# Patient Record
Sex: FEMALE | URBAN - METROPOLITAN AREA
[De-identification: names, ages, dates, MRNs, and addresses within clinical notes are randomized per-mention and may not be internally consistent; named-entity substitution may affect disease eponyms.]

---

## 2021-09-15 ENCOUNTER — TRANSCRIBE ORDER (OUTPATIENT)
Dept: SCHEDULING | Age: 61
End: 2021-09-15

## 2021-09-15 DIAGNOSIS — M81.0 POSTMENOPAUSAL OSTEOPOROSIS: Primary | ICD-10-CM

## 2021-09-15 DIAGNOSIS — M79.604 LEG PAIN, RIGHT: ICD-10-CM

## 2021-09-15 DIAGNOSIS — M79.662 PAIN OF LEFT LOWER LEG: ICD-10-CM

## 2023-01-31 ENCOUNTER — HOSPITAL ENCOUNTER (INPATIENT)
Age: 63
LOS: 3 days | Discharge: HOME HEALTH | End: 2023-02-03
Attending: EMERGENCY MEDICINE | Admitting: INTERNAL MEDICINE
Payer: MEDICARE

## 2023-01-31 ENCOUNTER — APPOINTMENT (OUTPATIENT)
Dept: MRI IMAGING | Age: 63
End: 2023-01-31
Attending: INTERNAL MEDICINE
Payer: MEDICARE

## 2023-01-31 ENCOUNTER — APPOINTMENT (OUTPATIENT)
Dept: CT IMAGING | Age: 63
End: 2023-01-31
Attending: EMERGENCY MEDICINE
Payer: MEDICARE

## 2023-01-31 DIAGNOSIS — I10 MALIGNANT HYPERTENSION: ICD-10-CM

## 2023-01-31 DIAGNOSIS — I63.9 CEREBROVASCULAR ACCIDENT (CVA), UNSPECIFIED MECHANISM (HCC): Primary | ICD-10-CM

## 2023-01-31 DIAGNOSIS — R53.1 LEFT-SIDED WEAKNESS: ICD-10-CM

## 2023-01-31 LAB
ALBUMIN SERPL-MCNC: 3.8 G/DL (ref 3.5–5)
ALBUMIN/GLOB SERPL: 1 (ref 1.1–2.2)
ALP SERPL-CCNC: 95 U/L (ref 45–117)
ALT SERPL-CCNC: 10 U/L (ref 12–78)
ANION GAP SERPL CALC-SCNC: 5 MMOL/L (ref 5–15)
APPEARANCE UR: ABNORMAL
AST SERPL W P-5'-P-CCNC: 10 U/L (ref 15–37)
ATRIAL RATE: 61 BPM
BACTERIA URNS QL MICRO: ABNORMAL /HPF
BASOPHILS # BLD: 0 K/UL (ref 0–0.1)
BASOPHILS NFR BLD: 0 % (ref 0–1)
BILIRUB SERPL-MCNC: 0.3 MG/DL (ref 0.2–1)
BILIRUB UR QL: NEGATIVE
BUN SERPL-MCNC: 29 MG/DL (ref 6–20)
BUN/CREAT SERPL: 14 (ref 12–20)
CA-I BLD-MCNC: 9.3 MG/DL (ref 8.5–10.1)
CALCULATED P AXIS, ECG09: 66 DEGREES
CALCULATED R AXIS, ECG10: 16 DEGREES
CALCULATED T AXIS, ECG11: 79 DEGREES
CHLORIDE SERPL-SCNC: 106 MMOL/L (ref 97–108)
CO2 SERPL-SCNC: 26 MMOL/L (ref 21–32)
COLOR UR: YELLOW
CREAT SERPL-MCNC: 2.11 MG/DL (ref 0.55–1.02)
DIAGNOSIS, 93000: NORMAL
DIFFERENTIAL METHOD BLD: ABNORMAL
EOSINOPHIL # BLD: 0.1 K/UL (ref 0–0.4)
EOSINOPHIL NFR BLD: 1 % (ref 0–7)
EPITH CASTS URNS QL MICRO: ABNORMAL /LPF
ERYTHROCYTE [DISTWIDTH] IN BLOOD BY AUTOMATED COUNT: 15.4 % (ref 11.5–14.5)
GLOBULIN SER CALC-MCNC: 3.8 G/DL (ref 2–4)
GLUCOSE BLD STRIP.AUTO-MCNC: 107 MG/DL (ref 65–100)
GLUCOSE BLD STRIP.AUTO-MCNC: 77 MG/DL (ref 65–100)
GLUCOSE SERPL-MCNC: 186 MG/DL (ref 65–100)
GLUCOSE UR STRIP.AUTO-MCNC: NEGATIVE MG/DL
HCT VFR BLD AUTO: 39.8 % (ref 35–47)
HGB BLD-MCNC: 12.8 G/DL (ref 11.5–16)
HGB UR QL STRIP: ABNORMAL
IMM GRANULOCYTES # BLD AUTO: 0 K/UL (ref 0–0.04)
IMM GRANULOCYTES NFR BLD AUTO: 0 % (ref 0–0.5)
INR PPP: 1 (ref 0.9–1.1)
KETONES UR QL STRIP.AUTO: NEGATIVE MG/DL
LEUKOCYTE ESTERASE UR QL STRIP.AUTO: ABNORMAL
LYMPHOCYTES # BLD: 3.2 K/UL (ref 0.8–3.5)
LYMPHOCYTES NFR BLD: 27 % (ref 12–49)
MCH RBC QN AUTO: 27.2 PG (ref 26–34)
MCHC RBC AUTO-ENTMCNC: 32.2 G/DL (ref 30–36.5)
MCV RBC AUTO: 84.5 FL (ref 80–99)
MONOCYTES # BLD: 0.4 K/UL (ref 0–1)
MONOCYTES NFR BLD: 3 % (ref 5–13)
MUCOUS THREADS URNS QL MICRO: ABNORMAL /LPF
NEUTS SEG # BLD: 8.2 K/UL (ref 1.8–8)
NEUTS SEG NFR BLD: 69 % (ref 32–75)
NITRITE UR QL STRIP.AUTO: NEGATIVE
NRBC # BLD: 0 K/UL (ref 0–0.01)
NRBC BLD-RTO: 0 PER 100 WBC
P-R INTERVAL, ECG05: 168 MS
PERFORMED BY, TECHID: ABNORMAL
PERFORMED BY, TECHID: NORMAL
PH UR STRIP: 6 (ref 5–8)
PLATELET # BLD AUTO: 300 K/UL (ref 150–400)
PMV BLD AUTO: 9.1 FL (ref 8.9–12.9)
POTASSIUM SERPL-SCNC: 4.5 MMOL/L (ref 3.5–5.1)
PROT SERPL-MCNC: 7.6 G/DL (ref 6.4–8.2)
PROT UR STRIP-MCNC: >300 MG/DL
PROTHROMBIN TIME: 13.6 SEC (ref 11.9–14.6)
Q-T INTERVAL, ECG07: 458 MS
QRS DURATION, ECG06: 76 MS
QTC CALCULATION (BEZET), ECG08: 461 MS
RBC # BLD AUTO: 4.71 M/UL (ref 3.8–5.2)
RBC #/AREA URNS HPF: ABNORMAL /HPF (ref 0–5)
SODIUM SERPL-SCNC: 137 MMOL/L (ref 136–145)
SP GR UR REFRACTOMETRY: 1.01 (ref 1–1.03)
SP GR UR REFRACTOMETRY: 1.01 (ref 1–1.03)
TROPONIN-HIGH SENSITIVITY: 11 NG/L (ref 0–51)
UROBILINOGEN UR QL STRIP.AUTO: 0.1 EU/DL (ref 0.1–1)
VENTRICULAR RATE, ECG03: 61 BPM
WBC # BLD AUTO: 11.9 K/UL (ref 3.6–11)
WBC URNS QL MICRO: >100 /HPF (ref 0–4)

## 2023-01-31 PROCEDURE — 74011250637 HC RX REV CODE- 250/637: Performed by: EMERGENCY MEDICINE

## 2023-01-31 PROCEDURE — 92610 EVALUATE SWALLOWING FUNCTION: CPT

## 2023-01-31 PROCEDURE — 82962 GLUCOSE BLOOD TEST: CPT

## 2023-01-31 PROCEDURE — 65270000029 HC RM PRIVATE

## 2023-01-31 PROCEDURE — 80053 COMPREHEN METABOLIC PANEL: CPT

## 2023-01-31 PROCEDURE — 84484 ASSAY OF TROPONIN QUANT: CPT

## 2023-01-31 PROCEDURE — 36415 COLL VENOUS BLD VENIPUNCTURE: CPT

## 2023-01-31 PROCEDURE — 81001 URINALYSIS AUTO W/SCOPE: CPT

## 2023-01-31 PROCEDURE — 74011250637 HC RX REV CODE- 250/637: Performed by: INTERNAL MEDICINE

## 2023-01-31 PROCEDURE — 70551 MRI BRAIN STEM W/O DYE: CPT

## 2023-01-31 PROCEDURE — 93005 ELECTROCARDIOGRAM TRACING: CPT

## 2023-01-31 PROCEDURE — 70450 CT HEAD/BRAIN W/O DYE: CPT

## 2023-01-31 PROCEDURE — 85025 COMPLETE CBC W/AUTO DIFF WBC: CPT

## 2023-01-31 PROCEDURE — 99285 EMERGENCY DEPT VISIT HI MDM: CPT

## 2023-01-31 PROCEDURE — 85610 PROTHROMBIN TIME: CPT

## 2023-01-31 PROCEDURE — 74011250636 HC RX REV CODE- 250/636: Performed by: EMERGENCY MEDICINE

## 2023-01-31 RX ORDER — GUAIFENESIN 100 MG/5ML
81 LIQUID (ML) ORAL DAILY
Status: DISCONTINUED | OUTPATIENT
Start: 2023-02-01 | End: 2023-02-03 | Stop reason: HOSPADM

## 2023-01-31 RX ORDER — ATORVASTATIN CALCIUM 40 MG/1
40 TABLET, FILM COATED ORAL
Status: DISCONTINUED | OUTPATIENT
Start: 2023-01-31 | End: 2023-02-03 | Stop reason: HOSPADM

## 2023-01-31 RX ORDER — HYDRALAZINE HYDROCHLORIDE 20 MG/ML
20 INJECTION INTRAMUSCULAR; INTRAVENOUS ONCE
Status: COMPLETED | OUTPATIENT
Start: 2023-01-31 | End: 2023-01-31

## 2023-01-31 RX ORDER — SIMVASTATIN 20 MG/1
TABLET, FILM COATED ORAL
COMMUNITY
End: 2023-02-03

## 2023-01-31 RX ORDER — ACARBOSE 50 MG/1
TABLET ORAL
COMMUNITY

## 2023-01-31 RX ORDER — ATENOLOL 50 MG/1
TABLET ORAL DAILY
COMMUNITY
End: 2023-02-03

## 2023-01-31 RX ORDER — HYDROCHLOROTHIAZIDE 12.5 MG/1
12.5 TABLET ORAL DAILY
COMMUNITY

## 2023-01-31 RX ORDER — LORAZEPAM 2 MG/ML
1 INJECTION INTRAMUSCULAR ONCE
Status: COMPLETED | OUTPATIENT
Start: 2023-01-31 | End: 2023-01-31

## 2023-01-31 RX ORDER — GLIPIZIDE 5 MG/1
TABLET ORAL DAILY
COMMUNITY

## 2023-01-31 RX ORDER — ACETAMINOPHEN 325 MG/1
650 TABLET ORAL
Status: COMPLETED | OUTPATIENT
Start: 2023-01-31 | End: 2023-01-31

## 2023-01-31 RX ORDER — ACETAMINOPHEN 325 MG/1
650 TABLET ORAL
Status: DISCONTINUED | OUTPATIENT
Start: 2023-01-31 | End: 2023-02-03 | Stop reason: HOSPADM

## 2023-01-31 RX ORDER — ACETAMINOPHEN 650 MG/1
650 SUPPOSITORY RECTAL
Status: DISCONTINUED | OUTPATIENT
Start: 2023-01-31 | End: 2023-02-03 | Stop reason: HOSPADM

## 2023-01-31 RX ADMIN — ATORVASTATIN CALCIUM 40 MG: 40 TABLET, FILM COATED ORAL at 21:20

## 2023-01-31 RX ADMIN — ACETAMINOPHEN 650 MG: 325 TABLET ORAL at 20:22

## 2023-01-31 RX ADMIN — HYDRALAZINE HYDROCHLORIDE 20 MG: 20 INJECTION, SOLUTION INTRAMUSCULAR; INTRAVENOUS at 15:24

## 2023-01-31 RX ADMIN — ACETAMINOPHEN 650 MG: 325 TABLET ORAL at 11:55

## 2023-01-31 RX ADMIN — LORAZEPAM 1 MG: 2 INJECTION INTRAMUSCULAR; INTRAVENOUS at 15:24

## 2023-01-31 NOTE — ROUTINE PROCESS
TRANSFER - OUT REPORT:    Verbal report given to robe on Janice Cartagena  being transferred to [facility] for routine progression of care       Report consisted of patients Situation, Background, Assessment and   Recommendations(SBAR). Information from the following report(s) SBAR was reviewed with the receiving nurse. Lines:   Peripheral IV 01/31/23 Right Arm (Active)   Site Assessment Clean, dry, & intact 01/31/23 1051   Phlebitis Assessment 0 01/31/23 1051   Infiltration Assessment 0 01/31/23 1051   Dressing Status Clean, dry, & intact 01/31/23 1051   Dressing Type Tape;Transparent 01/31/23 1051   Hub Color/Line Status Pink;Flushed 01/31/23 1051   Action Taken Blood drawn 01/31/23 1051   Alcohol Cap Used Yes 01/31/23 1051        Opportunity for questions and clarification was provided.       Patient transported with:   [employer] Odomzo Counseling- I discussed with the patient the risks of Odomzo including but not limited to nausea, vomiting, diarrhea, constipation, weight loss, changes in the sense of taste, decreased appetite, muscle spasms, and hair loss.  The patient verbalized understanding of the proper use and possible adverse effects of Odomzo.  All of the patient's questions and concerns were addressed.

## 2023-01-31 NOTE — PROGRESS NOTES
SPEECH LANGUAGE PATHOLOGY BEDSIDE SWALLOW EVALUATIONS  Patient: Douglas Cyr  (14 y.o. )  Date: 1/31/2023  Primary Diagnosis: CVA   Precautions:  Fall    ASSESSMENT :  Patient is A&Ox4 and follows basic commands. No facial droop or dysarthria. Patient reports dysarthria comes and goes. Informally, language is wfl. Patient presents w/ wfl oropharyngeal swallow fxn. Oral phase c/b slow but adequate mastication of hard solids. Pharyngeal phase c/b timely swallow and HLE Is wfl upon palpation. No overt s/s of pen/asp observed. PLAN :  Recommendations and Planned Interventions:  Rec easy to chew w/ thin liquids and general asp/GERD precautions. No follow up for dysphagia. Speech eval pending MRI. SUBJECTIVE:   Patient reports L sided weakness and R sided paraesthesia. She denies dysphagia does report she has softer diet (stays away from hard vegetables). OBJECTIVE:   Patient admitted w/ left sided weakness and facial droop x3 days. Per patient had fall at home. Past Medical History:   Diagnosis Date    Chronic kidney disease     Diabetes ([facility] [address]     Hypertension     Stroke ([facility] [address]          Diet prior to admission: Soft/thin  Current Diet:  DIET ADULT     Cognitive and Communication Status:  Neurologic State: Alert  Orientation Level: Oriented X4  Cognition: Follows commands  Perception: Appears intact  Perseveration: No perseveration noted  Safety/Judgement: Awareness of environment  Swallowing Evaluation:   Oral Assessment:  Oral Assessment  Labial: No impairment  Dentition: Edentulous  Oral Hygiene: wfl  Lingual: No impairment  Velum: No impairment  Mandible: No impairment  P.O. Trials:  Patient Position: upright in bed  Vocal quality prior to P.O.: No impairment  Consistency Presented: Thin liquid; Solid;Puree  How Presented: Self-fed/presented;Cup/sip; Successive swallows;Straw     Bolus Acceptance: No impairment  Bolus Formation/Control: No impairment     Propulsion: No impairment  Oral Residue: None  Initiation of Swallow: No impairment  Laryngeal Elevation: Functional  Aspiration Signs/Symptoms: None  Pharyngeal Phase Characteristics: No impairment, issues, or problems              Oral Phase Severity: Minimal  Pharyngeal Phase Severity : No impairment  Voice:  Vocal Quality: No impairment     Pain:  Pain Scale 1: Numeric (0 - 10)  Pain Intensity 1: 7         After treatment:   Patient left in no apparent distress in bed, Call bell within reach, and Nursing notified    COMMUNICATION/EDUCATION:   Patient was educated regarding diet recs, s/s aspiration, and aspiration precautions. She demonstrated Good understanding as evidenced by engagement and appropriate questions. The patient's plan of care including recommendations, planned interventions, and recommended diet changes were discussed with: Registered nurse.      Thank you for this referral.  Margie Conklin M.S., M.Ed., CCC-SLP  Time Calculation: 16 mins

## 2023-01-31 NOTE — ED TRIAGE NOTES
Pt states she's had severe L sided weakness since Saturday. Slurred speech, Left facial droop. Pt is A&O x4. Glucose 274.

## 2023-01-31 NOTE — ED PROVIDER NOTES
[facility] EMERGENCY DEPT  EMERGENCY DEPARTMENT HISTORY AND PHYSICAL EXAM      Date: 1/31/2023  Patient Name: Henny Giang  MRN: [id number]  [place] 1960  Date of evaluation: 1/31/2023  Provider: Demetrius Ludwig MD   Note Started: 10:45 AM 1/31/23    HISTORY OF PRESENT ILLNESS     Chief Complaint   Patient presents with    Extremity Weakness       History Provided By: Patient    HPI: Henny Giang, 60 y.o. female presents to the emergency department complaint of left-sided weakness that started 3 days ago. Patient reports that the weakness was initially noticed when she was leaving the bathroom and caused her to fall. Patient reports mild residual weakness previously from a old stroke, but states this was weaker than before. PAST MEDICAL HISTORY   Past Medical History:  Diabetes, hypertension, CVA    Past Surgical History:  No past surgical history on file. Family History:  No family history on file. Social History:  Local resident, retired    Allergies:  No Known Allergies    PCP: No primary care provider on file. Current Meds:   Previous Medications    No medications on file       REVIEW OF SYSTEMS   Review of Systems  Positives and Pertinent negatives as per HPI. PHYSICAL EXAM     ED Triage Vitals   ED Encounter Vitals Group      BP       Pulse       Resp       Temp       Temp src       SpO2       Weight       Height       Physical Exam  Physical Exam  Constitutional:       General: No acute distress. Appearance: Normal appearance. Not toxic-appearing. HENT:      Head: Normocephalic and atraumatic. Nose: Nose normal.      Mouth/Throat:      Mouth: Mucous membranes are moist.   Eyes:      Extraocular Movements: Extraocular movements intact. Pupils: Pupils are equal, round, and reactive to light. Cardiovascular:      Rate and Rhythm: Normal rate. Pulses: Normal pulses. Pulmonary:      Effort: Pulmonary effort is normal.      Breath sounds: No stridor. Abdominal:      General: Abdomen is flat. There is no distension. Musculoskeletal:         General: Normal range of motion. Cervical back: Normal range of motion and neck supple. Skin:     General: Skin is warm and dry. Capillary Refill: Capillary refill takes less than 2 seconds. Neurological:      General: Left upper and lower extremity weakness noted. Left facial droop. Mental Status: Alert and oriented to person, place, and time. Psychiatric:         Mood and Affect: Mood normal.         Behavior: Behavior normal.     SCREENINGS        1/31/2023     1047   NIH Stroke Scale     Interval Baseline   Level of Conciousness (1a) Alert   LOC Questions (1b) Answers both questions correctly   LOC Commands (1c) Performs both tasks correctly   Best Gaze (2) Normal   Visual (3) Partial hemianopiaVisual (3). Partial hemianopia. Data is abnormal. Taken on 1/31/23 1047   Facial Palsy (4) Partial paralysisFacial Palsy (4). Partial paralysis. Data is abnormal. Taken on 1/31/23 1047   Motor Arm, Left (5a) Drift, but does not hit bedMotor Arm, Left (5a). Drift, but does not hit bed. Data is abnormal. Taken on 1/31/23 1047   Motor Arm, Right (5b) No drift   Motor Leg, Left (6a) Some effort against gravityMotor Leg, Left (6a). Some effort against gravity. Data is abnormal. Taken on 1/31/23 1047   Motor Leg, Right (6b) No drift   Limb Ataxia (7) Absent   Sensory (8) Mild to ModerateSensory (8). Mild to Moderate.  Data is abnormal. Taken on 1/31/23 1047   Best Language (9) No aphasia   Dysarthria (10) Normal   Extinction and Inattention (11) No abnormality   Total 7            No data recorded        LAB, EKG AND DIAGNOSTIC RESULTS   Labs:  Recent Results (from the past 12 hour(s))   TROPONIN-HIGH SENSITIVITY    Collection Time: 01/31/23 10:47 AM   Result Value Ref Range    Troponin-High Sensitivity 11 0 - 51 ng/L   CBC WITH AUTOMATED DIFF    Collection Time: 01/31/23 10:48 AM   Result Value Ref Range    WBC 11.9 (H) 3.6 - 11.0 K/uL    RBC 4.71 3.80 - 5.20 M/uL    HGB 12.8 11.5 - 16.0 g/dL    HCT 39.8 35.0 - 47.0 %    MCV 84.5 80.0 - 99.0 FL    MCH 27.2 26.0 - 34.0 PG    MCHC 32.2 30.0 - 36.5 g/dL    RDW 15.4 (H) 11.5 - 14.5 %    PLATELET 006 551 - 788 K/uL    MPV 9.1 8.9 - 12.9 FL    NRBC 0.0 0.0  WBC    ABSOLUTE NRBC 0.00 0.00 - 0.01 K/uL    NEUTROPHILS 69 32 - 75 %    LYMPHOCYTES 27 12 - 49 %    MONOCYTES 3 (L) 5 - 13 %    EOSINOPHILS 1 0 - 7 %    BASOPHILS 0 0 - 1 %    IMMATURE GRANULOCYTES 0 0 - 0.5 %    ABS. NEUTROPHILS 8.2 (H) 1.8 - 8.0 K/UL    ABS. LYMPHOCYTES 3.2 0.8 - 3.5 K/UL    ABS. MONOCYTES 0.4 0.0 - 1.0 K/UL    ABS. EOSINOPHILS 0.1 0.0 - 0.4 K/UL    ABS. BASOPHILS 0.0 0.0 - 0.1 K/UL    ABS. IMM. GRANS. 0.0 0.00 - 0.04 K/UL    DF AUTOMATED     PROTHROMBIN TIME + INR    Collection Time: 01/31/23 10:48 AM   Result Value Ref Range    Prothrombin time 13.6 11.9 - 14.6 sec    INR 1.0 0.9 - 1.1     METABOLIC PANEL, COMPREHENSIVE    Collection Time: 01/31/23 10:48 AM   Result Value Ref Range    Sodium 137 136 - 145 mmol/L    Potassium 4.5 3.5 - 5.1 mmol/L    Chloride 106 97 - 108 mmol/L    CO2 26 21 - 32 mmol/L    Anion gap 5 5 - 15 mmol/L    Glucose 186 (H) 65 - 100 mg/dL    BUN 29 (H) 6 - 20 mg/dL    Creatinine 2.11 (H) 0.55 - 1.02 mg/dL    BUN/Creatinine ratio 14 12 - 20      eGFR 26 (L) >60 ml/min/1.73m2    Calcium 9.3 8.5 - 10.1 mg/dL    Bilirubin, total 0.3 0.2 - 1.0 mg/dL    AST (SGOT) 10 (L) 15 - 37 U/L    ALT (SGPT) 10 (L) 12 - 78 U/L    Alk. phosphatase 95 45 - 117 U/L    Protein, total 7.6 6.4 - 8.2 g/dL    Albumin 3.8 3.5 - 5.0 g/dL    Globulin 3.8 2.0 - 4.0 g/dL    A-G Ratio 1.0 (L) 1.1 - 2.2             Interpretation per the Radiologist below, if available at the time of this note:  CT HEAD WO CONT    Result Date: 1/31/2023  HEAD CT WITHOUT CONTRAST: 1/31/2023 11:43 AM INDICATION: Left-sided weakness, prior cerebrovascular accident. COMPARISON: 3/11/2011.  PROCEDURE: Axial images of the head were obtained without contrast. Coronal and sagittal reformats were performed. CT dose reduction was achieved through use of a standardized protocol tailored for this examination and automatic exposure control for dose modulation. FINDINGS: A small right posterior cerebellar infarction is new from 2011, but probably chronic. Periventricular white matter hypodensity is nonspecific, but consistent with chronic small vessel ischemic disease. The ventricles and sulci are appropriate in size and configuration for age. No ill-defined loss of gray-white differentiation to suggest late acute or early subacute infarction. No mass effect or intracranial hemorrhage. Small, age indeterminate, right cerebellar infarction; probably chronic. PROCEDURES   Unless otherwise noted below, none  Performed by: Milton Holt MD   Procedures      CRITICAL CARE TIME   CRITICAL CARE NOTE :  IMPENDING DETERIORATION -CNS  ASSOCIATED RISK FACTORS -hypertension  MANAGEMENT- Bedside Assessment and Supervision of Care  INTERPRETATION -  CT Scan and Blood Pressure  INTERVENTIONS - hemodynamic mngmt  CASE REVIEW - Hospitalist/Intensivist and Nursing  TREATMENT RESPONSE -Stable  PERFORMED BY - Self    NOTES   :  I have spent 35 minutes of critical care time involved in lab review, consultations with specialist, family decision- making, bedside attention and documentation. This time excludes time spent in any separate billed procedures. During this entire length of time I was immediately available to the patient .     Milton Holt MD     EMERGENCY DEPARTMENT COURSE and DIFFERENTIAL DIAGNOSIS/MDM   Vitals:    Vitals:    01/31/23 1051   BP: (!) 192/68   Pulse: 68   Resp: 16   Temp: 98.3 °F (36.8 °C)   SpO2: 100%   Weight: 80.7 kg (178 lb)   Height: 5' 7\" (1.702 m)        Patient was given the following medications:  Medications   LORazepam (ATIVAN) injection 1 mg (has no administration in time range)   hydrALAZINE (APRESOLINE) 20 mg/mL injection 20 mg (has no administration in time range)   acetaminophen (TYLENOL) tablet 650 mg (650 mg Oral Given 1/31/23 1155)       CONSULTS: (Who and What was discussed)  None     Chronic Conditions: Hypertension, diabetes, prior CVA  Social Determinants affecting Dx or Tx: Patient lacks support at home or lives alone. Counseling: HYPERTENSION COUNSELING: Education was provided to the patient today regarding their hypertension. Patient is made aware of their elevated blood pressure and is instructed to follow up this week with their Primary Care for a recheck. Patient is counseled regarding consequences of chronic, uncontrolled hypertension including kidney disease, heart disease, stroke or even death. Patient states their understanding and agrees to follow up this week. Additionally, during their visit, I discussed sodium restriction, maintaining ideal body weight and regular exercise program as physiologic means to achieve blood pressure control. The patient will strive towards this. Records Reviewed (source and summary of external notes): Nursing notes and EMS run sheet    CC/HPI Summary, DDx, ED Course, and Reassessment: Patient with worsening acute on chronic left-sided weakness. History of prior stroke, which is concern for me at this time. Symptoms have been going on for approximately 3 days, is outside of the window for intervention. Less likely infectious etiology given lack of fever. Disposition Considerations (Tests not done, Shared Decision Making, Pt Expectation of Test or Treatment.):  Given continued left-sided weakness and difficulty to care for self, will admit. Her continued poorly controlled hypertension can also be addressed during this admission, she is currently on antihypertensives as well as a statin and medicine for diabetes. ED FINAL IMPRESSION     1. Cerebrovascular accident (CVA), unspecified mechanism ([address]    2. Malignant hypertension    3. Left-sided weakness          DISPOSITION/PLAN   Admitted    Admit Note: Pt is being admitted by Dr Chiqui Melissa. The results of their tests and reason(s) for their admission have been discussed with pt and/or available family. They convey agreement and understanding for the need to be admitted and for the admission diagnosis. I am the Primary Clinician of Record. Cherelle Hernandez MD (electronically signed)    (Please note that parts of this dictation were completed with voice recognition software. Quite often unanticipated grammatical, syntax, homophones, and other interpretive errors are inadvertently transcribed by the computer software. Please disregards these errors.  Please excuse any errors that have escaped final proofreading.)

## 2023-01-31 NOTE — H&P
History & Physical    Primary Care Provider: None  Source of Information: Patient/family     History of Presenting Illness:   Shiv Miller is a 60 y.o. female with a history of HTN, DMII, HLD, and 20 pack year smoking who presents with a three day history of left-sided weakness in her leg and arm with facial droop. On 1/28/23, she had a fall that left her in the bathroom on the floor. She does not remember any loss of consciousness and was helped up by a neighbor who heard her yelling for help. Although she felt weak in her arms and legs, she thought the medicine she was taking would help with the symptoms. Due to this belief, she did not pursue medical treatment for three days even with worsening weakness on her left side. She admits to a frontal headache and has had some vision changes since Saturday. Denies any fever, chest pain, runny nose, shortness of breath, nausea, vomiting, diarrhea, and tingling in her legs. A CT head was done at her arrival in the ED and showed a likely chronic cerebellar lesion. Her creatinine was also elevated at 2. 11. Due to the possibility of a stroke and further need for workup and treatment, this patient is being admitted to the floor. Review of Systems:  A comprehensive review of systems was negative except for that written in the History of Present Illness. Past Medical History:   Diagnosis Date    Chronic kidney disease     Diabetes ([facility] [address]     Hypertension     Stroke [facility])         Past Surgical History:   Procedure Laterality Date    HX HYSTERECTOMY      age 21       Prior to Admission medications    Medication Sig Start Date End Date Taking? Authorizing Provider   atenoloL (TENORMIN) 50 mg tablet Take  by mouth daily. Yes Provider, Historical   acarbose (PRECOSE) 50 mg tablet Take  by mouth three (3) times daily (with meals). Yes Provider, Historical   glipiZIDE (GLUCOTROL) 5 mg tablet Take  by mouth daily.    Yes Provider, Historical hydroCHLOROthiazide (HYDRODIURIL) 12.5 mg tablet Take 12.5 mg by mouth daily. Yes Provider, Historical   simvastatin (ZOCOR) 20 mg tablet Take  by mouth nightly. Yes Provider, Historical       No Known Allergies     Family History   Problem Relation Age of Onset    Heart Disease Mother     Diabetes Mother     Cancer Sister     Diabetes Sister         Social History     Socioeconomic History    Marital status: SINGLE   Tobacco Use    Smoking status: Every Day     Packs/day: 0.50     Types: Cigarettes    Smokeless tobacco: Never   Substance and Sexual Activity    Alcohol use: Never    Drug use: Never            CODE STATUS:  DNR    Full x   Other      Objective:     Physical Exam:     Visit Vitals  BP (!) 192/68 (BP Patient Position: At rest)   Pulse 68   Temp 98.3 °F (36.8 °C)   Resp 16   Ht 5' 7\" (1.702 m)   Wt 80.7 kg (178 lb)   SpO2 100%   BMI 27.88 kg/m²      O2 Device: None (Room air)    General:  Alert, cooperative, no distress, appears stated age. Head:  Normocephalic, without obvious abnormality, atraumatic. Eyes:  Conjunctivae/corneas clear. PERRL, EOMs intact. Nose: Nares normal. Septum midline. Mucosa normal. No drainage or sinus tenderness. Throat: Lips, mucosa, and tongue normal. Teeth and gums normal.   Neck: Supple, symmetrical, trachea midline, no adenopathy, thyroid: no enlargement/tenderness/nodules, no carotid bruit and no JVD. Back:   Symmetric, no curvature. ROM normal. No CVA tenderness. Lungs:   Clear to auscultation bilaterally. Chest wall:  No tenderness or deformity. Heart:  Regular rate and rhythm, S1, S2 normal, no murmur, click, rub or gallop. Abdomen:   Soft, non-tender. Bowel sounds normal. No masses,  No organomegaly. Extremities: LUE 4/5, LLE 1/5, all other extremities 5/5. Pulses: 2+ and symmetric all extremities. Skin: Skin color, texture, turgor normal. No rashes or lesions   Neurologic: CNII-XII intact. Motor deficits as described above.        24 Hour Results:    Recent Results (from the past 24 hour(s))   TROPONIN-HIGH SENSITIVITY    Collection Time: 01/31/23 10:47 AM   Result Value Ref Range    Troponin-High Sensitivity 11 0 - 51 ng/L   CBC WITH AUTOMATED DIFF    Collection Time: 01/31/23 10:48 AM   Result Value Ref Range    WBC 11.9 (H) 3.6 - 11.0 K/uL    RBC 4.71 3.80 - 5.20 M/uL    HGB 12.8 11.5 - 16.0 g/dL    HCT 39.8 35.0 - 47.0 %    MCV 84.5 80.0 - 99.0 FL    MCH 27.2 26.0 - 34.0 PG    MCHC 32.2 30.0 - 36.5 g/dL    RDW 15.4 (H) 11.5 - 14.5 %    PLATELET 812 429 - 352 K/uL    MPV 9.1 8.9 - 12.9 FL    NRBC 0.0 0.0  WBC    ABSOLUTE NRBC 0.00 0.00 - 0.01 K/uL    NEUTROPHILS 69 32 - 75 %    LYMPHOCYTES 27 12 - 49 %    MONOCYTES 3 (L) 5 - 13 %    EOSINOPHILS 1 0 - 7 %    BASOPHILS 0 0 - 1 %    IMMATURE GRANULOCYTES 0 0 - 0.5 %    ABS. NEUTROPHILS 8.2 (H) 1.8 - 8.0 K/UL    ABS. LYMPHOCYTES 3.2 0.8 - 3.5 K/UL    ABS. MONOCYTES 0.4 0.0 - 1.0 K/UL    ABS. EOSINOPHILS 0.1 0.0 - 0.4 K/UL    ABS. BASOPHILS 0.0 0.0 - 0.1 K/UL    ABS. IMM. GRANS. 0.0 0.00 - 0.04 K/UL    DF AUTOMATED     PROTHROMBIN TIME + INR    Collection Time: 01/31/23 10:48 AM   Result Value Ref Range    Prothrombin time 13.6 11.9 - 14.6 sec    INR 1.0 0.9 - 1.1     METABOLIC PANEL, COMPREHENSIVE    Collection Time: 01/31/23 10:48 AM   Result Value Ref Range    Sodium 137 136 - 145 mmol/L    Potassium 4.5 3.5 - 5.1 mmol/L    Chloride 106 97 - 108 mmol/L    CO2 26 21 - 32 mmol/L    Anion gap 5 5 - 15 mmol/L    Glucose 186 (H) 65 - 100 mg/dL    BUN 29 (H) 6 - 20 mg/dL    Creatinine 2.11 (H) 0.55 - 1.02 mg/dL    BUN/Creatinine ratio 14 12 - 20      eGFR 26 (L) >60 ml/min/1.73m2    Calcium 9.3 8.5 - 10.1 mg/dL    Bilirubin, total 0.3 0.2 - 1.0 mg/dL    AST (SGOT) 10 (L) 15 - 37 U/L    ALT (SGPT) 10 (L) 12 - 78 U/L    Alk.  phosphatase 95 45 - 117 U/L    Protein, total 7.6 6.4 - 8.2 g/dL    Albumin 3.8 3.5 - 5.0 g/dL    Globulin 3.8 2.0 - 4.0 g/dL    A-G Ratio 1.0 (L) 1.1 - 2.2           Imaging: CT HEAD WO CONT   Final Result   Small, age indeterminate, right cerebellar infarction; probably   chronic. MRI BRAIN WO CONT    (Results Pending)   DUPLEX CAROTID BILATERAL    (Results Pending)           Assessment:   Suspected acute stroke--LLE and LUE weakness and L facial droop    CKD stage indeterminate, possible ERIN    Diabetes Mellitus Type II not requiring insulin    Hyperlipidemia    Hypertension, uncontrolled    Tobacco abuse, 20 pack years    History of recurrent UTIs    Obesity      Discussion/Medical Decision Making: Patient with numerous medical comorbidities, each with increased risk for mortality and morbidity if left untreated.   Patient requires medications with high risk of toxicity and need for intensive monitoring      Plan:   Admit to telemetruy  MRI brain w/o  Carotid Duplex  Limited echo with bubble study  Start high-intensity statin  Start aspirin 81mg  Permissive hypertension above 150/90 until further MRI study is back  Hold anti-coagulation for now, possibly start after MRI study is back  Follow creatinine  Pureed diet until SLP can evaluate  Order A1C  PT/OT to evaluate extent of neurological status  Frequent neuro checks    CODE STATUS: full    Social determinants of health: No family in area, patient has missed appointments d/t transportation problems, weakness affecting her ADLs    Home medications were reviewed    Signed By: Mary Patton MD     January 31, 2023

## 2023-01-31 NOTE — MED STUDENT NOTES
History & Physical    Primary Care Provider: None  Source of Information: Patient/family     History of Presenting Illness:   Stella Nova is a 60 y.o. female with a history of HTN, DMII, HLD, and 20 pack year smoking who presents with a three day history of left-sided weakness in her leg and arm with facial droop. On 1/28/23, she had a fall that left her in the bathroom on the floor. She does not remember any loss of consciousness and was helped up by a neighbor who heard her yelling for help. Although she felt weak in her arms and legs, she thought the medicine she was taking would help with the symptoms. Due to this belief, she did not pursue medical treatment for three days even with worsening weakness on her left side. She admits to a frontal headache and has had some vision changes since Saturday. Denies any fever, chest pain, runny nose, shortness of breath, nausea, vomiting, diarrhea, and tingling in her legs. A CT head was done at her arrival in the ED and showed a likely chronic cerebellar lesion. Her creatinine was also elevated at 2. 11. Due to the possibility of a stroke and further need for workup and treatment, this patient is being admitted to the floor. Review of Systems:  A comprehensive review of systems was negative except for that written in the History of Present Illness. Past Medical History:   Diagnosis Date    Chronic kidney disease     Diabetes ([facility] [address]     Hypertension     Stroke [facility])         Past Surgical History:   Procedure Laterality Date    HX HYSTERECTOMY      age 21       Prior to Admission medications    Medication Sig Start Date End Date Taking? Authorizing Provider   atenoloL (TENORMIN) 50 mg tablet Take  by mouth daily. Yes Provider, Historical   acarbose (PRECOSE) 50 mg tablet Take  by mouth three (3) times daily (with meals). Yes Provider, Historical   glipiZIDE (GLUCOTROL) 5 mg tablet Take  by mouth daily.    Yes Provider, Historical hydroCHLOROthiazide (HYDRODIURIL) 12.5 mg tablet Take 12.5 mg by mouth daily. Yes Provider, Historical   simvastatin (ZOCOR) 20 mg tablet Take  by mouth nightly. Yes Provider, Historical       No Known Allergies     Family History   Problem Relation Age of Onset    Heart Disease Mother     Diabetes Mother     Cancer Sister     Diabetes Sister         Social History     Socioeconomic History    Marital status: SINGLE   Tobacco Use    Smoking status: Every Day     Packs/day: 0.50     Types: Cigarettes    Smokeless tobacco: Never   Substance and Sexual Activity    Alcohol use: Never    Drug use: Never            CODE STATUS:  DNR    Full x   Other      Objective:     Physical Exam:     Visit Vitals  BP (!) 192/68 (BP Patient Position: At rest)   Pulse 68   Temp 98.3 °F (36.8 °C)   Resp 16   Ht 5' 7\" (1.702 m)   Wt 80.7 kg (178 lb)   SpO2 100%   BMI 27.88 kg/m²      O2 Device: None (Room air)    General:  Alert, cooperative, no distress, appears stated age. Head:  Normocephalic, without obvious abnormality, atraumatic. Eyes:  Conjunctivae/corneas clear. PERRL, EOMs intact. Nose: Nares normal. Septum midline. Mucosa normal. No drainage or sinus tenderness. Throat: Lips, mucosa, and tongue normal. Teeth and gums normal.   Neck: Supple, symmetrical, trachea midline, no adenopathy, thyroid: no enlargement/tenderness/nodules, no carotid bruit and no JVD. Back:   Symmetric, no curvature. ROM normal. No CVA tenderness. Lungs:   Clear to auscultation bilaterally. Chest wall:  No tenderness or deformity. Heart:  Regular rate and rhythm, S1, S2 normal, no murmur, click, rub or gallop. Abdomen:   Soft, non-tender. Bowel sounds normal. No masses,  No organomegaly. Extremities: LUE 4/5, LLE 1/5, all other extremities 5/5. Pulses: 2+ and symmetric all extremities. Skin: Skin color, texture, turgor normal. No rashes or lesions   Neurologic: CNII-XII intact. Motor deficits as described above.        24 Hour Results:    Recent Results (from the past 24 hour(s))   TROPONIN-HIGH SENSITIVITY    Collection Time: 01/31/23 10:47 AM   Result Value Ref Range    Troponin-High Sensitivity 11 0 - 51 ng/L   CBC WITH AUTOMATED DIFF    Collection Time: 01/31/23 10:48 AM   Result Value Ref Range    WBC 11.9 (H) 3.6 - 11.0 K/uL    RBC 4.71 3.80 - 5.20 M/uL    HGB 12.8 11.5 - 16.0 g/dL    HCT 39.8 35.0 - 47.0 %    MCV 84.5 80.0 - 99.0 FL    MCH 27.2 26.0 - 34.0 PG    MCHC 32.2 30.0 - 36.5 g/dL    RDW 15.4 (H) 11.5 - 14.5 %    PLATELET 391 042 - 513 K/uL    MPV 9.1 8.9 - 12.9 FL    NRBC 0.0 0.0  WBC    ABSOLUTE NRBC 0.00 0.00 - 0.01 K/uL    NEUTROPHILS 69 32 - 75 %    LYMPHOCYTES 27 12 - 49 %    MONOCYTES 3 (L) 5 - 13 %    EOSINOPHILS 1 0 - 7 %    BASOPHILS 0 0 - 1 %    IMMATURE GRANULOCYTES 0 0 - 0.5 %    ABS. NEUTROPHILS 8.2 (H) 1.8 - 8.0 K/UL    ABS. LYMPHOCYTES 3.2 0.8 - 3.5 K/UL    ABS. MONOCYTES 0.4 0.0 - 1.0 K/UL    ABS. EOSINOPHILS 0.1 0.0 - 0.4 K/UL    ABS. BASOPHILS 0.0 0.0 - 0.1 K/UL    ABS. IMM. GRANS. 0.0 0.00 - 0.04 K/UL    DF AUTOMATED     PROTHROMBIN TIME + INR    Collection Time: 01/31/23 10:48 AM   Result Value Ref Range    Prothrombin time 13.6 11.9 - 14.6 sec    INR 1.0 0.9 - 1.1     METABOLIC PANEL, COMPREHENSIVE    Collection Time: 01/31/23 10:48 AM   Result Value Ref Range    Sodium 137 136 - 145 mmol/L    Potassium 4.5 3.5 - 5.1 mmol/L    Chloride 106 97 - 108 mmol/L    CO2 26 21 - 32 mmol/L    Anion gap 5 5 - 15 mmol/L    Glucose 186 (H) 65 - 100 mg/dL    BUN 29 (H) 6 - 20 mg/dL    Creatinine 2.11 (H) 0.55 - 1.02 mg/dL    BUN/Creatinine ratio 14 12 - 20      eGFR 26 (L) >60 ml/min/1.73m2    Calcium 9.3 8.5 - 10.1 mg/dL    Bilirubin, total 0.3 0.2 - 1.0 mg/dL    AST (SGOT) 10 (L) 15 - 37 U/L    ALT (SGPT) 10 (L) 12 - 78 U/L    Alk.  phosphatase 95 45 - 117 U/L    Protein, total 7.6 6.4 - 8.2 g/dL    Albumin 3.8 3.5 - 5.0 g/dL    Globulin 3.8 2.0 - 4.0 g/dL    A-G Ratio 1.0 (L) 1.1 - 2.2           Imaging: CT HEAD WO CONT   Final Result   Small, age indeterminate, right cerebellar infarction; probably   chronic. MRI BRAIN WO CONT    (Results Pending)   DUPLEX CAROTID BILATERAL    (Results Pending)           Assessment:   Suspected acute stroke--LLE and LUE weakness and L facial droop    CKD stage indeterminate, possible ERIN    Diabetes Mellitus Type II not requiring insulin    Hyperlipidemia    Hypertension, uncontrolled    Tobacco abuse, 20 pack years    History of recurrent UTIs    Obesity      Discussion/Medical Decision Making: Patient with numerous medical comorbidities, each with increased risk for mortality and morbidity if left untreated.   Patient requires medications with high risk of toxicity and need for intensive monitoring      Plan:   MRI brain  Carotid Duplex  Limited echo with bubble study  Start high-intensity statin  Start aspirin 81mg  Permissive hypertension above 150/90 until further MRI study is back  Hold anti-coagulation for now, possibly start after MRI study is back  Follow creatinine  Pureed diet until SLP can evaluate  Order A1C  PT/OT to evaluate extent of neurological status  Frequent neuro checks    CODE STATUS: full    Social determinants of health: No family in area, patient has missed appointments d/t transportation problems, weakness affecting her ADLs    Home medications were reviewed    Signed By: Aniceto Londono     January 31, 2023

## 2023-02-01 ENCOUNTER — APPOINTMENT (OUTPATIENT)
Dept: NON INVASIVE DIAGNOSTICS | Age: 63
End: 2023-02-01
Attending: INTERNAL MEDICINE
Payer: MEDICARE

## 2023-02-01 LAB
ALBUMIN SERPL-MCNC: 3.6 G/DL (ref 3.5–5)
ANION GAP SERPL CALC-SCNC: 7 MMOL/L (ref 5–15)
BUN SERPL-MCNC: 35 MG/DL (ref 6–20)
BUN/CREAT SERPL: 15 (ref 12–20)
CA-I BLD-MCNC: 9.1 MG/DL (ref 8.5–10.1)
CHLORIDE SERPL-SCNC: 107 MMOL/L (ref 97–108)
CHOLEST SERPL-MCNC: 198 MG/DL
CO2 SERPL-SCNC: 25 MMOL/L (ref 21–32)
CREAT SERPL-MCNC: 2.34 MG/DL (ref 0.55–1.02)
ECHO AO ROOT DIAM: 3 CM
ECHO AO ROOT INDEX: 1.56 CM/M2
ECHO AV PEAK GRADIENT: 5 MMHG
ECHO AV PEAK VELOCITY: 1.1 M/S
ECHO AV VELOCITY RATIO: 0.82
ECHO EST RA PRESSURE: 3 MMHG
ECHO LA DIAMETER INDEX: 1.35 CM/M2
ECHO LA DIAMETER: 2.6 CM
ECHO LA TO AORTIC ROOT RATIO: 0.87
ECHO LV EJECTION FRACTION BIPLANE: 63 % (ref 55–100)
ECHO LV FRACTIONAL SHORTENING: 35 % (ref 28–44)
ECHO LV INTERNAL DIMENSION DIASTOLE INDEX: 2.24 CM/M2
ECHO LV INTERNAL DIMENSION DIASTOLIC: 4.3 CM (ref 3.9–5.3)
ECHO LV INTERNAL DIMENSION SYSTOLIC INDEX: 1.46 CM/M2
ECHO LV INTERNAL DIMENSION SYSTOLIC: 2.8 CM
ECHO LV IVSD: 1.5 CM (ref 0.6–0.9)
ECHO LV MASS 2D: 196.1 G (ref 67–162)
ECHO LV MASS INDEX 2D: 102.1 G/M2 (ref 43–95)
ECHO LV POSTERIOR WALL DIASTOLIC: 1 CM (ref 0.6–0.9)
ECHO LV RELATIVE WALL THICKNESS RATIO: 0.47
ECHO LVOT PEAK GRADIENT: 3 MMHG
ECHO LVOT PEAK VELOCITY: 0.9 M/S
ECHO PULMONARY ARTERY END DIASTOLIC PRESSURE: 6 MMHG
ECHO PV MAX VELOCITY: 0.8 M/S
ECHO PV PEAK GRADIENT: 3 MMHG
ECHO PV REGURGITANT MAX VELOCITY: 1.2 M/S
ECHO RIGHT VENTRICULAR SYSTOLIC PRESSURE (RVSP): 30 MMHG
ECHO RV INTERNAL DIMENSION: 3.2 CM
ECHO TV REGURGITANT MAX VELOCITY: 2.61 M/S
ECHO TV REGURGITANT PEAK GRADIENT: 27 MMHG
EST. AVERAGE GLUCOSE BLD GHB EST-MCNC: 166 MG/DL
GLUCOSE BLD STRIP.AUTO-MCNC: 165 MG/DL (ref 65–100)
GLUCOSE BLD STRIP.AUTO-MCNC: 184 MG/DL (ref 65–100)
GLUCOSE BLD STRIP.AUTO-MCNC: 186 MG/DL (ref 65–100)
GLUCOSE SERPL-MCNC: 99 MG/DL (ref 65–100)
HBA1C MFR BLD: 7.4 % (ref 4–5.6)
HDLC SERPL-MCNC: 34 MG/DL
HDLC SERPL: 5.8 (ref 0–5)
LDLC SERPL CALC-MCNC: 132 MG/DL (ref 0–100)
LIPID PROFILE,FLP: ABNORMAL
PERFORMED BY, TECHID: ABNORMAL
PHOSPHATE SERPL-MCNC: 4.7 MG/DL (ref 2.6–4.7)
POTASSIUM SERPL-SCNC: 4.2 MMOL/L (ref 3.5–5.1)
SODIUM SERPL-SCNC: 139 MMOL/L (ref 136–145)
TRIGL SERPL-MCNC: 160 MG/DL (ref ?–150)
VLDLC SERPL CALC-MCNC: 32 MG/DL

## 2023-02-01 PROCEDURE — 82962 GLUCOSE BLOOD TEST: CPT

## 2023-02-01 PROCEDURE — 97530 THERAPEUTIC ACTIVITIES: CPT

## 2023-02-01 PROCEDURE — 97161 PT EVAL LOW COMPLEX 20 MIN: CPT

## 2023-02-01 PROCEDURE — 65270000029 HC RM PRIVATE

## 2023-02-01 PROCEDURE — 74011250637 HC RX REV CODE- 250/637: Performed by: INTERNAL MEDICINE

## 2023-02-01 PROCEDURE — 80061 LIPID PANEL: CPT

## 2023-02-01 PROCEDURE — 36415 COLL VENOUS BLD VENIPUNCTURE: CPT

## 2023-02-01 PROCEDURE — 97165 OT EVAL LOW COMPLEX 30 MIN: CPT

## 2023-02-01 PROCEDURE — 93308 TTE F-UP OR LMTD: CPT

## 2023-02-01 PROCEDURE — 83036 HEMOGLOBIN GLYCOSYLATED A1C: CPT

## 2023-02-01 PROCEDURE — 93880 EXTRACRANIAL BILAT STUDY: CPT

## 2023-02-01 PROCEDURE — 80069 RENAL FUNCTION PANEL: CPT

## 2023-02-01 RX ORDER — ACARBOSE 50 MG/1
50 TABLET ORAL
Status: DISCONTINUED | OUTPATIENT
Start: 2023-02-01 | End: 2023-02-03 | Stop reason: HOSPADM

## 2023-02-01 RX ORDER — ATENOLOL 50 MG/1
50 TABLET ORAL DAILY
Status: DISCONTINUED | OUTPATIENT
Start: 2023-02-01 | End: 2023-02-02

## 2023-02-01 RX ORDER — HYDROCHLOROTHIAZIDE 25 MG/1
12.5 TABLET ORAL DAILY
Status: DISCONTINUED | OUTPATIENT
Start: 2023-02-01 | End: 2023-02-03 | Stop reason: HOSPADM

## 2023-02-01 RX ADMIN — ASPIRIN 81 MG CHEWABLE TABLET 81 MG: 81 TABLET CHEWABLE at 09:59

## 2023-02-01 RX ADMIN — ACETAMINOPHEN 650 MG: 325 TABLET ORAL at 03:45

## 2023-02-01 RX ADMIN — ACARBOSE 50 MG: 50 TABLET ORAL at 18:50

## 2023-02-01 RX ADMIN — ACARBOSE 50 MG: 50 TABLET ORAL at 09:59

## 2023-02-01 RX ADMIN — HYDROCHLOROTHIAZIDE 12.5 MG: 25 TABLET ORAL at 09:59

## 2023-02-01 RX ADMIN — ATORVASTATIN CALCIUM 40 MG: 40 TABLET, FILM COATED ORAL at 21:22

## 2023-02-01 RX ADMIN — ATENOLOL 50 MG: 50 TABLET ORAL at 09:59

## 2023-02-01 NOTE — PROGRESS NOTES
Physician Progress Note      Carolyn Huerta  [facility] #:                  [id number]  :                       1960  ADMIT DATE:       2023 10:29 AM  DISCH DATE:  RESPONDING  PROVIDER #:        Molly Patton MD        QUERY TEXT:    Stage of Chronic Kidney Disease: Please provide further specificity, if known. Clinical indicators include: creatinine, chronic kidney disease, bun, bun/creatinine  Options provided:  -- Chronic kidney disease stage 1  -- Chronic kidney disease stage 2  -- Chronic kidney disease stage 3  -- Chronic kidney disease stage 3a  -- Chronic kidney disease stage 3b  -- Chronic kidney disease stage 4  -- Chronic kidney disease stage 5  -- Chronic kidney disease stage 5, requiring dialysis  -- End stage renal disease  -- Other - I will add my own diagnosis  -- Disagree - Not applicable / Not valid  -- Disagree - Clinically Unable to determine / Unknown        PROVIDER RESPONSE TEXT:    Provider was unable to determine a response for this query.   Suspected CKD, baseline unknown      Electronically signed by:  Molly Patton MD 2023 12:37 PM

## 2023-02-01 NOTE — PROGRESS NOTES
PHYSICAL THERAPY EVALUATION  Patient: Mini Hay (45 y.o. female)  Date: 2/1/2023  Primary Diagnosis: CVA (cerebral vascular accident) [facility]) [I63.9]       Precautions: falls    In place during session: External Catheter purewick  ASSESSMENT  Pt is a 60 y.o. female admitted on 1/31/2023 for left sided weakness x 3 days with fall prior to admission; pt currently being treated for CVA workup, CKD, DM, HLD, HTN, tobacco use, h/o UTIs, obese. Brain MRI showed acute infarcts of anterior and posterior limbs of right internal capsule. Pt semi-supine in bed upon PT/OT arrival, agreeable to evaluation. Pt A&O x all but year. Pt sister in room throughout session with permission from pt given. Based on the objective data described, the patient presents with left sided weakness, impaired functional mobility, impaired amb, impaired balance, impaired coordination on left compared to right, intact sensation, impaired posture with left sided lean, protracted left scapula compared to right, and impaired left shoulder griddle strength, no obvious subluxation at this time (sling provided to decrease risk of subluxation). (See below for objective details and assist levels). Overall pt tolerated session fair today with no c/o pain throughout session. Pt reports intact light touch sensation bilaterally, impaired coordination on left UE and LE compared to right, which is intact. Pt demonstrates sig weakness of left LE compared to right (4-/5) distal >proximal; left hip and knee 1/5, ankle and toes 0/5. Pt requires min to mod A x2 for sit to stand transfers with gt belt and quad cane; only able to advance 1 step toward [facility] with mod to max Ax2 with inability to clear left foot during right weight shift. Pt education regarding use of right foot hook behind to left to decrease amount of assistance required for sit to supine transfer completed; pt demonstrates understanding.  Pt will benefit from continued skilled PT to address above deficits and return to PLOF. Current PT DC recommendation Inpatient Rehabilitation Facility  once medically appropriate due to acute infarcts, new onset of deficits, severity of left sided deficits, pt would benefit from more intensive therapy (at least 3 hours/day) to improve mobility, decrease fall risk, decrease risk of readmission as pt is a high risk of skin breakdown and contractures with new onset of deficit. Current Level of Function Impacting Discharge (mobility/balance): min to mod Ax2 with additional time due to left sided deficits    Other factors to consider for discharge: excellent desire and motivation, good family support, acute medical state, severity of deficits      PLAN :  Recommendations and Planned Interventions: bed mobility training, transfer training, gait training, therapeutic exercises, patient and family training/education, and therapeutic activities      Recommend for staff: Encourage HEP in prep for ADLs/mobility and Frequent repositioning to prevent skin breakdown    Frequency/Duration: Patient will be followed by physical therapy:  3-5x/week to address goals. Recommendation for discharge: (in order for the patient to meet his/her long term goals)  [facility]     This discharge recommendation:  Has been made in collaboration with the attending provider and/or case management    IF patient discharges home will need the following DME: to be determined (TBD)         SUBJECTIVE:   Patient stated I want to try.     OBJECTIVE DATA SUMMARY:   HISTORY:    Past Medical History:   Diagnosis Date    Chronic kidney disease     Diabetes ([facility] [address]     Hypertension     Stroke [facility])      Past Surgical History:   Procedure Laterality Date    HX HYSTERECTOMY      age 21       Home Situation  Home Environment: Private residence (boading house)  # Steps to Enter: 1  Rails to Enter: Yes  One/Two Story Residence: Two story, live on 1st floor  Living Alone: No  Support Systems: Other Family Member(s)  Patient Expects to be Discharged to[de-identified] Rehab hospital/unit acute  Current DME Used/Available at Home: Saratha Underwood, rollator, [employer], quad  Tub or Shower Type: Other (comment) (sponge bath)    EXAMINATION/PRESENTATION/DECISION MAKING:   Critical Behavior:  Neurologic State: Alert  Orientation Level: Oriented to person, Oriented to place, Oriented to situation, Disoriented to time (disoriented to year)  Cognition: Follows commands  Safety/Judgement: Awareness of environment  Hearing: Auditory  Auditory Impairment: None  Skin:  intact where visible  Edema: none noted   Range Of Motion:  AROM: Grossly decreased, non-functional (increased tone noted in L bicep impacting elbow extension; difficutly w/ finger flexion)                       Strength:    Strength: Grossly decreased, non-functional                    Tone & Sensation:                  Sensation: Intact               Coordination:      Finger Opposition Finger to Nose Dysdiadokinesis Proprioception   Right UE  [x] Intact    [] Impaired   [x] Intact    [] Impaired [x] Intact    [] Impaired [x] Intact    [] Impaired   Left UE [] Intact    [x] Impaired [] Intact    [x] Impaired [] Intact    [x] Impaired [x] Intact    [] Impaired    Heel to Edouadr Heel taps  Proprioception   Right LE  [x] Intact    [] Impaired [x] Intact    [] Impaired  [x] Intact    [] Impaired   Left LE [] Intact    [x] Impaired [] Intact    [x] Impaired  [x] Intact    [] Impaired       Vision:   Corrective Lenses: Reading glasses  Functional Mobility:  Bed Mobility:     Supine to Sit: Moderate assistance;Assist x1;Additional time  Sit to Supine: Minimum assistance;Assist x1;Additional time;Bed Modified     Transfers:  Sit to Stand: Moderate assistance;Assist x2;Minimum assistance;Assist x1 (mod A x2 progressed to min A x1)  Stand to Sit: Moderate assistance;Assist x2;Minimum assistance;Assist x1                       Balance:   Sitting: Impaired; Without support;High guard  Sitting - Static: Fair (occasional)  Sitting - Dynamic: Fair (occasional)  Standing: Impaired; With support  Standing - Static: Fair;Constant support  Standing - Dynamic : Fair;Constant support  Ambulation/Gait Training:  Distance (ft):  (1 step completed)  Assistive Device: Gait belt;Cane, quad  Ambulation - Level of Assistance: Moderate assistance;Assist x2; Additional time     Gait Description (WDL): Exceptions to WDL  Gait Abnormalities: Foot drop;Decreased step clearance (unable to clear left foot)        Base of Support: Widened;Shift to right     Speed/Tiny: Shuffled;Delayed      Therapeutic Exercises:   Pt educated on LE HEP to include quad sets, glut sets 10x; passive left ankle DF (sister instructed), instructed to complete throughout the day to improve ROM and strength with good understanding verbalized and demonstrated. Functional Measure:  [facility] AM-PAC 6 Clicks         Basic Mobility Inpatient Short Form  How much difficulty does the patient currently have. .. Unable A Lot A Little None   1. Turning over in bed (including adjusting bedclothes, sheets and blankets)? [] 1   [x] 2   [] 3   [] 4   2. Sitting down on and standing up from a chair with arms ( e.g., wheelchair, bedside commode, etc.)   [] 1   [x] 2   [] 3   [] 4   3. Moving from lying on back to sitting on the side of the bed? [] 1   [x] 2   [] 3   [] 4          How much help from another person does the patient currently need. .. Total A Lot A Little None   4. Moving to and from a bed to a chair (including a wheelchair)? [] 1   [x] 2   [] 3   [] 4   5. Need to walk in hospital room? [] 1   [x] 2   [] 3   [] 4   6. Climbing 3-5 steps with a railing? [x] 1   [] 2   [] 3   [] 4   © 2007, Trustees of [facility], under license to [employer].  All rights reserved     Score:  Initial: 9/24 Most Recent: X (Date: 2/1/2023 )   Interpretation of Tool:  Represents activities that are increasingly more difficult (i.e. Bed mobility, Transfers, Gait). Score 24 23 22-20 19-15 14-10 9-7 6   Modifier CH CI CJ CK CL CM CN         Physical Therapy Evaluation Charge Determination   History Examination Presentation Decision-Making   HIGH Complexity :3+ comorbidities / personal factors will impact the outcome/ POC  HIGH Complexity : 4+ Standardized tests and measures addressing body structure, function, activity limitation and / or participation in recreation  LOW Complexity : Stable, uncomplicated  Other outcome measures ampac 6  mod      Based on the above components, the patient evaluation is determined to be of the following complexity level: LOW     Pain Ratin/10 reported    Activity Tolerance:   Fair and requires rest breaks    After treatment patient left in no apparent distress:   Bed locked and in lowest position Supine in bed, Call bell within reach, Bed / chair alarm activated, Caregiver / family present, and Side rails x 3 and nsg updated. GOALS:    Problem: Mobility Impaired (Adult and Pediatric)  Goal: *Acute Goals and Plan of Care (Insert Text)  Description: FUNCTIONAL STATUS PRIOR TO ADMISSION: Patient was modified independent using a SPC prior to fall on Saturday, rollator following Saturday for functional mobility. Patient required minimal assistance for basic and instrumental ADLs. HOME SUPPORT PRIOR TO ADMISSION: Pt resides at a boarding house, required assistance for ADLs and IADLs    Physical Therapy Goals  Initiated 2023  Pt stated goal: to get better  Pt will be I with LE HEP in 7 days. Pt will perform bed mobility with mod I in 7 days. Pt will perform transfers with mod I in 7 days. Pt will amb  feet with LRAD safely with mod I in 7 days. Outcome: Not Met       COMMUNICATION/EDUCATION:   The patients plan of care was discussed with: Occupational therapist, Registered nurse, and Case management.      Fall prevention education was provided and the patient/caregiver indicated understanding., Patient/family have participated as able in goal setting and plan of care. , and Patient/family agree to work toward stated goals and plan of care. PT/OT sessions occurred together for increased safety of pt and clinician.        Thank you for this referral.  Gertrude Casillas, PT, DPT   Time Calculation: 38 mins

## 2023-02-01 NOTE — PROGRESS NOTES
OCCUPATIONAL THERAPY EVALUATION  Patient: Good Pacheco (86 y.o. female)  Date: 2/1/2023  Primary Diagnosis: CVA (cerebral vascular accident) [facility]) [I63.9]       Precautions: fall risk     In place during session: EKG/telemetry     ASSESSMENT  Pt is a 60 y.o. female presenting to [facility] s/p fall with c/o 3 day h/o L sided weakness in her L arm and leg w/ facial droop along w/ frontal headache, admitted 1/31 and currently being treated for CVA work up. MRI shows two acute infarcts in the R anterior and posterior limb of the internal capsule as well as chronic bilateral cerebellar infarctions. Pt w/ h/o CVA w/ R sided deficits. Pt received semi-supine in bed upon arrival w/ sister in room (remained w/ permission), AXO x3 (disoriented to year), and agreeable to OT/PT evaluations. She reports two falls within the last 3 months. Based on current observations, pt presents with deficits in generalized strength/AROM, bed mobility, static/dynamic sitting balance, static/dynamic standing balance (see PT note for gait details), functional activity tolerance, and confusion currently impacting overall performance of ADLs and functional transfers/mobility (see below for objective details and assist levels). Overall, pt tolerates session fair w/out dizziness, SOB, or pain. Noted to have R facial droop; family reports this is baseline from previous strove and denies sensation differences. Unable to assess coordination in LUE; intact strength, ROM, and coordination in RUE. She presents w/ anteriorly shifted L shoulder, however, no subluxation noted. Pt able to shrug L shoulder and complete elbow flexion from ~90 degrees; increased tone noted in L biceps potentially impacting pt's ability to complete elbow extension. Pt presented w/ flexed L hand and no trace movement noted in fingers; able to initiate extension using R hand to A but unable to achieve full ROM.  Pt req'd mod A for sup>sit transfer; req'd A for UB and to bring LLE toward EOB. Slight dizziness reported w/ change in position; able guide L hand using R to handrail to help support self up, however, still req'd high guard for safety. Attempted STS x2 w/ mod A x2 progress to min A x1. On first attempt, req'd physical A to maintain upright support and attempted to advance LLE toward [facility] but unable to pick it up (see PT note for further details). On second attempt, despite improved performance, pt unable to maintain standing balance safely and returned to EOB. She req'd min A for sit>sup transfer; cues for hand placement and to use RLE to A LLE onto bed. OT provided pt w/ sling and educated on use for LUE during OOB activity to help reduce the risk of developing subluxation; pt/ family verbalized understanding. OT also educated pt on scapular retraction exercise and AAROM of LUE using RUE exs including elbow flex/ext, finger flex/ext,  and wrist flex/ext to help improve ROM; pt verbalized understanding. Pt would benefit from continued skilled OT services to address current impairments and improve IND and safety with self cares and functional transfers/mobility. Due to pt's deficits and acute CVA, she would benefit from intensive rehab (at least 3  hrs/day) to maximize safety/IND w/ functional mobility/ADLs, prevent contractures, improve IND w/ tasks, and reduce falls risk. Without intensive rehab,   pt is at high risk for falls, decompensation, and re-admission. Pt is motivated to return to OF, therefore, current OT d/c recommendation Inpatient Rehabilitation Facility  once medically appropriate. Other factors to consider for discharge: family/social support, DME, time since onset, severity of deficits, functional baseline     Patient will benefit from skilled therapy intervention to address the above noted impairments.        PLAN :  Recommendations and Planned Interventions: self care training, functional mobility training, therapeutic exercise, balance training, therapeutic activities, endurance activities, and patient education    Recommend with staff: bed pan for safety    Recommend next session: LB dressing , LB bathing, and Seated grooming/balance; ADLs compensatory techniques    Frequency/Duration: Patient will be followed by occupational therapy:  3-5x/week to address goals. Recommendation for discharge: (in order for the patient to meet his/her long term goals)  [address]     This discharge recommendation:  Has been made in collaboration with the attending provider and/or case management    IF patient discharges home will need the following DME: TBD at next placement       SUBJECTIVE:   Patient stated I want to be able to do it myself.     OBJECTIVE DATA SUMMARY:   HISTORY:   Past Medical History:   Diagnosis Date    Chronic kidney disease     Diabetes ([facility] [address]     Hypertension     Stroke [facility])      Past Surgical History:   Procedure Laterality Date    HX HYSTERECTOMY      age 21       Per pt:   Home Situation  Home Environment: Private residence (boading house)  # Steps to Enter: 1  Rails to [employer]: Yes  One/Two Story Residence: Two story, live on 1st floor  Living Alone: No  Support Systems: Other Family Member(s)  Patient Expects to be Discharged to[de-identified] Rehab hospital/unit acute  Current DME Used/Available at Home: Walker, rollator, [place], quad  Tub or Shower Type: Other (comment) (sponge bath)    Hand dominance: Right    EXAMINATION OF PERFORMANCE DEFICITS:  Cognitive/Behavioral Status:  Neurologic State: Alert  Orientation Level: Oriented to person;Oriented to place;Oriented to situation;Disoriented to time (disoriented to year)  Cognition: Follows commands               Hearing:   Auditory  Auditory Impairment: None    Vision/Perceptual:                                Corrective Lenses: Reading glasses    Range of Motion:  AROM: Grossly decreased, non-functional (increased tone noted in L bicep impacting elbow extension; difficutly w/ finger flexion)                         Strength:  Strength: Grossly decreased, non-functional                Coordination:     Fine Motor Skills-Upper: Left Impaired;Right Intact    Gross Motor Skills-Upper: Left Impaired;Right Intact    Tone & Sensation:     Sensation: Intact                      Balance:  Sitting: Impaired; Without support;High guard  Sitting - Static: Fair (occasional)  Sitting - Dynamic: Fair (occasional)  Standing: Impaired; With support  Standing - Static: Fair;Constant support  Standing - Dynamic : Fair;Constant support    Functional Mobility and Transfers for ADLs:  Bed Mobility:  Supine to Sit: Moderate assistance;Assist x1;Additional time  Sit to Supine: Minimum assistance;Assist x1;Additional time;Bed Modified    Transfers:  Sit to Stand: Moderate assistance;Assist x2;Minimum assistance;Assist x1 (mod A x2 progressed to min A x1)  Stand to Sit: Moderate assistance;Assist x2;Minimum assistance;Assist x1  Assistive Device : Cane, quad      ADL Intervention and task modifications:                           Lower Body Dressing Assistance  Socks: Minimum assistance              Therapeutic Exercise:  Pt will benefit from BUE HEP to improve participation in ADLs and mobility. Plan will be initiated at next session. Functional Measure:    [employer] AM-PACTM \"6 Clicks\"                                                       Daily Activity Inpatient Short Form  How much help from another person does the patient currently need. .. Total; A Lot A Little None   1. Putting on and taking off regular lower body clothing? []  1 [x]  2 []  3 []  4   2. Bathing (including washing, rinsing, drying)? []  1 [x]  2 []  3 []  4   3. Toileting, which includes using toilet, bedpan or urinal? [] 1 [x]  2 []  3 []  4   4. Putting on and taking off regular upper body clothing? []  1 []  2 [x]  3 []  4   5. Taking care of personal grooming such as brushing teeth? []  1 []  2 [x]  3 []  4   6. Eating meals? []  1 []  2 [x]  3 []  4   © 2007, Trustees of [address] [place] [address] [address] [address], under license to [employer]. All rights reserved     Score: 15/24     Interpretation of Tool:  Represents clinically-significant functional categories (i.e. Activities of daily living). Percentage of Impairment CH    0%   CI    1-19% CJ    20-39% CK    40-59% CL    60-79% CM    80-99% CN     100%   AMPA  Score 6-24 24 23 20-22 15-19 10-14 7-9 6     Occupational Therapy Evaluation Charge Determination   History Examination Decision-Making   LOW Complexity : Brief history review  LOW Complexity : 1-3 performance deficits relating to physical, cognitive , or psychosocial skils that result in activity limitations and / or participation restrictions  MEDIUM Complexity : Patient may present with comorbidities that affect occupational performnce. Miniml to moderate modification of tasks or assistance (eg, physical or verbal ) with assesment(s) is necessary to enable patient to complete evaluation       Based on the above components, the patient evaluation is determined to be of the following complexity level: LOW   Pain Rating:  No reports of pain    Activity Tolerance:   Fair and requires rest breaks    After treatment patient left in no apparent distress:    Supine in bed, Call bell within reach, Bed / chair alarm activated, Caregiver / family present, and Side rails x 3, bed locked and in lowest position    COMMUNICATION/EDUCATION:   The patients plan of care was discussed with: Physical therapist and Registered nurse. Patient/family have participated as able in goal setting and plan of care. and Patient/family agree to work toward stated goals and plan of care. This patients plan of care is appropriate for delegation to [facility]. PT/OT sessions occurred together for increased safety of pt and clinician.      Thank you for this referral.  Malathi Chambers OT  Time Calculation: 44 mins   Problem: Self Care Deficits Care Plan (Adult)  Goal: *Acute Goals and Plan of Care (Insert Text)  Description: FUNCTIONAL STATUS PRIOR TO ADMISSION: Pt reports she was mod I using quad cane until Saturday when she started to experience her symptoms since then used rollator; she req'd min A for bathing and dressing. She reports 2 falls. HOME SUPPORT: Pt lives at [facility].      Occupational Therapy Goals  Initiated 2/1/2023    Pt stated goal I want to get better   Pt will be IND sup <> sit in prep for EOB ADLs  Pt will be Mod I grooming standing sink side LRAD  Pt will be IND UB dressing sitting EOB/long sit   Pt will be Mod I  LE dressing sitting EOB/long sit  Pt will be Mod I sit <>  prep for toileting LRAD  Pt will be Mod I toileting/toilet transfer/cloth mgmt LRAD  Pt will be IND following UE HEP in prep for self care tasks   Outcome: Not Met

## 2023-02-01 NOTE — PROGRESS NOTES
Hospitalist Progress Note               Daily Progress Note: 2/1/2023      Subjective:   Hospital course to date:  Omi Beauchamp is a 60 y.o. female with a history of HTN, DMII, HLD, and 20 pack year smoking who presents with a three day history of left-sided weakness in her leg and arm with facial droop. On 1/28/23, she had a fall that left her in the bathroom on the floor. She does not remember any loss of consciousness and was helped up by a neighbor who heard her yelling for help. Although she felt weak in her arms and legs, she thought the medicine she was taking would help with the symptoms. Due to this belief, she did not pursue medical treatment for three days even with worsening weakness on her left side. She admits to a frontal headache and has had some vision changes since Saturday. Denies any fever, chest pain, runny nose, shortness of breath, nausea, vomiting, diarrhea, and tingling in her legs. A CT head was done at her arrival in the ED and showed a likely chronic cerebellar lesion. Her creatinine was also elevated at 2. 11. Due to the possibility of a stroke and further need for workup and treatment, this patient is being admitted to the floor. ---------------------  MRI was read early this morning and showed two infarcts in the right poster limb of the internal capsule. She is scheduled for a an echo and carotid duplex now. This morning, her neuro exam is similar to the previous day with a slight improvement of her left leg but still remains 1/5. Left arm is 4/5 again. Patient to see PT/OT/SLP.         Problem List:  Problem List as of 2/1/2023 Never Reviewed            Codes Class Noted - Resolved    CVA (cerebral vascular accident) [facility]) ICD-10-CM: I63.9  ICD-9-CM: 434.91  1/31/2023 - Present           Medications reviewed  Current Facility-Administered Medications   Medication Dose Route Frequency    acarbose (PRECOSE) tablet 50 mg  50 mg Oral TID WITH MEALS    atenoloL (TENORMIN) tablet 50 mg  50 mg Oral DAILY    hydroCHLOROthiazide (HYDRODIURIL) tablet 12.5 mg  12.5 mg Oral DAILY    acetaminophen (TYLENOL) tablet 650 mg  650 mg Oral Q4H PRN    Or    acetaminophen (TYLENOL) solution 650 mg  650 mg Per NG tube Q4H PRN    Or    acetaminophen (TYLENOL) suppository 650 mg  650 mg Rectal Q4H PRN    aspirin chewable tablet 81 mg  81 mg Oral DAILY    atorvastatin (LIPITOR) tablet 40 mg  40 mg Oral QHS       Review of Systems:   A comprehensive review of systems was negative except for that written in the HPI. Objective:   Physical Exam:     Visit Vitals  BP (!) 143/62 (BP 1 Location: Left upper arm)   Pulse 60   Temp 98.3 °F (36.8 °C)   Resp 18   Ht 5' 7\" (1.702 m)   Wt 80.7 kg (178 lb)   SpO2 99%   BMI 27.88 kg/m²      O2 Device: None (Room air)    Temp (24hrs), Av.9 °F (36.6 °C), Min:97.4 °F (36.3 °C), Max:98.3 °F (36.8 °C)    No intake/output data recorded.  1901 -  0700  In: -   Out: 25 [Urine:25]    General:   Awake and alert   Lungs:   Clear to auscultation bilaterally. Chest wall:  No tenderness or deformity. Heart:  Regular rate and rhythm, S1, S2 normal, no murmur, click, rub or gallop. Abdomen:   Soft, non-tender. Bowel sounds normal. No masses,  No organomegaly. Extremities: Extremities normal, atraumatic, no cyanosis or edema. Pulses: 2+ and symmetric all extremities. Skin: Skin color, texture, turgor normal. No rashes or lesions   Neurologic: CNII-XII intact. No gross focal deficits         Data Review:       Recent Days:  Recent Labs     23  1048   WBC 11.9*   HGB 12.8   HCT 39.8        Recent Labs     23  1048      K 4.5      CO2 26   *   BUN 29*   CREA 2.11*   CA 9.3   ALB 3.8   TBILI 0.3   ALT 10*   INR 1.0     No results for input(s): PH, PCO2, PO2, HCO3, FIO2 in the last 72 hours.     24 Hour Results:  Recent Results (from the past 24 hour(s))   EKG, 12 LEAD, INITIAL    Collection Time: 23 10:44 AM   Result Value Ref Range    Ventricular Rate 61 BPM    Atrial Rate 61 BPM    P-R Interval 168 ms    QRS Duration 76 ms    Q-T Interval 458 ms    QTC Calculation (Bezet) 461 ms    Calculated P Axis 66 degrees    Calculated R Axis 16 degrees    Calculated T Axis 79 degrees    Diagnosis       Sinus rhythm with marked sinus arrhythmia  Otherwise normal ECG  When compared with ECG of 11-MAR-2011 12:41,  Vent. rate has decreased BY  37 BPM  Confirmed by Seema Alicea ([id number]) on 1/31/2023 9:42:31 PM     TROPONIN-HIGH SENSITIVITY    Collection Time: 01/31/23 10:47 AM   Result Value Ref Range    Troponin-High Sensitivity 11 0 - 51 ng/L   CBC WITH AUTOMATED DIFF    Collection Time: 01/31/23 10:48 AM   Result Value Ref Range    WBC 11.9 (H) 3.6 - 11.0 K/uL    RBC 4.71 3.80 - 5.20 M/uL    HGB 12.8 11.5 - 16.0 g/dL    HCT 39.8 35.0 - 47.0 %    MCV 84.5 80.0 - 99.0 FL    MCH 27.2 26.0 - 34.0 PG    MCHC 32.2 30.0 - 36.5 g/dL    RDW 15.4 (H) 11.5 - 14.5 %    PLATELET 702 296 - 466 K/uL    MPV 9.1 8.9 - 12.9 FL    NRBC 0.0 0.0  WBC    ABSOLUTE NRBC 0.00 0.00 - 0.01 K/uL    NEUTROPHILS 69 32 - 75 %    LYMPHOCYTES 27 12 - 49 %    MONOCYTES 3 (L) 5 - 13 %    EOSINOPHILS 1 0 - 7 %    BASOPHILS 0 0 - 1 %    IMMATURE GRANULOCYTES 0 0 - 0.5 %    ABS. NEUTROPHILS 8.2 (H) 1.8 - 8.0 K/UL    ABS. LYMPHOCYTES 3.2 0.8 - 3.5 K/UL    ABS. MONOCYTES 0.4 0.0 - 1.0 K/UL    ABS. EOSINOPHILS 0.1 0.0 - 0.4 K/UL    ABS. BASOPHILS 0.0 0.0 - 0.1 K/UL    ABS. IMM.  GRANS. 0.0 0.00 - 0.04 K/UL    DF AUTOMATED     PROTHROMBIN TIME + INR    Collection Time: 01/31/23 10:48 AM   Result Value Ref Range    Prothrombin time 13.6 11.9 - 14.6 sec    INR 1.0 0.9 - 1.1     METABOLIC PANEL, COMPREHENSIVE    Collection Time: 01/31/23 10:48 AM   Result Value Ref Range    Sodium 137 136 - 145 mmol/L    Potassium 4.5 3.5 - 5.1 mmol/L    Chloride 106 97 - 108 mmol/L    CO2 26 21 - 32 mmol/L    Anion gap 5 5 - 15 mmol/L    Glucose 186 (H) 65 - 100 mg/dL    BUN 29 (H) 6 - 20 mg/dL    Creatinine 2.11 (H) 0.55 - 1.02 mg/dL    BUN/Creatinine ratio 14 12 - 20      eGFR 26 (L) >60 ml/min/1.73m2    Calcium 9.3 8.5 - 10.1 mg/dL    Bilirubin, total 0.3 0.2 - 1.0 mg/dL    AST (SGOT) 10 (L) 15 - 37 U/L    ALT (SGPT) 10 (L) 12 - 78 U/L    Alk. phosphatase 95 45 - 117 U/L    Protein, total 7.6 6.4 - 8.2 g/dL    Albumin 3.8 3.5 - 5.0 g/dL    Globulin 3.8 2.0 - 4.0 g/dL    A-G Ratio 1.0 (L) 1.1 - 2.2     URINALYSIS W/ RFLX MICROSCOPIC    Collection Time: 01/31/23  3:27 PM   Result Value Ref Range    Color YELLOW     Appearance HAZY Clear    Specific gravity 1.015 1.003 - 1.030    Specific gravity 1.015 1.003 - 1.030      pH (UA) 6.0 5.0 - 8.0      Protein >300 (A) Negative mg/dL    Glucose Negative Negative mg/dL    Ketone Negative Negative mg/dL    Bilirubin Negative Negative      Blood SMALL Negative    Urobilinogen 0.1 0.1 - 1.0 EU/dL    Nitrites Negative Negative      Leukocyte Esterase LARGE Negative   URINE MICROSCOPIC    Collection Time: 01/31/23  3:27 PM   Result Value Ref Range    WBC >100 (H) 0 - 4 /hpf    RBC 10-20 0 - 5 /hpf    Epithelial cells Moderate (A) Few /lpf    Bacteria 3+ (A) Negative /hpf    Mucus Trace (A) Negative /lpf   GLUCOSE, POC    Collection Time: 01/31/23  4:47 PM   Result Value Ref Range    Glucose (POC) 107 (H) 65 - 100 mg/dL    Performed by Enzo Javed    GLUCOSE, POC    Collection Time: 01/31/23  7:11 PM   Result Value Ref Range    Glucose (POC) 77 65 - 100 mg/dL    Performed by Kris Rivera        MRI BRAIN WO CONT   Final Result   Chronic microvascular ischemic disease with 2 separate small areas of acute   infarction involving the anterior and posterior limbs of the right internal   capsule. Chronic bilateral cerebellar infarctions. CT HEAD WO CONT   Final Result   Small, age indeterminate, right cerebellar infarction; probably   chronic.       DUPLEX CAROTID BILATERAL    (Results Pending)        Assessment:  Acute CVA, left hemiplegia -- two infarcts in the right posterior limb of the internal capsule     CKD stage indeterminate, possible ERIN     Diabetes Mellitus Type II not requiring insulin     Hyperlipidemia     Hypertension, uncontrolled     Tobacco abuse, 20 pack years     History of recurrent UTIs     Obesity      Discussion/MDM: Patient with multiple medical comorbidities, each with high likelihood for morbidity and mortality if left untreated. Patient being treated with medication that requires intensive monitoring due to increased risk for toxicity. I have reviewed patient's presenting subjective and objective findings, as well as all laboratory studies, imaging studies, and vital signs to date as well as treatment rendered and patient's response to those treatments. In addition, prior medical, surgical and relevant social and family histories were reviewed. Acute CVA in the right posterior limb of the internal capsule, PT/OT will be important if there is any improvement in the clinical status to be had. Plan:  Follow echo and carotid duplex results  PT/OT  SLP  A1C and lipid panel pending  Continue frequent neuro checks        Care Plan discussed with: Patient/Family    Code status: full    Social determinants of health: lives at boarding home alone    Disposition: IRF or SNF pending insurance    Total time spent with patient: 30 minutes.     Luis Hernandez

## 2023-02-01 NOTE — PROGRESS NOTES
Pt. C/o headache, BP elevated, medicated with tylenol, paged on call hospitalist for elevated BP, awaiting response.   Provider read message, no response received, BP now 149/65

## 2023-02-01 NOTE — PROGRESS NOTES
Reason for Admission:  CVA                     RUR Score:     13%                Plan for utilizing home health:  Not at this time        PCP: First and Last name:  None     Name of Practice:    Are you a current patient: Yes/No:    Approximate date of last visit:    Can you participate in a virtual visit with your PCP:                     Current Advanced Directive/Advance Care Plan: Full Code      Healthcare Decision Maker:   Click here to complete [place] including selection of the Healthcare Decision Maker Relationship (ie \"Primary\")     Manny Munoz, sister, [phone]                        Transition of Care Plan:     Met f/f with Pt and her sister, Pt sister confirmed that the information on the face sheet is correct. Pt sister stated that Pt lives in a [address] [place], [place] [address] Pt sister stated no HH, Pt has a rollator,and sometime Pt needs assistance with bathing and dressing. Pt stated that she uses [facility] on [address]. Pt will need stretcher ride when D/C. Pt signed choice letter for [facility], will send referral, will place choice letter on Pt chart. KRISTYN dispo: [facility].          2:30  Met f/f with Pt, discussed with her that if her insurance denied her going to [facility] where would she want to go. Pt stated that [facility] or [facility]. CM did not send referrals to [facility] or [facility], waiting on [employer] to see if they will give auth for [facility].

## 2023-02-01 NOTE — PROGRESS NOTES
Hospitalist Progress Note               Daily Progress Note: 2/1/2023      Subjective:   Hospital course to date:  Sadie Graham is a 60 y.o. female with a history of HTN, DMII, HLD, and 20 pack year smoking who presents with a three day history of left-sided weakness in her leg and arm with facial droop. On 1/28/23, she had a fall that left her in the bathroom on the floor. She does not remember any loss of consciousness and was helped up by a neighbor who heard her yelling for help. Although she felt weak in her arms and legs, she thought the medicine she was taking would help with the symptoms. Due to this belief, she did not pursue medical treatment for three days even with worsening weakness on her left side. She admits to a frontal headache and has had some vision changes since Saturday. Denies any fever, chest pain, runny nose, shortness of breath, nausea, vomiting, diarrhea, and tingling in her legs. A CT head was done at her arrival in the ED and showed a likely chronic cerebellar lesion. Her creatinine was also elevated at 2. 11. Due to the possibility of a stroke and further need for workup and treatment, this patient is being admitted to the floor. ---------------------  MRI was read early this morning and showed two infarcts in the right poster limb of the internal capsule. She is scheduled for a an echo and carotid duplex now. This morning, her neuro exam is similar to the previous day with a slight improvement of her left leg but still remains 1/5. Left arm is 4/5 again. Patient to see PT/OT/SLP.         Problem List:  Problem List as of 2/1/2023 Never Reviewed            Codes Class Noted - Resolved    CVA (cerebral vascular accident) [facility]) ICD-10-CM: I63.9  ICD-9-CM: 434.91  1/31/2023 - Present         Medications reviewed  Current Facility-Administered Medications   Medication Dose Route Frequency    acarbose (PRECOSE) tablet 50 mg  50 mg Oral TID WITH MEALS    atenoloL (TENORMIN) tablet 50 mg  50 mg Oral DAILY    hydroCHLOROthiazide (HYDRODIURIL) tablet 12.5 mg  12.5 mg Oral DAILY    acetaminophen (TYLENOL) tablet 650 mg  650 mg Oral Q4H PRN    Or    acetaminophen (TYLENOL) solution 650 mg  650 mg Per NG tube Q4H PRN    Or    acetaminophen (TYLENOL) suppository 650 mg  650 mg Rectal Q4H PRN    aspirin chewable tablet 81 mg  81 mg Oral DAILY    atorvastatin (LIPITOR) tablet 40 mg  40 mg Oral QHS       Review of Systems:   A comprehensive review of systems was negative except for that written in the HPI. Objective:   Physical Exam:     Visit Vitals  BP (!) 157/61 (BP 1 Location: Right upper arm, BP Patient Position: At rest)   Pulse 63   Temp 98.8 °F (37.1 °C)   Resp 16   Ht 5' 7\" (1.702 m)   Wt 80.7 kg (178 lb)   SpO2 99%   BMI 27.88 kg/m²      O2 Device: None (Room air)    Temp (24hrs), Av °F (36.7 °C), Min:97.4 °F (36.3 °C), Max:98.8 °F (37.1 °C)    No intake/output data recorded.  1901 -  0700  In: -   Out: 25 [Urine:25]    General:   Awake and alert   Lungs:   Clear to auscultation bilaterally. Chest wall:  No tenderness or deformity. Heart:  Regular rate and rhythm, S1, S2 normal, no murmur, click, rub or gallop. Abdomen:   Soft, non-tender. Bowel sounds normal. No masses,  No organomegaly. Extremities: Extremities normal, atraumatic, no cyanosis or edema. Pulses: 2+ and symmetric all extremities. Skin: Skin color, texture, turgor normal. No rashes or lesions   Neurologic: CNII-XII intact. No gross focal deficits         Data Review:       Recent Days:  Recent Labs     23  1048   WBC 11.9*   HGB 12.8   HCT 39.8          Recent Labs     23  1048      K 4.5      CO2 26   *   BUN 29*   CREA 2.11*   CA 9.3   ALB 3.8   TBILI 0.3   ALT 10*   INR 1.0       No results for input(s): PH, PCO2, PO2, HCO3, FIO2 in the last 72 hours.     24 Hour Results:  Recent Results (from the past 24 hour(s))   URINALYSIS W/ RFLX MICROSCOPIC Collection Time: 01/31/23  3:27 PM   Result Value Ref Range    Color YELLOW     Appearance HAZY Clear    Specific gravity 1.015 1.003 - 1.030    Specific gravity 1.015 1.003 - 1.030      pH (UA) 6.0 5.0 - 8.0      Protein >300 (A) Negative mg/dL    Glucose Negative Negative mg/dL    Ketone Negative Negative mg/dL    Bilirubin Negative Negative      Blood SMALL Negative    Urobilinogen 0.1 0.1 - 1.0 EU/dL    Nitrites Negative Negative      Leukocyte Esterase LARGE Negative   URINE MICROSCOPIC    Collection Time: 01/31/23  3:27 PM   Result Value Ref Range    WBC >100 (H) 0 - 4 /hpf    RBC 10-20 0 - 5 /hpf    Epithelial cells Moderate (A) Few /lpf    Bacteria 3+ (A) Negative /hpf    Mucus Trace (A) Negative /lpf   GLUCOSE, POC    Collection Time: 01/31/23  4:47 PM   Result Value Ref Range    Glucose (POC) 107 (H) 65 - 100 mg/dL    Performed by Pancho Alert    GLUCOSE, POC    Collection Time: 01/31/23  7:11 PM   Result Value Ref Range    Glucose (POC) 77 65 - 100 mg/dL    Performed by [facility] Patient    DUPLEX CAROTID BILATERAL    Collection Time: 02/01/23 10:48 AM   Result Value Ref Range    Left CCA dist sys 129.0 cm/s    Left CCA dist sarah 16.2 cm/s    Left CCA prox sys 105.0 cm/s    Left CCA prox sarah 8.6 cm/s    Left ICA dist sys 88.0 cm/s    Left ICA dist sarah 15.4 cm/s    Left ICA prox sys 328.0 cm/s    Left ICA prox sarah 32.7 cm/s    Left ECA sys 333.0 cm/s    LEFT EXTERNAL CAROTID ARTERY D 0.00 cm/s    Left subclavian prox .0 cm/s    Left subclavian prox EDV 0.0 cm/s    Left vertebral sys 108.0 cm/s    LEFT VERTEBRAL ARTERY D 15.70 cm/s    Right cca dist sys 103.0 cm/s    Right CCA dist sarah 13.4 cm/s    Right CCA prox sys 113.0 cm/s    Right CCA prox sarah 0.0 cm/s    Right ICA dist sys 120.0 cm/s    Right ICA dist sarah 16.0 cm/s    Right ICA prox sys 106.0 cm/s    Right ICA prox sarah 13.7 cm/s    Right eca sys 227.0 cm/s    RIGHT EXTERNAL CAROTID ARTERY D 0.00 cm/s    Right subclavian prox .0 cm/s    Right subclavian prox EDV 0.0 cm/s    Right vertebral sys 62.1 cm/s    RIGHT VERTEBRAL ARTERY D 0.60 cm/s   ECHO ADULT FOLLOW-UP OR LIMITED    Collection Time: 02/01/23 11:33 AM   Result Value Ref Range    AV Peak Velocity 1.1 m/s    AV Peak Gradient 5 mmHg    Aortic Root 3.0 cm    IVSd 1.5 (A) 0.6 - 0.9 cm    LVIDd 4.3 3.9 - 5.3 cm    LVIDs 2.8 cm    LVOT Peak Velocity 0.9 m/s    LVOT Peak Gradient 3 mmHg    LVPWd 1.0 (A) 0.6 - 0.9 cm    LA Diameter 2.6 cm    MO Max Velocity 1.2 m/s    Pulmonary Artery EDP 6 mmHg    PV Max Velocity 0.8 m/s    PV Peak Gradient 3 mmHg    Est. RA Pressure 3 mmHg    RVIDd 3.2 cm    TR Max Velocity 2.61 m/s    TR Peak Gradient 27 mmHg   GLUCOSE, POC    Collection Time: 02/01/23 12:00 PM   Result Value Ref Range    Glucose (POC) 165 (H) 65 - 100 mg/dL    Performed by ANUEL ARCINIEGA        DUPLEX CAROTID BILATERAL         MRI BRAIN WO CONT   Final Result   Chronic microvascular ischemic disease with 2 separate small areas of acute   infarction involving the anterior and posterior limbs of the right internal   capsule. Chronic bilateral cerebellar infarctions. CT HEAD WO CONT   Final Result   Small, age indeterminate, right cerebellar infarction; probably   chronic. Assessment:  Acute CVA, left hemiplegia -- two infarcts in the right posterior limb of the internal capsule     CKD stage indeterminate, possible ERIN     Diabetes Mellitus Type II not requiring insulin     Hyperlipidemia     Hypertension, uncontrolled     Tobacco abuse, 20 pack years     History of recurrent UTIs     Obesity      Discussion/MDM: Patient with multiple medical comorbidities, each with high likelihood for morbidity and mortality if left untreated. Patient being treated with medication that requires intensive monitoring due to increased risk for toxicity.  I have reviewed patient's presenting subjective and objective findings, as well as all laboratory studies, imaging studies, and vital signs to date as well as treatment rendered and patient's response to those treatments. In addition, prior medical, surgical and relevant social and family histories were reviewed. Acute CVA in the right posterior limb of the internal capsule, intensive inpatient PT/OT will be important if there is any improvement in the clinical status to be had. Plan:  Follow echo and carotid duplex results  PT/OT  SLP  A1C and lipid panel pending  Continue frequent neuro checks  Recheck renal function      Care Plan discussed with: Patient/Family    Code status: full    Social determinants of health: lives at boarding home alone    Disposition: IRF or SNF pending insurance    Total time spent with patient: 30 minutes.     Carlos Escobar MD

## 2023-02-02 LAB
GLUCOSE BLD STRIP.AUTO-MCNC: 138 MG/DL (ref 65–100)
GLUCOSE BLD STRIP.AUTO-MCNC: 167 MG/DL (ref 65–100)
GLUCOSE BLD STRIP.AUTO-MCNC: 213 MG/DL (ref 65–100)
GLUCOSE BLD STRIP.AUTO-MCNC: 260 MG/DL (ref 65–100)
LEFT CCA DIST DIAS: 16.2 CM/S
LEFT CCA DIST SYS: 129 CM/S
LEFT CCA PROX DIAS: 8.6 CM/S
LEFT CCA PROX SYS: 105 CM/S
LEFT ECA DIAS: 0 CM/S
LEFT ECA SYS: 333 CM/S
LEFT ICA DIST DIAS: 15.4 CM/S
LEFT ICA DIST SYS: 88 CM/S
LEFT ICA PROX DIAS: 32.7 CM/S
LEFT ICA PROX SYS: 328 CM/S
LEFT ICA/CCA SYS: 2.54
LEFT VERTEBRAL DIAS: 15.7 CM/S
LEFT VERTEBRAL SYS: 108 CM/S
PERFORMED BY, TECHID: ABNORMAL
RIGHT CCA DIST DIAS: 13.4 CM/S
RIGHT CCA DIST SYS: 103 CM/S
RIGHT CCA PROX DIAS: 0 CM/S
RIGHT CCA PROX SYS: 113 CM/S
RIGHT ECA DIAS: 0 CM/S
RIGHT ECA SYS: 227 CM/S
RIGHT ICA DIST DIAS: 16 CM/S
RIGHT ICA DIST SYS: 120 CM/S
RIGHT ICA PROX DIAS: 13.7 CM/S
RIGHT ICA PROX SYS: 106 CM/S
RIGHT ICA/CCA SYS: 1.03
RIGHT VERTEBRAL DIAS: 0.6 CM/S
RIGHT VERTEBRAL SYS: 62.1 CM/S
VAS LEFT SUBCLAVIAN PROX EDV: 0 CM/S
VAS LEFT SUBCLAVIAN PROX PSV: 168 CM/S
VAS RIGHT SUBCLAVIAN PROX EDV: 0 CM/S
VAS RIGHT SUBCLAVIAN PROX PSV: 139 CM/S

## 2023-02-02 PROCEDURE — 93880 EXTRACRANIAL BILAT STUDY: CPT | Performed by: SURGERY

## 2023-02-02 PROCEDURE — 65270000029 HC RM PRIVATE

## 2023-02-02 PROCEDURE — 82962 GLUCOSE BLOOD TEST: CPT

## 2023-02-02 PROCEDURE — 97530 THERAPEUTIC ACTIVITIES: CPT

## 2023-02-02 PROCEDURE — 74011250636 HC RX REV CODE- 250/636: Performed by: INTERNAL MEDICINE

## 2023-02-02 PROCEDURE — 74011250637 HC RX REV CODE- 250/637: Performed by: INTERNAL MEDICINE

## 2023-02-02 RX ORDER — HYDRALAZINE HYDROCHLORIDE 20 MG/ML
10 INJECTION INTRAMUSCULAR; INTRAVENOUS ONCE
Status: COMPLETED | OUTPATIENT
Start: 2023-02-02 | End: 2023-02-02

## 2023-02-02 RX ORDER — ACETAMINOPHEN 325 MG/1
650 TABLET ORAL
Status: ACTIVE | OUTPATIENT
Start: 2023-02-02 | End: 2023-02-03

## 2023-02-02 RX ORDER — AMLODIPINE BESYLATE 5 MG/1
5 TABLET ORAL DAILY
Status: DISCONTINUED | OUTPATIENT
Start: 2023-02-02 | End: 2023-02-03 | Stop reason: HOSPADM

## 2023-02-02 RX ORDER — DIPHENHYDRAMINE HCL 25 MG
25 CAPSULE ORAL
Status: DISPENSED | OUTPATIENT
Start: 2023-02-02 | End: 2023-02-03

## 2023-02-02 RX ADMIN — ATORVASTATIN CALCIUM 40 MG: 40 TABLET, FILM COATED ORAL at 21:02

## 2023-02-02 RX ADMIN — ACARBOSE 50 MG: 50 TABLET ORAL at 17:43

## 2023-02-02 RX ADMIN — ACARBOSE 50 MG: 50 TABLET ORAL at 15:15

## 2023-02-02 RX ADMIN — ASPIRIN 81 MG CHEWABLE TABLET 81 MG: 81 TABLET CHEWABLE at 09:32

## 2023-02-02 RX ADMIN — ACARBOSE 50 MG: 50 TABLET ORAL at 09:33

## 2023-02-02 RX ADMIN — AMLODIPINE BESYLATE 5 MG: 5 TABLET ORAL at 17:51

## 2023-02-02 RX ADMIN — HYDROCHLOROTHIAZIDE 12.5 MG: 25 TABLET ORAL at 09:33

## 2023-02-02 RX ADMIN — HYDRALAZINE HYDROCHLORIDE 10 MG: 20 INJECTION, SOLUTION INTRAMUSCULAR; INTRAVENOUS at 21:02

## 2023-02-02 RX ADMIN — ACETAMINOPHEN 650 MG: 325 TABLET ORAL at 17:43

## 2023-02-02 NOTE — PROGRESS NOTES
Hospitalist Progress Note               Daily Progress Note: 2/2/2023      Subjective:   Hospital course to date:  Jihan Moore is a 60 y.o. female with a history of HTN, DMII, HLD, and 20 pack year smoking who presents with a three day history of left-sided weakness in her leg and arm with facial droop. On 1/28/23, she had a fall that left her in the bathroom on the floor. She does not remember any loss of consciousness and was helped up by a neighbor who heard her yelling for help. Although she felt weak in her arms and legs, she thought the medicine she was taking would help with the symptoms. Due to this belief, she did not pursue medical treatment for three days even with worsening weakness on her left side. She admits to a frontal headache and has had some vision changes since Saturday. Denies any fever, chest pain, runny nose, shortness of breath, nausea, vomiting, diarrhea, and tingling in her legs. A CT head was done at her arrival in the ED and showed a likely chronic cerebellar lesion. Her creatinine was also elevated at 2. 11. Due to the possibility of a stroke and further need for workup and treatment, this patient is being admitted to the floor. MRI was read early this morning and showed two infarcts in the right poster limb of the internal capsule. She is scheduled for a an echo and carotid duplex now. This morning, her neuro exam is similar to the previous day with a slight improvement of her left leg but still remains 1/5. Left arm is 4/5 again. Patient to see PT/OT/SLP.  ---------------------  Echo was done yesterday and showed no local thrombus/embolus with an EF of 60-65%. Carotid Duplex scan showed some L proximal ICA increased flow which correlates to 50-70% stenosis. Repeat CTA is suggested by radiologist. A1C 7.4% Lipid panel shows an elevated LDL.         Problem List:  Problem List as of 2/2/2023 Never Reviewed            Codes Class Noted - Resolved    CVA (cerebral vascular accident) [facility]) ICD-10-CM: I63.9  ICD-9-CM: 434.91  2023 - Present         Medications reviewed  Current Facility-Administered Medications   Medication Dose Route Frequency    acarbose (PRECOSE) tablet 50 mg  50 mg Oral TID WITH MEALS    atenoloL (TENORMIN) tablet 50 mg  50 mg Oral DAILY    hydroCHLOROthiazide (HYDRODIURIL) tablet 12.5 mg  12.5 mg Oral DAILY    acetaminophen (TYLENOL) tablet 650 mg  650 mg Oral Q4H PRN    Or    acetaminophen (TYLENOL) solution 650 mg  650 mg Per NG tube Q4H PRN    Or    acetaminophen (TYLENOL) suppository 650 mg  650 mg Rectal Q4H PRN    aspirin chewable tablet 81 mg  81 mg Oral DAILY    atorvastatin (LIPITOR) tablet 40 mg  40 mg Oral QHS       Review of Systems:   A comprehensive review of systems was negative except for that written in the HPI. Objective:   Physical Exam:     Visit Vitals  BP (!) 175/67 (BP 1 Location: Left upper arm, BP Patient Position: At rest;Supine)   Pulse (!) 58   Temp 98.1 °F (36.7 °C)   Resp 18   Ht 5' 7\" (1.702 m)   Wt 80.7 kg (178 lb)   SpO2 97%   BMI 27.88 kg/m²      O2 Device: None (Room air)    Temp (24hrs), Av.3 °F (36.8 °C), Min:97.7 °F (36.5 °C), Max:98.8 °F (37.1 °C)    No intake/output data recorded.  1901 -  0700  In: -   Out: 25 [Urine:25]    General:   Awake and alert   Lungs:   Clear to auscultation bilaterally. Chest wall:  No tenderness or deformity. Heart:  Regular rate and rhythm, S1, S2 normal, no murmur, click, rub or gallop. Abdomen:   Soft, non-tender. Bowel sounds normal. No masses,  No organomegaly. Extremities: Extremities normal, atraumatic, no cyanosis or edema. Pulses: 2+ and symmetric all extremities. Skin: Skin color, texture, turgor normal. No rashes or lesions   Neurologic: CNII-XII intact.   No gross focal deficits         Data Review:       Recent Days:  Recent Labs     23  1048   WBC 11.9*   HGB 12.8   HCT 39.8          Recent Labs     23  0850 23  1048  137   K 4.2 4.5    106   CO2 25 26   GLU 99 186*   BUN 35* 29*   CREA 2.34* 2.11*   CA 9.1 9.3   PHOS 4.7  --    ALB 3.6 3.8   TBILI  --  0.3   ALT  --  10*   INR  --  1.0       No results for input(s): PH, PCO2, PO2, HCO3, FIO2 in the last 72 hours.     24 Hour Results:  Recent Results (from the past 24 hour(s))   ECHO ADULT FOLLOW-UP OR LIMITED    Collection Time: 02/01/23 11:33 AM   Result Value Ref Range    AV Peak Velocity 1.1 m/s    AV Peak Gradient 5 mmHg    Aortic Root 3.0 cm    IVSd 1.5 (A) 0.6 - 0.9 cm    LVIDd 4.3 3.9 - 5.3 cm    LVIDs 2.8 cm    LVOT Peak Velocity 0.9 m/s    LVOT Peak Gradient 3 mmHg    LVPWd 1.0 (A) 0.6 - 0.9 cm    LA Diameter 2.6 cm    HI Max Velocity 1.2 m/s    Pulmonary Artery EDP 6 mmHg    PV Max Velocity 0.8 m/s    PV Peak Gradient 3 mmHg    Est. RA Pressure 3 mmHg    RVIDd 3.2 cm    TR Max Velocity 2.61 m/s    TR Peak Gradient 27 mmHg    Fractional Shortening 2D 35 28 - 44 %    LVIDd Index 2.24 cm/m2    LVIDs Index 1.46 cm/m2    LV RWT Ratio 0.47     LV Mass 2D 196.1 (A) 67 - 162 g    LV Mass 2D Index 102.1 (A) 43 - 95 g/m2    LA Size Index 1.35 cm/m2    LA/AO Root Ratio 0.87     Ao Root Index 1.56 cm/m2    AV Velocity Ratio 0.82     RVSP 30 mmHg    EF BP 63 55 - 100 %   GLUCOSE, POC    Collection Time: 02/01/23 12:00 PM   Result Value Ref Range    Glucose (POC) 165 (H) 65 - 100 mg/dL    Performed by ANUEL GUIDRY, POC    Collection Time: 02/01/23  4:04 PM   Result Value Ref Range    Glucose (POC) 186 (H) 65 - 100 mg/dL    Performed by Yevonne Olszewski, POC    Collection Time: 02/01/23  7:41 PM   Result Value Ref Range    Glucose (POC) 184 (H) 65 - 100 mg/dL    Performed by Corky Ordonez    GLUCOSE, POC    Collection Time: 02/02/23  8:58 AM   Result Value Ref Range    Glucose (POC) 167 (H) 65 - 100 mg/dL    Performed by CARTER NORTON        DUPLEX CAROTID BILATERAL   Final Result      MRI BRAIN WO CONT   Final Result   Chronic microvascular ischemic disease with 2 separate small areas of acute   infarction involving the anterior and posterior limbs of the right internal   capsule. Chronic bilateral cerebellar infarctions. CT HEAD WO CONT   Final Result   Small, age indeterminate, right cerebellar infarction; probably   chronic. Assessment:  Acute CVA, left hemiplegia -- two infarcts in the right posterior limb of the internal capsule    Carotid artery stenosis  - L ICA 50-70% per duplex 2/1/23     CKD stage indeterminate, possible ERIN     Diabetes Mellitus Type II not requiring insulin     Hyperlipidemia     Hypertension, uncontrolled     Tobacco abuse, 20 pack years     History of recurrent UTIs     Obesity      Discussion/MDM: Patient with multiple medical comorbidities, each with high likelihood for morbidity and mortality if left untreated. Patient being treated with medication that requires intensive monitoring due to increased risk for toxicity. I have reviewed patient's presenting subjective and objective findings, as well as all laboratory studies, imaging studies, and vital signs to date as well as treatment rendered and patient's response to those treatments. In addition, prior medical, surgical and relevant social and family histories were reviewed. Acute CVA in the right posterior limb of the internal capsule, PT/OT will be important if there is any improvement in the clinical status to be had. Plan:  PT/OT  Waiting placement and authorization        Care Plan discussed with: Patient/Family    Code status: full    Social determinants of health: lives at boarding home alone    Disposition: IRF or SNF pending insurance    Total time spent with patient: 30 minutes.     Juvenal Tate

## 2023-02-02 NOTE — PROGRESS NOTES
Chart reviewed. KRISTYN called Dorene Guerrero from [facility] IRF and was made aware that Synetta Rosy been started and she will give CM a room number shortly. Per chart, choice was given for [facility] and [facility] as back-up if insurance denies the IRF request. CM sent referrals to SNF's.

## 2023-02-02 NOTE — PROGRESS NOTES
Hospitalist Progress Note               Daily Progress Note: 2/2/2023      Subjective:   Hospital course to date:  Tre Rolon is a 60 y.o. female with a history of HTN, DMII, HLD, and 20 pack year smoking who presents with a three day history of left-sided weakness in her leg and arm with facial droop. On 1/28/23, she had a fall that left her in the bathroom on the floor. She does not remember any loss of consciousness and was helped up by a neighbor who heard her yelling for help. Although she felt weak in her arms and legs, she thought the medicine she was taking would help with the symptoms. Due to this belief, she did not pursue medical treatment for three days even with worsening weakness on her left side. She admits to a frontal headache and has had some vision changes since Saturday. Denies any fever, chest pain, runny nose, shortness of breath, nausea, vomiting, diarrhea, and tingling in her legs. A CT head was done at her arrival in the ED and showed a likely chronic cerebellar lesion. Her creatinine was also elevated at 2. 11. Due to the possibility of a stroke and further need for workup and treatment, this patient is being admitted to the floor. MRI was read early this morning and showed two infarcts in the right poster limb of the internal capsule. She is scheduled for a an echo and carotid duplex now. This morning, her neuro exam is similar to the previous day with a slight improvement of her left leg but still remains 1/5. Left arm is 4/5 again. Patient to see PT/OT/SLP.  ---------------------  Echo was done yesterday and showed no local thrombus/embolus with an EF of 60-65%. Carotid Duplex scan showed some L proximal ICA increased flow which correlates to 50-70% stenosis. Repeat CTA is suggested by radiologist. A1C 7.4% Lipid panel shows an elevated LDL.         Problem List:  Problem List as of 2/2/2023 Never Reviewed            Codes Class Noted - Resolved    CVA (cerebral vascular accident) [facility]) ICD-10-CM: I63.9  ICD-9-CM: 434.91  2023 - Present       Medications reviewed  Current Facility-Administered Medications   Medication Dose Route Frequency    acarbose (PRECOSE) tablet 50 mg  50 mg Oral TID WITH MEALS    atenoloL (TENORMIN) tablet 50 mg  50 mg Oral DAILY    hydroCHLOROthiazide (HYDRODIURIL) tablet 12.5 mg  12.5 mg Oral DAILY    acetaminophen (TYLENOL) tablet 650 mg  650 mg Oral Q4H PRN    Or    acetaminophen (TYLENOL) solution 650 mg  650 mg Per NG tube Q4H PRN    Or    acetaminophen (TYLENOL) suppository 650 mg  650 mg Rectal Q4H PRN    aspirin chewable tablet 81 mg  81 mg Oral DAILY    atorvastatin (LIPITOR) tablet 40 mg  40 mg Oral QHS       Review of Systems:   A comprehensive review of systems was negative except for that written in the HPI. Objective:   Physical Exam:     Visit Vitals  BP (!) 175/67 (BP 1 Location: Left upper arm, BP Patient Position: At rest;Supine)   Pulse (!) 58   Temp 98.1 °F (36.7 °C)   Resp 18   Ht 5' 7\" (1.702 m)   Wt 80.7 kg (178 lb)   SpO2 97%   BMI 27.88 kg/m²      O2 Device: None (Room air)    Temp (24hrs), Av.1 °F (36.7 °C), Min:97.7 °F (36.5 °C), Max:98.7 °F (37.1 °C)    No intake/output data recorded.  1901 -  0700  In: -   Out: 25 [Urine:25]    General:   Awake and alert   Lungs:   Clear to auscultation bilaterally. Chest wall:  No tenderness or deformity. Heart:  Regular rate and rhythm, S1, S2 normal, no murmur, click, rub or gallop. Abdomen:   Soft, non-tender. Bowel sounds normal. No masses,  No organomegaly. Extremities: Extremities normal, atraumatic, no cyanosis or edema. Pulses: 2+ and symmetric all extremities. Skin: Skin color, texture, turgor normal. No rashes or lesions   Neurologic: CNII-XII intact.   No gross focal deficits         Data Review:       Recent Days:  Recent Labs     23  1048   WBC 11.9*   HGB 12.8   HCT 39.8          Recent Labs     23  0850 23  1048  137   K 4.2 4.5    106   CO2 25 26   GLU 99 186*   BUN 35* 29*   CREA 2.34* 2.11*   CA 9.1 9.3   PHOS 4.7  --    ALB 3.6 3.8   TBILI  --  0.3   ALT  --  10*   INR  --  1.0       No results for input(s): PH, PCO2, PO2, HCO3, FIO2 in the last 72 hours. 24 Hour Results:  Recent Results (from the past 24 hour(s))   GLUCOSE, POC    Collection Time: 02/01/23  4:04 PM   Result Value Ref Range    Glucose (POC) 186 (H) 65 - 100 mg/dL    Performed by Deirdre Clinton, POC    Collection Time: 02/01/23  7:41 PM   Result Value Ref Range    Glucose (POC) 184 (H) 65 - 100 mg/dL    Performed by Tyler Mak, POC    Collection Time: 02/02/23  8:58 AM   Result Value Ref Range    Glucose (POC) 167 (H) 65 - 100 mg/dL    Performed by Helder Logan    GLUCOSE, POC    Collection Time: 02/02/23 11:28 AM   Result Value Ref Range    Glucose (POC) 260 (H) 65 - 100 mg/dL    Performed by Ardeljosiah Duffel        DUPLEX CAROTID BILATERAL   Final Result      MRI BRAIN WO CONT   Final Result   Chronic microvascular ischemic disease with 2 separate small areas of acute   infarction involving the anterior and posterior limbs of the right internal   capsule. Chronic bilateral cerebellar infarctions. CT HEAD WO CONT   Final Result   Small, age indeterminate, right cerebellar infarction; probably   chronic. Assessment:  Acute CVA, left hemiplegia -- two infarcts in the right posterior limb of the internal capsule    Carotid artery stenosis  - L ICA 50-70% per duplex 2/1/23     CKD stage indeterminate, possible ERIN     Diabetes Mellitus Type II not requiring insulin     Hyperlipidemia     Hypertension, uncontrolled     Tobacco abuse, 20 pack years     History of recurrent UTIs     Obesity      Discussion/MDM: Patient with multiple medical comorbidities, each with high likelihood for morbidity and mortality if left untreated.   Patient being treated with medication that requires intensive monitoring due to increased risk for toxicity. I have reviewed patient's presenting subjective and objective findings, as well as all laboratory studies, imaging studies, and vital signs to date as well as treatment rendered and patient's response to those treatments. In addition, prior medical, surgical and relevant social and family histories were reviewed. Acute CVA in the right posterior limb of the internal capsule, PT/OT will be important if there is any improvement in the clinical status to be had. Plan:  Waiting placement and authorization  IRF would be the ideal rehab setting      Care Plan discussed with: Patient/Family    Code status: full    Social determinants of health: lives at boarding home alone    Disposition: IRF or SNF pending insurance    Total time spent with patient: 30 minutes.     Rocío Petty MD

## 2023-02-02 NOTE — PROGRESS NOTES
Problem: Pressure Injury - Risk of  Goal: *Prevention of pressure injury  Description: Document Stewart Scale and appropriate interventions in the flowsheet.   Outcome: Progressing Towards Goal  Note: Pressure Injury Interventions:  Sensory Interventions: Assess changes in LOC    Moisture Interventions: Absorbent underpads    Activity Interventions: Assess need for specialty bed    Mobility Interventions: HOB 30 degrees or less    Nutrition Interventions: Document food/fluid/supplement intake    Friction and Shear Interventions: HOB 30 degrees or less                Problem: Patient Education: Go to Patient Education Activity  Goal: Patient/Family Education  Outcome: Progressing Towards Goal

## 2023-02-02 NOTE — PROGRESS NOTES
Problem: Falls - Risk of  Goal: *Absence of Falls  Description: Document Jacki Nap Fall Risk and appropriate interventions in the flowsheet.   Outcome: Progressing Towards Goal                               Problem: Patient Education: Go to Patient Education Activity  Goal: Patient/Family Education  Outcome: Progressing Towards Goal

## 2023-02-02 NOTE — PROGRESS NOTES
Patient states she will not take Atenolol because it causes her to have Headaches. Patient stated she has told the doctor. This RN stated she will follow up with MD.

## 2023-02-02 NOTE — CONSULTS
Rehab Consult    Patient: Douglas Cyr MRN: [id number]  SSN: [id number]    YOB: 1960  Age: 60 y.o. Sex: female      Consulting provider: Gala  Reason for consult: Evaluate for rehab needs     Admit date: 1/31/2023  LOS (days): 2    CC: Difficulty walking     Subjective: This is a 60 y.o. female with a past medical history including hypertension, diabetes mellitus type 2, hyperlipidemia, tobacco abuse. Patient actually presented is also [facility] with left-sided weakness and facial droop. Initial head CT was completed and patient mated for further stroke work-up. MRI brain showed infarcts of the right posterior limb of internal capsule. Echo completed showing no shunt, EF of 60 to 65%. Carotid duplex completed showing stenosis of the left ICA 50 to 70%. Patient also noted A1c of 7.4%. Patient working with therapy. Patient seen today while she is sitting up in her bedside chair was on the phone with her son. She currently denies any chest pain, nausea, vomiting, or diarrhea. Discussed therapy options posthospitalization, she hopes for inpatient rehab would likely good candidate. Functional History -   Baseline: Independent with mobility/transfers/ADLs. Utilizes rollator sometimes gets assistance with bathing.     Current: Patient currently functioning at a mod assist with transfers, ADLs, and mobility    Living situation: Patient resides at a [facility]    Past Medical History:   Diagnosis Date    Chronic kidney disease     Diabetes ([facility] [address]     Hypertension     Stroke [facility])      Past Surgical History:   Procedure Laterality Date    HX HYSTERECTOMY      age 21      Family History   Problem Relation Age of Onset    Heart Disease Mother     Diabetes Mother     Cancer Sister     Diabetes Sister      Social History     Tobacco Use    Smoking status: Every Day     Packs/day: 0.50     Types: Cigarettes    Smokeless tobacco: Never   Substance Use Topics    Alcohol use: Never      Current Facility-Administered Medications   Medication    acarbose (PRECOSE) tablet 50 mg    atenoloL (TENORMIN) tablet 50 mg    hydroCHLOROthiazide (HYDRODIURIL) tablet 12.5 mg    acetaminophen (TYLENOL) tablet 650 mg    Or    acetaminophen (TYLENOL) solution 650 mg    Or    acetaminophen (TYLENOL) suppository 650 mg    aspirin chewable tablet 81 mg    atorvastatin (LIPITOR) tablet 40 mg        No Known Allergies    Review of Systems:  Positive for fatigue. Otherwise a complete review of systems was negative except as noted above in HPI.      Objective:     Vitals:    02/02/23 0000 02/02/23 0334 02/02/23 0400 02/02/23 0854   BP:  (!) 148/65  (!) 175/67   Pulse: (!) 59 62 (!) 58 (!) 58   Resp:  18  18   Temp:  98 °F (36.7 °C)  98.1 °F (36.7 °C)   SpO2:  98%  97%   Weight:       Height:            Physical Exam:  General: NAD, pleasant and cooperative   HEENT: anicteric, moist oral mucosa   CV: RRR, no murmurs, 2+ pulses   Respiratory: clear and aerating well, no increased WOB  Abdomen: nondistended, nontender  Extremities: no peripheral edema   Musculoskeletal: VERÓNICA 3/5 prox, 2/5 distal. RUE/RLE 4/5  Neuro: alert, mild dysarthrai, facial droop, CN 2-12 intact, sensation intact to light touch     Labs:  Recent Results (from the past 24 hour(s))   GLUCOSE, POC    Collection Time: 02/01/23  4:04 PM   Result Value Ref Range    Glucose (POC) 186 (H) 65 - 100 mg/dL    Performed by Daniel Grant, POC    Collection Time: 02/01/23  7:41 PM   Result Value Ref Range    Glucose (POC) 184 (H) 65 - 100 mg/dL    Performed by Basia Zheng, POC    Collection Time: 02/02/23  8:58 AM   Result Value Ref Range    Glucose (POC) 167 (H) 65 - 100 mg/dL    Performed by Lu Horowitz    GLUCOSE, POC    Collection Time: 02/02/23 11:28 AM   Result Value Ref Range    Glucose (POC) 260 (H) 65 - 100 mg/dL    Performed by [facility]          Imaging:  MRI BRAIN WO CONT    Result Date: 2/1/2023  Chronic microvascular ischemic disease with 2 separate small areas of acute infarction involving the anterior and posterior limbs of the right internal capsule. Chronic bilateral cerebellar infarctions. CT HEAD WO CONT    Result Date: 1/31/2023  Small, age indeterminate, right cerebellar infarction; probably chronic. Impression/Plan:     Diagnoses:   Right CVA posterior limb internal capsule  Left hemiparesis  Carotid stenosis  ERIN on CKD  Diabetes mellitus type 2  Tobacco abuse  Hypertension      This patient would benefit from participation in an intensive inpatient rehabilitation program.     This patient can likely tolerate 3 hours of therapy per day. Discussed with patient/family. Barriers to rehab: insurance auth    [ x ] Will need submission for insurance authorization for inpatient rehab. Continue PT/OT in the acute setting. Will continue to follow.        Signed By: Avinash Bailey NP     February 2, 2023    Physical Medicine and Rehabilitation

## 2023-02-02 NOTE — PROGRESS NOTES
OCCUPATIONAL THERAPY TREATMENT  Patient: Tre Rolon (47 y.o. female)  Date: 2/2/2023  Diagnosis: CVA (cerebral vascular accident) [facility]) [I63.9] <principal problem not specified>      Chart, occupational therapy assessment, plan of care, and goals were reviewed. ASSESSMENT  Pt continues with skilled OT services and is progressing towards goals. Upon BETTENCOURT arrival, pt semi supine in bed and agreeable to tx session at this time. Overall, pt continues to present with deficits in generalized strength/AROM, coordination, bed mobility, static/dynamic sitting and standing balance and functional activity tolerance during performance of ADLs/mobility (see below for objective details and assist levels). Pt tolerated session fair this date and demonstrated improvement with bed mobility and transfers. Pt completed EOB UE therex with improvement in mobility of LUE. Pt completed steps using quad cane and Shannan x2 and noted with improvement advancing LLE. Pt completed transfer to chair for seated rest break. Pt then completed ambulation using RW and noted with improvement in steps and in L . Pt then returned for seated rest break then completed transfer onto [facility] and able to complete standing bowel hygiene with Shannan x2 for balance. Donning of clean gown completed with ModA due to decreased coordination. Seated grooming completed in chair with setup A overall. Pt left seated in chair with needs met. Recommend d/c to [address]  once medically appropriate. Other factors to consider for discharge: family/social support, DME, time since onset, severity of deficits, decline from functional baseline         PLAN :  Patient continues to benefit from skilled intervention to address the above impairments. Continue treatment per established plan of care to address goals.     Recommendation for discharge: (in order for the patient to meet his/her long term goals)  Inpatient Rehabilitation Facility     This discharge recommendation:  Has been made in collaboration with the attending provider and/or case management    IF patient discharges home will need the following DME: TBD       SUBJECTIVE:   Patient stated I am just feeling tired today.     OBJECTIVE DATA SUMMARY:   Cognitive/Behavioral Status:  Neurologic State: Alert  Orientation Level: Oriented X4  Cognition: Follows commands    Functional Mobility and Transfers for ADLs:  Bed Mobility:  Rolling: Minimum assistance  Supine to Sit: Moderate assistance  Scooting: Minimum assistance; Moderate assistance    Transfers:  Sit to Stand: Minimum assistance;Assist x2  Functional Transfers  Toilet Transfer : Minimum assistance;Assist x2 (BSC)  Bed to Chair: Minimum assistance;Assist x2    Balance:  Sitting: Impaired; With support  Sitting - Static: Good (unsupported)  Sitting - Dynamic: Fair (occasional)  Standing: Impaired; With support  Standing - Static: Constant support; Fair  Standing - Dynamic : Constant support; Fair    ADL Intervention:  Grooming  Position Performed: Seated in chair  Washing Face: Set-up    Upper Body [address]: Moderate assistance    Toileting  Bladder Hygiene: Minimum assistance; Moderate assistance (balance while standing)    Therapeutic Exercises:   Exercise Sets Reps AROM AAROM PROM Self PROM Comments   Elbow flex/ext 1 10 [] [x] [] []    Wrist flex/ext 1 10 [] [x] [] []      Pain:  0/10    Activity Tolerance:   Fair and requires rest breaks  Please refer to the flowsheet for vital signs taken during this treatment. After treatment patient left in no apparent distress:   Bed locked and in lowest position  Sitting in chair and Call bell within reach    COMMUNICATION/COLLABORATION:   The patients plan of care was discussed with: Physical therapy assistant, Registered nurse, and Certified nursing assistant/patient care technician.      SG Santos  Time Calculation: 41 mins    Problem: Self Care Deficits Care Plan (Adult)  Goal: *Acute Goals and Plan of Care (Insert Text)  Description: FUNCTIONAL STATUS PRIOR TO ADMISSION: Pt reports she was mod I using quad cane until Saturday when she started to experience her symptoms since then used rollator; she req'd min A for bathing and dressing. She reports 2 falls. HOME SUPPORT: Pt lives at [facility].      Occupational Therapy Goals  Initiated 2/1/2023    Pt stated goal I want to get better   Pt will be IND sup <> sit in prep for EOB ADLs  Pt will be Mod I grooming standing sink side LRAD  Pt will be IND UB dressing sitting EOB/long sit   Pt will be Mod I  LE dressing sitting EOB/long sit  Pt will be Mod I sit <>  prep for toileting LRAD  Pt will be Mod I toileting/toilet transfer/cloth mgmt LRAD  Pt will be IND following UE HEP in prep for self care tasks   Outcome: Progressing Towards Goal

## 2023-02-02 NOTE — PROGRESS NOTES
PHYSICAL THERAPY TREATMENT  Patient: Chidi Xie (9 y.o. female)  Date: 2/2/2023  Diagnosis: CVA (cerebral vascular accident) [facility]) [I63.9] <principal problem not specified>    Chart, physical therapy assessment, plan of care and goals were reviewed. ASSESSMENT  Patient continues with skilled PT services and is progressing towards goals. Pt in the chair upon PT arrival, agreeable to session. Patient ambulated with quad cane and RW today during session and pt appeared safer with RW and was able to  with Left hand today. Patient does demonstrate unsteady gait with L foot drag and difficulty advancing. Req physical assist to manual move LLE initially progressed well with practices. AAROM-PROM required to perform LLE therex. Pt improving but still remains unsafe with mobility and would benefit from IRF to address CVA deficits from LLE. (See below for objective details and assist levels). Overall, pt tolerated session well today with improved mobility. Will continue to benefit from skilled PT services, and will continue to progress as tolerated. Current Level of Function Impacting Discharge (mobility/balance): L side weakness    Other factors to consider for discharge: safety, weakness         PLAN :  Patient continues to benefit from skilled intervention to address the above impairments. Continue treatment per established plan of care to address goals.     Recommendation for discharge: (in order for the patient to meet his/her long term goals)  [address]     This discharge recommendation:  Has been made in collaboration with the attending provider and/or case management    IF patient discharges home will need the following DME: to be determined (TBD)       SUBJECTIVE:   Patient stated I want to go home    OBJECTIVE DATA SUMMARY:   Critical Behavior:  Neurologic State: Alert  Orientation Level: Oriented X4  Cognition: Follows commands  Safety/Judgement: Awareness of environment  Functional Mobility Training:    Transfers:  Sit to Stand: Minimum assistance;Assist x2  Stand to Sit: Minimum assistance;Assist x2    Balance:  Sitting: Impaired; With support  Sitting - Static: Good (unsupported)  Sitting - Dynamic: Fair (occasional)  Standing: Impaired; With support  Standing - Static: Constant support; Fair  Standing - Dynamic : Constant support; Fair    Ambulation/Gait Training:  Distance (ft): 10 Feet (ft)  Assistive Device: Gait belt;Walker, rolling;Cane, quad  Ambulation - Level of Assistance: Assist x2;Minimal assistance  Gait Abnormalities: Foot drop;Decreased step clearance  Base of Support: Widened  Speed/Tiny: Shuffled      Therapeutic Exercises:   10x ankle pumps and LAQ (mostly PROM on LLE)     Pain Ratin/10    Activity Tolerance:   Good and requires rest breaks    After treatment patient left in no apparent distress:   Sitting in chair and Call bell within reach      COMMUNICATION/COLLABORATION:   The patients plan of care was discussed with: Occupational therapy assistant, Registered nurse, and Certified nursing assistant/patient care technician. Cotx with BETTENCOURT for increased safety and assistance. Doris Bradford, PT   Time Calculation: 39 mins         Problem: Mobility Impaired (Adult and Pediatric)  Goal: *Acute Goals and Plan of Care (Insert Text)  Description: FUNCTIONAL STATUS PRIOR TO ADMISSION: Patient was modified independent using a SPC prior to fall on Saturday, rollator following Saturday for functional mobility. Patient required minimal assistance for basic and instrumental ADLs. HOME SUPPORT PRIOR TO ADMISSION: Pt resides at a boarding house, required assistance for ADLs and IADLs    Physical Therapy Goals  Initiated 2023  Pt stated goal: to get better  Pt will be I with LE HEP in 7 days. Pt will perform bed mobility with mod I in 7 days. Pt will perform transfers with mod I in 7 days.    Pt will amb  feet with LRAD safely with mod I in 7 days.        Outcome: Progressing Towards Goal

## 2023-02-03 VITALS
SYSTOLIC BLOOD PRESSURE: 195 MMHG | TEMPERATURE: 98.3 F | RESPIRATION RATE: 18 BRPM | BODY MASS INDEX: 27.94 KG/M2 | OXYGEN SATURATION: 98 % | DIASTOLIC BLOOD PRESSURE: 67 MMHG | WEIGHT: 178 LBS | HEART RATE: 66 BPM | HEIGHT: 67 IN

## 2023-02-03 LAB
GLUCOSE BLD STRIP.AUTO-MCNC: 169 MG/DL (ref 65–100)
GLUCOSE BLD STRIP.AUTO-MCNC: 239 MG/DL (ref 65–100)
PERFORMED BY, TECHID: ABNORMAL
PERFORMED BY, TECHID: ABNORMAL

## 2023-02-03 PROCEDURE — 97530 THERAPEUTIC ACTIVITIES: CPT

## 2023-02-03 PROCEDURE — 82962 GLUCOSE BLOOD TEST: CPT

## 2023-02-03 PROCEDURE — 74011250637 HC RX REV CODE- 250/637: Performed by: INTERNAL MEDICINE

## 2023-02-03 PROCEDURE — 97110 THERAPEUTIC EXERCISES: CPT

## 2023-02-03 PROCEDURE — 97116 GAIT TRAINING THERAPY: CPT

## 2023-02-03 RX ORDER — ATORVASTATIN CALCIUM 40 MG/1
40 TABLET, FILM COATED ORAL
Qty: 30 TABLET | Refills: 0 | Status: SHIPPED | OUTPATIENT
Start: 2023-02-03 | End: 2023-02-03 | Stop reason: SDUPTHER

## 2023-02-03 RX ORDER — AMLODIPINE BESYLATE 5 MG/1
10 TABLET ORAL DAILY
Qty: 30 TABLET | Refills: 0 | Status: SHIPPED | OUTPATIENT
Start: 2023-02-04 | End: 2023-02-03 | Stop reason: SDUPTHER

## 2023-02-03 RX ORDER — AMLODIPINE BESYLATE 10 MG/1
10 TABLET ORAL DAILY
Qty: 30 TABLET | Refills: 0 | Status: SHIPPED | OUTPATIENT
Start: 2023-02-04

## 2023-02-03 RX ORDER — GUAIFENESIN 100 MG/5ML
81 LIQUID (ML) ORAL DAILY
Qty: 30 TABLET | Refills: 0 | Status: SHIPPED | OUTPATIENT
Start: 2023-02-04

## 2023-02-03 RX ORDER — ATORVASTATIN CALCIUM 40 MG/1
40 TABLET, FILM COATED ORAL
Qty: 30 TABLET | Refills: 0 | Status: SHIPPED | OUTPATIENT
Start: 2023-02-03

## 2023-02-03 RX ADMIN — ACARBOSE 50 MG: 50 TABLET ORAL at 12:22

## 2023-02-03 RX ADMIN — AMLODIPINE BESYLATE 5 MG: 5 TABLET ORAL at 08:11

## 2023-02-03 RX ADMIN — ACARBOSE 50 MG: 50 TABLET ORAL at 08:04

## 2023-02-03 RX ADMIN — ACETAMINOPHEN 650 MG: 325 TABLET ORAL at 08:12

## 2023-02-03 RX ADMIN — ASPIRIN 81 MG CHEWABLE TABLET 81 MG: 81 TABLET CHEWABLE at 08:05

## 2023-02-03 RX ADMIN — HYDROCHLOROTHIAZIDE 12.5 MG: 25 TABLET ORAL at 09:00

## 2023-02-03 NOTE — PROGRESS NOTES
Patient complaining of headache. Obtained an order for Tylenol 650mg, Patient refused insisting to take her own Tylenol PM to help her sleep (unwrapped and without label in a pill box). Explained that she cannot take that medicine for her safety as it is not labeled and patient became combative trying to take the medicine into her mouth. Called code birth. Patients pill box brought to the pharmacy for safekeeping. Obtained another order for Benadryl, patient refused. Referred to Doctor on duty.

## 2023-02-03 NOTE — PROGRESS NOTES
OCCUPATIONAL THERAPY TREATMENT  Patient: Kulwant Esposito (68 y.o. female)  Date: 2/3/2023  Diagnosis: CVA (cerebral vascular accident) ([facility] [address] [I63.9] <principal problem not specified>      Precautions: In place during session: EKG/telemetry   Chart, occupational therapy assessment, plan of care, and goals were reviewed. ASSESSMENT  Pt continues with skilled OT services and is progressing towards goals. Upon BETTENCOURT arrival, pt sitting at EOB with PTA and agreeable to tx session at this time. Overall, pt continues to present with deficits in generalized strength/AROM, coordination, bed mobility, static/dynamic sitting and standing balance and functional activity tolerance during performance of ADLs/mobility (see below for objective details and assist levels). Pt noted with improvement in overall mobility and transfers. Pt completed ambulation and transfers with Shannan x1-2 using RW. Pt noted with improvement using RW and able to complete toilet transfer with Shannan x1-2. Pt able to complete toileting with Shannan for balance. Pt completed standing grooming at sink with Shannan for balance and noted with one LOB in bathroom requiring ModA for recovery. Pt noted with balance decreasing as becoming fatigued and pt reporting LE weakness. Pt returned to supine and completed semi supine UE therex. Pt left semi supine in bed with call bell within reach and needs met. Recommend d/c to [address] [employer]  once medically appropriate. Other factors to consider for discharge: family/social support, DME, time since onset, severity of deficits, decline from functional baseline         PLAN :  Patient continues to benefit from skilled intervention to address the above impairments. Continue treatment per established plan of care to address goals.     Recommendation for discharge: (in order for the patient to meet his/her long term goals)  [address] [employer]     This discharge recommendation:  Has been made in collaboration with the attending provider and/or case management    IF patient discharges home will need the following DME: TBD       SUBJECTIVE:   Patient stated I just want to go home to pay my rent before rehab.     OBJECTIVE DATA SUMMARY:   Cognitive/Behavioral Status:  Neurologic State: Alert     Cognition: Follows commands;Decreased attention/concentration; Impaired decision making    Functional Mobility and Transfers for ADLs:  Bed Mobility:  Rolling: Stand-by assistance  Supine to Sit: Stand-by assistance  Sit to Supine: Stand-by assistance  Scooting: Minimum assistance;Assist x2    Transfers:  Sit to Stand: Minimum assistance  Functional Transfers  Toilet Transfer : Minimum assistance;Assist x2    Balance:  Sitting: Impaired; With support  Sitting - Static: Good (unsupported)  Sitting - Dynamic: Fair (occasional)  Standing: Impaired; With support  Standing - Static: Constant support; Fair  Standing - Dynamic : Constant support; Fair    ADL Intervention:  Grooming  Position Performed: Standing  Washing Hands: Minimum assistance    Toileting  Bladder Hygiene: Minimum assistance    Therapeutic Exercises:   Exercise Sets Reps AROM AAROM PROM Self PROM Comments   Shoulder flex/ext 1 15 [x] [] [] []    Elbow flex/ext 1 15 [x] [] [] []    Wrist flex/ext 1 15 [] [x] [] []    Digit flex/ext 1 15 [] [x] [] []      Pain:  0/10    Activity Tolerance:   Fair and requires rest breaks  Please refer to the flowsheet for vital signs taken during this treatment. After treatment patient left in no apparent distress:   Bed locked and in lowest position  Supine in bed, Call bell within reach, Bed / chair alarm activated, and Side rails x 3    COMMUNICATION/COLLABORATION:   The patients plan of care was discussed with: Physical therapy assistant and Registered nurse. Cotreat with PT for increased safety for pt and clinician.     SG Santos  Time Calculation: 24 mins    Problem: Self Care Deficits Care Plan (Adult)  Goal: *Acute Goals and Plan of Care (Insert Text)  Description: FUNCTIONAL STATUS PRIOR TO ADMISSION: Pt reports she was mod I using quad cane until Saturday when she started to experience her symptoms since then used rollator; she req'd min A for bathing and dressing. She reports 2 falls. HOME SUPPORT: Pt lives at [facility].      Occupational Therapy Goals  Initiated 2/1/2023    Pt stated goal I want to get better   Pt will be IND sup <> sit in prep for EOB ADLs  Pt will be Mod I grooming standing sink side LRAD  Pt will be IND UB dressing sitting EOB/long sit   Pt will be Mod I  LE dressing sitting EOB/long sit  Pt will be Mod I sit <>  prep for toileting LRAD  Pt will be Mod I toileting/toilet transfer/cloth mgmt LRAD  Pt will be IND following UE HEP in prep for self care tasks   Outcome: Progressing Towards Goal

## 2023-02-03 NOTE — MED STUDENT NOTES
Physician Discharge Summary     Patient ID:    Chirag López  [id number]  60 y.o.  1960    Admit date: 1/31/2023    Discharge date : 2/3/2023      Final Diagnoses:   Acute CVA, left hemiplegia leg>>arm -- two infarcts in the right posterior limb of the internal capsule     Carotid artery stenosis  - L ICA 50-70%     CKD stage indeterminate, possible ERIN     Diabetes Mellitus Type II not requiring insulin     Hyperlipidemia     Hypertension, uncontrolled     Tobacco abuse, 20 pack years     History of recurrent UTIs     Obesity      Reason for Hospitalization:  Chirag López is a 60 y.o. female with a history of HTN, DMII, HLD, and 20 pack year smoking who presents with a three day history of left-sided weakness in her leg and arm with facial droop. On 1/28/23, she had a fall that left her in the bathroom on the floor. She does not remember any loss of consciousness and was helped up by a neighbor who heard her yelling for help. Although she felt weak in her arms and legs, she thought the medicine she was taking would help with the symptoms. Due to this belief, she did not pursue medical treatment for three days even with worsening weakness on her left side. She admits to a frontal headache and has had some vision changes since Saturday. Denies any fever, chest pain, runny nose, shortness of breath, nausea, vomiting, diarrhea, and tingling in her legs. A CT head was done at her arrival in the ED and showed a likely chronic cerebellar lesion. Her creatinine was also elevated at 2. 11. Due to the possibility of a stroke and further need for workup and treatment, this patient is being admitted to the floor. Hospital Course:   MRI was read early this morning and showed two infarcts in the right poster limb of the internal capsule. She is scheduled for a an echo and carotid duplex now.  This morning, her neuro exam is similar to the previous day with a slight improvement of her left leg but still remains 1/5. Left arm is 4/5 again. Patient to see PT/OT/SLP. Echo was done yesterday and showed no local thrombus/embolus with an EF of 60-65%. Carotid Duplex scan showed some L proximal ICA increased flow which correlates to 50-70% stenosis. Repeat CTA is suggested by radiologist. A1C 7.4% Lipid panel shows an elevated LDL. Patient is doing well today, has a high blood pressure with reading 195/67. Muscle strength is 4/ in LUE and 3/5 in LLE. She reports that she is ready to go home and doesn't want to go to any rehab place. Discharge Medications:   Current Discharge Medication List        START taking these medications    Details   amLODIPine (NORVASC) 5 mg tablet Take 2 Tablets by mouth daily. Qty: 30 Tablet, Refills: 0  Start date: 2/4/2023      aspirin 81 mg chewable tablet Take 1 Tablet by mouth daily. Qty: 30 Tablet, Refills: 0  Start date: 2/4/2023      atorvastatin (LIPITOR) 40 mg tablet Take 1 Tablet by mouth nightly. Qty: 30 Tablet, Refills: 0  Start date: 2/3/2023           CONTINUE these medications which have NOT CHANGED    Details   acarbose (PRECOSE) 50 mg tablet Take  by mouth three (3) times daily (with meals). glipiZIDE (GLUCOTROL) 5 mg tablet Take  by mouth daily. hydroCHLOROthiazide (HYDRODIURIL) 12.5 mg tablet Take 12.5 mg by mouth daily. STOP taking these medications       atenoloL (TENORMIN) 50 mg tablet Comments:   Reason for Stopping:         simvastatin (ZOCOR) 20 mg tablet Comments:   Reason for Stopping: Follow up Care:    1. None in 1-2 weeks. Please call to set up an appointment shortly after discharge. Diet:  Regular Diet    Disposition:  Home. Advanced Directive:   FULL x   DNR      Discharge Exam:  General:  Alert, cooperative, no distress, appears stated age. L facial droop. Lungs:   Clear to auscultation bilaterally. Chest wall:  No tenderness or deformity.    Heart:  Regular rate and rhythm, S1, S2 normal, no murmur, click, rub or gallop. Abdomen:   Soft, non-tender. Bowel sounds normal. No masses,  No organomegaly. Extremities: LUE 4/5, LLE 3/5, all other 5/5 muscle strength   Pulses: 2+ and symmetric all extremities. Skin: Skin color, texture, turgor normal. No rashes or lesions   Neurologic: CNII-XII intact. No gross sensory or motor deficits        CONSULTATIONS: None    Significant Diagnostic Studies:   1/31/2023: BUN 29 mg/dL (H; Ref range: 6 - 20 mg/dL); Calcium 9.3 mg/dL (Ref range: 8.5 - 10.1 mg/dL); CO2 26 mmol/L (Ref range: 21 - 32 mmol/L); Creatinine 2.11 mg/dL (H; Ref range: 0.55 - 1.02 mg/dL); Glucose 186 mg/dL (H; Ref range: 65 - 100 mg/dL); HCT 39.8 % (Ref range: 35.0 - 47.0 %); HGB 12.8 g/dL (Ref range: 11.5 - 16.0 g/dL); Potassium 4.5 mmol/L (Ref range: 3.5 - 5.1 mmol/L); Sodium 137 mmol/L (Ref range: 136 - 145 mmol/L)  2/1/2023: BUN 35 mg/dL (H; Ref range: 6 - 20 mg/dL); Calcium 9.1 mg/dL (Ref range: 8.5 - 10.1 mg/dL); CO2 25 mmol/L (Ref range: 21 - 32 mmol/L); Creatinine 2.34 mg/dL (H; Ref range: 0.55 - 1.02 mg/dL); Glucose 99 mg/dL (Ref range: 65 - 100 mg/dL); Potassium 4.2 mmol/L (Ref range: 3.5 - 5.1 mmol/L); Sodium 139 mmol/L (Ref range: 136 - 145 mmol/L)  Recent Labs     01/31/23  1048   WBC 11.9*   HGB 12.8   HCT 39.8        Recent Labs     02/01/23  0850 01/31/23  1048    137   K 4.2 4.5    106   CO2 25 26   BUN 35* 29*   CREA 2.34* 2.11*   GLU 99 186*   CA 9.1 9.3   PHOS 4.7  --      Recent Labs     02/01/23  0850 01/31/23  1048   ALT  --  10*   AP  --  95   TBILI  --  0.3   TP  --  7.6   ALB 3.6 3.8   GLOB  --  3.8     Recent Labs     01/31/23  1048   INR 1.0   PTP 13.6      No results for input(s): FE, TIBC, PSAT, FERR in the last 72 hours. No results for input(s): PH, PCO2, PO2 in the last 72 hours. No results for input(s): CPK, CKMB in the last 72 hours.     No lab exists for component: TROPONINI  Lab Results   Component Value Date/Time    Glucose (POC) 169 (H) 02/03/2023 07:27 AM    Glucose (POC) 213 (H) 02/02/2023 08:21 PM    Glucose (POC) 138 (H) 02/02/2023 04:43 PM    Glucose (POC) 260 (H) 02/02/2023 11:28 AM    Glucose (POC) 167 (H) 02/02/2023 08:58 AM       Discharge time spent 35 minutes    Signed:  Chana Mojica  2/3/2023  10:13 AM

## 2023-02-03 NOTE — PROGRESS NOTES
CM informed that patient wants to go home instead of going to IRF or SNF. CM met with patient at bedside, MD in room encouraging patient to go to rehab, but patient declined. Patient states that she wants to go home with [facility] today. No preference of agency. CM sent referral.    Patient states that she will call her transportation to pick her up at 1pm.    --------------    1155- Patient accepted with [facility] - SOC date 2/5. CM notified patient. Discharge plan of care/case management plan validated with provider discharge order.

## 2023-02-03 NOTE — PROGRESS NOTES
PHYSICAL THERAPY TREATMENT  Patient: Ajit Lino (54 y.o. female)  Date: 2/3/2023  Diagnosis: CVA (cerebral vascular accident) [facility]) [I63.9] <principal problem not specified>      Precautions:      Chart, physical therapy assessment, plan of care and goals were reviewed. ASSESSMENT  Patient continues with skilled PT services and is progressing towards goals. Pt in bed upon PT arrival, agreeable to session. Pt improved with bed mobility, SBA to the right side of bed today. Patient still req min A x1-2 for ambulation and transfers. Patient improved with RW today and step clearance initially while amb to the bathroom however demos 1 LOB amb back to bed with mod A to recover. Balance worsened with fatigue from ambulation and pt reporting more LE weakness. Patient demos more movement in the LLE and was able to assist more with exercises. Progressing well but still remains unsafe with mobility, rec d/c to IRF. (See below for objective details and assist levels). Overall pt tolerated session well today with improved mobility. Will continue to benefit from skilled PT services, and will continue to progress as tolerated. Current Level of Function Impacting Discharge (mobility/balance): safety, min-mod A ambulation    Other factors to consider for discharge: safety, lives alone          PLAN :  Patient continues to benefit from skilled intervention to address the above impairments. Continue treatment per established plan of care to address goals. Recommendation for discharge: (in order for the patient to meet his/her long term goals)  [address] [facility],[address]     This discharge recommendation:  Has been made in collaboration with the attending provider and/or case management    IF patient discharges home will need the following DME: to be determined (TBD)       SUBJECTIVE:   Patient stated I want to go home so bad.     OBJECTIVE DATA SUMMARY:   Critical Behavior:  Neurologic State: Agitated  Orientation Level: Oriented X4  Cognition: Impaired decision making, Impulsive, Poor safety awareness  Safety/Judgement: Awareness of environment  Functional Mobility Training:  Bed Mobility:  Rolling: Stand-by assistance  Supine to Sit: Stand-by assistance  Sit to Supine: Stand-by assistance  Scooting: Minimum assistance;Assist x2        Transfers:  Sit to Stand: Minimum assistance  Stand to Sit: Minimum assistance;Assist x2    Balance:  Sitting: Impaired; With support  Sitting - Static: Good (unsupported)  Sitting - Dynamic: Fair (occasional)  Standing: Impaired; With support  Standing - Static: Constant support; Fair  Standing - Dynamic : Constant support; Fair  Ambulation/Gait Training:  Distance (ft): 20 Feet (ft) (10ft x2 to bathroom/back to bed)  Assistive Device: Gait belt;Walker, rolling  Ambulation - Level of Assistance: Minimal assistance        Gait Abnormalities: Foot drop;Decreased step clearance        Base of Support: Narrowed     Speed/Tiny: Shuffled      Therapeutic Exercises:       EXERCISE   Sets   Reps   Active Active Assist   Passive Self ROM   Comments   Ankle Pumps  10 [x] [x] [] [] AAROM LLE   Hip abd/add  10 [x] [] [] []    Long Arc Quads  10 [x] [] [] []    Marching  10 [x] [x] [] [] AAROM LLE      [] [] [] []       Pain Ratin/10    Activity Tolerance:   Fair and requires rest breaks    After treatment patient left in no apparent distress:   Bed locked and returned to lowest position, Supine in bed and Call bell within reach, BETTENCOURT finishing up with patient tx. COMMUNICATION/COLLABORATION:   The patients plan of care was discussed with: Occupational therapy assistant and Registered nurse. Cotx for ambulation/OOB safety.        Nisreen Hatfield, PT   Time Calculation: 23 mins         Problem: Mobility Impaired (Adult and Pediatric)  Goal: *Acute Goals and Plan of Care (Insert Text)  Description: FUNCTIONAL STATUS PRIOR TO ADMISSION: Patient was modified independent using a SPC prior to fall on Saturday, rollator following Saturday for functional mobility. Patient required minimal assistance for basic and instrumental ADLs. HOME SUPPORT PRIOR TO ADMISSION: Pt resides at a boarding house, required assistance for ADLs and IADLs    Physical Therapy Goals  Initiated 2/1/2023  Pt stated goal: to get better  Pt will be I with LE HEP in 7 days. Pt will perform bed mobility with mod I in 7 days. Pt will perform transfers with mod I in 7 days. Pt will amb  feet with LRAD safely with mod I in 7 days.        Outcome: Progressing Towards Goal

## 2023-02-03 NOTE — PROGRESS NOTES
Problem: Pressure Injury - Risk of  Goal: *Prevention of pressure injury  Description: Document Stewart Scale and appropriate interventions in the flowsheet. Outcome: Progressing Towards Goal  Note: Pressure Injury Interventions:  Sensory Interventions: Assess changes in LOC, Keep linens dry and wrinkle-free    Moisture Interventions: Absorbent underpads, Internal/External urinary devices    Activity Interventions: Assess need for specialty bed    Mobility Interventions: HOB 30 degrees or less, Turn and reposition approx. every two hours(pillow and wedges)    Nutrition Interventions: Document food/fluid/supplement intake, Offer support with meals,snacks and hydration    Friction and Shear Interventions: HOB 30 degrees or less                Problem: Falls - Risk of  Goal: *Absence of Falls  Description: Document Justin Fall Risk and appropriate interventions in the flowsheet.   Outcome: Progressing Towards Goal  Note: Fall Risk Interventions:

## 2023-02-03 NOTE — PROGRESS NOTES
Problem: Pressure Injury - Risk of  Goal: *Prevention of pressure injury  Description: Document Stewart Scale and appropriate interventions in the flowsheet. Outcome: Progressing Towards Goal  Note: Pressure Injury Interventions:  Sensory Interventions: Assess changes in LOC    Moisture Interventions: Absorbent underpads    Activity Interventions: Assess need for specialty bed    Mobility Interventions: HOB 30 degrees or less    Nutrition Interventions: Document food/fluid/supplement intake    Friction and Shear Interventions: HOB 30 degrees or less                Problem: Patient Education: Go to Patient Education Activity  Goal: Patient/Family Education  Outcome: Progressing Towards Goal     Problem: Falls - Risk of  Goal: *Absence of Falls  Description: Document Justin Fall Risk and appropriate interventions in the flowsheet.   Outcome: Progressing Towards Goal  Note: Fall Risk Interventions:                                Problem: Patient Education: Go to Patient Education Activity  Goal: Patient/Family Education  Outcome: Progressing Towards Goal

## 2023-02-03 NOTE — DISCHARGE SUMMARY
Physician Discharge Summary     Patient ID:    Cassandra Jernigan  [id number]  60 y.o.  1960    Admit date: 1/31/2023    Discharge date : 2/3/2023      Final Diagnoses:   Acute CVA, left hemiplegia leg>>arm -- two infarcts in the right posterior limb of the internal capsule     Carotid artery stenosis  - L ICA 50-70%     CKD likely stage III     Diabetes Mellitus Type II not requiring insulin     Hyperlipidemia     Hypertension, uncontrolled     Tobacco abuse, 20 pack years     History of recurrent UTIs     Obesity      Reason for Hospitalization:  Cassandra Jernigan is a 60 y.o. female with a history of HTN, DMII, HLD, and 20 pack year smoking who presents with a three day history of left-sided weakness in her leg and arm with facial droop. On 1/28/23, she had a fall that left her in the bathroom on the floor. She does not remember any loss of consciousness and was helped up by a neighbor who heard her yelling for help. Although she felt weak in her arms and legs, she thought the medicine she was taking would help with the symptoms. Due to this belief, she did not pursue medical treatment for three days even with worsening weakness on her left side. She admits to a frontal headache and has had some vision changes since Saturday. Denies any fever, chest pain, runny nose, shortness of breath, nausea, vomiting, diarrhea, and tingling in her legs. A CT head was done at her arrival in the ED and showed a likely chronic cerebellar lesion. Her creatinine was also elevated at 2. 11. Due to the possibility of a stroke and further need for workup and treatment, this patient is being admitted to the floor. Hospital Course:   MRI was read early this morning and showed two infarcts in the right poster limb of the internal capsule. She is scheduled for a an echo and carotid duplex now.  This morning, her neuro exam is similar to the previous day with a slight improvement of her left leg but still remains 1/5. Left arm is 4/5 again. Patient to see PT/OT/SLP. Echo was done yesterday and showed no local thrombus/embolus with an EF of 60-65%. Carotid Duplex scan showed some L proximal ICA increased flow which correlates to 50-70% stenosis. Repeat CTA is suggested by radiologist. A1C 7.4% Lipid panel shows an elevated LDL. She was switched to a high intensity statin and aspirin was added    Patient is doing well today, has a high blood pressure with reading 195/67. Muscle strength is 4/ in LUE and 3/5 in LLE. She reports that she is ready to go home and doesn't want to go to any rehab place. Although her home medication reconciliation indicated she was on atenolol, patient states she has not been taking it due to headaches. We therefore started her on amlodipine 10 mg daily in its place    Smoking cessation strongly advised although patient has no intention of quitting        Discharge Medications:   Current Discharge Medication List        START taking these medications    Details   aspirin 81 mg chewable tablet Take 1 Tablet by mouth daily. Qty: 30 Tablet, Refills: 0  Start date: 2/4/2023      atorvastatin (LIPITOR) 40 mg tablet Take 1 Tablet by mouth nightly. Qty: 30 Tablet, Refills: 0  Start date: 2/3/2023      amLODIPine (NORVASC) 10 mg tablet Take 1 Tablet by mouth daily. Qty: 30 Tablet, Refills: 0  Start date: 2/4/2023           CONTINUE these medications which have NOT CHANGED    Details   acarbose (PRECOSE) 50 mg tablet Take  by mouth three (3) times daily (with meals). glipiZIDE (GLUCOTROL) 5 mg tablet Take  by mouth daily. hydroCHLOROthiazide (HYDRODIURIL) 12.5 mg tablet Take 12.5 mg by mouth daily. STOP taking these medications       atenoloL (TENORMIN) 50 mg tablet Comments:   Reason for Stopping:         simvastatin (ZOCOR) 20 mg tablet Comments:   Reason for Stopping: Follow up Care:    1. None in 1-2 weeks.   Please call to set up an appointment shortly after discharge. Diet:  Diabetic Diet    Disposition:  Home. Advanced Directive:   FULL x   DNR      Discharge Exam:  General:  Alert, cooperative, no distress, appears stated age. L facial droop. Lungs:   Clear to auscultation bilaterally. Chest wall:  No tenderness or deformity. Heart:  Regular rate and rhythm, S1, S2 normal, no murmur, click, rub or gallop. Abdomen:   Soft, non-tender. Bowel sounds normal. No masses,  No organomegaly. Extremities: LUE 4/5, LLE 3/5, all other 5/5 muscle strength   Pulses: 2+ and symmetric all extremities. Skin: Skin color, texture, turgor normal. No rashes or lesions            CONSULTATIONS: None    Significant Diagnostic Studies:   1/31/2023: BUN 29 mg/dL (H; Ref range: 6 - 20 mg/dL); Calcium 9.3 mg/dL (Ref range: 8.5 - 10.1 mg/dL); CO2 26 mmol/L (Ref range: 21 - 32 mmol/L); Creatinine 2.11 mg/dL (H; Ref range: 0.55 - 1.02 mg/dL); Glucose 186 mg/dL (H; Ref range: 65 - 100 mg/dL); HCT 39.8 % (Ref range: 35.0 - 47.0 %); HGB 12.8 g/dL (Ref range: 11.5 - 16.0 g/dL); Potassium 4.5 mmol/L (Ref range: 3.5 - 5.1 mmol/L); Sodium 137 mmol/L (Ref range: 136 - 145 mmol/L)  2/1/2023: BUN 35 mg/dL (H; Ref range: 6 - 20 mg/dL); Calcium 9.1 mg/dL (Ref range: 8.5 - 10.1 mg/dL); CO2 25 mmol/L (Ref range: 21 - 32 mmol/L); Creatinine 2.34 mg/dL (H; Ref range: 0.55 - 1.02 mg/dL); Glucose 99 mg/dL (Ref range: 65 - 100 mg/dL); Potassium 4.2 mmol/L (Ref range: 3.5 - 5.1 mmol/L);  Sodium 139 mmol/L (Ref range: 136 - 145 mmol/L)  Recent Labs     01/31/23  1048   WBC 11.9*   HGB 12.8   HCT 39.8          Recent Labs     02/01/23  0850 01/31/23  1048    137   K 4.2 4.5    106   CO2 25 26   BUN 35* 29*   CREA 2.34* 2.11*   GLU 99 186*   CA 9.1 9.3   PHOS 4.7  --        Recent Labs     02/01/23  0850 01/31/23  1048   ALT  --  10*   AP  --  95   TBILI  --  0.3   TP  --  7.6   ALB 3.6 3.8   GLOB  --  3.8       Recent Labs     01/31/23  1048   INR 1.0   PTP 13.6 No results for input(s): FE, TIBC, PSAT, FERR in the last 72 hours. No results for input(s): PH, PCO2, PO2 in the last 72 hours. No results for input(s): CPK, CKMB in the last 72 hours.     No lab exists for component: TROPONINI  Lab Results   Component Value Date/Time    Glucose (POC) 169 (H) 02/03/2023 07:27 AM    Glucose (POC) 213 (H) 02/02/2023 08:21 PM    Glucose (POC) 138 (H) 02/02/2023 04:43 PM    Glucose (POC) 260 (H) 02/02/2023 11:28 AM    Glucose (POC) 167 (H) 02/02/2023 08:58 AM       Discharge time spent 35 minutes    Signed:  Arin uHrtado MD  2/3/2023  10:13 AM

## 2023-04-06 ENCOUNTER — APPOINTMENT (OUTPATIENT)
Dept: CT IMAGING | Age: 63
DRG: 638 | End: 2023-04-06
Attending: EMERGENCY MEDICINE
Payer: MEDICARE

## 2023-04-06 ENCOUNTER — HOSPITAL ENCOUNTER (INPATIENT)
Age: 63
LOS: 6 days | Discharge: NURSING, REHAB OR LONG-TERM CARE | DRG: 638 | End: 2023-04-17
Attending: EMERGENCY MEDICINE | Admitting: INTERNAL MEDICINE
Payer: MEDICARE

## 2023-04-06 DIAGNOSIS — I63.9 CEREBROVASCULAR ACCIDENT (CVA), UNSPECIFIED MECHANISM (HCC): ICD-10-CM

## 2023-04-06 DIAGNOSIS — G62.9 NEUROPATHY: ICD-10-CM

## 2023-04-06 DIAGNOSIS — R29.898 RIGHT LEG WEAKNESS: Primary | ICD-10-CM

## 2023-04-06 PROBLEM — E16.2 HYPOGLYCEMIA: Status: ACTIVE | Noted: 2023-04-06

## 2023-04-06 LAB
ALBUMIN SERPL-MCNC: 3.6 G/DL (ref 3.5–5)
ALBUMIN/GLOB SERPL: 1.1 (ref 1.1–2.2)
ALP SERPL-CCNC: 57 U/L (ref 45–117)
ALT SERPL-CCNC: 9 U/L (ref 12–78)
ANION GAP SERPL CALC-SCNC: 5 MMOL/L (ref 5–15)
APPEARANCE UR: ABNORMAL
AST SERPL W P-5'-P-CCNC: 14 U/L (ref 15–37)
ATRIAL RATE: 60 BPM
BACTERIA URNS QL MICRO: NEGATIVE /HPF
BASOPHILS # BLD: 0 K/UL (ref 0–0.1)
BASOPHILS NFR BLD: 0 % (ref 0–1)
BILIRUB SERPL-MCNC: 0.3 MG/DL (ref 0.2–1)
BILIRUB UR QL: NEGATIVE
BUN SERPL-MCNC: 38 MG/DL (ref 6–20)
BUN/CREAT SERPL: 15 (ref 12–20)
CA-I BLD-MCNC: 8.8 MG/DL (ref 8.5–10.1)
CALCULATED P AXIS, ECG09: 61 DEGREES
CALCULATED R AXIS, ECG10: 11 DEGREES
CALCULATED T AXIS, ECG11: 47 DEGREES
CHLORIDE SERPL-SCNC: 113 MMOL/L (ref 97–108)
CO2 SERPL-SCNC: 23 MMOL/L (ref 21–32)
COLOR UR: ABNORMAL
CREAT SERPL-MCNC: 2.55 MG/DL (ref 0.55–1.02)
DIAGNOSIS, 93000: NORMAL
DIFFERENTIAL METHOD BLD: ABNORMAL
EOSINOPHIL # BLD: 0.1 K/UL (ref 0–0.4)
EOSINOPHIL NFR BLD: 1 % (ref 0–7)
EPITH CASTS URNS QL MICRO: ABNORMAL /LPF
ERYTHROCYTE [DISTWIDTH] IN BLOOD BY AUTOMATED COUNT: 16.1 % (ref 11.5–14.5)
GLOBULIN SER CALC-MCNC: 3.3 G/DL (ref 2–4)
GLUCOSE BLD STRIP.AUTO-MCNC: 150 MG/DL (ref 65–100)
GLUCOSE BLD STRIP.AUTO-MCNC: 61 MG/DL (ref 65–100)
GLUCOSE BLD STRIP.AUTO-MCNC: 72 MG/DL (ref 65–100)
GLUCOSE BLD STRIP.AUTO-MCNC: 73 MG/DL (ref 65–100)
GLUCOSE SERPL-MCNC: 58 MG/DL (ref 65–100)
GLUCOSE UR STRIP.AUTO-MCNC: NEGATIVE MG/DL
HCT VFR BLD AUTO: 32.3 % (ref 35–47)
HGB BLD-MCNC: 10.5 G/DL (ref 11.5–16)
HGB UR QL STRIP: NEGATIVE
IMM GRANULOCYTES # BLD AUTO: 0 K/UL (ref 0–0.04)
IMM GRANULOCYTES NFR BLD AUTO: 0 % (ref 0–0.5)
KETONES UR QL STRIP.AUTO: NEGATIVE MG/DL
LEUKOCYTE ESTERASE UR QL STRIP.AUTO: ABNORMAL
LYMPHOCYTES # BLD: 2.2 K/UL (ref 0.8–3.5)
LYMPHOCYTES NFR BLD: 24 % (ref 12–49)
MCH RBC QN AUTO: 28.2 PG (ref 26–34)
MCHC RBC AUTO-ENTMCNC: 32.5 G/DL (ref 30–36.5)
MCV RBC AUTO: 86.8 FL (ref 80–99)
MONOCYTES # BLD: 0.5 K/UL (ref 0–1)
MONOCYTES NFR BLD: 5 % (ref 5–13)
MUCOUS THREADS URNS QL MICRO: ABNORMAL /LPF
NEUTS SEG # BLD: 6.3 K/UL (ref 1.8–8)
NEUTS SEG NFR BLD: 70 % (ref 32–75)
NITRITE UR QL STRIP.AUTO: NEGATIVE
NRBC # BLD: 0 K/UL (ref 0–0.01)
NRBC BLD-RTO: 0 PER 100 WBC
P-R INTERVAL, ECG05: 176 MS
PERFORMED BY, TECHID: ABNORMAL
PERFORMED BY, TECHID: ABNORMAL
PERFORMED BY, TECHID: NORMAL
PERFORMED BY, TECHID: NORMAL
PH UR STRIP: 5 (ref 5–8)
PLATELET # BLD AUTO: 242 K/UL (ref 150–400)
PMV BLD AUTO: 9.5 FL (ref 8.9–12.9)
POTASSIUM SERPL-SCNC: 4.6 MMOL/L (ref 3.5–5.1)
PROT SERPL-MCNC: 6.9 G/DL (ref 6.4–8.2)
PROT UR STRIP-MCNC: 30 MG/DL
Q-T INTERVAL, ECG07: 504 MS
QRS DURATION, ECG06: 70 MS
QTC CALCULATION (BEZET), ECG08: 504 MS
RBC # BLD AUTO: 3.72 M/UL (ref 3.8–5.2)
RBC #/AREA URNS HPF: ABNORMAL /HPF (ref 0–5)
SODIUM SERPL-SCNC: 141 MMOL/L (ref 136–145)
SP GR UR REFRACTOMETRY: 1.02 (ref 1–1.03)
TROPONIN I SERPL HS-MCNC: 9 NG/L (ref 0–51)
UROBILINOGEN UR QL STRIP.AUTO: 0.1 EU/DL (ref 0.1–1)
VENTRICULAR RATE, ECG03: 60 BPM
WBC # BLD AUTO: 9.1 K/UL (ref 3.6–11)
WBC URNS QL MICRO: ABNORMAL /HPF (ref 0–4)

## 2023-04-06 PROCEDURE — 70450 CT HEAD/BRAIN W/O DYE: CPT

## 2023-04-06 PROCEDURE — 80053 COMPREHEN METABOLIC PANEL: CPT

## 2023-04-06 PROCEDURE — 96365 THER/PROPH/DIAG IV INF INIT: CPT

## 2023-04-06 PROCEDURE — G0378 HOSPITAL OBSERVATION PER HR: HCPCS

## 2023-04-06 PROCEDURE — 74011250636 HC RX REV CODE- 250/636: Performed by: EMERGENCY MEDICINE

## 2023-04-06 PROCEDURE — 74011000250 HC RX REV CODE- 250: Performed by: EMERGENCY MEDICINE

## 2023-04-06 PROCEDURE — 96372 THER/PROPH/DIAG INJ SC/IM: CPT

## 2023-04-06 PROCEDURE — 84484 ASSAY OF TROPONIN QUANT: CPT

## 2023-04-06 PROCEDURE — 74011000250 HC RX REV CODE- 250: Performed by: NURSE PRACTITIONER

## 2023-04-06 PROCEDURE — 93005 ELECTROCARDIOGRAM TRACING: CPT

## 2023-04-06 PROCEDURE — 82962 GLUCOSE BLOOD TEST: CPT

## 2023-04-06 PROCEDURE — 4A03X5D MEASUREMENT OF ARTERIAL FLOW, INTRACRANIAL, EXTERNAL APPROACH: ICD-10-PCS | Performed by: INTERNAL MEDICINE

## 2023-04-06 PROCEDURE — 74011250636 HC RX REV CODE- 250/636: Performed by: NURSE PRACTITIONER

## 2023-04-06 PROCEDURE — 70496 CT ANGIOGRAPHY HEAD: CPT

## 2023-04-06 PROCEDURE — 96366 THER/PROPH/DIAG IV INF ADDON: CPT

## 2023-04-06 PROCEDURE — 74011250637 HC RX REV CODE- 250/637: Performed by: NURSE PRACTITIONER

## 2023-04-06 PROCEDURE — 36415 COLL VENOUS BLD VENIPUNCTURE: CPT

## 2023-04-06 PROCEDURE — 99285 EMERGENCY DEPT VISIT HI MDM: CPT

## 2023-04-06 PROCEDURE — 85025 COMPLETE CBC W/AUTO DIFF WBC: CPT

## 2023-04-06 PROCEDURE — 81001 URINALYSIS AUTO W/SCOPE: CPT

## 2023-04-06 PROCEDURE — 74011000636 HC RX REV CODE- 636: Performed by: EMERGENCY MEDICINE

## 2023-04-06 RX ORDER — SIMVASTATIN 20 MG/1
20 TABLET, FILM COATED ORAL
COMMUNITY

## 2023-04-06 RX ORDER — ACETAMINOPHEN 325 MG/1
650 TABLET ORAL
Status: DISCONTINUED | OUTPATIENT
Start: 2023-04-06 | End: 2023-04-17 | Stop reason: HOSPADM

## 2023-04-06 RX ORDER — GUAIFENESIN 100 MG/5ML
81 LIQUID (ML) ORAL DAILY
Status: DISCONTINUED | OUTPATIENT
Start: 2023-04-07 | End: 2023-04-17 | Stop reason: HOSPADM

## 2023-04-06 RX ORDER — HYDROCHLOROTHIAZIDE 25 MG/1
12.5 TABLET ORAL DAILY
Status: DISCONTINUED | OUTPATIENT
Start: 2023-04-07 | End: 2023-04-12

## 2023-04-06 RX ORDER — AMLODIPINE BESYLATE 5 MG/1
10 TABLET ORAL DAILY
Status: DISCONTINUED | OUTPATIENT
Start: 2023-04-07 | End: 2023-04-17 | Stop reason: HOSPADM

## 2023-04-06 RX ORDER — SODIUM CHLORIDE 0.9 % (FLUSH) 0.9 %
5-40 SYRINGE (ML) INJECTION EVERY 8 HOURS
Status: DISCONTINUED | OUTPATIENT
Start: 2023-04-06 | End: 2023-04-17 | Stop reason: HOSPADM

## 2023-04-06 RX ORDER — ONDANSETRON 4 MG/1
4 TABLET, ORALLY DISINTEGRATING ORAL
Status: DISCONTINUED | OUTPATIENT
Start: 2023-04-06 | End: 2023-04-17 | Stop reason: HOSPADM

## 2023-04-06 RX ORDER — HEPARIN SODIUM 5000 [USP'U]/ML
5000 INJECTION, SOLUTION INTRAVENOUS; SUBCUTANEOUS EVERY 8 HOURS
Status: DISCONTINUED | OUTPATIENT
Start: 2023-04-06 | End: 2023-04-17 | Stop reason: HOSPADM

## 2023-04-06 RX ORDER — ATORVASTATIN CALCIUM 40 MG/1
40 TABLET, FILM COATED ORAL
Status: DISCONTINUED | OUTPATIENT
Start: 2023-04-06 | End: 2023-04-17 | Stop reason: HOSPADM

## 2023-04-06 RX ORDER — ONDANSETRON 2 MG/ML
4 INJECTION INTRAMUSCULAR; INTRAVENOUS
Status: DISCONTINUED | OUTPATIENT
Start: 2023-04-06 | End: 2023-04-17 | Stop reason: HOSPADM

## 2023-04-06 RX ORDER — GLIMEPIRIDE 4 MG/1
4 TABLET ORAL DAILY
COMMUNITY

## 2023-04-06 RX ORDER — SODIUM CHLORIDE 0.9 % (FLUSH) 0.9 %
5-40 SYRINGE (ML) INJECTION AS NEEDED
Status: DISCONTINUED | OUTPATIENT
Start: 2023-04-06 | End: 2023-04-17 | Stop reason: HOSPADM

## 2023-04-06 RX ORDER — ASPIRIN 81 MG/1
81 TABLET ORAL DAILY
COMMUNITY

## 2023-04-06 RX ORDER — POLYETHYLENE GLYCOL 3350 17 G/17G
17 POWDER, FOR SOLUTION ORAL DAILY PRN
Status: DISCONTINUED | OUTPATIENT
Start: 2023-04-06 | End: 2023-04-17 | Stop reason: HOSPADM

## 2023-04-06 RX ORDER — ACETAMINOPHEN 650 MG/1
650 SUPPOSITORY RECTAL
Status: DISCONTINUED | OUTPATIENT
Start: 2023-04-06 | End: 2023-04-17 | Stop reason: HOSPADM

## 2023-04-06 RX ORDER — HYDRALAZINE HYDROCHLORIDE 50 MG/1
50 TABLET, FILM COATED ORAL 3 TIMES DAILY
COMMUNITY
End: 2023-04-17

## 2023-04-06 RX ORDER — LOSARTAN POTASSIUM AND HYDROCHLOROTHIAZIDE 12.5; 5 MG/1; MG/1
1 TABLET ORAL DAILY
COMMUNITY
End: 2023-04-17

## 2023-04-06 RX ORDER — ATENOLOL 50 MG/1
50 TABLET ORAL DAILY
COMMUNITY

## 2023-04-06 RX ORDER — DEXTROSE MONOHYDRATE 100 MG/ML
250 INJECTION, SOLUTION INTRAVENOUS ONCE
Status: COMPLETED | OUTPATIENT
Start: 2023-04-06 | End: 2023-04-06

## 2023-04-06 RX ADMIN — HEPARIN SODIUM 5000 UNITS: 5000 INJECTION INTRAVENOUS; SUBCUTANEOUS at 15:35

## 2023-04-06 RX ADMIN — HEPARIN SODIUM 5000 UNITS: 5000 INJECTION INTRAVENOUS; SUBCUTANEOUS at 21:01

## 2023-04-06 RX ADMIN — IOPAMIDOL 100 ML: 755 INJECTION, SOLUTION INTRAVENOUS at 11:22

## 2023-04-06 RX ADMIN — ATORVASTATIN CALCIUM 40 MG: 40 TABLET, FILM COATED ORAL at 21:01

## 2023-04-06 RX ADMIN — SODIUM CHLORIDE, PRESERVATIVE FREE 10 ML: 5 INJECTION INTRAVENOUS at 21:02

## 2023-04-06 RX ADMIN — ACETAMINOPHEN 650 MG: 325 TABLET ORAL at 15:35

## 2023-04-06 RX ADMIN — SODIUM CHLORIDE 500 ML: 9 INJECTION, SOLUTION INTRAVENOUS at 11:35

## 2023-04-06 RX ADMIN — DEXTROSE MONOHYDRATE 250 ML: 100 INJECTION, SOLUTION INTRAVENOUS at 11:20

## 2023-04-07 LAB
GLUCOSE BLD STRIP.AUTO-MCNC: 213 MG/DL (ref 65–100)
GLUCOSE BLD STRIP.AUTO-MCNC: 40 MG/DL (ref 65–100)
GLUCOSE BLD STRIP.AUTO-MCNC: 42 MG/DL (ref 65–100)
GLUCOSE BLD STRIP.AUTO-MCNC: 67 MG/DL (ref 65–100)
GLUCOSE BLD STRIP.AUTO-MCNC: 89 MG/DL (ref 65–100)
GLUCOSE BLD STRIP.AUTO-MCNC: 89 MG/DL (ref 65–100)
GLUCOSE BLD STRIP.AUTO-MCNC: 96 MG/DL (ref 65–100)
GLUCOSE BLD STRIP.AUTO-MCNC: 98 MG/DL (ref 65–100)
PERFORMED BY, TECHID: ABNORMAL
PERFORMED BY, TECHID: NORMAL

## 2023-04-07 PROCEDURE — 97530 THERAPEUTIC ACTIVITIES: CPT

## 2023-04-07 PROCEDURE — 82962 GLUCOSE BLOOD TEST: CPT

## 2023-04-07 PROCEDURE — 96375 TX/PRO/DX INJ NEW DRUG ADDON: CPT

## 2023-04-07 PROCEDURE — 99222 1ST HOSP IP/OBS MODERATE 55: CPT | Performed by: SURGERY

## 2023-04-07 PROCEDURE — 96372 THER/PROPH/DIAG INJ SC/IM: CPT

## 2023-04-07 PROCEDURE — 74011250636 HC RX REV CODE- 250/636: Performed by: NURSE PRACTITIONER

## 2023-04-07 PROCEDURE — 97161 PT EVAL LOW COMPLEX 20 MIN: CPT

## 2023-04-07 PROCEDURE — 74011000250 HC RX REV CODE- 250: Performed by: NURSE PRACTITIONER

## 2023-04-07 PROCEDURE — 74011250637 HC RX REV CODE- 250/637: Performed by: NURSE PRACTITIONER

## 2023-04-07 PROCEDURE — 97165 OT EVAL LOW COMPLEX 30 MIN: CPT

## 2023-04-07 PROCEDURE — G0378 HOSPITAL OBSERVATION PER HR: HCPCS

## 2023-04-07 RX ORDER — DEXTROSE MONOHYDRATE 100 MG/ML
0-250 INJECTION, SOLUTION INTRAVENOUS AS NEEDED
Status: DISCONTINUED | OUTPATIENT
Start: 2023-04-07 | End: 2023-04-17 | Stop reason: HOSPADM

## 2023-04-07 RX ORDER — IBUPROFEN 200 MG
4 TABLET ORAL AS NEEDED
Status: DISCONTINUED | OUTPATIENT
Start: 2023-04-07 | End: 2023-04-17 | Stop reason: HOSPADM

## 2023-04-07 RX ADMIN — HEPARIN SODIUM 5000 UNITS: 5000 INJECTION INTRAVENOUS; SUBCUTANEOUS at 15:10

## 2023-04-07 RX ADMIN — SODIUM CHLORIDE, PRESERVATIVE FREE 10 ML: 5 INJECTION INTRAVENOUS at 05:28

## 2023-04-07 RX ADMIN — DEXTROSE MONOHYDRATE 250 ML: 100 INJECTION, SOLUTION INTRAVENOUS at 16:16

## 2023-04-07 RX ADMIN — AMLODIPINE BESYLATE 10 MG: 5 TABLET ORAL at 08:24

## 2023-04-07 RX ADMIN — ACETAMINOPHEN 650 MG: 325 TABLET ORAL at 08:33

## 2023-04-07 RX ADMIN — SODIUM CHLORIDE, PRESERVATIVE FREE 10 ML: 5 INJECTION INTRAVENOUS at 15:13

## 2023-04-07 RX ADMIN — ASPIRIN 81 MG CHEWABLE TABLET 81 MG: 81 TABLET CHEWABLE at 08:24

## 2023-04-07 RX ADMIN — HYDROCHLOROTHIAZIDE 12.5 MG: 25 TABLET ORAL at 08:31

## 2023-04-07 RX ADMIN — HEPARIN SODIUM 5000 UNITS: 5000 INJECTION INTRAVENOUS; SUBCUTANEOUS at 05:28

## 2023-04-07 RX ADMIN — CEFTRIAXONE SODIUM 1 G: 1 INJECTION, POWDER, FOR SOLUTION INTRAMUSCULAR; INTRAVENOUS at 08:24

## 2023-04-07 RX ADMIN — ACETAMINOPHEN 650 MG: 325 TABLET ORAL at 02:12

## 2023-04-07 RX ADMIN — ACETAMINOPHEN 650 MG: 325 TABLET ORAL at 16:16

## 2023-04-07 RX ADMIN — ATORVASTATIN CALCIUM 40 MG: 40 TABLET, FILM COATED ORAL at 21:21

## 2023-04-07 RX ADMIN — SODIUM CHLORIDE, PRESERVATIVE FREE 10 ML: 5 INJECTION INTRAVENOUS at 21:22

## 2023-04-07 RX ADMIN — HEPARIN SODIUM 5000 UNITS: 5000 INJECTION INTRAVENOUS; SUBCUTANEOUS at 21:21

## 2023-04-08 LAB
GLUCOSE BLD STRIP.AUTO-MCNC: 101 MG/DL (ref 65–100)
GLUCOSE BLD STRIP.AUTO-MCNC: 103 MG/DL (ref 65–100)
GLUCOSE BLD STRIP.AUTO-MCNC: 149 MG/DL (ref 65–100)
GLUCOSE BLD STRIP.AUTO-MCNC: 159 MG/DL (ref 65–100)
GLUCOSE BLD STRIP.AUTO-MCNC: 191 MG/DL (ref 65–100)
PERFORMED BY, TECHID: ABNORMAL

## 2023-04-08 PROCEDURE — 74011250637 HC RX REV CODE- 250/637: Performed by: NURSE PRACTITIONER

## 2023-04-08 PROCEDURE — G0378 HOSPITAL OBSERVATION PER HR: HCPCS

## 2023-04-08 PROCEDURE — 74011250636 HC RX REV CODE- 250/636: Performed by: NURSE PRACTITIONER

## 2023-04-08 PROCEDURE — 96372 THER/PROPH/DIAG INJ SC/IM: CPT

## 2023-04-08 PROCEDURE — 74011000250 HC RX REV CODE- 250: Performed by: NURSE PRACTITIONER

## 2023-04-08 PROCEDURE — 96376 TX/PRO/DX INJ SAME DRUG ADON: CPT

## 2023-04-08 PROCEDURE — 82962 GLUCOSE BLOOD TEST: CPT

## 2023-04-08 RX ADMIN — HEPARIN SODIUM 5000 UNITS: 5000 INJECTION INTRAVENOUS; SUBCUTANEOUS at 05:26

## 2023-04-08 RX ADMIN — ACETAMINOPHEN 650 MG: 325 TABLET ORAL at 16:25

## 2023-04-08 RX ADMIN — ACETAMINOPHEN 650 MG: 325 TABLET ORAL at 21:27

## 2023-04-08 RX ADMIN — HEPARIN SODIUM 5000 UNITS: 5000 INJECTION INTRAVENOUS; SUBCUTANEOUS at 13:33

## 2023-04-08 RX ADMIN — CEFTRIAXONE SODIUM 1 G: 1 INJECTION, POWDER, FOR SOLUTION INTRAMUSCULAR; INTRAVENOUS at 09:39

## 2023-04-08 RX ADMIN — SODIUM CHLORIDE, PRESERVATIVE FREE 10 ML: 5 INJECTION INTRAVENOUS at 21:27

## 2023-04-08 RX ADMIN — ATORVASTATIN CALCIUM 40 MG: 40 TABLET, FILM COATED ORAL at 21:26

## 2023-04-08 RX ADMIN — HEPARIN SODIUM 5000 UNITS: 5000 INJECTION INTRAVENOUS; SUBCUTANEOUS at 21:27

## 2023-04-08 RX ADMIN — SODIUM CHLORIDE, PRESERVATIVE FREE 10 ML: 5 INJECTION INTRAVENOUS at 13:33

## 2023-04-08 RX ADMIN — AMLODIPINE BESYLATE 10 MG: 5 TABLET ORAL at 09:39

## 2023-04-08 RX ADMIN — SODIUM CHLORIDE, PRESERVATIVE FREE 10 ML: 5 INJECTION INTRAVENOUS at 05:14

## 2023-04-08 RX ADMIN — ASPIRIN 81 MG CHEWABLE TABLET 81 MG: 81 TABLET CHEWABLE at 09:39

## 2023-04-09 LAB
GLUCOSE BLD STRIP.AUTO-MCNC: 127 MG/DL (ref 65–100)
GLUCOSE BLD STRIP.AUTO-MCNC: 175 MG/DL (ref 65–100)
GLUCOSE BLD STRIP.AUTO-MCNC: 239 MG/DL (ref 65–100)
PERFORMED BY, TECHID: ABNORMAL

## 2023-04-09 PROCEDURE — 96375 TX/PRO/DX INJ NEW DRUG ADDON: CPT

## 2023-04-09 PROCEDURE — G0378 HOSPITAL OBSERVATION PER HR: HCPCS

## 2023-04-09 PROCEDURE — 74011000250 HC RX REV CODE- 250: Performed by: NURSE PRACTITIONER

## 2023-04-09 PROCEDURE — 74011250636 HC RX REV CODE- 250/636: Performed by: NURSE PRACTITIONER

## 2023-04-09 PROCEDURE — 74011250637 HC RX REV CODE- 250/637: Performed by: NURSE PRACTITIONER

## 2023-04-09 PROCEDURE — 96372 THER/PROPH/DIAG INJ SC/IM: CPT

## 2023-04-09 PROCEDURE — 96376 TX/PRO/DX INJ SAME DRUG ADON: CPT

## 2023-04-09 PROCEDURE — 82962 GLUCOSE BLOOD TEST: CPT

## 2023-04-09 RX ORDER — IBUPROFEN 200 MG
1 TABLET ORAL DAILY
Status: DISCONTINUED | OUTPATIENT
Start: 2023-04-09 | End: 2023-04-17 | Stop reason: HOSPADM

## 2023-04-09 RX ORDER — HYDRALAZINE HYDROCHLORIDE 25 MG/1
25 TABLET, FILM COATED ORAL 3 TIMES DAILY
Status: DISCONTINUED | OUTPATIENT
Start: 2023-04-09 | End: 2023-04-10

## 2023-04-09 RX ORDER — IBUPROFEN 400 MG/1
800 TABLET ORAL ONCE
Status: COMPLETED | OUTPATIENT
Start: 2023-04-09 | End: 2023-04-09

## 2023-04-09 RX ORDER — CETIRIZINE HYDROCHLORIDE 10 MG/1
10 TABLET ORAL DAILY
Status: DISCONTINUED | OUTPATIENT
Start: 2023-04-09 | End: 2023-04-17 | Stop reason: HOSPADM

## 2023-04-09 RX ORDER — LORATADINE 10 MG/1
10 TABLET ORAL DAILY
COMMUNITY

## 2023-04-09 RX ORDER — HYDRALAZINE HYDROCHLORIDE 20 MG/ML
20 INJECTION INTRAMUSCULAR; INTRAVENOUS ONCE
Status: COMPLETED | OUTPATIENT
Start: 2023-04-09 | End: 2023-04-09

## 2023-04-09 RX ADMIN — SODIUM CHLORIDE, PRESERVATIVE FREE 10 ML: 5 INJECTION INTRAVENOUS at 05:39

## 2023-04-09 RX ADMIN — CETIRIZINE HYDROCHLORIDE 10 MG: 10 TABLET, FILM COATED ORAL at 17:21

## 2023-04-09 RX ADMIN — ACETAMINOPHEN 650 MG: 325 TABLET ORAL at 15:18

## 2023-04-09 RX ADMIN — HEPARIN SODIUM 5000 UNITS: 5000 INJECTION INTRAVENOUS; SUBCUTANEOUS at 05:39

## 2023-04-09 RX ADMIN — ACETAMINOPHEN 650 MG: 325 TABLET ORAL at 23:20

## 2023-04-09 RX ADMIN — HEPARIN SODIUM 5000 UNITS: 5000 INJECTION INTRAVENOUS; SUBCUTANEOUS at 15:18

## 2023-04-09 RX ADMIN — CEFTRIAXONE SODIUM 1 G: 1 INJECTION, POWDER, FOR SOLUTION INTRAMUSCULAR; INTRAVENOUS at 08:14

## 2023-04-09 RX ADMIN — IBUPROFEN 800 MG: 400 TABLET, FILM COATED ORAL at 08:14

## 2023-04-09 RX ADMIN — SODIUM CHLORIDE, PRESERVATIVE FREE 10 ML: 5 INJECTION INTRAVENOUS at 12:26

## 2023-04-09 RX ADMIN — HYDRALAZINE HYDROCHLORIDE 20 MG: 20 INJECTION INTRAMUSCULAR; INTRAVENOUS at 12:24

## 2023-04-09 RX ADMIN — HYDRALAZINE HYDROCHLORIDE 25 MG: 25 TABLET, FILM COATED ORAL at 15:17

## 2023-04-09 RX ADMIN — ASPIRIN 81 MG CHEWABLE TABLET 81 MG: 81 TABLET CHEWABLE at 08:14

## 2023-04-09 RX ADMIN — AMLODIPINE BESYLATE 10 MG: 5 TABLET ORAL at 08:14

## 2023-04-10 LAB
ALBUMIN SERPL-MCNC: 3.4 G/DL (ref 3.5–5)
ALBUMIN/GLOB SERPL: 0.9 (ref 1.1–2.2)
ALP SERPL-CCNC: 51 U/L (ref 45–117)
ALT SERPL-CCNC: 26 U/L (ref 12–78)
ANION GAP SERPL CALC-SCNC: 2 MMOL/L (ref 5–15)
AST SERPL W P-5'-P-CCNC: ABNORMAL U/L (ref 15–37)
BASOPHILS # BLD: 0 K/UL (ref 0–0.1)
BASOPHILS NFR BLD: 0 % (ref 0–1)
BILIRUB SERPL-MCNC: 0.3 MG/DL (ref 0.2–1)
BUN SERPL-MCNC: 49 MG/DL (ref 6–20)
BUN/CREAT SERPL: 19 (ref 12–20)
CA-I BLD-MCNC: 9.1 MG/DL (ref 8.5–10.1)
CHLORIDE SERPL-SCNC: 111 MMOL/L (ref 97–108)
CO2 SERPL-SCNC: 23 MMOL/L (ref 21–32)
CREAT SERPL-MCNC: 2.56 MG/DL (ref 0.55–1.02)
DIFFERENTIAL METHOD BLD: ABNORMAL
EOSINOPHIL # BLD: 0.2 K/UL (ref 0–0.4)
EOSINOPHIL NFR BLD: 3 % (ref 0–7)
ERYTHROCYTE [DISTWIDTH] IN BLOOD BY AUTOMATED COUNT: 15.9 % (ref 11.5–14.5)
GLOBULIN SER CALC-MCNC: 3.6 G/DL (ref 2–4)
GLUCOSE BLD STRIP.AUTO-MCNC: 119 MG/DL (ref 65–100)
GLUCOSE BLD STRIP.AUTO-MCNC: 208 MG/DL (ref 65–100)
GLUCOSE BLD STRIP.AUTO-MCNC: 273 MG/DL (ref 65–100)
GLUCOSE SERPL-MCNC: 214 MG/DL (ref 65–100)
HCT VFR BLD AUTO: 32.7 % (ref 35–47)
HGB BLD-MCNC: 10.6 G/DL (ref 11.5–16)
IMM GRANULOCYTES # BLD AUTO: 0 K/UL (ref 0–0.04)
IMM GRANULOCYTES NFR BLD AUTO: 0 % (ref 0–0.5)
LYMPHOCYTES # BLD: 2.6 K/UL (ref 0.8–3.5)
LYMPHOCYTES NFR BLD: 33 % (ref 12–49)
MCH RBC QN AUTO: 27.8 PG (ref 26–34)
MCHC RBC AUTO-ENTMCNC: 32.4 G/DL (ref 30–36.5)
MCV RBC AUTO: 85.8 FL (ref 80–99)
MONOCYTES # BLD: 0.4 K/UL (ref 0–1)
MONOCYTES NFR BLD: 5 % (ref 5–13)
NEUTS SEG # BLD: 4.8 K/UL (ref 1.8–8)
NEUTS SEG NFR BLD: 59 % (ref 32–75)
NRBC # BLD: 0 K/UL (ref 0–0.01)
NRBC BLD-RTO: 0 PER 100 WBC
PERFORMED BY, TECHID: ABNORMAL
PLATELET # BLD AUTO: 265 K/UL (ref 150–400)
PMV BLD AUTO: 10.1 FL (ref 8.9–12.9)
POTASSIUM SERPL-SCNC: ABNORMAL MMOL/L (ref 3.5–5.1)
PROT SERPL-MCNC: 7 G/DL (ref 6.4–8.2)
RBC # BLD AUTO: 3.81 M/UL (ref 3.8–5.2)
SODIUM SERPL-SCNC: 136 MMOL/L (ref 136–145)
WBC # BLD AUTO: 8 K/UL (ref 3.6–11)

## 2023-04-10 PROCEDURE — 97530 THERAPEUTIC ACTIVITIES: CPT

## 2023-04-10 PROCEDURE — 80053 COMPREHEN METABOLIC PANEL: CPT

## 2023-04-10 PROCEDURE — 36415 COLL VENOUS BLD VENIPUNCTURE: CPT

## 2023-04-10 PROCEDURE — 74011636637 HC RX REV CODE- 636/637: Performed by: NURSE PRACTITIONER

## 2023-04-10 PROCEDURE — 74011250637 HC RX REV CODE- 250/637: Performed by: NURSE PRACTITIONER

## 2023-04-10 PROCEDURE — 74011000250 HC RX REV CODE- 250: Performed by: NURSE PRACTITIONER

## 2023-04-10 PROCEDURE — 74011250636 HC RX REV CODE- 250/636: Performed by: NURSE PRACTITIONER

## 2023-04-10 PROCEDURE — 96372 THER/PROPH/DIAG INJ SC/IM: CPT

## 2023-04-10 PROCEDURE — 96376 TX/PRO/DX INJ SAME DRUG ADON: CPT

## 2023-04-10 PROCEDURE — G0378 HOSPITAL OBSERVATION PER HR: HCPCS

## 2023-04-10 PROCEDURE — 97116 GAIT TRAINING THERAPY: CPT

## 2023-04-10 PROCEDURE — 82962 GLUCOSE BLOOD TEST: CPT

## 2023-04-10 PROCEDURE — 85025 COMPLETE CBC W/AUTO DIFF WBC: CPT

## 2023-04-10 RX ORDER — INSULIN LISPRO 100 [IU]/ML
INJECTION, SOLUTION INTRAVENOUS; SUBCUTANEOUS
Status: DISCONTINUED | OUTPATIENT
Start: 2023-04-10 | End: 2023-04-17 | Stop reason: HOSPADM

## 2023-04-10 RX ORDER — HYDRALAZINE HYDROCHLORIDE 50 MG/1
50 TABLET, FILM COATED ORAL 3 TIMES DAILY
Status: DISCONTINUED | OUTPATIENT
Start: 2023-04-10 | End: 2023-04-12

## 2023-04-10 RX ORDER — HYDRALAZINE HYDROCHLORIDE 25 MG/1
25 TABLET, FILM COATED ORAL
Status: COMPLETED | OUTPATIENT
Start: 2023-04-10 | End: 2023-04-10

## 2023-04-10 RX ORDER — DEXTROSE MONOHYDRATE 100 MG/ML
0-250 INJECTION, SOLUTION INTRAVENOUS AS NEEDED
Status: DISCONTINUED | OUTPATIENT
Start: 2023-04-10 | End: 2023-04-17 | Stop reason: HOSPADM

## 2023-04-10 RX ADMIN — HEPARIN SODIUM 5000 UNITS: 5000 INJECTION INTRAVENOUS; SUBCUTANEOUS at 21:03

## 2023-04-10 RX ADMIN — HYDRALAZINE HYDROCHLORIDE 50 MG: 50 TABLET, FILM COATED ORAL at 16:57

## 2023-04-10 RX ADMIN — ASPIRIN 81 MG CHEWABLE TABLET 81 MG: 81 TABLET CHEWABLE at 09:24

## 2023-04-10 RX ADMIN — INSULIN LISPRO 4 UNITS: 100 INJECTION, SOLUTION INTRAVENOUS; SUBCUTANEOUS at 11:57

## 2023-04-10 RX ADMIN — ACETAMINOPHEN 650 MG: 325 TABLET ORAL at 11:57

## 2023-04-10 RX ADMIN — SODIUM CHLORIDE, PRESERVATIVE FREE 10 ML: 5 INJECTION INTRAVENOUS at 21:08

## 2023-04-10 RX ADMIN — ACETAMINOPHEN 650 MG: 325 TABLET ORAL at 21:02

## 2023-04-10 RX ADMIN — SODIUM CHLORIDE, PRESERVATIVE FREE 10 ML: 5 INJECTION INTRAVENOUS at 13:18

## 2023-04-10 RX ADMIN — HEPARIN SODIUM 5000 UNITS: 5000 INJECTION INTRAVENOUS; SUBCUTANEOUS at 13:18

## 2023-04-10 RX ADMIN — ATORVASTATIN CALCIUM 40 MG: 40 TABLET, FILM COATED ORAL at 21:03

## 2023-04-10 RX ADMIN — SODIUM CHLORIDE, PRESERVATIVE FREE 10 ML: 5 INJECTION INTRAVENOUS at 06:07

## 2023-04-10 RX ADMIN — CEFTRIAXONE SODIUM 1 G: 1 INJECTION, POWDER, FOR SOLUTION INTRAMUSCULAR; INTRAVENOUS at 09:24

## 2023-04-10 RX ADMIN — HYDRALAZINE HYDROCHLORIDE 50 MG: 50 TABLET, FILM COATED ORAL at 21:03

## 2023-04-10 RX ADMIN — AMLODIPINE BESYLATE 10 MG: 5 TABLET ORAL at 09:24

## 2023-04-10 RX ADMIN — CETIRIZINE HYDROCHLORIDE 10 MG: 10 TABLET, FILM COATED ORAL at 09:25

## 2023-04-10 RX ADMIN — HYDRALAZINE HYDROCHLORIDE 25 MG: 25 TABLET, FILM COATED ORAL at 11:57

## 2023-04-10 RX ADMIN — HEPARIN SODIUM 5000 UNITS: 5000 INJECTION INTRAVENOUS; SUBCUTANEOUS at 06:06

## 2023-04-10 RX ADMIN — INSULIN LISPRO 6 UNITS: 100 INJECTION, SOLUTION INTRAVENOUS; SUBCUTANEOUS at 21:25

## 2023-04-10 RX ADMIN — HYDRALAZINE HYDROCHLORIDE 25 MG: 25 TABLET, FILM COATED ORAL at 09:24

## 2023-04-11 LAB
ALBUMIN SERPL-MCNC: 3.6 G/DL (ref 3.5–5)
ALBUMIN/GLOB SERPL: 0.9 (ref 1.1–2.2)
ALP SERPL-CCNC: 62 U/L (ref 45–117)
ALT SERPL-CCNC: 39 U/L (ref 12–78)
ANION GAP SERPL CALC-SCNC: 1 MMOL/L (ref 5–15)
AST SERPL W P-5'-P-CCNC: 37 U/L (ref 15–37)
BASOPHILS # BLD: 0 K/UL (ref 0–0.1)
BASOPHILS NFR BLD: 1 % (ref 0–1)
BILIRUB SERPL-MCNC: 0.2 MG/DL (ref 0.2–1)
BUN SERPL-MCNC: 51 MG/DL (ref 6–20)
BUN/CREAT SERPL: 18 (ref 12–20)
CA-I BLD-MCNC: 9.2 MG/DL (ref 8.5–10.1)
CHLORIDE SERPL-SCNC: 110 MMOL/L (ref 97–108)
CO2 SERPL-SCNC: 24 MMOL/L (ref 21–32)
CREAT SERPL-MCNC: 2.78 MG/DL (ref 0.55–1.02)
DIFFERENTIAL METHOD BLD: ABNORMAL
EOSINOPHIL # BLD: 0.2 K/UL (ref 0–0.4)
EOSINOPHIL NFR BLD: 3 % (ref 0–7)
ERYTHROCYTE [DISTWIDTH] IN BLOOD BY AUTOMATED COUNT: 15.4 % (ref 11.5–14.5)
GLOBULIN SER CALC-MCNC: 3.8 G/DL (ref 2–4)
GLUCOSE BLD STRIP.AUTO-MCNC: 131 MG/DL (ref 65–100)
GLUCOSE BLD STRIP.AUTO-MCNC: 307 MG/DL (ref 65–100)
GLUCOSE SERPL-MCNC: 161 MG/DL (ref 65–100)
HCT VFR BLD AUTO: 33.1 % (ref 35–47)
HGB BLD-MCNC: 10.4 G/DL (ref 11.5–16)
IMM GRANULOCYTES # BLD AUTO: 0 K/UL (ref 0–0.04)
IMM GRANULOCYTES NFR BLD AUTO: 0 % (ref 0–0.5)
LYMPHOCYTES # BLD: 2.2 K/UL (ref 0.8–3.5)
LYMPHOCYTES NFR BLD: 26 % (ref 12–49)
MCH RBC QN AUTO: 27.4 PG (ref 26–34)
MCHC RBC AUTO-ENTMCNC: 31.4 G/DL (ref 30–36.5)
MCV RBC AUTO: 87.1 FL (ref 80–99)
MONOCYTES # BLD: 0.4 K/UL (ref 0–1)
MONOCYTES NFR BLD: 5 % (ref 5–13)
NEUTS SEG # BLD: 5.6 K/UL (ref 1.8–8)
NEUTS SEG NFR BLD: 65 % (ref 32–75)
NRBC # BLD: 0 K/UL (ref 0–0.01)
NRBC BLD-RTO: 0 PER 100 WBC
PERFORMED BY, TECHID: ABNORMAL
PERFORMED BY, TECHID: ABNORMAL
PLATELET # BLD AUTO: 267 K/UL (ref 150–400)
PMV BLD AUTO: 9.4 FL (ref 8.9–12.9)
POTASSIUM SERPL-SCNC: 5.1 MMOL/L (ref 3.5–5.1)
PROT SERPL-MCNC: 7.4 G/DL (ref 6.4–8.2)
RBC # BLD AUTO: 3.8 M/UL (ref 3.8–5.2)
SODIUM SERPL-SCNC: 135 MMOL/L (ref 136–145)
WBC # BLD AUTO: 8.5 K/UL (ref 3.6–11)

## 2023-04-11 PROCEDURE — 74011250637 HC RX REV CODE- 250/637: Performed by: NURSE PRACTITIONER

## 2023-04-11 PROCEDURE — 85025 COMPLETE CBC W/AUTO DIFF WBC: CPT

## 2023-04-11 PROCEDURE — 82962 GLUCOSE BLOOD TEST: CPT

## 2023-04-11 PROCEDURE — 87040 BLOOD CULTURE FOR BACTERIA: CPT

## 2023-04-11 PROCEDURE — 65270000029 HC RM PRIVATE

## 2023-04-11 PROCEDURE — 74011000250 HC RX REV CODE- 250: Performed by: NURSE PRACTITIONER

## 2023-04-11 PROCEDURE — 74011250636 HC RX REV CODE- 250/636: Performed by: NURSE PRACTITIONER

## 2023-04-11 PROCEDURE — 74011636637 HC RX REV CODE- 636/637: Performed by: NURSE PRACTITIONER

## 2023-04-11 PROCEDURE — 97110 THERAPEUTIC EXERCISES: CPT

## 2023-04-11 PROCEDURE — 80053 COMPREHEN METABOLIC PANEL: CPT

## 2023-04-11 PROCEDURE — G0378 HOSPITAL OBSERVATION PER HR: HCPCS

## 2023-04-11 PROCEDURE — 36415 COLL VENOUS BLD VENIPUNCTURE: CPT

## 2023-04-11 PROCEDURE — 97116 GAIT TRAINING THERAPY: CPT

## 2023-04-11 RX ORDER — ATENOLOL 50 MG/1
50 TABLET ORAL DAILY
Status: DISCONTINUED | OUTPATIENT
Start: 2023-04-12 | End: 2023-04-16

## 2023-04-11 RX ADMIN — ACETAMINOPHEN 650 MG: 325 TABLET ORAL at 19:53

## 2023-04-11 RX ADMIN — SODIUM CHLORIDE, PRESERVATIVE FREE 10 ML: 5 INJECTION INTRAVENOUS at 06:12

## 2023-04-11 RX ADMIN — ASPIRIN 81 MG CHEWABLE TABLET 81 MG: 81 TABLET CHEWABLE at 08:53

## 2023-04-11 RX ADMIN — HEPARIN SODIUM 5000 UNITS: 5000 INJECTION INTRAVENOUS; SUBCUTANEOUS at 06:00

## 2023-04-11 RX ADMIN — ATORVASTATIN CALCIUM 40 MG: 40 TABLET, FILM COATED ORAL at 21:34

## 2023-04-11 RX ADMIN — HYDRALAZINE HYDROCHLORIDE 50 MG: 50 TABLET, FILM COATED ORAL at 17:07

## 2023-04-11 RX ADMIN — CETIRIZINE HYDROCHLORIDE 10 MG: 10 TABLET, FILM COATED ORAL at 08:57

## 2023-04-11 RX ADMIN — AMLODIPINE BESYLATE 10 MG: 5 TABLET ORAL at 08:53

## 2023-04-11 RX ADMIN — INSULIN LISPRO 8 UNITS: 100 INJECTION, SOLUTION INTRAVENOUS; SUBCUTANEOUS at 21:36

## 2023-04-11 RX ADMIN — SODIUM CHLORIDE, PRESERVATIVE FREE 10 ML: 5 INJECTION INTRAVENOUS at 21:38

## 2023-04-11 RX ADMIN — HYDRALAZINE HYDROCHLORIDE 50 MG: 50 TABLET, FILM COATED ORAL at 08:53

## 2023-04-11 RX ADMIN — HEPARIN SODIUM 5000 UNITS: 5000 INJECTION INTRAVENOUS; SUBCUTANEOUS at 14:05

## 2023-04-11 RX ADMIN — SODIUM CHLORIDE, PRESERVATIVE FREE 10 ML: 5 INJECTION INTRAVENOUS at 14:10

## 2023-04-11 RX ADMIN — CEFTRIAXONE SODIUM 1 G: 1 INJECTION, POWDER, FOR SOLUTION INTRAMUSCULAR; INTRAVENOUS at 08:54

## 2023-04-11 RX ADMIN — ACETAMINOPHEN 650 MG: 325 TABLET ORAL at 08:52

## 2023-04-12 LAB
ALBUMIN SERPL-MCNC: 3.4 G/DL (ref 3.5–5)
ALBUMIN/GLOB SERPL: 1 (ref 1.1–2.2)
ALP SERPL-CCNC: 57 U/L (ref 45–117)
ALT SERPL-CCNC: 36 U/L (ref 12–78)
ANION GAP SERPL CALC-SCNC: 6 MMOL/L (ref 5–15)
AST SERPL W P-5'-P-CCNC: 29 U/L (ref 15–37)
BASOPHILS # BLD: 0.1 K/UL (ref 0–0.1)
BASOPHILS NFR BLD: 1 % (ref 0–1)
BILIRUB SERPL-MCNC: 0.3 MG/DL (ref 0.2–1)
BUN SERPL-MCNC: 50 MG/DL (ref 6–20)
BUN/CREAT SERPL: 21 (ref 12–20)
CA-I BLD-MCNC: 9.2 MG/DL (ref 8.5–10.1)
CHLORIDE SERPL-SCNC: 110 MMOL/L (ref 97–108)
CO2 SERPL-SCNC: 21 MMOL/L (ref 21–32)
CREAT SERPL-MCNC: 2.36 MG/DL (ref 0.55–1.02)
DIFFERENTIAL METHOD BLD: ABNORMAL
EOSINOPHIL # BLD: 0.2 K/UL (ref 0–0.4)
EOSINOPHIL NFR BLD: 3 % (ref 0–7)
ERYTHROCYTE [DISTWIDTH] IN BLOOD BY AUTOMATED COUNT: 15.5 % (ref 11.5–14.5)
GLOBULIN SER CALC-MCNC: 3.5 G/DL (ref 2–4)
GLUCOSE BLD STRIP.AUTO-MCNC: 147 MG/DL (ref 65–100)
GLUCOSE BLD STRIP.AUTO-MCNC: 219 MG/DL (ref 65–100)
GLUCOSE BLD STRIP.AUTO-MCNC: 248 MG/DL (ref 65–100)
GLUCOSE BLD STRIP.AUTO-MCNC: 94 MG/DL (ref 65–100)
GLUCOSE SERPL-MCNC: 93 MG/DL (ref 65–100)
HCT VFR BLD AUTO: 31.5 % (ref 35–47)
HGB BLD-MCNC: 10 G/DL (ref 11.5–16)
IMM GRANULOCYTES # BLD AUTO: 0 K/UL (ref 0–0.04)
IMM GRANULOCYTES NFR BLD AUTO: 0 % (ref 0–0.5)
LYMPHOCYTES # BLD: 2.9 K/UL (ref 0.8–3.5)
LYMPHOCYTES NFR BLD: 37 % (ref 12–49)
MCH RBC QN AUTO: 27.9 PG (ref 26–34)
MCHC RBC AUTO-ENTMCNC: 31.7 G/DL (ref 30–36.5)
MCV RBC AUTO: 88 FL (ref 80–99)
MONOCYTES # BLD: 0.5 K/UL (ref 0–1)
MONOCYTES NFR BLD: 6 % (ref 5–13)
NEUTS SEG # BLD: 4.3 K/UL (ref 1.8–8)
NEUTS SEG NFR BLD: 53 % (ref 32–75)
NRBC # BLD: 0 K/UL (ref 0–0.01)
NRBC BLD-RTO: 0 PER 100 WBC
PERFORMED BY, TECHID: ABNORMAL
PERFORMED BY, TECHID: NORMAL
PLATELET # BLD AUTO: 237 K/UL (ref 150–400)
PMV BLD AUTO: 9 FL (ref 8.9–12.9)
POTASSIUM SERPL-SCNC: 5.1 MMOL/L (ref 3.5–5.1)
PROT SERPL-MCNC: 6.9 G/DL (ref 6.4–8.2)
RBC # BLD AUTO: 3.58 M/UL (ref 3.8–5.2)
SODIUM SERPL-SCNC: 137 MMOL/L (ref 136–145)
WBC # BLD AUTO: 8 K/UL (ref 3.6–11)

## 2023-04-12 PROCEDURE — 85025 COMPLETE CBC W/AUTO DIFF WBC: CPT

## 2023-04-12 PROCEDURE — 74011250636 HC RX REV CODE- 250/636: Performed by: NURSE PRACTITIONER

## 2023-04-12 PROCEDURE — 74011250637 HC RX REV CODE- 250/637: Performed by: NURSE PRACTITIONER

## 2023-04-12 PROCEDURE — 74011000250 HC RX REV CODE- 250: Performed by: NURSE PRACTITIONER

## 2023-04-12 PROCEDURE — 96376 TX/PRO/DX INJ SAME DRUG ADON: CPT

## 2023-04-12 PROCEDURE — 36415 COLL VENOUS BLD VENIPUNCTURE: CPT

## 2023-04-12 PROCEDURE — 65270000029 HC RM PRIVATE

## 2023-04-12 PROCEDURE — 80053 COMPREHEN METABOLIC PANEL: CPT

## 2023-04-12 PROCEDURE — 97530 THERAPEUTIC ACTIVITIES: CPT

## 2023-04-12 PROCEDURE — 96372 THER/PROPH/DIAG INJ SC/IM: CPT

## 2023-04-12 PROCEDURE — 82962 GLUCOSE BLOOD TEST: CPT

## 2023-04-12 PROCEDURE — 74011636637 HC RX REV CODE- 636/637: Performed by: NURSE PRACTITIONER

## 2023-04-12 RX ORDER — CLONIDINE HYDROCHLORIDE 0.1 MG/1
0.1 TABLET ORAL 2 TIMES DAILY
Status: DISCONTINUED | OUTPATIENT
Start: 2023-04-12 | End: 2023-04-14

## 2023-04-12 RX ADMIN — AMLODIPINE BESYLATE 10 MG: 5 TABLET ORAL at 08:22

## 2023-04-12 RX ADMIN — ATENOLOL 50 MG: 50 TABLET ORAL at 08:22

## 2023-04-12 RX ADMIN — CLONIDINE HYDROCHLORIDE 0.1 MG: 0.1 TABLET ORAL at 20:58

## 2023-04-12 RX ADMIN — HEPARIN SODIUM 5000 UNITS: 5000 INJECTION INTRAVENOUS; SUBCUTANEOUS at 21:01

## 2023-04-12 RX ADMIN — ATORVASTATIN CALCIUM 40 MG: 40 TABLET, FILM COATED ORAL at 21:00

## 2023-04-12 RX ADMIN — CEFTRIAXONE SODIUM 1 G: 1 INJECTION, POWDER, FOR SOLUTION INTRAMUSCULAR; INTRAVENOUS at 08:23

## 2023-04-12 RX ADMIN — CLONIDINE HYDROCHLORIDE 0.1 MG: 0.1 TABLET ORAL at 11:01

## 2023-04-12 RX ADMIN — SODIUM CHLORIDE, PRESERVATIVE FREE 10 ML: 5 INJECTION INTRAVENOUS at 21:05

## 2023-04-12 RX ADMIN — CETIRIZINE HYDROCHLORIDE 10 MG: 10 TABLET, FILM COATED ORAL at 08:35

## 2023-04-12 RX ADMIN — SODIUM CHLORIDE, PRESERVATIVE FREE 10 ML: 5 INJECTION INTRAVENOUS at 06:06

## 2023-04-12 RX ADMIN — INSULIN LISPRO 4 UNITS: 100 INJECTION, SOLUTION INTRAVENOUS; SUBCUTANEOUS at 17:40

## 2023-04-12 RX ADMIN — HEPARIN SODIUM 5000 UNITS: 5000 INJECTION INTRAVENOUS; SUBCUTANEOUS at 14:10

## 2023-04-12 RX ADMIN — SODIUM CHLORIDE, PRESERVATIVE FREE 10 ML: 5 INJECTION INTRAVENOUS at 14:10

## 2023-04-12 RX ADMIN — ASPIRIN 81 MG CHEWABLE TABLET 81 MG: 81 TABLET CHEWABLE at 08:22

## 2023-04-12 RX ADMIN — INSULIN LISPRO 4 UNITS: 100 INJECTION, SOLUTION INTRAVENOUS; SUBCUTANEOUS at 21:00

## 2023-04-12 RX ADMIN — ACETAMINOPHEN 650 MG: 325 TABLET ORAL at 19:47

## 2023-04-13 ENCOUNTER — APPOINTMENT (OUTPATIENT)
Dept: NON INVASIVE DIAGNOSTICS | Age: 63
DRG: 638 | End: 2023-04-13
Attending: NURSE PRACTITIONER
Payer: MEDICARE

## 2023-04-13 LAB
ALBUMIN SERPL-MCNC: 3.3 G/DL (ref 3.5–5)
ALBUMIN/GLOB SERPL: 0.9 (ref 1.1–2.2)
ALP SERPL-CCNC: 60 U/L (ref 45–117)
ALT SERPL-CCNC: 38 U/L (ref 12–78)
ANION GAP SERPL CALC-SCNC: 4 MMOL/L (ref 5–15)
AST SERPL W P-5'-P-CCNC: 27 U/L (ref 15–37)
BASOPHILS # BLD: 0 K/UL (ref 0–0.1)
BASOPHILS NFR BLD: 1 % (ref 0–1)
BILIRUB SERPL-MCNC: 0.2 MG/DL (ref 0.2–1)
BUN SERPL-MCNC: 51 MG/DL (ref 6–20)
BUN/CREAT SERPL: 22 (ref 12–20)
CA-I BLD-MCNC: 9 MG/DL (ref 8.5–10.1)
CHLORIDE SERPL-SCNC: 112 MMOL/L (ref 97–108)
CO2 SERPL-SCNC: 21 MMOL/L (ref 21–32)
CREAT SERPL-MCNC: 2.3 MG/DL (ref 0.55–1.02)
DIFFERENTIAL METHOD BLD: ABNORMAL
EOSINOPHIL # BLD: 0.3 K/UL (ref 0–0.4)
EOSINOPHIL NFR BLD: 4 % (ref 0–7)
ERYTHROCYTE [DISTWIDTH] IN BLOOD BY AUTOMATED COUNT: 15.6 % (ref 11.5–14.5)
GLOBULIN SER CALC-MCNC: 3.5 G/DL (ref 2–4)
GLUCOSE BLD STRIP.AUTO-MCNC: 100 MG/DL (ref 65–100)
GLUCOSE BLD STRIP.AUTO-MCNC: 134 MG/DL (ref 65–100)
GLUCOSE BLD STRIP.AUTO-MCNC: 212 MG/DL (ref 65–100)
GLUCOSE BLD STRIP.AUTO-MCNC: 228 MG/DL (ref 65–100)
GLUCOSE SERPL-MCNC: 124 MG/DL (ref 65–100)
HCT VFR BLD AUTO: 31.1 % (ref 35–47)
HGB BLD-MCNC: 10 G/DL (ref 11.5–16)
IMM GRANULOCYTES # BLD AUTO: 0 K/UL (ref 0–0.04)
IMM GRANULOCYTES NFR BLD AUTO: 0 % (ref 0–0.5)
LYMPHOCYTES # BLD: 3.5 K/UL (ref 0.8–3.5)
LYMPHOCYTES NFR BLD: 43 % (ref 12–49)
MCH RBC QN AUTO: 28.2 PG (ref 26–34)
MCHC RBC AUTO-ENTMCNC: 32.2 G/DL (ref 30–36.5)
MCV RBC AUTO: 87.9 FL (ref 80–99)
MONOCYTES # BLD: 0.5 K/UL (ref 0–1)
MONOCYTES NFR BLD: 6 % (ref 5–13)
NEUTS SEG # BLD: 3.7 K/UL (ref 1.8–8)
NEUTS SEG NFR BLD: 46 % (ref 32–75)
NRBC # BLD: 0 K/UL (ref 0–0.01)
NRBC BLD-RTO: 0 PER 100 WBC
PERFORMED BY, TECHID: ABNORMAL
PERFORMED BY, TECHID: NORMAL
PLATELET # BLD AUTO: 235 K/UL (ref 150–400)
PMV BLD AUTO: 9.3 FL (ref 8.9–12.9)
POTASSIUM SERPL-SCNC: 5.3 MMOL/L (ref 3.5–5.1)
PROT SERPL-MCNC: 6.8 G/DL (ref 6.4–8.2)
RBC # BLD AUTO: 3.54 M/UL (ref 3.8–5.2)
SODIUM SERPL-SCNC: 137 MMOL/L (ref 136–145)
WBC # BLD AUTO: 8 K/UL (ref 3.6–11)

## 2023-04-13 PROCEDURE — 93971 EXTREMITY STUDY: CPT

## 2023-04-13 PROCEDURE — 74011250637 HC RX REV CODE- 250/637: Performed by: NURSE PRACTITIONER

## 2023-04-13 PROCEDURE — 74011000250 HC RX REV CODE- 250: Performed by: NURSE PRACTITIONER

## 2023-04-13 PROCEDURE — 85025 COMPLETE CBC W/AUTO DIFF WBC: CPT

## 2023-04-13 PROCEDURE — 74011250636 HC RX REV CODE- 250/636: Performed by: NURSE PRACTITIONER

## 2023-04-13 PROCEDURE — 65270000029 HC RM PRIVATE

## 2023-04-13 PROCEDURE — 36415 COLL VENOUS BLD VENIPUNCTURE: CPT

## 2023-04-13 PROCEDURE — 82962 GLUCOSE BLOOD TEST: CPT

## 2023-04-13 PROCEDURE — 74011636637 HC RX REV CODE- 636/637: Performed by: NURSE PRACTITIONER

## 2023-04-13 PROCEDURE — 80053 COMPREHEN METABOLIC PANEL: CPT

## 2023-04-13 RX ORDER — GABAPENTIN 100 MG/1
100 CAPSULE ORAL 2 TIMES DAILY
Status: DISCONTINUED | OUTPATIENT
Start: 2023-04-13 | End: 2023-04-16

## 2023-04-13 RX ADMIN — INSULIN LISPRO 4 UNITS: 100 INJECTION, SOLUTION INTRAVENOUS; SUBCUTANEOUS at 12:27

## 2023-04-13 RX ADMIN — CETIRIZINE HYDROCHLORIDE 10 MG: 10 TABLET, FILM COATED ORAL at 08:19

## 2023-04-13 RX ADMIN — ASPIRIN 81 MG CHEWABLE TABLET 81 MG: 81 TABLET CHEWABLE at 08:19

## 2023-04-13 RX ADMIN — ATORVASTATIN CALCIUM 40 MG: 40 TABLET, FILM COATED ORAL at 22:47

## 2023-04-13 RX ADMIN — SODIUM CHLORIDE, PRESERVATIVE FREE 10 ML: 5 INJECTION INTRAVENOUS at 22:48

## 2023-04-13 RX ADMIN — CLONIDINE HYDROCHLORIDE 0.1 MG: 0.1 TABLET ORAL at 08:19

## 2023-04-13 RX ADMIN — GABAPENTIN 100 MG: 100 CAPSULE ORAL at 22:48

## 2023-04-13 RX ADMIN — CLONIDINE HYDROCHLORIDE 0.1 MG: 0.1 TABLET ORAL at 22:47

## 2023-04-13 RX ADMIN — HEPARIN SODIUM 5000 UNITS: 5000 INJECTION INTRAVENOUS; SUBCUTANEOUS at 14:33

## 2023-04-13 RX ADMIN — INSULIN LISPRO 4 UNITS: 100 INJECTION, SOLUTION INTRAVENOUS; SUBCUTANEOUS at 22:46

## 2023-04-13 RX ADMIN — CEFTRIAXONE SODIUM 1 G: 1 INJECTION, POWDER, FOR SOLUTION INTRAMUSCULAR; INTRAVENOUS at 08:19

## 2023-04-13 RX ADMIN — SODIUM CHLORIDE, PRESERVATIVE FREE 10 ML: 5 INJECTION INTRAVENOUS at 14:36

## 2023-04-13 RX ADMIN — ATENOLOL 50 MG: 50 TABLET ORAL at 08:19

## 2023-04-13 RX ADMIN — SODIUM CHLORIDE, PRESERVATIVE FREE 10 ML: 5 INJECTION INTRAVENOUS at 06:31

## 2023-04-13 RX ADMIN — AMLODIPINE BESYLATE 10 MG: 5 TABLET ORAL at 08:19

## 2023-04-14 LAB
GLUCOSE BLD STRIP.AUTO-MCNC: 183 MG/DL (ref 65–100)
GLUCOSE BLD STRIP.AUTO-MCNC: 214 MG/DL (ref 65–100)
GLUCOSE BLD STRIP.AUTO-MCNC: 225 MG/DL (ref 65–100)
GLUCOSE BLD STRIP.AUTO-MCNC: 396 MG/DL (ref 65–100)
PERFORMED BY, TECHID: ABNORMAL

## 2023-04-14 PROCEDURE — 74011000250 HC RX REV CODE- 250: Performed by: NURSE PRACTITIONER

## 2023-04-14 PROCEDURE — 82962 GLUCOSE BLOOD TEST: CPT

## 2023-04-14 PROCEDURE — 74011250637 HC RX REV CODE- 250/637: Performed by: NURSE PRACTITIONER

## 2023-04-14 PROCEDURE — 74011636637 HC RX REV CODE- 636/637: Performed by: NURSE PRACTITIONER

## 2023-04-14 PROCEDURE — 65270000029 HC RM PRIVATE

## 2023-04-14 PROCEDURE — 74011250636 HC RX REV CODE- 250/636: Performed by: NURSE PRACTITIONER

## 2023-04-14 RX ORDER — CLONIDINE HYDROCHLORIDE 0.1 MG/1
0.2 TABLET ORAL 2 TIMES DAILY
Status: DISCONTINUED | OUTPATIENT
Start: 2023-04-14 | End: 2023-04-17 | Stop reason: HOSPADM

## 2023-04-14 RX ADMIN — CETIRIZINE HYDROCHLORIDE 10 MG: 10 TABLET, FILM COATED ORAL at 09:40

## 2023-04-14 RX ADMIN — ATORVASTATIN CALCIUM 40 MG: 40 TABLET, FILM COATED ORAL at 21:12

## 2023-04-14 RX ADMIN — ATENOLOL 50 MG: 50 TABLET ORAL at 09:33

## 2023-04-14 RX ADMIN — CLONIDINE HYDROCHLORIDE 0.2 MG: 0.1 TABLET ORAL at 09:33

## 2023-04-14 RX ADMIN — GABAPENTIN 100 MG: 100 CAPSULE ORAL at 09:40

## 2023-04-14 RX ADMIN — ASPIRIN 81 MG CHEWABLE TABLET 81 MG: 81 TABLET CHEWABLE at 09:32

## 2023-04-14 RX ADMIN — SODIUM CHLORIDE, PRESERVATIVE FREE 10 ML: 5 INJECTION INTRAVENOUS at 15:40

## 2023-04-14 RX ADMIN — INSULIN LISPRO 2 UNITS: 100 INJECTION, SOLUTION INTRAVENOUS; SUBCUTANEOUS at 09:33

## 2023-04-14 RX ADMIN — INSULIN LISPRO 4 UNITS: 100 INJECTION, SOLUTION INTRAVENOUS; SUBCUTANEOUS at 11:56

## 2023-04-14 RX ADMIN — INSULIN LISPRO 16 UNITS: 100 INJECTION, SOLUTION INTRAVENOUS; SUBCUTANEOUS at 21:11

## 2023-04-14 RX ADMIN — CLONIDINE HYDROCHLORIDE 0.2 MG: 0.1 TABLET ORAL at 21:12

## 2023-04-14 RX ADMIN — SODIUM CHLORIDE, PRESERVATIVE FREE 10 ML: 5 INJECTION INTRAVENOUS at 21:12

## 2023-04-14 RX ADMIN — CEFTRIAXONE SODIUM 1 G: 1 INJECTION, POWDER, FOR SOLUTION INTRAMUSCULAR; INTRAVENOUS at 09:33

## 2023-04-14 RX ADMIN — GABAPENTIN 100 MG: 100 CAPSULE ORAL at 21:12

## 2023-04-14 RX ADMIN — AMLODIPINE BESYLATE 10 MG: 5 TABLET ORAL at 09:33

## 2023-04-14 RX ADMIN — INSULIN LISPRO 4 UNITS: 100 INJECTION, SOLUTION INTRAVENOUS; SUBCUTANEOUS at 17:03

## 2023-04-14 RX ADMIN — SODIUM CHLORIDE, PRESERVATIVE FREE 10 ML: 5 INJECTION INTRAVENOUS at 06:37

## 2023-04-15 LAB
GLUCOSE BLD STRIP.AUTO-MCNC: 140 MG/DL (ref 65–100)
GLUCOSE BLD STRIP.AUTO-MCNC: 154 MG/DL (ref 65–100)
GLUCOSE BLD STRIP.AUTO-MCNC: 209 MG/DL (ref 65–100)
GLUCOSE BLD STRIP.AUTO-MCNC: 302 MG/DL (ref 65–100)
GLUCOSE BLD STRIP.AUTO-MCNC: 387 MG/DL (ref 65–100)
PERFORMED BY, TECHID: ABNORMAL

## 2023-04-15 PROCEDURE — 74011250637 HC RX REV CODE- 250/637: Performed by: NURSE PRACTITIONER

## 2023-04-15 PROCEDURE — 74011250636 HC RX REV CODE- 250/636: Performed by: NURSE PRACTITIONER

## 2023-04-15 PROCEDURE — 74011000250 HC RX REV CODE- 250: Performed by: NURSE PRACTITIONER

## 2023-04-15 PROCEDURE — 97530 THERAPEUTIC ACTIVITIES: CPT

## 2023-04-15 PROCEDURE — 65270000029 HC RM PRIVATE

## 2023-04-15 PROCEDURE — 74011636637 HC RX REV CODE- 636/637: Performed by: NURSE PRACTITIONER

## 2023-04-15 PROCEDURE — 82962 GLUCOSE BLOOD TEST: CPT

## 2023-04-15 RX ADMIN — ASPIRIN 81 MG CHEWABLE TABLET 81 MG: 81 TABLET CHEWABLE at 08:55

## 2023-04-15 RX ADMIN — ACETAMINOPHEN 650 MG: 325 TABLET ORAL at 04:16

## 2023-04-15 RX ADMIN — INSULIN LISPRO 15 UNITS: 100 INJECTION, SOLUTION INTRAVENOUS; SUBCUTANEOUS at 21:06

## 2023-04-15 RX ADMIN — CLONIDINE HYDROCHLORIDE 0.2 MG: 0.1 TABLET ORAL at 08:55

## 2023-04-15 RX ADMIN — CLONIDINE HYDROCHLORIDE 0.2 MG: 0.1 TABLET ORAL at 21:05

## 2023-04-15 RX ADMIN — GABAPENTIN 100 MG: 100 CAPSULE ORAL at 09:35

## 2023-04-15 RX ADMIN — INSULIN LISPRO 4 UNITS: 100 INJECTION, SOLUTION INTRAVENOUS; SUBCUTANEOUS at 17:44

## 2023-04-15 RX ADMIN — AMLODIPINE BESYLATE 10 MG: 5 TABLET ORAL at 08:55

## 2023-04-15 RX ADMIN — ATORVASTATIN CALCIUM 40 MG: 40 TABLET, FILM COATED ORAL at 21:06

## 2023-04-15 RX ADMIN — CETIRIZINE HYDROCHLORIDE 10 MG: 10 TABLET, FILM COATED ORAL at 09:09

## 2023-04-15 RX ADMIN — INSULIN LISPRO 2 UNITS: 100 INJECTION, SOLUTION INTRAVENOUS; SUBCUTANEOUS at 08:58

## 2023-04-15 RX ADMIN — SODIUM CHLORIDE, PRESERVATIVE FREE 10 ML: 5 INJECTION INTRAVENOUS at 21:06

## 2023-04-15 RX ADMIN — SODIUM CHLORIDE, PRESERVATIVE FREE 10 ML: 5 INJECTION INTRAVENOUS at 14:12

## 2023-04-15 RX ADMIN — CEFTRIAXONE SODIUM 1 G: 1 INJECTION, POWDER, FOR SOLUTION INTRAMUSCULAR; INTRAVENOUS at 08:57

## 2023-04-15 RX ADMIN — INSULIN LISPRO 8 UNITS: 100 INJECTION, SOLUTION INTRAVENOUS; SUBCUTANEOUS at 12:21

## 2023-04-15 RX ADMIN — GABAPENTIN 100 MG: 100 CAPSULE ORAL at 21:05

## 2023-04-15 RX ADMIN — HEPARIN SODIUM 5000 UNITS: 5000 INJECTION INTRAVENOUS; SUBCUTANEOUS at 14:11

## 2023-04-15 RX ADMIN — ATENOLOL 50 MG: 50 TABLET ORAL at 08:55

## 2023-04-15 RX ADMIN — SODIUM CHLORIDE, PRESERVATIVE FREE 10 ML: 5 INJECTION INTRAVENOUS at 06:05

## 2023-04-16 LAB
GLUCOSE BLD STRIP.AUTO-MCNC: 164 MG/DL (ref 65–100)
GLUCOSE BLD STRIP.AUTO-MCNC: 243 MG/DL (ref 65–100)
GLUCOSE BLD STRIP.AUTO-MCNC: 246 MG/DL (ref 65–100)
GLUCOSE BLD STRIP.AUTO-MCNC: 253 MG/DL (ref 65–100)
PERFORMED BY, TECHID: ABNORMAL

## 2023-04-16 PROCEDURE — 74011000250 HC RX REV CODE- 250: Performed by: NURSE PRACTITIONER

## 2023-04-16 PROCEDURE — 65270000029 HC RM PRIVATE

## 2023-04-16 PROCEDURE — 74011636637 HC RX REV CODE- 636/637: Performed by: NURSE PRACTITIONER

## 2023-04-16 PROCEDURE — 74011250637 HC RX REV CODE- 250/637: Performed by: NURSE PRACTITIONER

## 2023-04-16 PROCEDURE — 74011250636 HC RX REV CODE- 250/636: Performed by: NURSE PRACTITIONER

## 2023-04-16 PROCEDURE — 82962 GLUCOSE BLOOD TEST: CPT

## 2023-04-16 RX ORDER — ATENOLOL 50 MG/1
25 TABLET ORAL DAILY
Status: DISCONTINUED | OUTPATIENT
Start: 2023-04-17 | End: 2023-04-17 | Stop reason: HOSPADM

## 2023-04-16 RX ORDER — INSULIN GLARGINE 100 [IU]/ML
7 INJECTION, SOLUTION SUBCUTANEOUS
Status: DISCONTINUED | OUTPATIENT
Start: 2023-04-16 | End: 2023-04-17 | Stop reason: HOSPADM

## 2023-04-16 RX ORDER — GABAPENTIN 100 MG/1
100 CAPSULE ORAL 3 TIMES DAILY
Status: DISCONTINUED | OUTPATIENT
Start: 2023-04-16 | End: 2023-04-17 | Stop reason: HOSPADM

## 2023-04-16 RX ORDER — GABAPENTIN 100 MG/1
200 CAPSULE ORAL 2 TIMES DAILY
Status: DISCONTINUED | OUTPATIENT
Start: 2023-04-16 | End: 2023-04-16

## 2023-04-16 RX ADMIN — INSULIN LISPRO 4 UNITS: 100 INJECTION, SOLUTION INTRAVENOUS; SUBCUTANEOUS at 12:01

## 2023-04-16 RX ADMIN — CETIRIZINE HYDROCHLORIDE 10 MG: 10 TABLET, FILM COATED ORAL at 08:48

## 2023-04-16 RX ADMIN — SODIUM CHLORIDE, PRESERVATIVE FREE 10 ML: 5 INJECTION INTRAVENOUS at 05:12

## 2023-04-16 RX ADMIN — SODIUM CHLORIDE, PRESERVATIVE FREE 10 ML: 5 INJECTION INTRAVENOUS at 16:49

## 2023-04-16 RX ADMIN — AMLODIPINE BESYLATE 10 MG: 5 TABLET ORAL at 08:48

## 2023-04-16 RX ADMIN — GABAPENTIN 100 MG: 100 CAPSULE ORAL at 08:48

## 2023-04-16 RX ADMIN — SODIUM CHLORIDE, PRESERVATIVE FREE 10 ML: 5 INJECTION INTRAVENOUS at 22:28

## 2023-04-16 RX ADMIN — ATORVASTATIN CALCIUM 40 MG: 40 TABLET, FILM COATED ORAL at 22:23

## 2023-04-16 RX ADMIN — ASPIRIN 81 MG CHEWABLE TABLET 81 MG: 81 TABLET CHEWABLE at 08:48

## 2023-04-16 RX ADMIN — INSULIN GLARGINE 7 UNITS: 100 INJECTION, SOLUTION SUBCUTANEOUS at 22:23

## 2023-04-16 RX ADMIN — ACETAMINOPHEN 650 MG: 325 TABLET ORAL at 05:37

## 2023-04-16 RX ADMIN — GABAPENTIN 100 MG: 100 CAPSULE ORAL at 22:23

## 2023-04-16 RX ADMIN — INSULIN LISPRO 6 UNITS: 100 INJECTION, SOLUTION INTRAVENOUS; SUBCUTANEOUS at 16:48

## 2023-04-16 RX ADMIN — INSULIN LISPRO 2 UNITS: 100 INJECTION, SOLUTION INTRAVENOUS; SUBCUTANEOUS at 07:30

## 2023-04-16 RX ADMIN — INSULIN LISPRO 4 UNITS: 100 INJECTION, SOLUTION INTRAVENOUS; SUBCUTANEOUS at 22:27

## 2023-04-16 RX ADMIN — CLONIDINE HYDROCHLORIDE 0.2 MG: 0.1 TABLET ORAL at 08:48

## 2023-04-16 RX ADMIN — CEFTRIAXONE SODIUM 1 G: 1 INJECTION, POWDER, FOR SOLUTION INTRAMUSCULAR; INTRAVENOUS at 08:48

## 2023-04-16 RX ADMIN — CLONIDINE HYDROCHLORIDE 0.2 MG: 0.1 TABLET ORAL at 22:23

## 2023-04-16 RX ADMIN — GABAPENTIN 100 MG: 100 CAPSULE ORAL at 16:48

## 2023-04-17 VITALS
BODY MASS INDEX: 27.47 KG/M2 | RESPIRATION RATE: 18 BRPM | HEART RATE: 54 BPM | WEIGHT: 175 LBS | TEMPERATURE: 97.7 F | SYSTOLIC BLOOD PRESSURE: 139 MMHG | OXYGEN SATURATION: 99 % | HEIGHT: 67 IN | DIASTOLIC BLOOD PRESSURE: 56 MMHG

## 2023-04-17 PROBLEM — N39.0 UTI (URINARY TRACT INFECTION): Status: ACTIVE | Noted: 2023-04-17

## 2023-04-17 LAB
BACTERIA SPEC CULT: NORMAL
GLUCOSE BLD STRIP.AUTO-MCNC: 178 MG/DL (ref 65–100)
GLUCOSE BLD STRIP.AUTO-MCNC: 341 MG/DL (ref 65–100)
PERFORMED BY, TECHID: ABNORMAL
PERFORMED BY, TECHID: ABNORMAL
SPECIAL REQUESTS,SREQ: NORMAL

## 2023-04-17 PROCEDURE — 82962 GLUCOSE BLOOD TEST: CPT

## 2023-04-17 PROCEDURE — 74011636637 HC RX REV CODE- 636/637: Performed by: NURSE PRACTITIONER

## 2023-04-17 PROCEDURE — 74011000250 HC RX REV CODE- 250: Performed by: NURSE PRACTITIONER

## 2023-04-17 PROCEDURE — 74011250636 HC RX REV CODE- 250/636: Performed by: NURSE PRACTITIONER

## 2023-04-17 PROCEDURE — 74011250637 HC RX REV CODE- 250/637: Performed by: NURSE PRACTITIONER

## 2023-04-17 RX ORDER — GABAPENTIN 100 MG/1
100 CAPSULE ORAL 3 TIMES DAILY
Qty: 63 CAPSULE | Refills: 0 | Status: SHIPPED | OUTPATIENT
Start: 2023-04-17 | End: 2023-05-08

## 2023-04-17 RX ORDER — CLONIDINE HYDROCHLORIDE 0.2 MG/1
0.2 TABLET ORAL 2 TIMES DAILY
Qty: 60 TABLET | Refills: 1 | Status: SHIPPED | OUTPATIENT
Start: 2023-04-17 | End: 2023-06-16

## 2023-04-17 RX ORDER — AMLODIPINE BESYLATE 10 MG/1
10 TABLET ORAL DAILY
Qty: 30 TABLET | Refills: 1 | Status: SHIPPED | OUTPATIENT
Start: 2023-04-17 | End: 2023-06-16

## 2023-04-17 RX ADMIN — CETIRIZINE HYDROCHLORIDE 10 MG: 10 TABLET, FILM COATED ORAL at 10:49

## 2023-04-17 RX ADMIN — GABAPENTIN 100 MG: 100 CAPSULE ORAL at 10:49

## 2023-04-17 RX ADMIN — ACETAMINOPHEN 650 MG: 325 TABLET ORAL at 10:49

## 2023-04-17 RX ADMIN — INSULIN LISPRO 2 UNITS: 100 INJECTION, SOLUTION INTRAVENOUS; SUBCUTANEOUS at 10:48

## 2023-04-17 RX ADMIN — AMLODIPINE BESYLATE 10 MG: 5 TABLET ORAL at 10:49

## 2023-04-17 RX ADMIN — ASPIRIN 81 MG CHEWABLE TABLET 81 MG: 81 TABLET CHEWABLE at 10:49

## 2023-04-17 RX ADMIN — SODIUM CHLORIDE, PRESERVATIVE FREE 10 ML: 5 INJECTION INTRAVENOUS at 06:24

## 2023-04-17 RX ADMIN — ATENOLOL 25 MG: 50 TABLET ORAL at 10:49

## 2023-04-17 RX ADMIN — CEFTRIAXONE SODIUM 1 G: 1 INJECTION, POWDER, FOR SOLUTION INTRAMUSCULAR; INTRAVENOUS at 10:49

## 2023-04-17 RX ADMIN — INSULIN LISPRO 8 UNITS: 100 INJECTION, SOLUTION INTRAVENOUS; SUBCUTANEOUS at 12:53

## 2023-04-17 RX ADMIN — CLONIDINE HYDROCHLORIDE 0.2 MG: 0.1 TABLET ORAL at 10:49

## 2023-04-17 NOTE — PROGRESS NOTES
CM reviewed clinical chart. Patients insurance auth  yesterday for patient to go to [facility]. CM has sent updated clinicals to [facility] AND [facility] and messaged them to see if they can reinitiate auth this morning.

## 2023-04-17 NOTE — PROGRESS NOTES
Insurance authorization has been approved for patient to go to [facility] located at [address]. Patient will go to room 201A. Nurse to call report to [phone]. Medicare pt has received, reviewed, and signed 2nd IM letter informing them of their right to appeal the discharge. Signed copied has been placed on pt bedside chart. Discharge plan of care/case management plan validated with provider discharge order.

## 2023-04-17 NOTE — DISCHARGE SUMMARY
Hospitalist Discharge Summary     Patient ID:    Alina Lozano  [id number]  81 y.o.  1960    Admit date: 4/6/2023    Discharge date : 4/17/2023    Final Diagnoses:   Principal Problem:    Hypoglycemia (4/6/2023)    Active Problems:    UTI (urinary tract infection) (4/17/2023)        Hospital Course:   Ailna Lozano is a 60 y.o. female who presents to the emergency room with chief complaint of generalized weakness and hypoglycemia. Apparently on evaluation by EMS patient was noted with a blood sugar of 51. Patient was given IV dextrose. In the ED, CT of the head shows chronic right cerebral infarct. CTA head and neck shows mild stenosis less than 50% of the right ICA. Creatinine near baseline. Hospitalist team consulted for further evaluation and treatment. Will admit to observation status. Obtain therapy evaluation. Patient complaining of \"burning\" sensation right lower extremity. Duplex US negative for DVT. Likely neuropathic pain. Started on gabapentin with improvement. Urinalysis showing leukocyte esterase. Empirically started on IV ceftriaxone. Incidental finding of severe atherosclerotic disease of the proximal descending thoracic aorta. Vascular surgery consult, Dr. Iliana Dukes. Recommending follow-up CT in 3 months. Blood sugars remain stable. PT/OT recommending SNF. Medically stable for discharge with close outpatient follow-up. Discharge Medications:   Current Discharge Medication List        START taking these medications    Details   gabapentin (NEURONTIN) 100 mg capsule Take 1 Capsule by mouth three (3) times daily for 21 days. Max Daily Amount: 300 mg. Indications: diabetic complication causing injury to some body nerves  Qty: 63 Capsule, Refills: 0  Start date: 4/17/2023, End date: 5/8/2023    Associated Diagnoses: Neuropathy      cloNIDine HCL (CATAPRES) 0.2 mg tablet Take 1 Tablet by mouth two (2) times a day for 60 days.   Qty: 60 Tablet, Refills: 1  Start date: 4/17/2023, End date: 6/16/2023           CONTINUE these medications which have CHANGED    Details   amLODIPine (NORVASC) 10 mg tablet Take 1 Tablet by mouth daily for 60 days. Qty: 30 Tablet, Refills: 1  Start date: 4/17/2023, End date: 6/16/2023           CONTINUE these medications which have NOT CHANGED    Details   loratadine (CLARITIN) 10 mg tablet Take 1 Tablet by mouth daily. aspirin delayed-release 81 mg tablet Take 1 Tablet by mouth daily. atenoloL (TENORMIN) 50 mg tablet Take 1 Tablet by mouth daily. simvastatin (ZOCOR) 20 mg tablet Take 1 Tablet by mouth nightly. glimepiride (AMARYL) 4 mg tablet Take 1 Tablet by mouth daily. acarbose (PRECOSE) 50 mg tablet Take 1 Tablet by mouth three (3) times daily (with meals). STOP taking these medications       hydrALAZINE (APRESOLINE) 50 mg tablet Comments:   Reason for Stopping:         losartan-hydroCHLOROthiazide (HYZAAR) 50-12.5 mg per tablet Comments:   Reason for Stopping: Follow up Care:    1. Rekha Fuentes MD in 1-2 weeks. Follow-up Information       Follow up With Specialties Details Why Contact Info    Rekha Fuentes MD Family Medicine Schedule an appointment as soon as possible for a visit in 1 week(s)  [facility] [address] [phone]      [facility] [address] [facility] Emergency Medicine Follow up If symptoms worsen, As needed [address] [facility]  [phone]              Patient Follow Up Instructions:    Activity: Activity as tolerated  Diet:  Diabetic Diet and Renal Diet  Wound care: None required    Condition at Discharge:  Stable  __________________________________________________________________    Disposition  [facility]  ____________________________________________________________________    Code Status:  Full Code  ___________________________________________________________________    Discharge Exam:  Patient seen and examined by me on discharge day.  Pertinent Findings:    Gen:    Not in distress  Chest: Clear lungs without wheezing, rhonchi, or rales. On room air. CVS:   Regular rate and rhythm. +S1/S2. NO murmur or gallop. No edema  Abd:  Soft, not distended, not tender. Active bowel sounds. Neuro:  Alert and oriented x3. CN II-XII grossly intact. Psych: Mood and affect appropriate     CONSULTATIONS: Vascular Surgery    Significant Diagnostic Studies:   Recent Results (from the past 24 hour(s))   GLUCOSE, POC    Collection Time: 04/16/23  4:16 PM   Result Value Ref Range    Glucose (POC) 253 (H) 65 - 100 mg/dL    Performed by Netta Carr, POC    Collection Time: 04/16/23 10:24 PM   Result Value Ref Range    Glucose (POC) 243 (H) 65 - 100 mg/dL    Performed by Chele Blackman    GLUCOSE, POC    Collection Time: 04/17/23  7:33 AM   Result Value Ref Range    Glucose (POC) 178 (H) 65 - 100 mg/dL    Performed by [facility]    GLUCOSE, POC    Collection Time: 04/17/23 11:16 AM   Result Value Ref Range    Glucose (POC) 341 (H) 65 - 100 mg/dL    Performed by [facility]      DUPLEX LOWER EXT VENOUS RIGHT   Final Result      CTA CODE NEURO HEAD AND NECK W CONT   Final Result   CTA Head:   1. No evidence of flow-limiting stenosis or aneurysm. CTA Neck:   1. No evidence of flow-limiting stenosis. 2. Mild stenosis (less than 50% by NASCET criteria) of the proximal right   internal carotid artery. 3. 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 CT CODE NEURO HEAD WO CONTRAST   Final Result   1. No acute abnormality. 2.  Chronic right cerebellar infarct.                     Signed:  Ritta Aase, PA-C  4/17/2023  2:02 PM

## 2023-04-17 NOTE — PROGRESS NOTES
Problem: Falls - Risk of  Goal: *Absence of Falls  Description: Document Alexia Casiano Fall Risk and appropriate interventions in the flowsheet.   Outcome: Progressing Towards Goal  Note: Fall Risk Interventions:  Mobility Interventions: Bed/chair exit alarm         Medication Interventions: Bed/chair exit alarm, Teach patient to arise slowly, Patient to call before getting OOB    Elimination Interventions: Call light in reach, Bed/chair exit alarm, Toileting schedule/hourly rounds

## 2023-04-17 NOTE — PROGRESS NOTES
VSS. Patient given discharge instructions. Medications and follow-up appointments reviewed. IV and Telemetry remain. Case management, provider, and primary nurse aware of discharge. Discharge plan of care/case management plan validated with provider discharge order. Discharge paperwork complete and on desk. Primary nurse aware.

## 2023-04-17 NOTE — PROGRESS NOTES
Bedside and Verbal shift change report given to Shane Weems (oncoming nurse) by Alexander Jo (offgoing nurse). Report included the following information SBAR, Kardex, ED Summary, OR Summary, Procedure Summary, Intake/Output, MAR, Accordion, Recent Results, and Med Rec Status.

## 2023-04-17 NOTE — PROGRESS NOTES
Spiritual Care Assessment/Progress Note  [facility]      NAME: Rj Mackey      MRN: [id number]  AGE: 60 y.o.  SEX: female  Cheondoism Affiliation: [place]   Language: English     4/17/2023     Total Time (in minutes): 24     Spiritual Assessment begun in [address] through conversation with:         [x]Patient        [] Family    [] Friend(s)        Reason for Consult: Initial/Spiritual assessment, patient floor     Spiritual beliefs: (Please include comment if needed)     [x] Identifies with a cali tradition:         [x] Supported by a cali community:            [] Claims no spiritual orientation:           [] Seeking spiritual identity:                [] Adheres to an individual form of spirituality:           [] Not able to assess:                           Identified resources for coping:      [x] Prayer                               [] Music                  [] Guided Imagery     [x] Family/friends                 [] Pet visits     [x] Devotional reading                         [] Unknown     [] Other:                                               Interventions offered during this visit: (See comments for more details)    Patient Interventions: Affirmation of emotions/emotional suffering, Affirmation of cali, Catharsis/review of pertinent events in supportive environment, Coping skills reviewed/reinforced, Guidance concerning next steps/process to be expected, Iconic (affirming the presence of God/Higher Power), Initial/Spiritual assessment, patient floor, Prayer (assurance of), Cheondoism beliefs/image of God discussed           Plan of Care:     [] Support spiritual and/or cultural needs    [] Support AMD and/or advance care planning process      [] Support grieving process   [] Coordinate Rites and/or Rituals    [] Coordination with community clergy   [] No spiritual needs identified at this time   [] Detailed Plan of Care below (See Comments)  [] Make referral to Music Therapy  [] Make referral to Pet Therapy     [] Make referral to Addiction services  [] Make referral to [facility]  [] Make referral to Spiritual Care Partner  [] No future visits requested        [x] Contact Spiritual Care for further referrals     Comments: The purpose of the visit was to do a spiritual assessment on the patient. The patient was lying in bed and she mentioned feeling uncertain about her care. She shared that is supported by her family, however they are busy working. She mentioned that though her has been a struggle she does trust God to carry her. The patient talked about how much she values prayer and that she is grateful for God's love. She expressed how hopeful she was that things would work out fine regarding her health, though being in the hospital is difficult. The  listened empathically, facilitated story-telling, explored painful feelings, encouraged holistic reflection, extended prayer, and provided the ministry of presence and the comfort of pastoral care. [address] [facility] D.Min, M.Div.  [address] can be reached by calling the  at [facility]  [phone]

## 2023-04-18 NOTE — PROGRESS NOTES
Discharge report called to Marquis Joshua LPN at [facility] at [phone]. Patient discharged to [facility] via [facility] [address] ambulance transport with belongings.

## 2023-05-08 RX ORDER — GLIMEPIRIDE 4 MG/1
4 TABLET ORAL DAILY
COMMUNITY

## 2023-05-08 RX ORDER — GABAPENTIN 100 MG/1
100 CAPSULE ORAL 3 TIMES DAILY
Qty: 63 CAPSULE | Refills: 0 | COMMUNITY
Start: 2023-04-17 | End: 2023-05-08

## 2023-05-08 RX ORDER — CLONIDINE HYDROCHLORIDE 0.2 MG/1
0.2 TABLET ORAL 2 TIMES DAILY
Qty: 60 TABLET | Refills: 1 | COMMUNITY
Start: 2023-04-17 | End: 2023-06-16

## 2023-05-08 RX ORDER — LORATADINE 10 MG/1
10 TABLET ORAL DAILY
COMMUNITY

## 2023-05-08 RX ORDER — SIMVASTATIN 20 MG
1 TABLET ORAL NIGHTLY
COMMUNITY

## 2023-05-08 RX ORDER — ATENOLOL 50 MG/1
50 TABLET ORAL DAILY
COMMUNITY

## 2023-05-17 PROBLEM — N39.0 UTI (URINARY TRACT INFECTION): Status: RESOLVED | Noted: 2023-04-17 | Resolved: 2023-05-17

## 2023-06-06 LAB
GLUCOSE BLD STRIP.AUTO-MCNC: 115 MG/DL (ref 65–100)
GLUCOSE BLD STRIP.AUTO-MCNC: 188 MG/DL (ref 65–100)
PERFORMED BY, TECHID: ABNORMAL
PERFORMED BY, TECHID: ABNORMAL

## 2023-08-02 NOTE — PROGRESS NOTES
(O90.088) Pain of right lower extremity  (primary encounter   (R60.0) Lower extremity edema  Comment: concern for possible DVT (blood clot)  Plan: to Emergency Department now for further evaluation with ultrasound.         Hospitalist Progress Note               Daily Progress Note: 2/3/2023      Subjective:   Hospital course to date:  Shane Heard is a 60 y.o. female with a history of HTN, DMII, HLD, and 20 pack year smoking who presents with a three day history of left-sided weakness in her leg and arm with facial droop. On 1/28/23, she had a fall that left her in the bathroom on the floor. She does not remember any loss of consciousness and was helped up by a neighbor who heard her yelling for help. Although she felt weak in her arms and legs, she thought the medicine she was taking would help with the symptoms. Due to this belief, she did not pursue medical treatment for three days even with worsening weakness on her left side. She admits to a frontal headache and has had some vision changes since Saturday. Denies any fever, chest pain, runny nose, shortness of breath, nausea, vomiting, diarrhea, and tingling in her legs. A CT head was done at her arrival in the ED and showed a likely chronic cerebellar lesion. Her creatinine was also elevated at 2. 11. Due to the possibility of a stroke and further need for workup and treatment, this patient is being admitted to the floor. MRI was read early this morning and showed two infarcts in the right poster limb of the internal capsule. She is scheduled for a an echo and carotid duplex now. This morning, her neuro exam is similar to the previous day with a slight improvement of her left leg but still remains 1/5. Left arm is 4/5 again. Patient to see PT/OT/SLP. Echo was done yesterday and showed no local thrombus/embolus with an EF of 60-65%. Carotid Duplex scan showed some L proximal ICA increased flow which correlates to 50-70% stenosis.  Repeat CTA is suggested by radiologist. A1C 7.4% Lipid panel shows an elevated LDL.  ---------------------  Patient is doing well today, has a high blood pressure with reading 195/67        Problem List:  Problem List as of 2/3/2023 Never Reviewed            Codes Class Noted - Resolved    CVA (cerebral vascular accident) [facility]) ICD-10-CM: I63.9  ICD-9-CM: 434.91  2023 - Present       Medications reviewed  Current Facility-Administered Medications   Medication Dose Route Frequency    *Please  patient's home medications from pharmacy at discharge*  1 Each Other PRIOR TO DISCHARGE    amLODIPine (NORVASC) tablet 5 mg  5 mg Oral DAILY    acetaminophen (TYLENOL) tablet 650 mg  650 mg Oral NOW    diphenhydrAMINE (BENADRYL) capsule 25 mg  25 mg Oral NOW    acarbose (PRECOSE) tablet 50 mg  50 mg Oral TID WITH MEALS    hydroCHLOROthiazide (HYDRODIURIL) tablet 12.5 mg  12.5 mg Oral DAILY    acetaminophen (TYLENOL) tablet 650 mg  650 mg Oral Q4H PRN    Or    acetaminophen (TYLENOL) solution 650 mg  650 mg Per NG tube Q4H PRN    Or    acetaminophen (TYLENOL) suppository 650 mg  650 mg Rectal Q4H PRN    aspirin chewable tablet 81 mg  81 mg Oral DAILY    atorvastatin (LIPITOR) tablet 40 mg  40 mg Oral QHS       Review of Systems:   A comprehensive review of systems was negative except for that written in the HPI. Objective:   Physical Exam:     Visit Vitals  BP (!) 195/67   Pulse 66   Temp 98.3 °F (36.8 °C)   Resp 18   Ht 5' 7\" (1.702 m)   Wt 80.7 kg (178 lb)   SpO2 98%   BMI 27.88 kg/m²      O2 Device: None (Room air)    Temp (24hrs), Av.2 °F (36.8 °C), Min:97.9 °F (36.6 °C), Max:98.6 °F (37 °C)    No intake/output data recorded.  1901 -  0700  In: -   Out: 450 [Urine:450]    General:   Awake and alert   Lungs:   Clear to auscultation bilaterally. Chest wall:  No tenderness or deformity. Heart:  Regular rate and rhythm, S1, S2 normal, no murmur, click, rub or gallop. Abdomen:   Soft, non-tender. Bowel sounds normal. No masses,  No organomegaly. Extremities: Extremities normal, atraumatic, no cyanosis or edema. Pulses: 2+ and symmetric all extremities.    Skin: Skin color, texture, turgor normal. No rashes or lesions   Neurologic: CNII-XII intact. No gross focal deficits         Data Review:       Recent Days:  Recent Labs     01/31/23  1048   WBC 11.9*   HGB 12.8   HCT 39.8          Recent Labs     02/01/23  0850 01/31/23  1048    137   K 4.2 4.5    106   CO2 25 26   GLU 99 186*   BUN 35* 29*   CREA 2.34* 2.11*   CA 9.1 9.3   PHOS 4.7  --    ALB 3.6 3.8   TBILI  --  0.3   ALT  --  10*   INR  --  1.0       No results for input(s): PH, PCO2, PO2, HCO3, FIO2 in the last 72 hours. 24 Hour Results:  Recent Results (from the past 24 hour(s))   GLUCOSE, POC    Collection Time: 02/02/23  8:58 AM   Result Value Ref Range    Glucose (POC) 167 (H) 65 - 100 mg/dL    Performed by Karen Six    GLUCOSE, POC    Collection Time: 02/02/23 11:28 AM   Result Value Ref Range    Glucose (POC) 260 (H) 65 - 100 mg/dL    Performed by Karen Six    GLUCOSE, POC    Collection Time: 02/02/23  4:43 PM   Result Value Ref Range    Glucose (POC) 138 (H) 65 - 100 mg/dL    Performed by Karen Six    GLUCOSE, POC    Collection Time: 02/02/23  8:21 PM   Result Value Ref Range    Glucose (POC) 213 (H) 65 - 100 mg/dL    Performed by Elaine Cosme, POC    Collection Time: 02/03/23  7:27 AM   Result Value Ref Range    Glucose (POC) 169 (H) 65 - 100 mg/dL    Performed by [id number]   Final Result      MRI BRAIN WO CONT   Final Result   Chronic microvascular ischemic disease with 2 separate small areas of acute   infarction involving the anterior and posterior limbs of the right internal   capsule. Chronic bilateral cerebellar infarctions. CT HEAD WO CONT   Final Result   Small, age indeterminate, right cerebellar infarction; probably   chronic.            Assessment:  Acute CVA, left hemiplegia -- two infarcts in the right posterior limb of the internal capsule    Carotid artery stenosis  - L ICA 50-70% per duplex 2/1/23     CKD stage indeterminate, possible ERIN     Diabetes Mellitus Type II not requiring insulin     Hyperlipidemia     Hypertension, uncontrolled     Tobacco abuse, 20 pack years     History of recurrent UTIs     Obesity      Discussion/MDM: Patient with multiple medical comorbidities, each with high likelihood for morbidity and mortality if left untreated. Patient being treated with medication that requires intensive monitoring due to increased risk for toxicity. I have reviewed patient's presenting subjective and objective findings, as well as all laboratory studies, imaging studies, and vital signs to date as well as treatment rendered and patient's response to those treatments. In addition, prior medical, surgical and relevant social and family histories were reviewed. Acute CVA in the right posterior limb of the internal capsule, PT/OT will be important if there is any improvement in the clinical status to be had. Plan:  Waiting placement and authorization  IRF would be the ideal rehab setting    METOPROLOL      Care Plan discussed with: Patient/Family    Code status: full    Social determinants of health: lives at boarding home alone    Disposition: IRF or SNF pending insurance    Total time spent with patient: 30 minutes.     Chely Argueta

## 2024-08-18 ENCOUNTER — APPOINTMENT (OUTPATIENT)
Facility: HOSPITAL | Age: 64
End: 2024-08-18
Payer: MEDICARE

## 2024-08-18 ENCOUNTER — HOSPITAL ENCOUNTER (INPATIENT)
Facility: HOSPITAL | Age: 64
LOS: 11 days | Discharge: NURSING, REHAB OR LONG-TERM CARE | End: 2024-08-30
Attending: EMERGENCY MEDICINE | Admitting: INTERNAL MEDICINE
Payer: MEDICARE

## 2024-08-18 DIAGNOSIS — I10 UNCONTROLLED HYPERTENSION: ICD-10-CM

## 2024-08-18 DIAGNOSIS — I63.89 CEREBROVASCULAR ACCIDENT (CVA) DUE TO OTHER MECHANISM (HCC): ICD-10-CM

## 2024-08-18 DIAGNOSIS — M79.605 PAIN OF LEFT LOWER EXTREMITY: ICD-10-CM

## 2024-08-18 DIAGNOSIS — E11.42 DIABETIC POLYNEUROPATHY ASSOCIATED WITH TYPE 2 DIABETES MELLITUS (HCC): ICD-10-CM

## 2024-08-18 DIAGNOSIS — R19.7 DIARRHEA, UNSPECIFIED TYPE: ICD-10-CM

## 2024-08-18 DIAGNOSIS — N17.9 AKI (ACUTE KIDNEY INJURY) (HCC): Primary | ICD-10-CM

## 2024-08-18 LAB
ALBUMIN SERPL-MCNC: 3.3 G/DL (ref 3.5–5)
ALBUMIN/GLOB SERPL: 0.8 (ref 1.1–2.2)
ALP SERPL-CCNC: 70 U/L (ref 45–117)
ALT SERPL-CCNC: 9 U/L (ref 12–78)
ANION GAP SERPL CALC-SCNC: 8 MMOL/L (ref 5–15)
AST SERPL W P-5'-P-CCNC: 8 U/L (ref 15–37)
BASOPHILS # BLD: 0.1 K/UL (ref 0–0.1)
BASOPHILS NFR BLD: 1 % (ref 0–1)
BILIRUB SERPL-MCNC: 0.2 MG/DL (ref 0.2–1)
BUN SERPL-MCNC: 64 MG/DL (ref 6–20)
BUN/CREAT SERPL: 15 (ref 12–20)
CA-I BLD-MCNC: 9.2 MG/DL (ref 8.5–10.1)
CHLORIDE SERPL-SCNC: 106 MMOL/L (ref 97–108)
CO2 SERPL-SCNC: 24 MMOL/L (ref 21–32)
CREAT SERPL-MCNC: 4.22 MG/DL (ref 0.55–1.02)
DIFFERENTIAL METHOD BLD: ABNORMAL
EOSINOPHIL # BLD: 0.2 K/UL (ref 0–0.4)
EOSINOPHIL NFR BLD: 2 % (ref 0–7)
ERYTHROCYTE [DISTWIDTH] IN BLOOD BY AUTOMATED COUNT: 15.4 % (ref 11.5–14.5)
GLOBULIN SER CALC-MCNC: 4.4 G/DL (ref 2–4)
GLUCOSE SERPL-MCNC: 149 MG/DL (ref 65–100)
HCT VFR BLD AUTO: 32 % (ref 35–47)
HGB BLD-MCNC: 10.4 G/DL (ref 11.5–16)
IMM GRANULOCYTES # BLD AUTO: 0 K/UL (ref 0–0.04)
IMM GRANULOCYTES NFR BLD AUTO: 0 % (ref 0–0.5)
LYMPHOCYTES # BLD: 3.6 K/UL (ref 0.8–3.5)
LYMPHOCYTES NFR BLD: 34 % (ref 12–49)
MCH RBC QN AUTO: 27.2 PG (ref 26–34)
MCHC RBC AUTO-ENTMCNC: 32.5 G/DL (ref 30–36.5)
MCV RBC AUTO: 83.6 FL (ref 80–99)
MONOCYTES # BLD: 0.7 K/UL (ref 0–1)
MONOCYTES NFR BLD: 6 % (ref 5–13)
NEUTS SEG # BLD: 6.1 K/UL (ref 1.8–8)
NEUTS SEG NFR BLD: 57 % (ref 32–75)
NRBC # BLD: 0 K/UL (ref 0–0.01)
NRBC BLD-RTO: 0 PER 100 WBC
PLATELET # BLD AUTO: 247 K/UL (ref 150–400)
PMV BLD AUTO: 9.7 FL (ref 8.9–12.9)
POTASSIUM SERPL-SCNC: 4 MMOL/L (ref 3.5–5.1)
PROT SERPL-MCNC: 7.7 G/DL (ref 6.4–8.2)
RBC # BLD AUTO: 3.83 M/UL (ref 3.8–5.2)
SARS-COV-2 RNA RESP QL NAA+PROBE: NOT DETECTED
SODIUM SERPL-SCNC: 138 MMOL/L (ref 136–145)
SPECIMEN SOURCE: NORMAL
TROPONIN I SERPL HS-MCNC: 12 NG/L (ref 0–51)
WBC # BLD AUTO: 10.6 K/UL (ref 3.6–11)

## 2024-08-18 PROCEDURE — 94761 N-INVAS EAR/PLS OXIMETRY MLT: CPT

## 2024-08-18 PROCEDURE — 85025 COMPLETE CBC W/AUTO DIFF WBC: CPT

## 2024-08-18 PROCEDURE — 99285 EMERGENCY DEPT VISIT HI MDM: CPT

## 2024-08-18 PROCEDURE — 6360000002 HC RX W HCPCS: Performed by: EMERGENCY MEDICINE

## 2024-08-18 PROCEDURE — 6370000000 HC RX 637 (ALT 250 FOR IP): Performed by: EMERGENCY MEDICINE

## 2024-08-18 PROCEDURE — 71045 X-RAY EXAM CHEST 1 VIEW: CPT

## 2024-08-18 PROCEDURE — 74176 CT ABD & PELVIS W/O CONTRAST: CPT

## 2024-08-18 PROCEDURE — 80053 COMPREHEN METABOLIC PANEL: CPT

## 2024-08-18 PROCEDURE — 87635 SARS-COV-2 COVID-19 AMP PRB: CPT

## 2024-08-18 PROCEDURE — 36415 COLL VENOUS BLD VENIPUNCTURE: CPT

## 2024-08-18 PROCEDURE — 96374 THER/PROPH/DIAG INJ IV PUSH: CPT

## 2024-08-18 PROCEDURE — 84484 ASSAY OF TROPONIN QUANT: CPT

## 2024-08-18 RX ORDER — HYDRALAZINE HYDROCHLORIDE 20 MG/ML
20 INJECTION INTRAMUSCULAR; INTRAVENOUS ONCE
Status: COMPLETED | OUTPATIENT
Start: 2024-08-18 | End: 2024-08-18

## 2024-08-18 RX ORDER — ACETAMINOPHEN 325 MG/1
650 TABLET ORAL
Status: COMPLETED | OUTPATIENT
Start: 2024-08-18 | End: 2024-08-18

## 2024-08-18 RX ADMIN — HYDRALAZINE HYDROCHLORIDE 20 MG: 20 INJECTION, SOLUTION INTRAMUSCULAR; INTRAVENOUS at 19:56

## 2024-08-18 RX ADMIN — ACETAMINOPHEN 650 MG: 325 TABLET ORAL at 23:47

## 2024-08-18 ASSESSMENT — PAIN - FUNCTIONAL ASSESSMENT: PAIN_FUNCTIONAL_ASSESSMENT: 0-10

## 2024-08-18 ASSESSMENT — PAIN SCALES - GENERAL: PAINLEVEL_OUTOF10: 0

## 2024-08-18 ASSESSMENT — LIFESTYLE VARIABLES
HOW OFTEN DO YOU HAVE A DRINK CONTAINING ALCOHOL: NEVER
HOW MANY STANDARD DRINKS CONTAINING ALCOHOL DO YOU HAVE ON A TYPICAL DAY: PATIENT DOES NOT DRINK

## 2024-08-18 NOTE — ED PROVIDER NOTES
[facility] EMERGENCY DEPT  EMERGENCY DEPARTMENT HISTORY AND PHYSICAL EXAM      Date: 8/18/2024  Patient Name: Yamilet Smith  MRN: [id number]  Birthdate 1960  Date of evaluation: 8/18/2024  Provider: Medina Kessler MD   Note Started: 7:35 PM EDT 8/18/24    HISTORY OF PRESENT ILLNESS     Chief Complaint   Patient presents with    Cough    Diarrhea    Fatigue       History Provided By: Patient    HPI: Yamilet Smith is a 64 y.o. female with medical history of CKD, diabetes, hypertension and stroke presents for evaluation of cough fatigue and diarrhea.  Has been sick for about a week.  Not able to keep her medications down.  Is hypertensive here.    PAST MEDICAL HISTORY   Past Medical History:  Past Medical History:   Diagnosis Date    Chronic kidney disease     Diabetes (HCC)     Hypertension     Stroke (HCC)        Past Surgical History:  Past Surgical History:   Procedure Laterality Date    HYSTERECTOMY (CERVIX STATUS UNKNOWN)      age 24       Family History:  Family History   Problem Relation Age of Onset    Cancer Sister     Diabetes Sister     Heart Disease Mother     Diabetes Mother        Social History:  Social History     Tobacco Use    Smoking status: Every Day     Current packs/day: 0.50     Types: Cigarettes    Smokeless tobacco: Never   Substance Use Topics    Alcohol use: Never    Drug use: Never       Allergies:  No Known Allergies    PCP: Alba Shields MD    Current Meds:   No current facility-administered medications for this encounter.     Current Outpatient Medications   Medication Sig Dispense Refill    atenolol (TENORMIN) 50 MG tablet Take 1 tablet by mouth daily      glimepiride (AMARYL) 4 MG tablet Take 1 tablet by mouth daily      loratadine (CLARITIN) 10 MG tablet Take 1 tablet by mouth daily      simvastatin (ZOCOR) 20 MG tablet Take 1 tablet by mouth nightly      acarbose (PRECOSE) 50 MG tablet Take by mouth 3 times daily (with meals)      amLODIPine (NORVASC) 10 MG tablet Take 1 tablet

## 2024-08-19 PROBLEM — Z79.4 TYPE 2 DIABETES MELLITUS, WITH LONG-TERM CURRENT USE OF INSULIN (HCC): Status: ACTIVE | Noted: 2024-08-19

## 2024-08-19 PROBLEM — N18.9 ACUTE ON CHRONIC RENAL INSUFFICIENCY: Status: ACTIVE | Noted: 2024-08-19

## 2024-08-19 PROBLEM — E11.9 TYPE 2 DIABETES MELLITUS, WITH LONG-TERM CURRENT USE OF INSULIN (HCC): Status: ACTIVE | Noted: 2024-08-19

## 2024-08-19 PROBLEM — E11.9 TYPE 2 DIABETES MELLITUS, WITH LONG-TERM CURRENT USE OF INSULIN (HCC): Chronic | Status: ACTIVE | Noted: 2024-08-19

## 2024-08-19 PROBLEM — N28.9 ACUTE ON CHRONIC RENAL INSUFFICIENCY: Status: ACTIVE | Noted: 2024-08-19

## 2024-08-19 PROBLEM — R10.9 ABDOMINAL PAIN: Status: ACTIVE | Noted: 2024-08-19

## 2024-08-19 PROBLEM — Z79.4 TYPE 2 DIABETES MELLITUS, WITH LONG-TERM CURRENT USE OF INSULIN (HCC): Chronic | Status: ACTIVE | Noted: 2024-08-19

## 2024-08-19 LAB
ALBUMIN SERPL-MCNC: 3.1 G/DL (ref 3.5–5)
ANION GAP SERPL CALC-SCNC: 15 MMOL/L (ref 5–15)
APPEARANCE UR: ABNORMAL
BACTERIA URNS QL MICRO: ABNORMAL /HPF
BILIRUB UR QL: NEGATIVE
BUN SERPL-MCNC: 70 MG/DL (ref 6–20)
BUN/CREAT SERPL: 15 (ref 12–20)
CA-I BLD-MCNC: 9.2 MG/DL (ref 8.5–10.1)
CHLORIDE SERPL-SCNC: 105 MMOL/L (ref 97–108)
CO2 SERPL-SCNC: 19 MMOL/L (ref 21–32)
COLOR UR: ABNORMAL
CREAT SERPL-MCNC: 4.76 MG/DL (ref 0.55–1.02)
CREAT UR-MCNC: 160 MG/DL
EPITH CASTS URNS QL MICRO: ABNORMAL /LPF
GLUCOSE BLD STRIP.AUTO-MCNC: 112 MG/DL (ref 65–100)
GLUCOSE BLD STRIP.AUTO-MCNC: 120 MG/DL (ref 65–100)
GLUCOSE BLD STRIP.AUTO-MCNC: 183 MG/DL (ref 65–100)
GLUCOSE BLD STRIP.AUTO-MCNC: 198 MG/DL (ref 65–100)
GLUCOSE BLD STRIP.AUTO-MCNC: 296 MG/DL (ref 65–100)
GLUCOSE SERPL-MCNC: 169 MG/DL (ref 65–100)
GLUCOSE UR STRIP.AUTO-MCNC: NEGATIVE MG/DL
HGB UR QL STRIP: ABNORMAL
KETONES UR QL STRIP.AUTO: NEGATIVE MG/DL
LEUKOCYTE ESTERASE UR QL STRIP.AUTO: ABNORMAL
NITRITE UR QL STRIP.AUTO: NEGATIVE
OTHER, URINE: ABNORMAL
OTHER: ABNORMAL
PERFORMED BY:: ABNORMAL
PH UR STRIP: 5 (ref 5–8)
PHOSPHATE SERPL-MCNC: 4.2 MG/DL (ref 2.6–4.7)
POTASSIUM SERPL-SCNC: 4 MMOL/L (ref 3.5–5.1)
PROT UR STRIP-MCNC: 100 MG/DL
PROT UR-MCNC: 114 MG/DL (ref 0–11.9)
PROT/CREAT UR-RTO: 0.7
RBC #/AREA URNS HPF: ABNORMAL /HPF (ref 0–5)
SODIUM SERPL-SCNC: 139 MMOL/L (ref 136–145)
SP GR UR REFRACTOMETRY: 1.01 (ref 1–1.03)
URINE CULTURE IF INDICATED: ABNORMAL
UROBILINOGEN UR QL STRIP.AUTO: 0.1 EU/DL (ref 0.1–1)
WBC URNS QL MICRO: >100 /HPF (ref 0–4)

## 2024-08-19 PROCEDURE — 82570 ASSAY OF URINE CREATININE: CPT

## 2024-08-19 PROCEDURE — 6370000000 HC RX 637 (ALT 250 FOR IP): Performed by: INTERNAL MEDICINE

## 2024-08-19 PROCEDURE — 80069 RENAL FUNCTION PANEL: CPT

## 2024-08-19 PROCEDURE — 87086 URINE CULTURE/COLONY COUNT: CPT

## 2024-08-19 PROCEDURE — 82962 GLUCOSE BLOOD TEST: CPT

## 2024-08-19 PROCEDURE — 81001 URINALYSIS AUTO W/SCOPE: CPT

## 2024-08-19 PROCEDURE — 87186 SC STD MICRODIL/AGAR DIL: CPT

## 2024-08-19 PROCEDURE — 84156 ASSAY OF PROTEIN URINE: CPT

## 2024-08-19 PROCEDURE — 2580000003 HC RX 258: Performed by: INTERNAL MEDICINE

## 2024-08-19 PROCEDURE — 87088 URINE BACTERIA CULTURE: CPT

## 2024-08-19 PROCEDURE — 1100000000 HC RM PRIVATE

## 2024-08-19 PROCEDURE — 36410 VNPNXR 3YR/> PHY/QHP DX/THER: CPT

## 2024-08-19 PROCEDURE — 6360000002 HC RX W HCPCS: Performed by: INTERNAL MEDICINE

## 2024-08-19 PROCEDURE — 05HC33Z INSERTION OF INFUSION DEVICE INTO LEFT BASILIC VEIN, PERCUTANEOUS APPROACH: ICD-10-PCS | Performed by: INTERNAL MEDICINE

## 2024-08-19 RX ORDER — HYDROCHLOROTHIAZIDE 25 MG/1
12.5 TABLET ORAL DAILY
Status: DISCONTINUED | OUTPATIENT
Start: 2024-08-19 | End: 2024-08-20

## 2024-08-19 RX ORDER — ACETAMINOPHEN 650 MG/1
650 SUPPOSITORY RECTAL EVERY 6 HOURS PRN
Status: DISCONTINUED | OUTPATIENT
Start: 2024-08-19 | End: 2024-08-30 | Stop reason: HOSPADM

## 2024-08-19 RX ORDER — ONDANSETRON 4 MG/1
4 TABLET, ORALLY DISINTEGRATING ORAL EVERY 8 HOURS PRN
Status: DISCONTINUED | OUTPATIENT
Start: 2024-08-19 | End: 2024-08-30 | Stop reason: HOSPADM

## 2024-08-19 RX ORDER — HYDRALAZINE HYDROCHLORIDE 20 MG/ML
10 INJECTION INTRAMUSCULAR; INTRAVENOUS EVERY 4 HOURS PRN
Status: DISCONTINUED | OUTPATIENT
Start: 2024-08-19 | End: 2024-08-19

## 2024-08-19 RX ORDER — POLYETHYLENE GLYCOL 3350 17 G/17G
17 POWDER, FOR SOLUTION ORAL DAILY PRN
Status: DISCONTINUED | OUTPATIENT
Start: 2024-08-19 | End: 2024-08-20 | Stop reason: SDUPTHER

## 2024-08-19 RX ORDER — ATENOLOL 25 MG/1
50 TABLET ORAL DAILY
Status: DISCONTINUED | OUTPATIENT
Start: 2024-08-19 | End: 2024-08-20

## 2024-08-19 RX ORDER — SODIUM CHLORIDE 0.9 % (FLUSH) 0.9 %
5-40 SYRINGE (ML) INJECTION PRN
Status: DISCONTINUED | OUTPATIENT
Start: 2024-08-19 | End: 2024-08-30 | Stop reason: HOSPADM

## 2024-08-19 RX ORDER — AMLODIPINE BESYLATE 5 MG/1
10 TABLET ORAL DAILY
Status: DISCONTINUED | OUTPATIENT
Start: 2024-08-19 | End: 2024-08-30 | Stop reason: HOSPADM

## 2024-08-19 RX ORDER — SODIUM CHLORIDE, SODIUM LACTATE, POTASSIUM CHLORIDE, CALCIUM CHLORIDE 600; 310; 30; 20 MG/100ML; MG/100ML; MG/100ML; MG/100ML
INJECTION, SOLUTION INTRAVENOUS CONTINUOUS
Status: DISCONTINUED | OUTPATIENT
Start: 2024-08-19 | End: 2024-08-20

## 2024-08-19 RX ORDER — SODIUM CHLORIDE 0.9 % (FLUSH) 0.9 %
5-40 SYRINGE (ML) INJECTION EVERY 12 HOURS SCHEDULED
Status: DISCONTINUED | OUTPATIENT
Start: 2024-08-19 | End: 2024-08-30 | Stop reason: HOSPADM

## 2024-08-19 RX ORDER — ATORVASTATIN CALCIUM 40 MG/1
40 TABLET, FILM COATED ORAL NIGHTLY
Status: DISCONTINUED | OUTPATIENT
Start: 2024-08-19 | End: 2024-08-20 | Stop reason: SDUPTHER

## 2024-08-19 RX ORDER — ACETAMINOPHEN 325 MG/1
650 TABLET ORAL EVERY 6 HOURS PRN
Status: DISCONTINUED | OUTPATIENT
Start: 2024-08-19 | End: 2024-08-30 | Stop reason: HOSPADM

## 2024-08-19 RX ORDER — INSULIN LISPRO 100 [IU]/ML
0-4 INJECTION, SOLUTION INTRAVENOUS; SUBCUTANEOUS
Status: DISCONTINUED | OUTPATIENT
Start: 2024-08-19 | End: 2024-08-30 | Stop reason: HOSPADM

## 2024-08-19 RX ORDER — OXYCODONE HYDROCHLORIDE 5 MG/1
5 TABLET ORAL EVERY 6 HOURS PRN
Status: DISCONTINUED | OUTPATIENT
Start: 2024-08-19 | End: 2024-08-30 | Stop reason: HOSPADM

## 2024-08-19 RX ORDER — ONDANSETRON 2 MG/ML
4 INJECTION INTRAMUSCULAR; INTRAVENOUS EVERY 6 HOURS PRN
Status: DISCONTINUED | OUTPATIENT
Start: 2024-08-19 | End: 2024-08-30 | Stop reason: HOSPADM

## 2024-08-19 RX ORDER — ASPIRIN 81 MG/1
81 TABLET, CHEWABLE ORAL DAILY
Status: DISCONTINUED | OUTPATIENT
Start: 2024-08-19 | End: 2024-08-20 | Stop reason: SDUPTHER

## 2024-08-19 RX ORDER — HYDRALAZINE HYDROCHLORIDE 20 MG/ML
20 INJECTION INTRAMUSCULAR; INTRAVENOUS ONCE
Status: COMPLETED | OUTPATIENT
Start: 2024-08-19 | End: 2024-08-19

## 2024-08-19 RX ORDER — SODIUM CHLORIDE 9 MG/ML
INJECTION, SOLUTION INTRAVENOUS PRN
Status: DISCONTINUED | OUTPATIENT
Start: 2024-08-19 | End: 2024-08-20 | Stop reason: SDUPTHER

## 2024-08-19 RX ORDER — HYDRALAZINE HYDROCHLORIDE 20 MG/ML
10 INJECTION INTRAMUSCULAR; INTRAVENOUS EVERY 4 HOURS PRN
Status: DISCONTINUED | OUTPATIENT
Start: 2024-08-19 | End: 2024-08-30 | Stop reason: HOSPADM

## 2024-08-19 RX ORDER — INSULIN LISPRO 100 [IU]/ML
0-4 INJECTION, SOLUTION INTRAVENOUS; SUBCUTANEOUS NIGHTLY
Status: DISCONTINUED | OUTPATIENT
Start: 2024-08-19 | End: 2024-08-30 | Stop reason: HOSPADM

## 2024-08-19 RX ADMIN — ASPIRIN 81 MG 81 MG: 81 TABLET ORAL at 09:00

## 2024-08-19 RX ADMIN — ATORVASTATIN CALCIUM 40 MG: 40 TABLET, FILM COATED ORAL at 20:40

## 2024-08-19 RX ADMIN — HYDRALAZINE HYDROCHLORIDE 20 MG: 20 INJECTION, SOLUTION INTRAMUSCULAR; INTRAVENOUS at 07:48

## 2024-08-19 RX ADMIN — SODIUM CHLORIDE, PRESERVATIVE FREE 10 ML: 5 INJECTION INTRAVENOUS at 22:06

## 2024-08-19 RX ADMIN — ATENOLOL 50 MG: 25 TABLET ORAL at 16:23

## 2024-08-19 RX ADMIN — OXYCODONE 5 MG: 5 TABLET ORAL at 16:22

## 2024-08-19 RX ADMIN — OXYCODONE 5 MG: 5 TABLET ORAL at 22:03

## 2024-08-19 RX ADMIN — AMLODIPINE BESYLATE 10 MG: 5 TABLET ORAL at 16:23

## 2024-08-19 RX ADMIN — SODIUM CHLORIDE, POTASSIUM CHLORIDE, SODIUM LACTATE AND CALCIUM CHLORIDE: 600; 310; 30; 20 INJECTION, SOLUTION INTRAVENOUS at 06:42

## 2024-08-19 RX ADMIN — ACETAMINOPHEN 650 MG: 325 TABLET ORAL at 09:00

## 2024-08-19 ASSESSMENT — PAIN DESCRIPTION - DESCRIPTORS: DESCRIPTORS: ACHING;DISCOMFORT

## 2024-08-19 ASSESSMENT — PAIN DESCRIPTION - LOCATION: LOCATION: LEG

## 2024-08-19 ASSESSMENT — PAIN SCALES - GENERAL
PAINLEVEL_OUTOF10: 0
PAINLEVEL_OUTOF10: 7
PAINLEVEL_OUTOF10: 0

## 2024-08-19 ASSESSMENT — PAIN - FUNCTIONAL ASSESSMENT: PAIN_FUNCTIONAL_ASSESSMENT: ACTIVITIES ARE NOT PREVENTED

## 2024-08-19 ASSESSMENT — PAIN DESCRIPTION - ORIENTATION: ORIENTATION: RIGHT;LEFT

## 2024-08-19 ASSESSMENT — PAIN SCALES - WONG BAKER: WONGBAKER_NUMERICALRESPONSE: NO HURT

## 2024-08-19 NOTE — CONSULTS
Nephrology Consultation          Patient: Yamilet Smith MRN: [id number]  SSN: [id number]    YOB: 1960  Age: 64 y.o.  Sex: female      Subjective:   Reason for the consultation.  Acute kidney injury on chronic kidney stage IV  History of present illness.  The patient is 64-year-old woman with underlying history of chronic kidney stage IV, diabetes mellitus type 2, hypertension, old stroke presented to the ER with abdominal pain and diarrhea.  On arrival in the ER she was severely hypertensive with blood pressure 226/71 afebrile and was on room air.  CBC showed no leukocytosis.  Initial chemistry showed elevated BUN of 64 and creatinine 4.22 which prompted nephrology consultation.  She seems to have underlying chronic kidney disease and her last creatinine was 2.30 in April 2023.  She was noticed to be on hydrochlorothiazide at home.  No lower extremity swelling.  She is on room air.  Chest x-ray no infiltrates or edema.  No evidence of hydronephrosis per CT of the abdomen.  No voiding issues so far.    Past Medical History:   Diagnosis Date    Chronic kidney disease     Diabetes (HCC)     Hypertension     Stroke (HCC)      Past Surgical History:   Procedure Laterality Date    HYSTERECTOMY (CERVIX STATUS UNKNOWN)      age 24      Family History   Problem Relation Age of Onset    Cancer Sister     Diabetes Sister     Heart Disease Mother     Diabetes Mother      Social History     Tobacco Use    Smoking status: Every Day     Current packs/day: 0.50     Types: Cigarettes    Smokeless tobacco: Never   Substance Use Topics    Alcohol use: Never      Current Facility-Administered Medications   Medication Dose Route Frequency Provider Last Rate Last Admin    insulin lispro (HUMALOG,ADMELOG) injection vial 0-4 Units  0-4 Units SubCUTAneous TID  Cortez Casas MD        insulin lispro (HUMALOG,ADMELOG) injection vial 0-4 Units  0-4 Units SubCUTAneous Nightly Cortez Casas MD         sodium chloride flush 0.9 % injection 5-40 mL  5-40 mL IntraVENous 2 times per day Cortez Casas MD        sodium chloride flush 0.9 % injection 5-40 mL  5-40 mL IntraVENous PRN Cortez Casas MD        0.9 % sodium chloride infusion   IntraVENous PRN Cortez Casas MD        ondansetron (ZOFRAN-ODT) disintegrating tablet 4 mg  4 mg Oral Q8H PRN Cortez Casas MD        Or    ondansetron (ZOFRAN) injection 4 mg  4 mg IntraVENous Q6H PRN Cortez Casas MD        polyethylene glycol (GLYCOLAX) packet 17 g  17 g Oral Daily PRN Cortez Casas MD        acetaminophen (TYLENOL) tablet 650 mg  650 mg Oral Q6H PRN Cortez Casas MD   650 mg at 08/19/24 0900    Or    acetaminophen (TYLENOL) suppository 650 mg  650 mg Rectal Q6H PRN Cortez Casas MD        lactated ringers IV soln infusion   IntraVENous Continuous Cortez Casas MD   Stopped at 08/19/24 0746    hydrALAZINE (APRESOLINE) injection 10 mg  10 mg IntraVENous Q4H PRN Carlo Sibley MD        amLODIPine (NORVASC) tablet 10 mg  10 mg Oral Daily Carlo Sibley MD        atenolol (TENORMIN) tablet 50 mg  50 mg Oral Daily Carlo Sibley MD        [Held by provider] hydroCHLOROthiazide (HYDRODIURIL) tablet 12.5 mg  12.5 mg Oral Daily Carlo Sibley MD        aspirin chewable tablet 81 mg  81 mg Oral Daily Carlo Sibley MD   81 mg at 08/19/24 0900    atorvastatin (LIPITOR) tablet 40 mg  40 mg Oral Nightly Carlo Sibley MD            No Known Allergies    Review of Systems:  A comprehensive review of systems was negative except for that written in the History of Present Illness.    Objective:     Vitals:    08/19/24 0645 08/19/24 0715 08/19/24 0758 08/19/24 0858   BP: (!) 211/58 (!) 227/72 (!) 187/67 (!) 165/67   Pulse:   72    Resp:   18    Temp:   98.1 °F (36.7 °C)    TempSrc:   Oral    SpO2:   100%    Weight:       Height:            Physical Exam:  General: NAD  Eyes: sclera

## 2024-08-19 NOTE — ED NOTES
Hematology/Oncology Outpatient Follow- up Note  Elizabeth Griffith 67 y o  female MRN: @ Encounter: [id number]        Date:  9/3/2021      Assessment / Plan:    Stage IV adenocarcinoma of the lung primary in the left upper lobe of the lung with left scapula involvement diagnosed on 08/2016 PDL expression more than 50%, negative for EGFR mutation, ALK  rearrangement, Ros1 mutation     Treated initially with Pembrolizumab complicated with pneumonitis and later on nivolumab with prednisone 10 mg p o  Daily with excellent response      2  Disease progression in August 2018 in the left scapula with pain no new lesions by PET scan   Status post radiation therapy to the left scapula and treated with Alimta 500 milligram/meter squared, carboplatin AUC 5     After 3 cycles,  CT scan in January 2019 showed stable disease, and she was placed on maintenance Alimta 500 milligram/meter squared every 3 weeks         Alimta dose was reduced to 400 milligram/meter IV due to elevated Creatinine and  then Pemetrexed dose was reduced to 300 milligram/meter squared every 4 weeks  Cycle #38  Alimta  was 6/22/21          3  CT scan in June 2021 of the chest showed right lower lobe lung nodule  increasing in size it might be inflammation versus progressive of disease  PET scan in July 2021 showed enlarging right lower lobe lung mass, increased activity of the left upper lobe consistent disease,    Nivolumab 240 mg flat dose every 3 weeks, pemetrexed 250 milligram/meter squared, carboplatin AUC 5 every 3 weeks Initiated 8/2/2021    4  Likely treatment related infusion reaction  1st cycle, patient noticed a rash along her hairline  With the 2nd she had a rash on her hands, face and burning  She did take Benadryl at home with improvement  No shortness of breath, scratchy throat, chest tightness  Will add benadryl premedication and increase dexamethasone dose as premedications     She states she will have a  to and from Hospitalist at bedside   the infusion appointment  F/U CT chest requested after this third cycle  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%  (MAP2K1 mutations are mutually exclusive with BRAF mutations)  There are FDA approved therapies indicated for other disease states such as binimetinib, cobimetinib, selumetinib, trametinib  Given the very small (0 1%)  DNA amplification, use of these medications off label are likely to offer minimal benefit  4   CKD  She has established care with nephrology  She is on tighter BP management  5   Atrial fibrillation  On eliquis  Continue  6   Chemotherapy induced nausea  Tried Zofran  She states that this caused heart palpitations  Will try Compazine  7   Depression  Finds benefit with Effexor  HPI:  Idania Knight was admitted to the hospital with arrhythmia and was found to have right lower lobe infiltrate in August 2016   She wasreated with antibiotics however repeat chest x-ray showed persistent right lower lobe infiltrate  Subsequently the patient had a CT scan of the chest which showed a right perihilar mass, subcarinal lymphadenopathy, lytic lesion of the right costovertebral junction at T10 level   PET scan October 2016 showed a right perihilar mass measuring 3 5 cm with SUV of 8 9, subcarinal lymph nodes measuring 3 4 x 2 2 cm with SUV of 9 2  Nodule in the left upper lobe lung measured 2 x 1 1 cm   There was a lytic lesion involving the right 10th costovertebral junction with SUV of 14  4      Biopsy showed non-small cell carcinoma with features suggesting of adenocarcinoma positive for CK 7, CK 19, CA-19-9, ANEUDY-3, partially positive for P40, p63, negative for TTF-1   She had a history of uterine cancer in 2000 status post hysterectomy and did not require radiation or chemotherapy   She is status post bilateral oophorectomy, right knee replacement,   tonsillectomy       She used to smoke for 35 years 1 pack per day however quit smoking 21 years ago   She used hormonal replacement therapy for 3 years  Tristan Chapin has a family history significant for skin cancer in her father and coronary artery disease in mother  Tristan Chapin has 2 healthy children      Treated initially with Pembrolizumab December 2016, Finished in May 2017 secondary to grade 3 pneumonitis  Initiated on prednisone     Progression: Nivolumab 240 mg flat dose every 2 weeks along with prednisone 10 mg p o  Daily initiated April 2018- October 2018 with excellent response      Liquid biopsy showed K-MADHURI mutation G12C, no evidence of MSI high        Disease progression in August 2018 in the left scapula with pain no new lesions by PET scan   Status post radiation therapy to the left scapula and treated with Alimta 500 milligram/meter squared, carboplatin AUC 5   After 3 cycles,  CT scan in January 2019 showed stable disease  Carbo discontinued 3/2019    Maintenance Alimta 500 milligram/meter squared every 3 weeks initiated 3/2019        Cr 2/20 was 1 5   Plan was to dose reduce Alimta to 400mg/m2; however cr 2 16 2/24/20 and Alimta was held       3/4/20:  renal u/s  Minimal fullness of the left renal collecting system without matthieu hydronephrosis     Chronic right kidney lower pole cortical scar, with adjacent parenchymal calcification measuring 5 mm       She was on prednisone 5 mg p o  daily because of previous history of pneumonitis  CT scan chest 1/3/2020 showed no evidence of infiltration in the lung parenchyma, prednisone was reduced every other day for 1 month and then discontinued        CT scan 4/2020 showed stable disease in the left upper lobe of the lung     CT scan on 07/2020 showed stable disease in the left upper lobe of the lung, pancreatic cyst 2 3 cm, stable from the previous CT scan however bigger from last year CT scan       Chronic LLE edema since fall she had 2/2020    Venous doppler negative for clot      CT scan in February 2021 showed stable left upper lobe lung nodule however with progressive chronic kidney disease we decided to hold pemetrexed, status post SBRT to the left upper lobe lung nodule     CT scan of the chest in June 2021 showed increase in the size of the right lower lobe lung mass may be progression of disease versus inflammation/ infection     PET scan on 07/14/2021 showed large consolidation in the left lobe might be metastatic disease versus inflammation, uptake in enlarging right lower lobe lung lesion suspicious for malignancy, no evidence of disease in the neck abdomen or pelvis, mild disease in the left scapula  Nivolumab 240 mg flat dose every 3 weeks, pemetrexed 250 milligram/meter squared, carboplatin AUC 5 every 3 weeks Initiated 8/2/2021      Interval History:    8/23/21 Evaluated patient in infusion center  Erythematous rash to forehead, circumferentially around neck R > L, and on right hand near IV site  Rash blanches and is not raised, well circumscribed borders  Presumed secondary to Opdivo vs alimta  She did receive her 2nd cycle of treatment  Test Results:        Labs:   Lab Results   Component Value Date    HGB 10 1 (L) 08/20/2021    HCT 32 2 (L) 08/20/2021    MCV 89 08/20/2021     08/20/2021    WBC 3 64 (L) 08/20/2021    NRBC 0 08/20/2021     Lab Results   Component Value Date     11/23/2015    K 4 2 08/20/2021     08/20/2021    CO2 24 08/20/2021    ANIONGAP 9 11/23/2015    BUN 22 08/20/2021    CREATININE 1 53 (H) 08/20/2021    GLUCOSE 149 (H) 11/23/2015    GLUF 156 (H) 08/20/2021    CALCIUM 9 5 08/20/2021    CORRECTEDCA 10 6 (H) 08/20/2021    AST 18 08/20/2021    ALT 18 08/20/2021    ALKPHOS 91 08/20/2021    PROT 6 8 11/23/2015    BILITOT 0 65 11/23/2015    EGFR 34 08/20/2021       Imaging: No results found  ROS:  As mentioned in HPI & Interval History otherwise 14 point ROS negative  Allergies:    Allergies   Allergen Reactions    Amoxicillin Rash and Hives    Cardizem [Diltiazem] Rash     Rash      Statins Myalgia     Severe muscle aching  Terrible pains     Current Medications: Reviewed  PMH/FH/SH:  Reviewed      Physical Exam:    There is no height or weight on file to calculate BSA  Ht Readings from Last 3 Encounters:   08/23/21 5' 4" (1 626 m)   08/18/21 5' 4" (1 626 m)   08/02/21 5' 4" (1 626 m)        Wt Readings from Last 3 Encounters:   08/23/21 88 2 kg (194 lb 8 oz)   08/18/21 89 4 kg (197 lb)   08/02/21 91 4 kg (201 lb 8 oz)        Temp Readings from Last 3 Encounters:   08/23/21 (!) 96 4 °F (35 8 °C) (Temporal)   08/18/21 98 °F (36 7 °C) (Temporal)   08/02/21 (!) 97 1 °F (36 2 °C) (Temporal)        BP Readings from Last 3 Encounters:   08/23/21 120/64   08/18/21 128/58   08/02/21 118/58           Physical Exam  Vitals reviewed  Constitutional:       General: She is not in acute distress  Appearance: She is well-developed  She is not diaphoretic  HENT:      Head: Normocephalic and atraumatic  Eyes:      Conjunctiva/sclera: Conjunctivae normal    Neck:      Trachea: No tracheal deviation  Cardiovascular:      Rate and Rhythm: Normal rate and regular rhythm  Heart sounds: No murmur heard  No friction rub  No gallop  Pulmonary:      Effort: Pulmonary effort is normal  No respiratory distress  Breath sounds: Normal breath sounds  No wheezing or rales  Chest:      Chest wall: No tenderness  Abdominal:      General: There is no distension  Palpations: Abdomen is soft  Tenderness: There is no abdominal tenderness  Musculoskeletal:      Cervical back: Normal range of motion and neck supple  Lymphadenopathy:      Cervical: No cervical adenopathy  Skin:     General: Skin is warm and dry  Coloration: Skin is not pale  Findings: No erythema  Neurological:      Mental Status: She is alert and oriented to person, place, and time  Psychiatric:         Behavior: Behavior normal          Thought Content:  Thought content normal          Judgment: Judgment normal  ECO      Emergency Contacts:    Extended Emergency Contact Information  Primary Emergency Contact: KemarPatel  Address: [place] [address]            [place], [place] [address] [place] [employer] Phone: [phone]  Relation: Brother  Secondary Emergency Contact: Liz Booth  Address: [facility] [facility] [employer] Phone: [phone]  Relation: Daughter

## 2024-08-19 NOTE — PROGRESS NOTES
Hospitalist Progress Note               Daily Progress Note: 8/19/2024      Hospital Day: 2     Chief complaint:   Chief Complaint   Patient presents with    Cough    Diarrhea    Fatigue        Subjective:   Hospital course to date:    64-year-old female with diabetes, CKD, stroke, hypertension and hyperlipidemia presented the ED with cough, fatigue and diarrhea.  Has been sick for about 1 week.  Patient unable to keep her medications down.    In the ED blood pressure was 145/86.  Labs showed MANUEL with a creatinine of 4.2, Baseline around 2.3.    She was admitted, started on IV fluids  --------  Patient is seen today for follow-up.  Blood pressure was up this morning at 220 systolic.  She was given hydralazine 20 mg IV and it came down to 160 systolic        Medications reviewed  Current Facility-Administered Medications   Medication Dose Route Frequency    insulin lispro (HUMALOG,ADMELOG) injection vial 0-4 Units  0-4 Units SubCUTAneous TID WC    insulin lispro (HUMALOG,ADMELOG) injection vial 0-4 Units  0-4 Units SubCUTAneous Nightly    sodium chloride flush 0.9 % injection 5-40 mL  5-40 mL IntraVENous 2 times per day    sodium chloride flush 0.9 % injection 5-40 mL  5-40 mL IntraVENous PRN    0.9 % sodium chloride infusion   IntraVENous PRN    ondansetron (ZOFRAN-ODT) disintegrating tablet 4 mg  4 mg Oral Q8H PRN    Or    ondansetron (ZOFRAN) injection 4 mg  4 mg IntraVENous Q6H PRN    polyethylene glycol (GLYCOLAX) packet 17 g  17 g Oral Daily PRN    acetaminophen (TYLENOL) tablet 650 mg  650 mg Oral Q6H PRN    Or    acetaminophen (TYLENOL) suppository 650 mg  650 mg Rectal Q6H PRN    lactated ringers IV soln infusion   IntraVENous Continuous    hydrALAZINE (APRESOLINE) injection 10 mg  10 mg IntraVENous Q4H PRN    amLODIPine (NORVASC) tablet 10 mg  10 mg Oral Daily    atenolol (TENORMIN) tablet 50 mg  50 mg Oral Daily    hydroCHLOROthiazide (HYDRODIURIL) tablet 12.5 mg  12.5 mg Oral Daily    aspirin chewable

## 2024-08-19 NOTE — CARE COORDINATION
08/19/24 1448   Service Assessment   Patient Orientation Alert and Oriented   Cognition Alert   History Provided By Patient   Primary Caregiver Self   Support Systems Family Members   Patient's Healthcare Decision Maker is: Legal Next of Kin   PCP Verified by CM Yes   Last Visit to PCP Within last 3 months   Prior Functional Level Assistance with the following:;Bathing;Mobility   Current Functional Level Assistance with the following:;Mobility;Bathing   Can patient return to prior living arrangement Yes   Ability to make needs known: Fair   Family able to assist with home care needs: Yes   Would you like for me to discuss the discharge plan with any other family members/significant others, and if so, who? Yes   Financial Resources Medicare;Medicaid  ([employer])   Community Resources None   Social/Functional History   Lives With Family   Type of Home Apartment   Home Equipment Rollator;Cane   Receives Help From Family   Ambulation Assistance Needs assistance   Discharge Planning   Type of Residence Apartment   Living Arrangements Family Members       Patient confirmed demographics.  Resides w/her sister in an apartment.  Last seen at listed PCP approx three mos ago.  Had a scheduled PCP visit last week and cancelled d/t not feeling well.  Utilizes [facility] ([facility]) to p/u all medications.  Self reports she requires some assistance w/ADL's & IADL's occasionally.  No home O2.  Home DME consists of a rolling walker and cane.  Prior HH & SNF in the past.  No prior IRF.  Patient voiced will need a ride home; doesn't have the key \"it will be someone there.\"      Advance Care Planning     General Advance Care Planning (ACP) Conversation    Date of Conversation: 8/19/2024  Conducted with: Patient with Decision Making Capacity  Other persons present: None    Healthcare Decision Maker:   Primary Decision Maker: Narda Pace - Brother/Sister - [phone]       Content/Action Overview:  Reviewed DNR/DNI and patient

## 2024-08-19 NOTE — PROGRESS NOTES
4 Eyes Skin Assessment     NAME:  Yamilet Smith  YOB: 1960  MEDICAL RECORD NUMBER:  [id number]    The patient is being assessed for  Admission    I agree that at least one RN has performed a thorough Head to Toe Skin Assessment on the patient. ALL assessment sites listed below have been assessed.      Areas assessed by both nurses:    Head, Face, Ears, Shoulders, Back, Chest, Arms, Elbows, Hands, Sacrum. Buttock, Coccyx, Ischium, Legs. Feet and Heels, and Under Medical Devices         Does the Patient have a Wound? Yes wound(s) were present on assessment. LDA wound assessment was Initiated and completed by RN       Sesar Prevention initiated by RN: Yes  Wound Care Orders initiated by RN: Yes    Pressure Injury (Stage 3,4, Unstageable, DTI, NWPT, and Complex wounds) if present, place Wound referral order by RN under : No    New Ostomies, if present place, Ostomy referral order under : No     Nurse 1 eSignature: Electronically signed by Sheridan Capone RN on 8/19/24 at 7:25 AM EDT    **SHARE this note so that the co-signing nurse can place an eSignature**    Nurse 2 eSignature: Electronically signed by BRADFORD ROBERTS RN on 8/19/24 at 8:06 AM EDT

## 2024-08-19 NOTE — WOUND CARE
IP WOUND CONSULT    Yamilet Smith  MEDICAL RECORD NUMBER:  [id number]  AGE: 64 y.o.   GENDER: female  : 1960  TODAY'S DATE:  2024  Places; hospital units: 222    GENERAL     [] Follow-up   [x] New Consult          PAST MEDICAL HISTORY    Past Medical History:   Diagnosis Date    Chronic kidney disease     Diabetes (HCC)     Hypertension     Stroke (HCC)         ALLERGIES    No Known Allergies       Orientation: Alert     Continent: incontinent  Urinary Device:     Assessment     Patient resting in bed, she is incontinent with purewick in place. Incontinence care provided with underpad changed.     2 small open areas to left buttocks noted areas are superficial and dry, unclear etiology at this time. May be due to some IAD and friction. Area cleansed with barrier wipe and then a thin layer of zinc paste applied to buttocks and sacral foam dressing applied to sacrum for protection.           Wound 24 Buttocks Left 2 pinhole wounds on left buttock (Active)   Wound Etiology Other 24 1326   Dressing Status New dressing applied 24 1326   Wound Cleansed Other (Comment) 24 1326   Dressing/Treatment Zinc paste;Foam 24 1326   Wound Assessment Menard/red;Superficial 24 1326   Drainage Amount None (dry) 24 1326   Odor None 24 1326   Kenyatta-wound Assessment Blanchable erythema;Fragile;Dry/flaky 24 1326   Number of days: 0        Recommend applying sacral foam dressing to sacrum for protection. Dressing to be changed every 3 days and PRN for soiling, nurse to peel back dressing daily for skin assessment.       Boot are to be applied daily and kept on at all times while in bed for offloading and to prevent pressure injuries.      Use foam body alignment wedge to turn patient q2h at 30 degree angle to offload sacrum to prevent pressure related skin injury.  Do not position directly on greater trochanter.         Please re-consult [facility] for any changes in skin

## 2024-08-19 NOTE — H&P
V2.0  History and Physical      Name:  Yamilet Smith /Age/Sex: 1960  (64 y.o. female)   MRN & CSN:  [id number] & [id number] Encounter Date/Time: 2024 2:35 AM EDT   Location:  [place] [address] PCP: Alba Shields MD       Hospital Day: 2    Assessment and Plan:   Yamilet Smith is a 64 y.o. female with pmh of diabetes mellitus, hypertension, hyperlipidemia who presents with 1 week of abdominal pain, cough, diarrhea, and decreased p.o. intake.     Hospital Problems             Last Modified POA    * (Principal) Acute on chronic renal insufficiency 2024 Yes    Abdominal pain 2024 Yes    Type 2 diabetes mellitus, with long-term current use of insulin (HCC) (Chronic) 2024 Yes         Principal Problem:    Acute on chronic renal insufficiency  Plan:   In the setting of diarrhea, abdominal pain and decreased p.o. intake suggestive of mild dehydration affecting her renal function  Rehydrate gradually with lactated Ringer's at 75 mL/h  Daily BMP    Active Problems:    Abdominal pain  Plan:   No obvious etiology.  If symptoms continue consider consulting general surgery.      Type 2 diabetes mellitus, with long-term current use of insulin (HCC)  Plan:   While in the hospital will order sliding scale    She should have her home blood pressure medications and other usual medications restarted but currently awaiting nursing staff to update medicine list       Admit to In-Patient    Disposition:   Current Living situation: Home  Expected Disposition: Home  Estimated D/C: 3 days    Diet Diabetic   DVT Prophylaxis [] Lovenox, []  Heparin, [] SCDs, [x] Ambulation,  [] Eliquis, [] Xarelto, [] Coumadin   Code Status Full code   Surrogate Decision Maker/ POA Unknown     Personally reviewed Lab Studies and Imaging     Time spent with patient and/or family, reviewing records, and developing plan of care 30 minutes    History from:     patient, Quality of history:  good historian    History of Present Illness:

## 2024-08-19 NOTE — ED NOTES
ED TO INPATIENT SBAR HANDOFF    Patient Name: Yamilet Smith   Preferred Name: Yamilet  : 1960  64 y.o.   Family/Caregiver Present: no   Code Status Order: No Order  PO Status: NPO:No  Telemetry Order:   C-SSRS: Risk of Suicide: No Risk  Sitter no   Restraints:  Sepsis Risk Score    Situation  Chief Complaint   Patient presents with    Cough    Diarrhea    Fatigue     Brief Description of Patient's Condition: Pt arrived from home with c/o diarrhea and vomiting has not had any episodes while at facility. Being admitted for an MANUEL. Can walk with walker  Mental Status: oriented, alert, and coherent  Arrived from:Home  Imaging:   CT ABDOMEN PELVIS WO CONTRAST Additional Contrast? None   Final Result   No acute intra-abdominal pathology to explain abdominal pain.   Incidental findings as above.      Electronically signed by ADA HURLEY      XR CHEST PORTABLE   Final Result   No acute intrathoracic process is identified.             Electronically signed by KAMILAH PERRY        Abnormal labs:   Abnormal Labs Reviewed   CBC WITH AUTO DIFFERENTIAL - Abnormal; Notable for the following components:       Result Value    Hemoglobin 10.4 (*)     Hematocrit 32.0 (*)     RDW 15.4 (*)     Lymphocytes Absolute 3.6 (*)     All other components within normal limits   COMPREHENSIVE METABOLIC PANEL - Abnormal; Notable for the following components:    Glucose 149 (*)     BUN 64 (*)     Creatinine 4.22 (*)     Est, Glom Filt Rate 11 (*)     AST 8 (*)     ALT 9 (*)     Albumin 3.3 (*)     Globulin 4.4 (*)     Albumin/Globulin Ratio 0.8 (*)     All other components within normal limits       Background  Allergies: No Known Allergies  History:   Past Medical History:   Diagnosis Date    Chronic kidney disease     Diabetes (HCC)     Hypertension     Stroke (HCC)        Assessment  Vitals: MEWS Score: 3  Level of Consciousness: Alert (0)   Vitals:    24 1845 24 1956 24 2130 24 0130   BP: (!) 231/74 (!)

## 2024-08-20 ENCOUNTER — APPOINTMENT (OUTPATIENT)
Facility: HOSPITAL | Age: 64
End: 2024-08-20
Payer: MEDICARE

## 2024-08-20 ENCOUNTER — APPOINTMENT (OUTPATIENT)
Facility: HOSPITAL | Age: 64
End: 2024-08-20
Attending: INTERNAL MEDICINE
Payer: MEDICARE

## 2024-08-20 ENCOUNTER — HOSPITAL ENCOUNTER (INPATIENT)
Facility: HOSPITAL | Age: 64
Discharge: HOME | End: 2024-08-22
Attending: INTERNAL MEDICINE
Payer: MEDICARE

## 2024-08-20 LAB
ALBUMIN SERPL-MCNC: 2.8 G/DL (ref 3.5–5)
ANION GAP SERPL CALC-SCNC: 10 MMOL/L (ref 5–15)
ANION GAP SERPL CALC-SCNC: 10 MMOL/L (ref 5–15)
BASOPHILS # BLD: 0 K/UL (ref 0–0.1)
BASOPHILS NFR BLD: 1 % (ref 0–1)
BUN SERPL-MCNC: 68 MG/DL (ref 6–20)
BUN SERPL-MCNC: 70 MG/DL (ref 6–20)
BUN/CREAT SERPL: 15 (ref 12–20)
BUN/CREAT SERPL: 16 (ref 12–20)
CA-I BLD-MCNC: 8.7 MG/DL (ref 8.5–10.1)
CA-I BLD-MCNC: 8.8 MG/DL (ref 8.5–10.1)
CHLORIDE SERPL-SCNC: 109 MMOL/L (ref 97–108)
CHLORIDE SERPL-SCNC: 109 MMOL/L (ref 97–108)
CO2 SERPL-SCNC: 20 MMOL/L (ref 21–32)
CO2 SERPL-SCNC: 21 MMOL/L (ref 21–32)
CREAT SERPL-MCNC: 4.45 MG/DL (ref 0.55–1.02)
CREAT SERPL-MCNC: 4.49 MG/DL (ref 0.55–1.02)
DIFFERENTIAL METHOD BLD: ABNORMAL
ECHO BSA: 1.95 M2
EOSINOPHIL # BLD: 0.2 K/UL (ref 0–0.4)
EOSINOPHIL NFR BLD: 2 % (ref 0–7)
ERYTHROCYTE [DISTWIDTH] IN BLOOD BY AUTOMATED COUNT: 15.4 % (ref 11.5–14.5)
FERRITIN SERPL-MCNC: 140 NG/ML (ref 26–388)
GLUCOSE BLD STRIP.AUTO-MCNC: 159 MG/DL (ref 65–100)
GLUCOSE SERPL-MCNC: 127 MG/DL (ref 65–100)
GLUCOSE SERPL-MCNC: 131 MG/DL (ref 65–100)
HCT VFR BLD AUTO: 27.9 % (ref 35–47)
HGB BLD-MCNC: 9.1 G/DL (ref 11.5–16)
IMM GRANULOCYTES # BLD AUTO: 0 K/UL (ref 0–0.04)
IMM GRANULOCYTES NFR BLD AUTO: 1 % (ref 0–0.5)
IRON SATN MFR SERPL: 16 % (ref 20–50)
IRON SERPL-MCNC: 25 UG/DL (ref 35–150)
LYMPHOCYTES # BLD: 2.2 K/UL (ref 0.8–3.5)
LYMPHOCYTES NFR BLD: 26 % (ref 12–49)
MAGNESIUM SERPL-MCNC: 2.1 MG/DL (ref 1.6–2.4)
MCH RBC QN AUTO: 26.8 PG (ref 26–34)
MCHC RBC AUTO-ENTMCNC: 32.6 G/DL (ref 30–36.5)
MCV RBC AUTO: 82.3 FL (ref 80–99)
MONOCYTES # BLD: 0.7 K/UL (ref 0–1)
MONOCYTES NFR BLD: 8 % (ref 5–13)
NEUTS SEG # BLD: 5.6 K/UL (ref 1.8–8)
NEUTS SEG NFR BLD: 62 % (ref 32–75)
NRBC # BLD: 0 K/UL (ref 0–0.01)
NRBC BLD-RTO: 0 PER 100 WBC
PERFORMED BY:: ABNORMAL
PHOSPHATE SERPL-MCNC: 3.8 MG/DL (ref 2.6–4.7)
PLATELET # BLD AUTO: 215 K/UL (ref 150–400)
PMV BLD AUTO: 9.1 FL (ref 8.9–12.9)
POTASSIUM SERPL-SCNC: 4 MMOL/L (ref 3.5–5.1)
POTASSIUM SERPL-SCNC: 4 MMOL/L (ref 3.5–5.1)
RBC # BLD AUTO: 3.39 M/UL (ref 3.8–5.2)
SODIUM SERPL-SCNC: 139 MMOL/L (ref 136–145)
SODIUM SERPL-SCNC: 140 MMOL/L (ref 136–145)
TIBC SERPL-MCNC: 152 UG/DL (ref 250–450)
WBC # BLD AUTO: 8.7 K/UL (ref 3.6–11)

## 2024-08-20 PROCEDURE — 80069 RENAL FUNCTION PANEL: CPT

## 2024-08-20 PROCEDURE — 97530 THERAPEUTIC ACTIVITIES: CPT

## 2024-08-20 PROCEDURE — 93971 EXTREMITY STUDY: CPT

## 2024-08-20 PROCEDURE — 83540 ASSAY OF IRON: CPT

## 2024-08-20 PROCEDURE — 93306 TTE W/DOPPLER COMPLETE: CPT

## 2024-08-20 PROCEDURE — 82728 ASSAY OF FERRITIN: CPT

## 2024-08-20 PROCEDURE — 6360000002 HC RX W HCPCS: Performed by: INTERNAL MEDICINE

## 2024-08-20 PROCEDURE — 83735 ASSAY OF MAGNESIUM: CPT

## 2024-08-20 PROCEDURE — 2580000003 HC RX 258: Performed by: INTERNAL MEDICINE

## 2024-08-20 PROCEDURE — 6370000000 HC RX 637 (ALT 250 FOR IP): Performed by: INTERNAL MEDICINE

## 2024-08-20 PROCEDURE — 97161 PT EVAL LOW COMPLEX 20 MIN: CPT

## 2024-08-20 PROCEDURE — 36415 COLL VENOUS BLD VENIPUNCTURE: CPT

## 2024-08-20 PROCEDURE — 85025 COMPLETE CBC W/AUTO DIFF WBC: CPT

## 2024-08-20 PROCEDURE — 6370000000 HC RX 637 (ALT 250 FOR IP): Performed by: PSYCHIATRY & NEUROLOGY

## 2024-08-20 PROCEDURE — 80048 BASIC METABOLIC PNL TOTAL CA: CPT

## 2024-08-20 PROCEDURE — 70450 CT HEAD/BRAIN W/O DYE: CPT

## 2024-08-20 PROCEDURE — 1100000000 HC RM PRIVATE

## 2024-08-20 PROCEDURE — 99222 1ST HOSP IP/OBS MODERATE 55: CPT | Performed by: PSYCHIATRY & NEUROLOGY

## 2024-08-20 PROCEDURE — 82962 GLUCOSE BLOOD TEST: CPT

## 2024-08-20 RX ORDER — ASPIRIN 81 MG/1
81 TABLET, CHEWABLE ORAL DAILY
Status: DISCONTINUED | OUTPATIENT
Start: 2024-08-20 | End: 2024-08-30 | Stop reason: HOSPADM

## 2024-08-20 RX ORDER — HYDRALAZINE HYDROCHLORIDE 50 MG/1
50 TABLET, FILM COATED ORAL EVERY 8 HOURS SCHEDULED
Status: DISCONTINUED | OUTPATIENT
Start: 2024-08-20 | End: 2024-08-21

## 2024-08-20 RX ORDER — POLYETHYLENE GLYCOL 3350 17 G/17G
17 POWDER, FOR SOLUTION ORAL DAILY PRN
Status: DISCONTINUED | OUTPATIENT
Start: 2024-08-20 | End: 2024-08-30 | Stop reason: HOSPADM

## 2024-08-20 RX ORDER — ATENOLOL 25 MG/1
50 TABLET ORAL 2 TIMES DAILY
Status: DISCONTINUED | OUTPATIENT
Start: 2024-08-20 | End: 2024-08-20

## 2024-08-20 RX ORDER — SODIUM CHLORIDE 0.9 % (FLUSH) 0.9 %
5-40 SYRINGE (ML) INJECTION EVERY 12 HOURS SCHEDULED
Status: DISCONTINUED | OUTPATIENT
Start: 2024-08-20 | End: 2024-08-23 | Stop reason: SDUPTHER

## 2024-08-20 RX ORDER — SODIUM CHLORIDE 0.9 % (FLUSH) 0.9 %
5-40 SYRINGE (ML) INJECTION PRN
Status: DISCONTINUED | OUTPATIENT
Start: 2024-08-20 | End: 2024-08-30 | Stop reason: HOSPADM

## 2024-08-20 RX ORDER — SODIUM CHLORIDE 9 MG/ML
INJECTION, SOLUTION INTRAVENOUS PRN
Status: DISCONTINUED | OUTPATIENT
Start: 2024-08-20 | End: 2024-08-30 | Stop reason: HOSPADM

## 2024-08-20 RX ORDER — ASPIRIN 300 MG/1
300 SUPPOSITORY RECTAL DAILY
Status: DISCONTINUED | OUTPATIENT
Start: 2024-08-20 | End: 2024-08-30 | Stop reason: HOSPADM

## 2024-08-20 RX ORDER — ROSUVASTATIN CALCIUM 20 MG/1
10 TABLET, COATED ORAL NIGHTLY
Status: DISCONTINUED | OUTPATIENT
Start: 2024-08-20 | End: 2024-08-20

## 2024-08-20 RX ORDER — ROSUVASTATIN CALCIUM 20 MG/1
20 TABLET, COATED ORAL NIGHTLY
Status: DISCONTINUED | OUTPATIENT
Start: 2024-08-20 | End: 2024-08-30 | Stop reason: HOSPADM

## 2024-08-20 RX ORDER — ATENOLOL 25 MG/1
50 TABLET ORAL DAILY
Status: DISCONTINUED | OUTPATIENT
Start: 2024-08-21 | End: 2024-08-30 | Stop reason: HOSPADM

## 2024-08-20 RX ADMIN — AMLODIPINE BESYLATE 10 MG: 5 TABLET ORAL at 09:17

## 2024-08-20 RX ADMIN — SODIUM CHLORIDE, POTASSIUM CHLORIDE, SODIUM LACTATE AND CALCIUM CHLORIDE: 600; 310; 30; 20 INJECTION, SOLUTION INTRAVENOUS at 00:12

## 2024-08-20 RX ADMIN — ROSUVASTATIN CALCIUM 20 MG: 20 TABLET, COATED ORAL at 21:15

## 2024-08-20 RX ADMIN — OXYCODONE 5 MG: 5 TABLET ORAL at 09:54

## 2024-08-20 RX ADMIN — HYDRALAZINE HYDROCHLORIDE 50 MG: 50 TABLET ORAL at 11:38

## 2024-08-20 RX ADMIN — ASPIRIN 81 MG 81 MG: 81 TABLET ORAL at 09:17

## 2024-08-20 RX ADMIN — SODIUM CHLORIDE 125 MG: 9 INJECTION, SOLUTION INTRAVENOUS at 17:09

## 2024-08-20 RX ADMIN — OXYCODONE 5 MG: 5 TABLET ORAL at 04:42

## 2024-08-20 RX ADMIN — ATENOLOL 50 MG: 25 TABLET ORAL at 09:17

## 2024-08-20 RX ADMIN — OXYCODONE 5 MG: 5 TABLET ORAL at 18:42

## 2024-08-20 ASSESSMENT — PAIN DESCRIPTION - ORIENTATION
ORIENTATION: LEFT
ORIENTATION: LEFT;LOWER
ORIENTATION: RIGHT

## 2024-08-20 ASSESSMENT — PAIN SCALES - GENERAL
PAINLEVEL_OUTOF10: 9
PAINLEVEL_OUTOF10: 9
PAINLEVEL_OUTOF10: 0
PAINLEVEL_OUTOF10: 4
PAINLEVEL_OUTOF10: 7
PAINLEVEL_OUTOF10: 8

## 2024-08-20 ASSESSMENT — PAIN - FUNCTIONAL ASSESSMENT: PAIN_FUNCTIONAL_ASSESSMENT: ACTIVITIES ARE NOT PREVENTED

## 2024-08-20 ASSESSMENT — PAIN DESCRIPTION - DESCRIPTORS: DESCRIPTORS: ACHING

## 2024-08-20 ASSESSMENT — PAIN DESCRIPTION - LOCATION
LOCATION: LEG
LOCATION: FOOT;LEG

## 2024-08-20 ASSESSMENT — PAIN SCALES - WONG BAKER: WONGBAKER_NUMERICALRESPONSE: NO HURT

## 2024-08-20 NOTE — PLAN OF CARE
Problem: Discharge Planning  Goal: Discharge to home or other facility with appropriate resources  8/20/2024 0032 by Megan Scruggs RN  Outcome: Progressing  8/19/2024 1053 by Patt Hancock RN  Outcome: Not Progressing     Problem: Pain  Goal: Verbalizes/displays adequate comfort level or baseline comfort level  8/20/2024 0032 by Megan Scruggs RN  Outcome: Progressing  8/19/2024 1053 by Patt Hancock RN  Outcome: Not Progressing     Problem: Safety - Adult  Goal: Free from fall injury  8/20/2024 0032 by Megan Scruggs RN  Outcome: Progressing  8/19/2024 1053 by Patt Hancock RN  Outcome: Not Progressing     Problem: Skin/Tissue Integrity  Goal: Absence of new skin breakdown  Description: 1.  Monitor for areas of redness and/or skin breakdown  2.  Assess vascular access sites hourly  3.  Every 4-6 hours minimum:  Change oxygen saturation probe site  4.  Every 4-6 hours:  If on nasal continuous positive airway pressure, respiratory therapy assess nares and determine need for appliance change or resting period.  Outcome: Progressing     Problem: Chronic Conditions and Co-morbidities  Goal: Patient's chronic conditions and co-morbidity symptoms are monitored and maintained or improved  Outcome: Progressing     Problem: Discharge Planning  Goal: Discharge to home or other facility with appropriate resources  8/20/2024 0032 by Megan Scruggs RN  Outcome: Progressing  8/19/2024 1053 by Patt Hancock RN  Outcome: Not Progressing     Problem: Pain  Goal: Verbalizes/displays adequate comfort level or baseline comfort level  8/20/2024 0032 by Megan Scruggs RN  Outcome: Progressing  8/19/2024 1053 by Patt Hancock RN  Outcome: Not Progressing     Problem: Safety - Adult  Goal: Free from fall injury  8/20/2024 0032 by Megan Scruggs RN  Outcome: Progressing  8/19/2024 1053 by Patt Hancock RN  Outcome: Not Progressing

## 2024-08-20 NOTE — CONSULTS
[facility] NEUROLOGY CONSULTATION          Chief Complaint/Admission Diagnosis: Acute on chronic renal insufficiency [N28.9, N18.9]  MANUEL (acute kidney injury) (HCC) [N17.9]  Uncontrolled hypertension [I10]  Diarrhea, unspecified type [R19.7]     I have been asked to see this 64 y.o. female in neurological consultation by Dr. Sibley, Carlo MAK MD  to render advice and opinion regarding left leg weakness     ?     Impression/Recommendations:   64-year-old female with diabetes, CKD, stroke, hypertension and hyperlipidemia presented the ED with cough, fatigue and diarrhea.  CT of the head showed subacute/acute right cerebellar infarct. Exam is non focal. Left leg pain noticed.    Good DM control recommended A1c is 7.4  Aspirin 325 mg plus Plavix 300 mg in ED  DAPT for 30 days (Aspirin 325 mg qd plus Plavix 75 mg qd) then switch to Plavix 75 mg qd  Increased rosuvastatin to 20 mg qd (ldl 132)  PT/OT recommended  NPO until see by bedside swallowing or SLP evaluation.  MRI brain stroke protocol pending  Echocardiogram complete with bubble study.  Permissive hypertension. Don't treat HTN until SBP more than 180.  Neuro floor admission with telemetry             Archana Duran MD  Teleneurologist    HPI:   History was obtained from a review of the electronic record and from the patient and family.??   64-year-old female with diabetes, CKD, stroke, hypertension and hyperlipidemia presented the ED with cough, fatigue and diarrhea.  Has been sick for about 1 week.  Patient unable to keep her medications down.     In the ED blood pressure was 145/86.  Labs showed MANUEL with a creatinine of 4.2, Baseline around 2.3.     She was admitted, started on IV fluids  ?     Review of Symptoms:     A ten system review of constitutional, cardiovascular, respiratory, musculoskeletal, endocrine, skin, HEENT, genitourinary, neurological, and psychiatric systems was obtained and is negative except as noted above in HPI.  assistance of a trained virtual health liaison working at the originating site.? The consultation used real time licensed and encrypted telehealth equipment which allowed a live video connection between my location and the patient's location.? Aspects of the evaluation that could not be adequately evaluated by virtual assessment were shared with both the patient and the consulting provider.?          A total of 45 minutes of time was spent in direct care and coordination of care with the patient.

## 2024-08-20 NOTE — PROGRESS NOTES
Pharmacy Note - Renal dose adjustment made per P/T protocol    Original order:  Crestor 40 mg HS    Estimated Creatinine Clearance: 14 mL/min (A) (based on SCr of 4.45 mg/dL (H)).    Recent Labs     08/19/24  1129 08/20/24  0529 08/20/24  0530   BUN 70* 70* 68*   CREATININE 4.76* 4.49* 4.45*       Renally adjusted order:  Crestor 10 mg HS    Please call inpatient pharmacy with any questions.    Thank you,  JOHNNY RAMON [facility] MS  8/20/2024 3:19 PM

## 2024-08-20 NOTE — PROGRESS NOTES
Hospitalist Progress Note               Daily Progress Note: 8/20/2024      Hospital Day: 3     Chief complaint:   Chief Complaint   Patient presents with    Cough    Diarrhea    Fatigue        Subjective:   Hospital course to date:    64-year-old female with diabetes, CKD, stroke, hypertension and hyperlipidemia presented the ED with cough, fatigue and diarrhea.  Has been sick for about 1 week.  Patient unable to keep her medications down.    In the ED blood pressure was 145/86.  Labs showed MANUEL with a creatinine of 4.2, Baseline around 2.3.    She was admitted, started on IV fluids  --------  Patient is seen today for follow-up.   Her blood pressure has been extremely labile, was as low as 140 systolic yesterday then this morning was up to 210 systolic.    Patient has been seen by nephrology.  It appears that her current renal function is her new baseline with a creatinine in the 4-4.4 range    She is complaining of severe left leg pain below the knee, having difficulty moving it.  To me, she denies any weakness, just pain        Medications reviewed  Current Facility-Administered Medications   Medication Dose Route Frequency    [START ON 8/21/2024] atenolol (TENORMIN) tablet 50 mg  50 mg Oral Daily    hydrALAZINE (APRESOLINE) tablet 50 mg  50 mg Oral 3 times per day    insulin lispro (HUMALOG,ADMELOG) injection vial 0-4 Units  0-4 Units SubCUTAneous TID WC    insulin lispro (HUMALOG,ADMELOG) injection vial 0-4 Units  0-4 Units SubCUTAneous Nightly    sodium chloride flush 0.9 % injection 5-40 mL  5-40 mL IntraVENous 2 times per day    sodium chloride flush 0.9 % injection 5-40 mL  5-40 mL IntraVENous PRN    0.9 % sodium chloride infusion   IntraVENous PRN    ondansetron (ZOFRAN-ODT) disintegrating tablet 4 mg  4 mg Oral Q8H PRN    Or    ondansetron (ZOFRAN) injection 4 mg  4 mg IntraVENous Q6H PRN    polyethylene glycol (GLYCOLAX) packet 17 g  17 g Oral Daily PRN    acetaminophen (TYLENOL) tablet 650 mg  650

## 2024-08-20 NOTE — PROGRESS NOTES
1432-CT called to notify nurse right inferior cerebral infarct no blood  1436-Nurse notified Dr. Sibley of results of CT, continue aspirin, echo and carotid duplex ordered, will continue to monitor patient, patient vitals.

## 2024-08-20 NOTE — PROGRESS NOTES
Nephrology follow up          Patient: Yamilet Smith MRN: [id number]  SSN: [id number]    YOB: 1960  Age: 64 y.o.  Sex: female      Subjective:   The patient is seen at the bedside.  Blood pressures are elevated  On room air  No lower extremity swelling  Creatinine 4.55    Past Medical History:   Diagnosis Date    Chronic kidney disease     Diabetes (HCC)     Hypertension     Stroke (HCC)      Past Surgical History:   Procedure Laterality Date    HYSTERECTOMY (CERVIX STATUS UNKNOWN)      age 24      Family History   Problem Relation Age of Onset    Cancer Sister     Diabetes Sister     Heart Disease Mother     Diabetes Mother      Social History     Tobacco Use    Smoking status: Every Day     Current packs/day: 0.50     Types: Cigarettes    Smokeless tobacco: Never   Substance Use Topics    Alcohol use: Never      Current Facility-Administered Medications   Medication Dose Route Frequency Provider Last Rate Last Admin    [START ON 8/21/2024] atenolol (TENORMIN) tablet 50 mg  50 mg Oral Daily Everton Whittaker MD        hydrALAZINE (APRESOLINE) tablet 50 mg  50 mg Oral 3 times per day Everton Whittaker MD   50 mg at 08/20/24 1138    sodium chloride flush 0.9 % injection 5-40 mL  5-40 mL IntraVENous 2 times per day Carlo Sibley MD        sodium chloride flush 0.9 % injection 5-40 mL  5-40 mL IntraVENous PRN Carlo Sibley MD        0.9 % sodium chloride infusion   IntraVENous PRN Carlo Sibley MD        polyethylene glycol (GLYCOLAX) packet 17 g  17 g Oral Daily PRN Carlo Sibley MD        aspirin chewable tablet 81 mg  81 mg Oral Daily Carlo Sibley MD        Or    aspirin suppository 300 mg  300 mg Rectal Daily Carlo Sibley MD        rosuvastatin (CRESTOR) tablet 20 mg  20 mg Oral Nightly Archana Duran MD        insulin lispro (HUMALOG,ADMELOG) injection vial 0-4 Units  0-4 Units SubCUTAneous TID  Cortez Casas MD        insulin lispro

## 2024-08-20 NOTE — PLAN OF CARE
PHYSICAL THERAPY EVALUATION  Patient: Yamilet Smith (64 y.o. female)  Date: 8/20/2024  Primary Diagnosis: Acute on chronic renal insufficiency [N28.9, N18.9]  MANUEL (acute kidney injury) (HCC) [N17.9]  Uncontrolled hypertension [I10]  Diarrhea, unspecified type [R19.7]       Precautions: Fall Risk                      Recommendations for nursing mobility: Frequent repositioning to prevent skin breakdown    In place during session: Peripheral IV, External Catheter, and EKG/telemetry     ASSESSMENT  Pt is a 64 y.o. female admitted on 8/18/2024 for abdominal pain, diarrhea, cough; pt currently being treated for dehydration, acute on chronic renal insufficiency . Pt semi-supine upon PT arrival, agreeable to evaluation. Pt A&O x 4.  Pt reports 3+ falls in the past 3 months.    Based on the objective data described below, the patient currently presents with impaired functional mobility, decreased ROM, impaired strength, decreased activity tolerance, poor safety awareness, decreased coordination, and impaired balance. (See below for objective details and assist levels).     Overall pt tolerated session fair today with 8/10 pain in L LE. Pt required min-mod A for bed mobility and max A for transfers. Pt requiring assist to help move LLE off the bed.  Once sitting EOB, pt denies any dizziness or lightheadedness.  Pt's LLE noted to be very weak with minimal movement, pt with weaker  strength in L hand with L hand slightly swollen. Pt also noted to have impaired coordination on the L side.  Pt attempted sit to stand x 3 reps and by the third rep, pt able to clear her buttocks off the bed with max A, but still unable to straighten her knees to stand erect. Pt stated her LE's feel very weak and that her L side is weaker than at her baseline.  Pt is a good participant and motivated to participate with PT.  Pt states she never had rehab after her stroke less than a year ago.  Pt states her L side was affected, but she was

## 2024-08-21 ENCOUNTER — APPOINTMENT (OUTPATIENT)
Facility: HOSPITAL | Age: 64
End: 2024-08-21
Payer: MEDICARE

## 2024-08-21 ENCOUNTER — APPOINTMENT (OUTPATIENT)
Facility: HOSPITAL | Age: 64
End: 2024-08-21
Attending: INTERNAL MEDICINE
Payer: MEDICARE

## 2024-08-21 LAB
ANION GAP SERPL CALC-SCNC: 9 MMOL/L (ref 5–15)
BACTERIA SPEC CULT: ABNORMAL
BASOPHILS # BLD: 0 K/UL (ref 0–0.1)
BASOPHILS NFR BLD: 0 % (ref 0–1)
BUN SERPL-MCNC: 72 MG/DL (ref 6–20)
BUN/CREAT SERPL: 17 (ref 12–20)
CA-I BLD-MCNC: 8.5 MG/DL (ref 8.5–10.1)
CHLORIDE SERPL-SCNC: 108 MMOL/L (ref 97–108)
CHOLEST SERPL-MCNC: 128 MG/DL
CO2 SERPL-SCNC: 21 MMOL/L (ref 21–32)
COLONY COUNT, CNT: ABNORMAL
CREAT SERPL-MCNC: 4.22 MG/DL (ref 0.55–1.02)
DIFFERENTIAL METHOD BLD: ABNORMAL
ECHO AO ASC DIAM: 2.4 CM
ECHO AO ASCENDING AORTA INDEX: 1.25 CM/M2
ECHO AO ROOT DIAM: 2.5 CM
ECHO AO ROOT INDEX: 1.3 CM/M2
ECHO AV AREA PEAK VELOCITY: 2.4 CM2
ECHO AV AREA/BSA PEAK VELOCITY: 1.3 CM2/M2
ECHO AV PEAK GRADIENT: 5 MMHG
ECHO AV PEAK VELOCITY: 1.1 M/S
ECHO AV VELOCITY RATIO: 0.82
ECHO BSA: 1.95 M2
ECHO IVC PROX: 1.2 CM
ECHO LA AREA 2C: 11.7 CM2
ECHO LA AREA 4C: 14.7 CM2
ECHO LA DIAMETER INDEX: 1.41 CM/M2
ECHO LA DIAMETER: 2.7 CM
ECHO LA MAJOR AXIS: 5.1 CM
ECHO LA MINOR AXIS: 4.5 CM
ECHO LA TO AORTIC ROOT RATIO: 1.08
ECHO LA VOL BP: 30 ML (ref 22–52)
ECHO LA VOL MOD A2C: 24 ML (ref 22–52)
ECHO LA VOL MOD A4C: 32 ML (ref 22–52)
ECHO LA VOL/BSA BIPLANE: 16 ML/M2 (ref 16–34)
ECHO LA VOLUME INDEX MOD A2C: 13 ML/M2 (ref 16–34)
ECHO LA VOLUME INDEX MOD A4C: 17 ML/M2 (ref 16–34)
ECHO LV E' LATERAL VELOCITY: 7 CM/S
ECHO LV E' SEPTAL VELOCITY: 5 CM/S
ECHO LV EF PHYSICIAN: 60 %
ECHO LV FRACTIONAL SHORTENING: 34 % (ref 28–44)
ECHO LV INTERNAL DIMENSION DIASTOLE INDEX: 1.67 CM/M2
ECHO LV INTERNAL DIMENSION DIASTOLIC: 3.2 CM (ref 3.9–5.3)
ECHO LV INTERNAL DIMENSION SYSTOLIC INDEX: 1.09 CM/M2
ECHO LV INTERNAL DIMENSION SYSTOLIC: 2.1 CM
ECHO LV IVSD: 1.4 CM (ref 0.6–0.9)
ECHO LV MASS 2D: 119.4 G (ref 67–162)
ECHO LV MASS INDEX 2D: 62.2 G/M2 (ref 43–95)
ECHO LV POSTERIOR WALL DIASTOLIC: 1 CM (ref 0.6–0.9)
ECHO LV RELATIVE WALL THICKNESS RATIO: 0.63
ECHO LVOT AREA: 2.8 CM2
ECHO LVOT DIAM: 1.9 CM
ECHO LVOT PEAK GRADIENT: 3 MMHG
ECHO LVOT PEAK VELOCITY: 0.9 M/S
ECHO MV A VELOCITY: 1.3 M/S
ECHO MV E DECELERATION TIME (DT): 228 MS
ECHO MV E VELOCITY: 0.84 M/S
ECHO MV E/A RATIO: 0.65
ECHO MV E/E' LATERAL: 12
ECHO MV E/E' RATIO (AVERAGED): 14.4
ECHO MV E/E' SEPTAL: 16.8
ECHO PULMONARY ARTERY END DIASTOLIC PRESSURE: 1 MMHG
ECHO PV ACCELERATION TIME (AT): 98 MS
ECHO PV MAX VELOCITY: 0.8 M/S
ECHO PV PEAK GRADIENT: 3 MMHG
ECHO PV REGURGITANT MAX VELOCITY: 0.6 M/S
ECHO RA AREA 4C: 10.8 CM2
ECHO RA END SYSTOLIC VOLUME APICAL 4 CHAMBER INDEX BSA: 15 ML/M2
ECHO RA VOLUME: 28 ML
ECHO RV BASAL DIMENSION: 2.8 CM
ECHO RV FREE WALL PEAK S': 12 CM/S
ECHO RV TAPSE: 2 CM (ref 1.7–?)
EOSINOPHIL # BLD: 0.2 K/UL (ref 0–0.4)
EOSINOPHIL NFR BLD: 2 % (ref 0–7)
ERYTHROCYTE [DISTWIDTH] IN BLOOD BY AUTOMATED COUNT: 15.4 % (ref 11.5–14.5)
GLUCOSE BLD STRIP.AUTO-MCNC: 108 MG/DL (ref 65–100)
GLUCOSE BLD STRIP.AUTO-MCNC: 112 MG/DL (ref 65–100)
GLUCOSE BLD STRIP.AUTO-MCNC: 150 MG/DL (ref 65–100)
GLUCOSE BLD STRIP.AUTO-MCNC: 177 MG/DL (ref 65–100)
GLUCOSE SERPL-MCNC: 109 MG/DL (ref 65–100)
HCT VFR BLD AUTO: 28.5 % (ref 35–47)
HDLC SERPL-MCNC: 34 MG/DL
HDLC SERPL: 3.8 (ref 0–5)
HGB BLD-MCNC: 9.5 G/DL (ref 11.5–16)
IMM GRANULOCYTES # BLD AUTO: 0 K/UL (ref 0–0.04)
IMM GRANULOCYTES NFR BLD AUTO: 0 % (ref 0–0.5)
LDLC SERPL CALC-MCNC: 66.8 MG/DL (ref 0–100)
LIPID PANEL: NORMAL
LYMPHOCYTES # BLD: 2.4 K/UL (ref 0.8–3.5)
LYMPHOCYTES NFR BLD: 23 % (ref 12–49)
Lab: ABNORMAL
MCH RBC QN AUTO: 27.4 PG (ref 26–34)
MCHC RBC AUTO-ENTMCNC: 33.3 G/DL (ref 30–36.5)
MCV RBC AUTO: 82.1 FL (ref 80–99)
MONOCYTES # BLD: 0.7 K/UL (ref 0–1)
MONOCYTES NFR BLD: 7 % (ref 5–13)
NEUTS SEG # BLD: 7 K/UL (ref 1.8–8)
NEUTS SEG NFR BLD: 68 % (ref 32–75)
NRBC # BLD: 0 K/UL (ref 0–0.01)
NRBC BLD-RTO: 0 PER 100 WBC
PERFORMED BY:: ABNORMAL
PLATELET # BLD AUTO: 220 K/UL (ref 150–400)
PMV BLD AUTO: 9.5 FL (ref 8.9–12.9)
POTASSIUM SERPL-SCNC: 4.6 MMOL/L (ref 3.5–5.1)
RBC # BLD AUTO: 3.47 M/UL (ref 3.8–5.2)
SODIUM SERPL-SCNC: 138 MMOL/L (ref 136–145)
TRIGL SERPL-MCNC: 136 MG/DL
VLDLC SERPL CALC-MCNC: 27.2 MG/DL
WBC # BLD AUTO: 10.3 K/UL (ref 3.6–11)

## 2024-08-21 PROCEDURE — 36415 COLL VENOUS BLD VENIPUNCTURE: CPT

## 2024-08-21 PROCEDURE — 99291 CRITICAL CARE FIRST HOUR: CPT | Performed by: PSYCHIATRY & NEUROLOGY

## 2024-08-21 PROCEDURE — 97535 SELF CARE MNGMENT TRAINING: CPT

## 2024-08-21 PROCEDURE — 2580000003 HC RX 258: Performed by: INTERNAL MEDICINE

## 2024-08-21 PROCEDURE — 85025 COMPLETE CBC W/AUTO DIFF WBC: CPT

## 2024-08-21 PROCEDURE — 97530 THERAPEUTIC ACTIVITIES: CPT

## 2024-08-21 PROCEDURE — 80048 BASIC METABOLIC PNL TOTAL CA: CPT

## 2024-08-21 PROCEDURE — 92610 EVALUATE SWALLOWING FUNCTION: CPT

## 2024-08-21 PROCEDURE — 6370000000 HC RX 637 (ALT 250 FOR IP): Performed by: INTERNAL MEDICINE

## 2024-08-21 PROCEDURE — 82962 GLUCOSE BLOOD TEST: CPT

## 2024-08-21 PROCEDURE — 97165 OT EVAL LOW COMPLEX 30 MIN: CPT

## 2024-08-21 PROCEDURE — 6360000002 HC RX W HCPCS: Performed by: INTERNAL MEDICINE

## 2024-08-21 PROCEDURE — 80061 LIPID PANEL: CPT

## 2024-08-21 PROCEDURE — 1100000000 HC RM PRIVATE

## 2024-08-21 PROCEDURE — 6370000000 HC RX 637 (ALT 250 FOR IP): Performed by: PSYCHIATRY & NEUROLOGY

## 2024-08-21 PROCEDURE — 93880 EXTRACRANIAL BILAT STUDY: CPT

## 2024-08-21 PROCEDURE — 70551 MRI BRAIN STEM W/O DYE: CPT

## 2024-08-21 RX ORDER — LEVOFLOXACIN 250 MG/1
250 TABLET, FILM COATED ORAL DAILY
Status: DISCONTINUED | OUTPATIENT
Start: 2024-08-21 | End: 2024-08-22

## 2024-08-21 RX ORDER — HEPARIN SODIUM 5000 [USP'U]/ML
5000 INJECTION, SOLUTION INTRAVENOUS; SUBCUTANEOUS EVERY 8 HOURS SCHEDULED
Status: DISCONTINUED | OUTPATIENT
Start: 2024-08-21 | End: 2024-08-30 | Stop reason: HOSPADM

## 2024-08-21 RX ORDER — HYDRALAZINE HYDROCHLORIDE 50 MG/1
100 TABLET, FILM COATED ORAL EVERY 8 HOURS SCHEDULED
Status: DISCONTINUED | OUTPATIENT
Start: 2024-08-21 | End: 2024-08-30 | Stop reason: HOSPADM

## 2024-08-21 RX ADMIN — SODIUM CHLORIDE 125 MG: 9 INJECTION, SOLUTION INTRAVENOUS at 17:55

## 2024-08-21 RX ADMIN — AMLODIPINE BESYLATE 10 MG: 5 TABLET ORAL at 08:38

## 2024-08-21 RX ADMIN — ATENOLOL 50 MG: 25 TABLET ORAL at 08:38

## 2024-08-21 RX ADMIN — HYDRALAZINE HYDROCHLORIDE 50 MG: 50 TABLET ORAL at 05:34

## 2024-08-21 RX ADMIN — HYDRALAZINE HYDROCHLORIDE 100 MG: 50 TABLET ORAL at 12:54

## 2024-08-21 RX ADMIN — OXYCODONE 5 MG: 5 TABLET ORAL at 21:53

## 2024-08-21 RX ADMIN — ASPIRIN 81 MG 81 MG: 81 TABLET ORAL at 08:37

## 2024-08-21 RX ADMIN — HYDRALAZINE HYDROCHLORIDE 100 MG: 50 TABLET ORAL at 20:33

## 2024-08-21 RX ADMIN — OXYCODONE 5 MG: 5 TABLET ORAL at 03:49

## 2024-08-21 RX ADMIN — SODIUM CHLORIDE, PRESERVATIVE FREE 10 ML: 5 INJECTION INTRAVENOUS at 02:04

## 2024-08-21 RX ADMIN — ROSUVASTATIN CALCIUM 20 MG: 20 TABLET, COATED ORAL at 20:33

## 2024-08-21 RX ADMIN — LEVOFLOXACIN 250 MG: 250 TABLET, FILM COATED ORAL at 16:51

## 2024-08-21 RX ADMIN — HEPARIN SODIUM 5000 UNITS: 5000 INJECTION INTRAVENOUS; SUBCUTANEOUS at 16:51

## 2024-08-21 ASSESSMENT — PAIN DESCRIPTION - DESCRIPTORS
DESCRIPTORS: ACHING
DESCRIPTORS: ACHING

## 2024-08-21 ASSESSMENT — PAIN SCALES - GENERAL
PAINLEVEL_OUTOF10: 0
PAINLEVEL_OUTOF10: 0
PAINLEVEL_OUTOF10: 7
PAINLEVEL_OUTOF10: 4
PAINLEVEL_OUTOF10: 0
PAINLEVEL_OUTOF10: 6
PAINLEVEL_OUTOF10: 0
PAINLEVEL_OUTOF10: 8

## 2024-08-21 ASSESSMENT — PAIN SCALES - WONG BAKER
WONGBAKER_NUMERICALRESPONSE: NO HURT

## 2024-08-21 ASSESSMENT — PAIN DESCRIPTION - ORIENTATION
ORIENTATION: LEFT;LOWER
ORIENTATION: LEFT

## 2024-08-21 ASSESSMENT — PAIN DESCRIPTION - LOCATION
LOCATION: LEG
LOCATION: LEG

## 2024-08-21 NOTE — PROGRESS NOTES
4 Eyes Skin Assessment     NAME:  Yamilet Smith  YOB: 1960  MEDICAL RECORD NUMBER:  [id number]    The patient is being assessed for  Weekly skin assessment      I agree that at least one RN has performed a thorough Head to Toe Skin Assessment on the patient. ALL assessment sites listed below have been assessed.      Areas assessed by both nurses:    Head, Face, Ears, Shoulders, Back, Chest, Arms, Elbows, Hands, Sacrum. Buttock, Coccyx, Ischium, and Legs. Feet and Heels        Does the Patient have a Wound? Yes wound(s) were present on assessment. LDA wound assessment was Initiated and completed by RN       Sesar Prevention initiated by RN: Yes  Wound Care Orders initiated by RN: Yes    Pressure Injury (Stage 3,4, Unstageable, DTI, NWPT, and Complex wounds) if present, place Wound referral order by RN under : No    New Ostomies, if present place, Ostomy referral order under : No     Nurse 1 eSignature: Electronically signed by Patt Gonsales RN on 8/21/24 at 3:00 PM EDT    **SHARE this note so that the co-signing nurse can place an eSignature**    Nurse 2 eSignature: Electronically signed by Jeni Lynne RN on 8/21/24 at 3:02 PM EDT

## 2024-08-21 NOTE — PROGRESS NOTES
Hospitalist Progress Note    NAME:   Yamilet Smith   : 1960   MRN: [id number]     Date/Time: 2024 9:36 AM  Patient PCP: Alba Shields MD    Estimated discharge date:  Barriers:       Assessment / Plan:    Acute kidney injury/CRF 4  Patient appears to have a new baseline creatinine of around 4.4.  Nephrology following.     Hypertensive urgency  Continue atenolol, Norvasc.  Added hydralazine 100 Mg 3 times daily.    History of CVA  Continue aspirin, statin.   - MRI brain negative for acute CVA however chronic small CVA previously noted.    Diabetes mellitus type 2  Continue SSI.    Hyperlipidemia     Left leg pain  Lower extremity Dopplers negative for DVT.     DVT prophylaxis  Heparin SC     Code status: Full code      Medical Decision Making:   I personally reviewed labs:  I personally reviewed imaging:  I personally reviewed EKG:  Toxic drug monitoring:   Discussed case with: Patient, nursing    Subjective:     Chief Complaint / Reason for Physician Visit  \" Fatigue and diarrhea\".  Discussed with RN events overnight.      - Admission H&P  64-year-old female with diabetes, CKD, stroke, hypertension and hyperlipidemia presented the ED with cough, fatigue and diarrhea.  Has been sick for about 1 week.  Patient unable to keep her medications down.     In the ED blood pressure was 145/86.  Labs showed MANUEL with a creatinine of 4.2, Baseline around 2.3.     She was admitted, started on IV fluids  --------  Patient is seen today for follow-up.   Her blood pressure has been extremely labile, was as low as 140 systolic yesterday then this morning was up to 210 systolic.     Patient has been seen by nephrology.  It appears that her current renal function is her new baseline with a creatinine in the 4-4.4 range     She is complaining of severe left leg pain below the knee, having difficulty moving it.  To me, she denies any weakness, just pain.        Patient is alert and oriented x 4.  Denies  any chest pain, shortness of breath however does complain of mild abdominal discomfort after eating.  Has any previous history of GERD.  No overnight acute events noted.    Objective:     VITALS:   Last 24hrs VS reviewed since prior progress note. Most recent are:  Patient Vitals for the past 24 hrs:   BP Temp Temp src Pulse Resp SpO2   08/21/24 0837 (!) 158/115 98.8 °F (37.1 °C) Oral 65 18 96 %   08/21/24 0419 (!) 179/54 98.2 °F (36.8 °C) Oral 62 20 99 %   08/21/24 0349 -- -- -- -- 20 --   08/21/24 0019 (!) 155/49 98.2 °F (36.8 °C) Oral 63 -- 98 %   08/20/24 2018 (!) 169/46 99 °F (37.2 °C) Oral 69 20 97 %   08/20/24 1511 (!) 149/52 98.4 °F (36.9 °C) Oral 64 18 97 %   08/20/24 1104 (!) 181/57 -- -- 67 -- --         Intake/Output Summary (Last 24 hours) at 8/21/2024 0936  Last data filed at 8/20/2024 2018  Gross per 24 hour   Intake --   Output 1100 ml   Net -1100 ml        I had a face to face encounter and independently examined this patient on 8/21/2024, as outlined below:  PHYSICAL EXAM:  General: Alert, cooperative  EENT:  EOMI. Anicteric sclerae.  Resp:  CTA bilaterally, no wheezing or rales.  No accessory muscle use  CV:  Regular  rhythm,  No edema  GI:  Soft, Non distended, Non tender.  +Bowel sounds  Neurologic:  Alert and oriented X 3, normal speech,   Psych:   Good insight. Not anxious nor agitated  Skin:  No rashes.  No jaundice    Reviewed most current lab test results and cultures  YES  Reviewed most current radiology test results   YES  Review and summation of old records today    NO  Reviewed patient's current orders and MAR    YES  PMH/SH reviewed - no change compared to H&P    Procedures: see electronic medical records for all procedures/Xrays and details which were not copied into this note but were reviewed prior to creation of Plan.      LABS:  I reviewed today's most current labs and imaging studies.  Pertinent labs include:  Recent Labs     08/18/24  1736 08/20/24  0529 08/21/24  0701   WBC

## 2024-08-21 NOTE — PROGRESS NOTES
Progress Note:      [facility] NEUROLOGY PROGRESS NOTE          Impression/Recommendations:   64-year-old female with diabetes, CKD, stroke, hypertension and hyperlipidemia presented the ED with cough, fatigue and diarrhea.  CT of the head showed subacute/acute right cerebellar infarct. Exam is non focal. Left leg pain noticed. MRI brain is negative for any acute change. Strokes seen on CT scan were actually chronic and not acute. Patient has a hx of known CVAs in the last 10 years.       Good DM control recommended A1c is 7.4  Switch to Plavix 75 mg qd  Increased rosuvastatin to 20 mg qd (ldl 132)  No further neurological recommendations.  Thank you for the consult.         ?     Archana Duran MD  Teleneurologist    SUBJECTIVE   Yamilet Smith is a 64 y.o. female being evaluated for cerebellar infarct.     ?     OBJECTIVE   ROS, PMH, FH, SH were all reviewed and are unchanged.   Neurological Examination:   BP (!) 158/115   Pulse 65   Temp 98.8 °F (37.1 °C) (Oral)   Resp 18   Ht 1.702 m (5' 7\")   Wt 80.3 kg (177 lb)   SpO2 96%   BMI 27.72 kg/m²    Assisted by HAYDEN Hammond   Mental status:  Patient was Alert.  Speech was fluent and articulate. Mental status exam was grossly within normal limits.   Cranial nerves:   Pupils were equally reactive. EOMI.  There was no facial  weakness.   There was a good shrug.   Tongue protruded on the midline.   Motor:   There was no pronator drift.   Left leg pain. Otherwise unremarkable.  No abnormal movements.  Sensory:   Intact Sensation to LT in BUE and BLE and bilateral face  Coordination:   There was no dysmetria.  Gait:   Not tested due to fall concerns        ?  Current Facility-Administered Medications   Medication Dose Route Frequency Provider Last Rate Last Admin    hydrALAZINE (APRESOLINE) tablet 100 mg  100 mg Oral 3 times per day Everton Whittaker MD        atenolol (TENORMIN) tablet 50 mg  50 mg Oral Daily Everton Whittaker MD   50 mg at 08/21/24 0838    sodium

## 2024-08-21 NOTE — PROGRESS NOTES
Speech LAnguage Pathology Dysphagia EVALUATION/DISCHARGE    Patient: Yamilet Smith (64 y.o. female)  Date: 8/21/2024  Primary Diagnosis: Acute on chronic renal insufficiency [N28.9, N18.9]  MANUEL (acute kidney injury) (HCC) [N17.9]  Uncontrolled hypertension [I10]  Diarrhea, unspecified type [R19.7]       Precautions:  General Precautions, Contact Precautions, Bed Alarm                DIET RECOMMENDATIONS: Regular and thin liquids, meds as tolerated,    SWALLOW SAFETY PRECAUTIONS: Rec slow rate of intake, small bites/sips, effortful swallow, and remain upright 30 minutes after PO intake.       ASSESSMENT :  Based on the objective data described below, the patient presents with largely wfl oropharyngeal swallow fxn.  No facial droop or dysarthria. Informally, speech language is wfl.  Oral phase c/b slow mastication s/t dentition. Pharyngeal phase c/b timely swallow and HLE is wfl upon palpation.   No overt s/s of pen/asp observed.   Patient will be discharged from skilled speech-language pathology services at this time.       PLAN :  Recommendations and Planned Interventions:  DIET RECOMMENDATIONS: Regular and thin liquids, meds as tolerated,    SWALLOW SAFETY PRECAUTIONS: Rec slow rate of intake, small bites/sips, effortful swallow, and remain upright 30 minutes after PO intake.      Acute SLP Services: No, patient will be discharged from acute skilled speech-language pathology at this time.    Discharge Recommendations: None     SUBJECTIVE:   Patient reports she doesn't like the hospital food.     OBJECTIVE:   Patient admitted w/ n/v/d and cough.   Vascular duplex carotid bilateral         MRI BRAIN WO CONTRAST   Final Result      1. No acute intracranial abnormality.   2. Mild chronic microvascular ischemic disease. Small chronic infarcts in the   bilateral cerebellum, right larger than left, and right reena.         Electronically signed by Dalotn Merrill      CT HEAD WO CONTRAST   Final Result   Acute to  denies  Patient Complaint: R sided pain  Hearing: wfl     General:         O2 Device: None (Room air)     Current Diet : Regular  Current Liquid Diet : Thin  Dentition: Edentulous  Patient Positioning: Upright in bed    Dysphagia:  Oral Assessment:  Oral Motor   Labial: No impairment  Dentition: Edentulous  Oral Hygiene: Moist  Velum: No Impairment  Mandible: No impairment  P.O. Trials:  Consistencies Administered: Regular;Thin - straw;Pureed      Voice:   hoarse      After Treatment:  Patient left in no apparent distress in bed, Call bell left within reach, Nursing notified, Bed alarm activated, and Updated patient's board with:  diet recommendations and aspiration precautions     Pain:  VAS (numerical) 5/10 R sided pain    COMMUNICATION/EDUCATION:   BE FAST acronym for signs/symptoms of CVA and TIA, Swallow safety precautions, Aspiration precautions, GERD precautions, and Diet recommendations education provided to Patient via explanation and teach back, all questions/concerns addressed. Patient verbalizes understanding and requires reinforcement.    The patient's plan of care including recommendations, planned interventions, and recommended diet changes were discussed with: Registered nurse.    Thank you,  Medina Washburn M.S., M.Ed., CCC-SLP  Minutes: 9     Is This A New Presentation, Or A Follow-Up?: Skin Lesion Has Your Skin Lesion Been Treated?: not been treated

## 2024-08-21 NOTE — PROGRESS NOTES
Nephrology follow up          Patient: Yamilet Smith MRN: [id number]  SSN: [id number]    YOB: 1960  Age: 64 y.o.  Sex: female      Subjective:   The patient is seen at the bedside.  Blood pressures are elevated  On room air  No lower extremity swelling  Creatinine 4.2 (improving)    Past Medical History:   Diagnosis Date    Chronic kidney disease     Diabetes (HCC)     Hypertension     Stroke (HCC)      Past Surgical History:   Procedure Laterality Date    HYSTERECTOMY (CERVIX STATUS UNKNOWN)      age 24      Family History   Problem Relation Age of Onset    Cancer Sister     Diabetes Sister     Heart Disease Mother     Diabetes Mother      Social History     Tobacco Use    Smoking status: Every Day     Current packs/day: 0.50     Types: Cigarettes    Smokeless tobacco: Never   Substance Use Topics    Alcohol use: Never      Current Facility-Administered Medications   Medication Dose Route Frequency Provider Last Rate Last Admin    hydrALAZINE (APRESOLINE) tablet 100 mg  100 mg Oral 3 times per day Everton Whittaker MD   100 mg at 08/21/24 1254    heparin (porcine) injection 5,000 Units  5,000 Units SubCUTAneous 3 times per day Mg Domingo MD        atenolol (TENORMIN) tablet 50 mg  50 mg Oral Daily Everton Whittaker MD   50 mg at 08/21/24 0838    sodium chloride flush 0.9 % injection 5-40 mL  5-40 mL IntraVENous 2 times per day Carlo Sibley MD   10 mL at 08/21/24 0204    sodium chloride flush 0.9 % injection 5-40 mL  5-40 mL IntraVENous PRN Carlo Sibley MD        0.9 % sodium chloride infusion   IntraVENous PRN Carlo Sibley MD        polyethylene glycol (GLYCOLAX) packet 17 g  17 g Oral Daily PRN Carlo Sibley MD        aspirin chewable tablet 81 mg  81 mg Oral Daily Carlo Sibley MD   81 mg at 08/21/24 0837    Or    aspirin suppository 300 mg  300 mg Rectal Daily Carlo Sibley MD        rosuvastatin (CRESTOR) tablet 20 mg  20 mg Oral Nightly Renee  Archana Dalton MD   20 mg at 08/20/24 2115    ferric gluconate (FERRLECIT) 125 mg in sodium chloride 0.9 % 100 mL IVPB  125 mg IntraVENous Daily Everton Whittaker MD   Stopped at 08/20/24 1752    insulin lispro (HUMALOG,ADMELOG) injection vial 0-4 Units  0-4 Units SubCUTAneous TID WC Cortez Casas MD        insulin lispro (HUMALOG,ADMELOG) injection vial 0-4 Units  0-4 Units SubCUTAneous Nightly Cortez Casas MD        sodium chloride flush 0.9 % injection 5-40 mL  5-40 mL IntraVENous 2 times per day Cortez Casas MD   10 mL at 08/19/24 2206    sodium chloride flush 0.9 % injection 5-40 mL  5-40 mL IntraVENous PRN Cortez Casas MD        ondansetron (ZOFRAN-ODT) disintegrating tablet 4 mg  4 mg Oral Q8H PRN Cortez Casas MD        Or    ondansetron (ZOFRAN) injection 4 mg  4 mg IntraVENous Q6H PRN Cortez Casas MD        acetaminophen (TYLENOL) tablet 650 mg  650 mg Oral Q6H PRN Cortez Casas MD   650 mg at 08/19/24 0900    Or    acetaminophen (TYLENOL) suppository 650 mg  650 mg Rectal Q6H PRN Cortez Casas MD        amLODIPine (NORVASC) tablet 10 mg  10 mg Oral Daily Everton Whittaker MD   10 mg at 08/21/24 0838    hydrALAZINE (APRESOLINE) injection 10 mg  10 mg IntraVENous Q4H PRN Everton Whittaker MD        oxyCODONE (ROXICODONE) immediate release tablet 5 mg  5 mg Oral Q6H PRN Carlo Sibley MD   5 mg at 08/21/24 0349        No Known Allergies    Review of Systems:  A comprehensive review of systems was negative except for that written in the History of Present Illness.    Objective:     Vitals:    08/21/24 0349 08/21/24 0419 08/21/24 0837 08/21/24 1230   BP:  (!) 179/54 (!) 158/115 (!) 157/68   Pulse:  62 65 67   Resp: 20 20 18 18   Temp:  98.2 °F (36.8 °C) 98.8 °F (37.1 °C) 98.6 °F (37 °C)   TempSrc:  Oral Oral Oral   SpO2:  99% 96% 98%   Weight:       Height:            Physical Exam:  General: NAD  Eyes: sclera anicteric  Oral Cavity: No thrush or

## 2024-08-21 NOTE — CARE COORDINATION
DCP: from home with sister     Pt anticipated to dc in 48 hours, pending nephro clearance, vascular duplex,

## 2024-08-21 NOTE — PLAN OF CARE
OCCUPATIONAL THERAPY EVALUATION  Patient: Yamilet Smith (64 y.o. female)  Date: 8/21/2024  Primary Diagnosis: Acute on chronic renal insufficiency [N28.9, N18.9]  MANUEL (acute kidney injury) (HCC) [N17.9]  Uncontrolled hypertension [I10]  Diarrhea, unspecified type [R19.7]       Precautions: General Precautions, Contact Precautions, Bed Alarm   Recommendations for nursing mobility: Encourage HEP in prep for ADLs/mobility; see handout for details, Frequent repositioning to prevent skin breakdown, Use of bed/chair alarm for safety, LE elevation for management of edema, Maxi Move for bed to chair transfers, and Assist x1    In place during session:Peripheral IV and EKG/telemetry   ASSESSMENT  Pt is a 64 y.o. female admitted 8/18/24 presenting to [facility] with abdominal pain, diarrhea, cough; pt currently being treated for dehydration, acute on chronic renal insufficiency.  Pt received semi-supine in bed upon arrival, AXO x 4, and agreeable to OT evaluation.     Based on current observations, pt presents with decreased  functional mobility, independence in ADLs, high-level IADLs, ROM, strength, body mechanics, activity tolerance, endurance, cognition, attention/concentration, posture, fine-motor control (see below for objective details and assist levels).     Overall, pt tolerates session well. Pt endorses increased difficulty performing BADLs/functional transfers/bed mobility in the last few weeks. Pt concerned w/ caregiver burden w/ newer functional presentation. Pt highly motivated to participate in therapy. Pt will benefit from continued skilled OT services to address current impairments and improve IND and safety with self cares and functional transfers/mobility. Potential barriers for safe discharge: pts available support system works or is unable to provide adequate supervision leaving the patient alone at times during the day, pts current support system is unable to meet their requirements for physical assistance,

## 2024-08-22 LAB
ALBUMIN SERPL-MCNC: 2.8 G/DL (ref 3.5–5)
ANION GAP SERPL CALC-SCNC: 10 MMOL/L (ref 5–15)
ANION GAP SERPL CALC-SCNC: 9 MMOL/L (ref 5–15)
BASOPHILS # BLD: 0 K/UL (ref 0–0.1)
BASOPHILS NFR BLD: 0 % (ref 0–1)
BUN SERPL-MCNC: 79 MG/DL (ref 6–20)
BUN SERPL-MCNC: 82 MG/DL (ref 6–20)
BUN/CREAT SERPL: 17 (ref 12–20)
BUN/CREAT SERPL: 18 (ref 12–20)
CA-I BLD-MCNC: 8.5 MG/DL (ref 8.5–10.1)
CA-I BLD-MCNC: 8.8 MG/DL (ref 8.5–10.1)
CHLORIDE SERPL-SCNC: 106 MMOL/L (ref 97–108)
CHLORIDE SERPL-SCNC: 107 MMOL/L (ref 97–108)
CO2 SERPL-SCNC: 20 MMOL/L (ref 21–32)
CO2 SERPL-SCNC: 21 MMOL/L (ref 21–32)
CREAT SERPL-MCNC: 4.6 MG/DL (ref 0.55–1.02)
CREAT SERPL-MCNC: 4.65 MG/DL (ref 0.55–1.02)
DIFFERENTIAL METHOD BLD: ABNORMAL
ECHO BSA: 1.95 M2
EOSINOPHIL # BLD: 0.1 K/UL (ref 0–0.4)
EOSINOPHIL NFR BLD: 1 % (ref 0–7)
ERYTHROCYTE [DISTWIDTH] IN BLOOD BY AUTOMATED COUNT: 15.6 % (ref 11.5–14.5)
GLUCOSE BLD STRIP.AUTO-MCNC: 127 MG/DL (ref 65–100)
GLUCOSE BLD STRIP.AUTO-MCNC: 127 MG/DL (ref 65–100)
GLUCOSE BLD STRIP.AUTO-MCNC: 132 MG/DL (ref 65–100)
GLUCOSE BLD STRIP.AUTO-MCNC: 143 MG/DL (ref 65–100)
GLUCOSE BLD STRIP.AUTO-MCNC: 145 MG/DL (ref 65–100)
GLUCOSE SERPL-MCNC: 122 MG/DL (ref 65–100)
GLUCOSE SERPL-MCNC: 124 MG/DL (ref 65–100)
HCT VFR BLD AUTO: 29.3 % (ref 35–47)
HGB BLD-MCNC: 9.6 G/DL (ref 11.5–16)
IMM GRANULOCYTES # BLD AUTO: 0 K/UL (ref 0–0.04)
IMM GRANULOCYTES NFR BLD AUTO: 0 % (ref 0–0.5)
LYMPHOCYTES # BLD: 2.5 K/UL (ref 0.8–3.5)
LYMPHOCYTES NFR BLD: 28 % (ref 12–49)
MCH RBC QN AUTO: 27 PG (ref 26–34)
MCHC RBC AUTO-ENTMCNC: 32.8 G/DL (ref 30–36.5)
MCV RBC AUTO: 82.5 FL (ref 80–99)
MONOCYTES # BLD: 0.7 K/UL (ref 0–1)
MONOCYTES NFR BLD: 7 % (ref 5–13)
NEUTS SEG # BLD: 5.9 K/UL (ref 1.8–8)
NEUTS SEG NFR BLD: 64 % (ref 32–75)
NRBC # BLD: 0 K/UL (ref 0–0.01)
NRBC BLD-RTO: 0 PER 100 WBC
PERFORMED BY:: ABNORMAL
PHOSPHATE SERPL-MCNC: 5 MG/DL (ref 2.6–4.7)
PLATELET # BLD AUTO: 244 K/UL (ref 150–400)
PMV BLD AUTO: 10.2 FL (ref 8.9–12.9)
POTASSIUM SERPL-SCNC: 4.6 MMOL/L (ref 3.5–5.1)
POTASSIUM SERPL-SCNC: 4.8 MMOL/L (ref 3.5–5.1)
RBC # BLD AUTO: 3.55 M/UL (ref 3.8–5.2)
SODIUM SERPL-SCNC: 136 MMOL/L (ref 136–145)
SODIUM SERPL-SCNC: 137 MMOL/L (ref 136–145)
VAS LEFT CCA DIST EDV: 16 CM/S
VAS LEFT CCA DIST PSV: 132 CM/S
VAS LEFT CCA PROX EDV: 12 CM/S
VAS LEFT CCA PROX PSV: 101 CM/S
VAS LEFT ECA EDV: 0 CM/S
VAS LEFT ECA PSV: 301 CM/S
VAS LEFT ICA DIST EDV: 22.4 CM/S
VAS LEFT ICA DIST PSV: 134 CM/S
VAS LEFT ICA PROX EDV: 18.9 CM/S
VAS LEFT ICA PROX PSV: 204 CM/S
VAS LEFT ICA/CCA PSV: 1.55
VAS LEFT SUBCLAVIAN PROX EDV: 0 CM/S
VAS LEFT SUBCLAVIAN PROX PSV: 136 CM/S
VAS LEFT VERTEBRAL EDV: 0 CM/S
VAS LEFT VERTEBRAL PSV: 66.6 CM/S
VAS RIGHT CCA DIST EDV: 6.9 CM/S
VAS RIGHT CCA DIST PSV: 89.6 CM/S
VAS RIGHT CCA PROX EDV: 10 CM/S
VAS RIGHT CCA PROX PSV: 96.5 CM/S
VAS RIGHT ECA EDV: 0 CM/S
VAS RIGHT ECA PSV: 188 CM/S
VAS RIGHT ICA DIST EDV: 19.9 CM/S
VAS RIGHT ICA DIST PSV: 121 CM/S
VAS RIGHT ICA PROX EDV: 18 CM/S
VAS RIGHT ICA PROX PSV: 107 CM/S
VAS RIGHT ICA/CCA PSV: 1.19
VAS RIGHT SUBCLAVIAN PROX EDV: 0 CM/S
VAS RIGHT SUBCLAVIAN PROX PSV: 136 CM/S
VAS RIGHT VERTEBRAL EDV: 7.68 CM/S
VAS RIGHT VERTEBRAL PSV: 64.8 CM/S
WBC # BLD AUTO: 9.2 K/UL (ref 3.6–11)

## 2024-08-22 PROCEDURE — 80069 RENAL FUNCTION PANEL: CPT

## 2024-08-22 PROCEDURE — 36415 COLL VENOUS BLD VENIPUNCTURE: CPT

## 2024-08-22 PROCEDURE — 6370000000 HC RX 637 (ALT 250 FOR IP): Performed by: INTERNAL MEDICINE

## 2024-08-22 PROCEDURE — 1100000000 HC RM PRIVATE

## 2024-08-22 PROCEDURE — 80048 BASIC METABOLIC PNL TOTAL CA: CPT

## 2024-08-22 PROCEDURE — 6370000000 HC RX 637 (ALT 250 FOR IP): Performed by: PSYCHIATRY & NEUROLOGY

## 2024-08-22 PROCEDURE — 97530 THERAPEUTIC ACTIVITIES: CPT

## 2024-08-22 PROCEDURE — 93880 EXTRACRANIAL BILAT STUDY: CPT | Performed by: SURGERY

## 2024-08-22 PROCEDURE — 85025 COMPLETE CBC W/AUTO DIFF WBC: CPT

## 2024-08-22 PROCEDURE — 82962 GLUCOSE BLOOD TEST: CPT

## 2024-08-22 PROCEDURE — 6360000002 HC RX W HCPCS: Performed by: INTERNAL MEDICINE

## 2024-08-22 RX ORDER — LEVOFLOXACIN 250 MG/1
250 TABLET, FILM COATED ORAL DAILY
Status: COMPLETED | OUTPATIENT
Start: 2024-08-23 | End: 2024-08-25

## 2024-08-22 RX ORDER — HYDRALAZINE HYDROCHLORIDE 100 MG/1
100 TABLET, FILM COATED ORAL EVERY 8 HOURS SCHEDULED
Qty: 90 TABLET | Refills: 3 | Status: SHIPPED | OUTPATIENT
Start: 2024-08-22

## 2024-08-22 RX ADMIN — ONDANSETRON 4 MG: 4 TABLET, ORALLY DISINTEGRATING ORAL at 22:54

## 2024-08-22 RX ADMIN — LEVOFLOXACIN 250 MG: 250 TABLET, FILM COATED ORAL at 08:24

## 2024-08-22 RX ADMIN — HYDRALAZINE HYDROCHLORIDE 100 MG: 50 TABLET ORAL at 05:51

## 2024-08-22 RX ADMIN — HYDRALAZINE HYDROCHLORIDE 100 MG: 50 TABLET ORAL at 22:52

## 2024-08-22 RX ADMIN — HEPARIN SODIUM 5000 UNITS: 5000 INJECTION INTRAVENOUS; SUBCUTANEOUS at 22:56

## 2024-08-22 RX ADMIN — ASPIRIN 81 MG 81 MG: 81 TABLET ORAL at 08:24

## 2024-08-22 RX ADMIN — HEPARIN SODIUM 5000 UNITS: 5000 INJECTION INTRAVENOUS; SUBCUTANEOUS at 00:10

## 2024-08-22 RX ADMIN — AMLODIPINE BESYLATE 10 MG: 5 TABLET ORAL at 08:24

## 2024-08-22 RX ADMIN — OXYCODONE 5 MG: 5 TABLET ORAL at 14:46

## 2024-08-22 RX ADMIN — HEPARIN SODIUM 5000 UNITS: 5000 INJECTION INTRAVENOUS; SUBCUTANEOUS at 08:24

## 2024-08-22 RX ADMIN — HEPARIN SODIUM 5000 UNITS: 5000 INJECTION INTRAVENOUS; SUBCUTANEOUS at 18:09

## 2024-08-22 RX ADMIN — ROSUVASTATIN CALCIUM 20 MG: 20 TABLET, COATED ORAL at 22:53

## 2024-08-22 RX ADMIN — HYDRALAZINE HYDROCHLORIDE 100 MG: 50 TABLET ORAL at 14:46

## 2024-08-22 RX ADMIN — POLYETHYLENE GLYCOL 3350 17 G: 17 POWDER, FOR SOLUTION ORAL at 08:25

## 2024-08-22 RX ADMIN — ATENOLOL 50 MG: 25 TABLET ORAL at 08:25

## 2024-08-22 RX ADMIN — ONDANSETRON 4 MG: 4 TABLET, ORALLY DISINTEGRATING ORAL at 08:25

## 2024-08-22 RX ADMIN — OXYCODONE 5 MG: 5 TABLET ORAL at 22:51

## 2024-08-22 ASSESSMENT — PAIN DESCRIPTION - ORIENTATION: ORIENTATION: ANTERIOR

## 2024-08-22 ASSESSMENT — PAIN DESCRIPTION - DESCRIPTORS
DESCRIPTORS: CRAMPING;DISCOMFORT
DESCRIPTORS: ACHING

## 2024-08-22 ASSESSMENT — PAIN DESCRIPTION - LOCATION
LOCATION: ABDOMEN
LOCATION: ABDOMEN

## 2024-08-22 ASSESSMENT — PAIN - FUNCTIONAL ASSESSMENT
PAIN_FUNCTIONAL_ASSESSMENT: ACTIVITIES ARE NOT PREVENTED
PAIN_FUNCTIONAL_ASSESSMENT: ACTIVITIES ARE NOT PREVENTED

## 2024-08-22 ASSESSMENT — PAIN SCALES - GENERAL
PAINLEVEL_OUTOF10: 7
PAINLEVEL_OUTOF10: 3
PAINLEVEL_OUTOF10: 0
PAINLEVEL_OUTOF10: 7
PAINLEVEL_OUTOF10: 0

## 2024-08-22 NOTE — DISCHARGE SUMMARY
Discharge Summary    Name: Yamilet Smith  [id number]  YOB: 1960 (Age: 64 y.o.)   Date of Admission: 8/18/2024  Date of Discharge: 8/22/2024  Attending Physician: Mg Domingo MD    Discharge Diagnosis:     Consultations:  IP WOUND CARE NURSE CONSULT TO EVAL  IP CONSULT TO NEPHROLOGY  IP CONSULT TO TELE-NEUROLOGY  IP CONSULT TO VASCULAR ACCESS TEAM      Brief Admission History/Reason for Admission Per Cortez Casas MD:     Chief Complaint / Reason for Physician Visit  \" Fatigue and diarrhea\".  Discussed with RN events overnight.      8/19 - Admission H&P  64-year-old female with diabetes, CKD, stroke, hypertension and hyperlipidemia presented the ED with cough, fatigue and diarrhea.  Has been sick for about 1 week.  Patient unable to keep her medications down.     In the ED blood pressure was 145/86.  Labs showed MANUEL with a creatinine of 4.2, Baseline around 2.3.     She was admitted, started on IV fluids  --------  Patient is seen today for follow-up.   Her blood pressure has been extremely labile, was as low as 140 systolic yesterday then this morning was up to 210 systolic.     Patient has been seen by nephrology.  It appears that her current renal function is her new baseline with a creatinine in the 4-4.4 range     She is complaining of severe left leg pain below the knee, having difficulty moving it.  To me, she denies any weakness, just pain.     8/21  Patient is alert and oriented x 4.  Denies any chest pain, shortness of breath however does complain of mild abdominal discomfort after eating.  Has any previous history of GERD.  No overnight acute events noted.    8/22  Patient is alert and oriented x 4.  Denies any chest pain, shortness of breath however does complain of mild abdominal discomfort after eating.  Has any previous history of GERD.  No overnight acute events note.  Cont' to have severe weakness.  Awaiting IRF  106   CO2 21   < > 21  20*   BUN 68*   < > 79*  82*   CREATININE 4.45*   < > 4.60*  4.65*   GLUCOSE 131*   < > 122*  124*   CALCIUM 8.7   < > 8.5  8.8   PHOS 3.8  --  5.0*   MG 2.1  --   --     < > = values in this interval not displayed.     Recent Labs     08/22/24  0641   HGB 9.6*   HCT 29.3*   WBC 9.2          Discharge Medications:     Medication List        START taking these medications      hydrALAZINE 100 MG tablet  Commonly known as: APRESOLINE  Take 1 tablet by mouth every 8 hours            CONTINUE taking these medications      acarbose 50 MG tablet  Commonly known as: PRECOSE     amLODIPine 10 MG tablet  Commonly known as: NORVASC     aspirin 81 MG chewable tablet     atenolol 50 MG tablet  Commonly known as: TENORMIN     atorvastatin 40 MG tablet  Commonly known as: LIPITOR     glimepiride 4 MG tablet  Commonly known as: AMARYL     loratadine 10 MG tablet  Commonly known as: CLARITIN            STOP taking these medications      glipiZIDE 5 MG tablet  Commonly known as: GLUCOTROL     hydroCHLOROthiazide 12.5 MG tablet     simvastatin 20 MG tablet  Commonly known as: ZOCOR               Where to Get Your Medications        These medications were sent to [id number] - [place], [place] - [address] - P [phone] - F [phone] [address] [address]      Phone: [phone]   hydrALAZINE 100 MG tablet           DISPOSITION:    Home with Family:       Home with HH/PT/OT/RN:    SNF/LTC:    [facility]:    OTHER:  IRF       Code status: fULL  Recommended diet: diabetic diet  Recommended activity: activity as tolerated      Follow up with:   PCP : Alba Shields MD Jin, Xiao, MD  [address] [facility] [address] [phone]    Follow up in 2 week(s)      Everton Whittaker MD  [address] [place] [id number] [phone]    Follow up in 2 week(s)        Total time in minutes spent coordinating this discharge (includes going over

## 2024-08-22 NOTE — PLAN OF CARE
PHYSICAL THERAPY TREATMENT     Patient: Yamilet Smith (64 y.o. female)  Date: 8/22/2024  Diagnosis: Acute on chronic renal insufficiency [N28.9, N18.9]  MANUEL (acute kidney injury) (HCC) [N17.9]  Uncontrolled hypertension [I10]  Diarrhea, unspecified type [R19.7] Acute on chronic renal insufficiency      Precautions: General Precautions, Contact Precautions, Bed Alarm                      Recommendations for nursing mobility: Encourage HEP in prep for ADLs/mobility; see handout for details, Frequent repositioning to prevent skin breakdown, and Use of bed/chair alarm for safety    In place during session: External Catheter  Chart, physical therapy assessment, plan of care and goals were reviewed.  ASSESSMENT  Patient continues with skilled PT services and is progressing towards goals. Pt sitting up in the bed upon PT arrival, agreeable to session. Pt A&O x 3 (confused about situation). (See below for objective details and assist levels).     Overall pt tolerated session fair today.  Pt demos weakness in BLE during therex req frequent cues to increase ROM and overall improve strength. Req min A for LAQ to achieve full ROM. Pt overall min-mod A x1 for bed mobility however pt was max A x1 for sit<>stand transfers. Pt achieved full upright position but buckled at attempt of a side step. Pt will benefit from 2 person assist for further transfers/gait or use gerry stedy for chair transfers. Patient demos quick fatigue and nausea today so requested to return back to supine. Educated to complete LE therex in bed as able. Will continue to benefit from skilled PT services, and will continue to progress as tolerated. Potential barriers for safe discharge: pt has poor safety awareness, pt has impaired cognition, pt is a high fall risk, pt is not safe to be alone, and concern for pt safely navigating or managing the home environment. Current PT DC recommendation Inpatient Rehabilitation Facility  once medically  Education  Education Given To: Patient  Education Provided: Role of Therapy;Plan of Care;Transfer Training;Home Exercise Program  Education Provided Comments: safety, mobility, POC, therex  Education Method: Demonstration;Verbal  Barriers to Learning: None  Education Outcome: Verbalized understanding;Continued education needed        Sindhu Mclaughlin, BRITTNEY  Minutes: 23

## 2024-08-22 NOTE — PLAN OF CARE
Problem: Discharge Planning  Goal: Discharge to home or other facility with appropriate resources  8/21/2024 2233 by Kg Ball RN  Outcome: Progressing  8/21/2024 1012 by Patt Hancock RN  Outcome: Not Progressing     Problem: Pain  Goal: Verbalizes/displays adequate comfort level or baseline comfort level  8/21/2024 2233 by Kg Ball RN  Outcome: Progressing  Flowsheets (Taken 8/21/2024 1940)  Verbalizes/displays adequate comfort level or baseline comfort level:   Encourage patient to monitor pain and request assistance   Assess pain using appropriate pain scale   Administer analgesics based on type and severity of pain and evaluate response   Implement non-pharmacological measures as appropriate and evaluate response   Consider cultural and social influences on pain and pain management   Notify Licensed Independent Practitioner if interventions unsuccessful or patient reports new pain  8/21/2024 1012 by Patt Hancock RN  Outcome: Progressing     Problem: Safety - Adult  Goal: Free from fall injury  8/21/2024 2233 by Kg Ball RN  Outcome: Progressing  8/21/2024 1012 by Patt Hancock RN  Outcome: Not Progressing     Problem: Skin/Tissue Integrity  Goal: Absence of new skin breakdown  Description: 1.  Monitor for areas of redness and/or skin breakdown  2.  Assess vascular access sites hourly  3.  Every 4-6 hours minimum:  Change oxygen saturation probe site  4.  Every 4-6 hours:  If on nasal continuous positive airway pressure, respiratory therapy assess nares and determine need for appliance change or resting period.  Outcome: Progressing     Problem: Chronic Conditions and Co-morbidities  Goal: Patient's chronic conditions and co-morbidity symptoms are monitored and maintained or improved  Outcome: Progressing     Problem: Occupational Therapy - Adult  Goal: By Discharge: Performs self-care activities at highest level of function for planned discharge setting.  See evaluation

## 2024-08-22 NOTE — CARE COORDINATION
CM met with patient at bedside to discuss discharge recommendations for IRF.  Patient is agreeable. Freedom of choice given for [facility] inpatient rehab.  CM informed patient that even though we are recommending inpatient rehab, the insurance company will need to authorize it, and IRF is not guaranteed. CM has asked for secondary choices. Secondary choice is for [place] [facility].  CM sent referrals. Patient will need insurance authorization.

## 2024-08-22 NOTE — PROGRESS NOTES
Came by for midline placement. Patient refusing. States \"It is too late\". Patient prefers to wait until the morning. Educated patient that at least one IV is needed incase of emergencies. Patient verbalized understanding and still refuses. HAYDEN Saul notified.

## 2024-08-22 NOTE — PROGRESS NOTES
Nephrology follow up          Patient: Yamilet Smith MRN: [id number]  SSN: [id number]    YOB: 1960  Age: 64 y.o.  Sex: female      Subjective:   The patient is seen at the bedside.  More awake and alert today.  Blood pressures are better  On room air  No lower extremity swelling  Creatinine 4.6  Started on Levaquin for UTI  Past Medical History:   Diagnosis Date    Chronic kidney disease     Diabetes (HCC)     Hypertension     Stroke (HCC)      Past Surgical History:   Procedure Laterality Date    HYSTERECTOMY (CERVIX STATUS UNKNOWN)      age 24      Family History   Problem Relation Age of Onset    Cancer Sister     Diabetes Sister     Heart Disease Mother     Diabetes Mother      Social History     Tobacco Use    Smoking status: Every Day     Current packs/day: 0.50     Types: Cigarettes    Smokeless tobacco: Never   Substance Use Topics    Alcohol use: Never      Current Facility-Administered Medications   Medication Dose Route Frequency Provider Last Rate Last Admin    [START ON 8/23/2024] levoFLOXacin (LEVAQUIN) tablet 250 mg  250 mg Oral Daily Mg Domingo MD        hydrALAZINE (APRESOLINE) tablet 100 mg  100 mg Oral 3 times per day Everton Whittaker MD   100 mg at 08/22/24 0551    heparin (porcine) injection 5,000 Units  5,000 Units SubCUTAneous 3 times per day Mg Domingo MD   5,000 Units at 08/22/24 0824    atenolol (TENORMIN) tablet 50 mg  50 mg Oral Daily Everton Whittaker MD   50 mg at 08/22/24 0825    sodium chloride flush 0.9 % injection 5-40 mL  5-40 mL IntraVENous 2 times per day Carlo Sibley MD   10 mL at 08/21/24 0204    sodium chloride flush 0.9 % injection 5-40 mL  5-40 mL IntraVENous PRN Carlo Sibley MD        0.9 % sodium chloride infusion   IntraVENous PRN Carlo Sibley MD        polyethylene glycol (GLYCOLAX) packet 17 g  17 g Oral Daily PRN Carlo Sibley MD   17 g at 08/22/24 0825    aspirin chewable tablet 81 mg  81 mg Oral Daily

## 2024-08-23 LAB
ALBUMIN SERPL-MCNC: 2.8 G/DL (ref 3.5–5)
ANION GAP SERPL CALC-SCNC: 10 MMOL/L (ref 5–15)
BUN SERPL-MCNC: 93 MG/DL (ref 6–20)
BUN/CREAT SERPL: 17 (ref 12–20)
CA-I BLD-MCNC: 8.8 MG/DL (ref 8.5–10.1)
CHLORIDE SERPL-SCNC: 104 MMOL/L (ref 97–108)
CO2 SERPL-SCNC: 19 MMOL/L (ref 21–32)
CREAT SERPL-MCNC: 5.43 MG/DL (ref 0.55–1.02)
GLUCOSE BLD STRIP.AUTO-MCNC: 114 MG/DL (ref 65–100)
GLUCOSE BLD STRIP.AUTO-MCNC: 122 MG/DL (ref 65–100)
GLUCOSE BLD STRIP.AUTO-MCNC: 130 MG/DL (ref 65–100)
GLUCOSE BLD STRIP.AUTO-MCNC: 131 MG/DL (ref 65–100)
GLUCOSE BLD STRIP.AUTO-MCNC: 149 MG/DL (ref 65–100)
GLUCOSE SERPL-MCNC: 117 MG/DL (ref 65–100)
PERFORMED BY:: ABNORMAL
PHOSPHATE SERPL-MCNC: 6.4 MG/DL (ref 2.6–4.7)
POTASSIUM SERPL-SCNC: 5.2 MMOL/L (ref 3.5–5.1)
SODIUM SERPL-SCNC: 133 MMOL/L (ref 136–145)

## 2024-08-23 PROCEDURE — 6370000000 HC RX 637 (ALT 250 FOR IP): Performed by: INTERNAL MEDICINE

## 2024-08-23 PROCEDURE — 2580000003 HC RX 258: Performed by: INTERNAL MEDICINE

## 2024-08-23 PROCEDURE — 97530 THERAPEUTIC ACTIVITIES: CPT

## 2024-08-23 PROCEDURE — 2500000003 HC RX 250 WO HCPCS: Performed by: INTERNAL MEDICINE

## 2024-08-23 PROCEDURE — 6360000002 HC RX W HCPCS: Performed by: INTERNAL MEDICINE

## 2024-08-23 PROCEDURE — 36415 COLL VENOUS BLD VENIPUNCTURE: CPT

## 2024-08-23 PROCEDURE — 80069 RENAL FUNCTION PANEL: CPT

## 2024-08-23 PROCEDURE — 97535 SELF CARE MNGMENT TRAINING: CPT

## 2024-08-23 PROCEDURE — 6370000000 HC RX 637 (ALT 250 FOR IP): Performed by: PSYCHIATRY & NEUROLOGY

## 2024-08-23 PROCEDURE — 1100000000 HC RM PRIVATE

## 2024-08-23 PROCEDURE — 82962 GLUCOSE BLOOD TEST: CPT

## 2024-08-23 RX ORDER — SODIUM BICARBONATE 650 MG/1
650 TABLET ORAL
Status: DISCONTINUED | OUTPATIENT
Start: 2024-08-23 | End: 2024-08-26

## 2024-08-23 RX ORDER — OXYCODONE HYDROCHLORIDE 10 MG/1
10 TABLET ORAL ONCE
Status: COMPLETED | OUTPATIENT
Start: 2024-08-23 | End: 2024-08-23

## 2024-08-23 RX ORDER — SODIUM CHLORIDE 9 MG/ML
INJECTION, SOLUTION INTRAVENOUS CONTINUOUS
Status: DISCONTINUED | OUTPATIENT
Start: 2024-08-23 | End: 2024-08-24

## 2024-08-23 RX ORDER — HYDROMORPHONE HYDROCHLORIDE 1 MG/ML
0.5 INJECTION, SOLUTION INTRAMUSCULAR; INTRAVENOUS; SUBCUTANEOUS EVERY 4 HOURS PRN
Status: DISPENSED | OUTPATIENT
Start: 2024-08-23 | End: 2024-08-25

## 2024-08-23 RX ADMIN — SODIUM ZIRCONIUM CYCLOSILICATE 10 G: 10 POWDER, FOR SUSPENSION ORAL at 15:55

## 2024-08-23 RX ADMIN — SODIUM CHLORIDE, PRESERVATIVE FREE 10 ML: 5 INJECTION INTRAVENOUS at 21:56

## 2024-08-23 RX ADMIN — SODIUM CHLORIDE, PRESERVATIVE FREE 10 ML: 5 INJECTION INTRAVENOUS at 21:54

## 2024-08-23 RX ADMIN — SODIUM BICARBONATE 650 MG: 650 TABLET ORAL at 15:55

## 2024-08-23 RX ADMIN — HYDRALAZINE HYDROCHLORIDE 100 MG: 50 TABLET ORAL at 14:25

## 2024-08-23 RX ADMIN — LEVOFLOXACIN 250 MG: 250 TABLET, FILM COATED ORAL at 09:02

## 2024-08-23 RX ADMIN — ASPIRIN 81 MG 81 MG: 81 TABLET ORAL at 09:02

## 2024-08-23 RX ADMIN — HYDROMORPHONE HYDROCHLORIDE 0.5 MG: 1 INJECTION, SOLUTION INTRAMUSCULAR; INTRAVENOUS; SUBCUTANEOUS at 21:31

## 2024-08-23 RX ADMIN — ROSUVASTATIN CALCIUM 20 MG: 20 TABLET, COATED ORAL at 21:35

## 2024-08-23 RX ADMIN — ATENOLOL 50 MG: 25 TABLET ORAL at 09:02

## 2024-08-23 RX ADMIN — HEPARIN SODIUM 5000 UNITS: 5000 INJECTION INTRAVENOUS; SUBCUTANEOUS at 15:55

## 2024-08-23 RX ADMIN — HYDRALAZINE HYDROCHLORIDE 100 MG: 50 TABLET ORAL at 05:48

## 2024-08-23 RX ADMIN — SODIUM CHLORIDE: 9 INJECTION, SOLUTION INTRAVENOUS at 16:15

## 2024-08-23 RX ADMIN — HYDRALAZINE HYDROCHLORIDE 100 MG: 50 TABLET ORAL at 21:35

## 2024-08-23 RX ADMIN — OXYCODONE 5 MG: 5 TABLET ORAL at 09:02

## 2024-08-23 RX ADMIN — HEPARIN SODIUM 5000 UNITS: 5000 INJECTION INTRAVENOUS; SUBCUTANEOUS at 09:01

## 2024-08-23 RX ADMIN — AMLODIPINE BESYLATE 10 MG: 5 TABLET ORAL at 09:02

## 2024-08-23 RX ADMIN — OXYCODONE HYDROCHLORIDE 10 MG: 10 TABLET ORAL at 03:29

## 2024-08-23 ASSESSMENT — PAIN SCALES - GENERAL
PAINLEVEL_OUTOF10: 0
PAINLEVEL_OUTOF10: 7
PAINLEVEL_OUTOF10: 8
PAINLEVEL_OUTOF10: 2
PAINLEVEL_OUTOF10: 7

## 2024-08-23 ASSESSMENT — PAIN - FUNCTIONAL ASSESSMENT: PAIN_FUNCTIONAL_ASSESSMENT: ACTIVITIES ARE NOT PREVENTED

## 2024-08-23 ASSESSMENT — PAIN SCALES - WONG BAKER
WONGBAKER_NUMERICALRESPONSE: NO HURT
WONGBAKER_NUMERICALRESPONSE: NO HURT

## 2024-08-23 ASSESSMENT — PAIN DESCRIPTION - LOCATION
LOCATION: FOOT;LEG
LOCATION: ABDOMEN;FOOT

## 2024-08-23 ASSESSMENT — PAIN DESCRIPTION - DESCRIPTORS
DESCRIPTORS: ACHING;DISCOMFORT
DESCRIPTORS: ACHING;DISCOMFORT

## 2024-08-23 ASSESSMENT — PAIN DESCRIPTION - ORIENTATION: ORIENTATION: RIGHT;LEFT;ANTERIOR

## 2024-08-23 NOTE — PROGRESS NOTES
.Progress Note (Hospitalist, Internal Medicine)  IDENTIFYING INFORMATION   PATIENT:  Yamilet Smith  MRN:  [id number]  ADMIT DATE: 8/18/2024  TIME OF EVALUATION: 8/23/2024 7:13 PM      HISTORY OF PRESENT ILLNESS   Yamilet Smith is a 64 y.o. female who presents with       SUBJECTIVE     Renal function continues to worsen.  She otherwise has no new complaints.    MEDICATIONS   Medications Prior to Admission  Medications Prior to Admission: atenolol (TENORMIN) 50 MG tablet, Take 1 tablet by mouth daily  glimepiride (AMARYL) 4 MG tablet, Take 1 tablet by mouth daily  loratadine (CLARITIN) 10 MG tablet, Take 1 tablet by mouth daily  [DISCONTINUED] simvastatin (ZOCOR) 20 MG tablet, Take 1 tablet by mouth nightly  acarbose (PRECOSE) 50 MG tablet, Take by mouth 3 times daily (with meals)  amLODIPine (NORVASC) 10 MG tablet, Take 1 tablet by mouth daily  aspirin 81 MG chewable tablet, Take 1 tablet by mouth daily  atorvastatin (LIPITOR) 40 MG tablet, Take 1 tablet by mouth nightly  [DISCONTINUED] glipiZIDE (GLUCOTROL) 5 MG tablet, Take by mouth daily  [DISCONTINUED] hydroCHLOROthiazide (HYDRODIURIL) 12.5 MG tablet, Take 1 tablet by mouth daily    Current Medications  Current Facility-Administered Medications   Medication Dose Route Frequency Provider Last Rate Last Admin    HYDROmorphone HCl PF (DILAUDID) injection 0.5 mg  0.5 mg IntraVENous Q4H PRN Alex Hernandez MD        0.9 % sodium chloride infusion   IntraVENous Continuous Everton Whittaker  mL/hr at 08/23/24 1615 New Bag at 08/23/24 1615    sodium bicarbonate tablet 650 mg  650 mg Oral TID WC Everton Whittaker MD   650 mg at 08/23/24 1555    levoFLOXacin (LEVAQUIN) tablet 250 mg  250 mg Oral Daily Mg Domingo MD   250 mg at 08/23/24 0902    hydrALAZINE (APRESOLINE) tablet 100 mg  100 mg Oral 3 times per day Everton Whittaker MD   100 mg at 08/23/24 1425    heparin (porcine) injection 5,000 Units  5,000 Units SubCUTAneous 3 times per day Mg Domingo MD    5,000 Units at 08/23/24 1555    atenolol (TENORMIN) tablet 50 mg  50 mg Oral Daily Everton Whittaker MD   50 mg at 08/23/24 0902    sodium chloride flush 0.9 % injection 5-40 mL  5-40 mL IntraVENous 2 times per day Carlo Sibley MD   10 mL at 08/21/24 0204    sodium chloride flush 0.9 % injection 5-40 mL  5-40 mL IntraVENous PRN Carlo Sibley MD        0.9 % sodium chloride infusion   IntraVENous PRN Carlo Sibley MD        polyethylene glycol (GLYCOLAX) packet 17 g  17 g Oral Daily PRN Carlo Sibley MD   17 g at 08/22/24 0825    aspirin chewable tablet 81 mg  81 mg Oral Daily Carlo Sibley MD   81 mg at 08/23/24 0902    Or    aspirin suppository 300 mg  300 mg Rectal Daily Carlo Sibley MD        rosuvastatin (CRESTOR) tablet 20 mg  20 mg Oral Nightly Archana Duran MD   20 mg at 08/22/24 2253    insulin lispro (HUMALOG,ADMELOG) injection vial 0-4 Units  0-4 Units SubCUTAneous TID  Cortez Casas MD        insulin lispro (HUMALOG,ADMELOG) injection vial 0-4 Units  0-4 Units SubCUTAneous Nightly Cortez Casas MD        sodium chloride flush 0.9 % injection 5-40 mL  5-40 mL IntraVENous 2 times per day Cortez Casas MD   10 mL at 08/19/24 2206    sodium chloride flush 0.9 % injection 5-40 mL  5-40 mL IntraVENous PRN Cortez Casas MD        ondansetron (ZOFRAN-ODT) disintegrating tablet 4 mg  4 mg Oral Q8H PRN Cortez Casas MD   4 mg at 08/22/24 2254    Or    ondansetron (ZOFRAN) injection 4 mg  4 mg IntraVENous Q6H PRN Cortez Casas MD        acetaminophen (TYLENOL) tablet 650 mg  650 mg Oral Q6H PRN Cortez Casas MD   650 mg at 08/19/24 0900    Or    acetaminophen (TYLENOL) suppository 650 mg  650 mg Rectal Q6H PRN Cortez Casas MD        amLODIPine (NORVASC) tablet 10 mg  10 mg Oral Daily Everton Whittaker MD   10 mg at 08/23/24 0902    hydrALAZINE (APRESOLINE) injection 10 mg  10 mg IntraVENous Q4H PRN Everton Whittaker MD

## 2024-08-23 NOTE — PROGRESS NOTES
OCCUPATIONAL THERAPY TREATMENT  Patient: Yamilet Smith (64 y.o. female)  Date: 8/23/2024  Primary Diagnosis: Acute on chronic renal insufficiency [N28.9, N18.9]  MANUEL (acute kidney injury) (HCC) [N17.9]  Uncontrolled hypertension [I10]  Diarrhea, unspecified type [R19.7]       Precautions: General Precautions, Contact Precautions, Bed Alarm                Recommendations for nursing mobility: Encourage HEP in prep for ADLs/mobility; see handout for details, Frequent repositioning to prevent skin breakdown, Use of bed/chair alarm for safety, LE elevation for management of edema, Maxi Move for bed to chair transfers, and Assist x1    In place during session: External Catheter and EKG/telemetry   Chart, occupational therapy assessment, plan of care, and goals were reviewed.  ASSESSMENT  Patient continues with skilled OT services and is progressing towards goals. Pt presented side lying upon GODINEZ arrival, agreeable to session. Pt A&O x 3. Therapist oriented pt to situation.  Pt reported 8/10 pain while in bed but agreeable to sit EOB.  Pt required assistance managing B LE out and back in bed.  Pt reported pain level 3/10 while seated EOB. Pt worked on sitting balance, demonstrating poor balance requiring unilateral support at all times.  Pt required increase A [facility] gown while seated EOB.  Pt returned semi supine and bed placed in modified chair position and pt's breakfast placed in front.  Pt left with all  needs met. (See below for objective details and assist levels).     Overall pt tolerated session Fair today with poor sitting balance at edge of bed. Pt continues to benefit from skilled OT to increase strength, endurance, balance, independence with self care and functional transfers/mobility.  Potential barriers for safe discharge: pts available support system works or is unable to provide adequate supervision leaving the patient alone at times during the day, pts current support system is  place;Oriented to time;Oriented to person;Disoriented to situation  Cognition  Overall Cognitive Status: Exceptions  Following Commands: Follows one step commands with repetition;Follows one step commands with increased time  Attention Span: Attends with cues to redirect  Memory: Decreased long term memory;Decreased recall of recent events  Safety Judgement: Decreased awareness of need for assistance  Problem Solving: Assistance required to generate solutions;Assistance required to identify errors made;Decreased awareness of errors  Insights: Decreased awareness of deficits  Initiation: Requires cues for some  Sequencing: Requires cues for some    Functional Mobility and Transfers for ADLs:  Bed Mobility:  Bed Mobility Training  Bed Mobility Training: Yes  Rolling: Minimum assistance  Supine to Sit: Moderate assistance;Assist X1;Adaptive equipment;Additional time (managing B LEs off bed)  Sit to Supine: Moderate assistance;Assist X1;Additional time;Adaptive equipment (managing LE back in bed)  Scooting: Minimum assistance      Balance:  Balance  Sitting: Impaired  Sitting - Static: Supported sitting  Sitting - Dynamic: Poor (constant support)      ADL Intervention:      UE Dressing: Moderate assistance  UE Dressing Skilled Clinical Factors: Seated EOB, A for balance and        Pain Ratin/10 at SOS and 3/10 sitting EOB  Pain Intervention(s):   nursing notified and repositioning    Activity Tolerance:   Fair  and requires rest breaks    After treatment patient left in no apparent distress:   Bed locked and returned to lowest position, Patient left in no apparent distress in bed, Call bell within reach, Bed/ chair alarm activated, Side rails x3, and Updated patient's board on functional status and mobility recommendations, and nsg updated     COMMUNICATION/EDUCATION:   The patient’s plan of care was discussed with: Registered nurse    Patient Education  Education Given To: Patient  Education Provided: Plan of

## 2024-08-23 NOTE — PLAN OF CARE
PHYSICAL THERAPY TREATMENT     Patient: Yamilet Smith (64 y.o. female)  Date: 8/23/2024  Diagnosis: Acute on chronic renal insufficiency [N28.9, N18.9]  MANUEL (acute kidney injury) (HCC) [N17.9]  Uncontrolled hypertension [I10]  Diarrhea, unspecified type [R19.7] Acute on chronic renal insufficiency      Precautions: General Precautions, Contact Precautions, Bed Alarm                      Recommendations for nursing mobility: Encourage HEP in prep for ADLs/mobility; see handout for details, Frequent repositioning to prevent skin breakdown, Use of bed/chair alarm for safety, and Quynh Stedy for bed to chair transfers     In place during session: External Catheter and EKG/telemetry   Chart, physical therapy assessment, plan of care and goals were reviewed.  ASSESSMENT  Patient continues with skilled PT services and is progressing towards goals. Pt semi-supine upon PT arrival, agreeable to session. Pt A&O x 3. (See below for objective details and assist levels).     Overall pt tolerated session fair today with 8/10 pain in LLE.  Pt requiring mod A to get to EOB today and reported slight dizziness with sitting EOB that improved with increased time in sitting.  Pt attempted sit to stand with writer and was able to come to almost full standing, but had difficulty extending both knees. Pt stood for about 10 seconds before B knees buckled and she had to sit back down.  On second standing attempt, pt used Quynh Steady to perform sit to stand transfer.  Pt able to come to standing for about 10 seconds before seat was folded out for her and she sat back onto Quynh Steady.  Pt remained another 2 minutes in weightbearing position on Quynh Steady before standing erect again in order to fold away seat and have pt sit back down on the bed. Pt then performed LE exercises requiring assist by writer due to weakness and fatigue.  Pt noticeably fatigued following session and was assisted back to bed with pt falling asleep

## 2024-08-23 NOTE — CARE COORDINATION
CM notified of P2P offered for patient to go to [facility] inpatient rehab.  Attending to call [phone] extension [phone].  P2P is due by 11am. CM notified attending this morning.     Back up Disposition is [facility].

## 2024-08-23 NOTE — CARE COORDINATION
P2P unable to be completed this morning. JAMARI has asked [facility] inpatient re[facility] if they have heard anything from the insurance company. Awaiting response at this time.  JAMARI met with patient at bedside and notified her of still awaiting insurance authorization.

## 2024-08-23 NOTE — PROGRESS NOTES
Nephrology follow up          Patient: Yamilet Smith MRN: [id number]  SSN: [id number]    YOB: 1960  Age: 64 y.o.  Sex: female      Subjective:   The patient is seen at the bedside.  More awake and alert today.  Blood pressures are better  On room air  No lower extremity swelling  Worsening BUN/creatinine      Past Medical History:   Diagnosis Date    Chronic kidney disease     Diabetes (HCC)     Hypertension     Stroke (HCC)      Past Surgical History:   Procedure Laterality Date    HYSTERECTOMY (CERVIX STATUS UNKNOWN)      age 24      Family History   Problem Relation Age of Onset    Cancer Sister     Diabetes Sister     Heart Disease Mother     Diabetes Mother      Social History     Tobacco Use    Smoking status: Every Day     Current packs/day: 0.50     Types: Cigarettes    Smokeless tobacco: Never   Substance Use Topics    Alcohol use: Never      Current Facility-Administered Medications   Medication Dose Route Frequency Provider Last Rate Last Admin    HYDROmorphone HCl PF (DILAUDID) injection 0.5 mg  0.5 mg IntraVENous Q4H PRN Alex Hernandez MD        levoFLOXacin (LEVAQUIN) tablet 250 mg  250 mg Oral Daily Mg Domingo MD   250 mg at 08/23/24 0902    hydrALAZINE (APRESOLINE) tablet 100 mg  100 mg Oral 3 times per day Everton Whittaker MD   100 mg at 08/23/24 1425    heparin (porcine) injection 5,000 Units  5,000 Units SubCUTAneous 3 times per day Mg Domingo MD   5,000 Units at 08/23/24 0901    atenolol (TENORMIN) tablet 50 mg  50 mg Oral Daily Everton Whittaker MD   50 mg at 08/23/24 0902    sodium chloride flush 0.9 % injection 5-40 mL  5-40 mL IntraVENous 2 times per day Carlo Sibley MD   10 mL at 08/21/24 0204    sodium chloride flush 0.9 % injection 5-40 mL  5-40 mL IntraVENous PRN Carlo Sibley MD        0.9 % sodium chloride infusion   IntraVENous PRN Carlo Sibley MD        polyethylene glycol (GLYCOLAX) packet 17 g  17 g Oral Daily PRN Carlo Sibley

## 2024-08-24 LAB
ALBUMIN SERPL-MCNC: 2.6 G/DL (ref 3.5–5)
ANION GAP SERPL CALC-SCNC: 10 MMOL/L (ref 5–15)
BUN SERPL-MCNC: 105 MG/DL (ref 6–20)
BUN/CREAT SERPL: 19 (ref 12–20)
CA-I BLD-MCNC: 8.4 MG/DL (ref 8.5–10.1)
CHLORIDE SERPL-SCNC: 106 MMOL/L (ref 97–108)
CO2 SERPL-SCNC: 18 MMOL/L (ref 21–32)
CREAT SERPL-MCNC: 5.57 MG/DL (ref 0.55–1.02)
GLUCOSE BLD STRIP.AUTO-MCNC: 126 MG/DL (ref 65–100)
GLUCOSE BLD STRIP.AUTO-MCNC: 136 MG/DL (ref 65–100)
GLUCOSE BLD STRIP.AUTO-MCNC: 162 MG/DL (ref 65–100)
GLUCOSE BLD STRIP.AUTO-MCNC: 199 MG/DL (ref 65–100)
GLUCOSE SERPL-MCNC: 123 MG/DL (ref 65–100)
PERFORMED BY:: ABNORMAL
PHOSPHATE SERPL-MCNC: 7.4 MG/DL (ref 2.6–4.7)
POTASSIUM SERPL-SCNC: 5.1 MMOL/L (ref 3.5–5.1)
SODIUM SERPL-SCNC: 134 MMOL/L (ref 136–145)

## 2024-08-24 PROCEDURE — 6360000002 HC RX W HCPCS: Performed by: INTERNAL MEDICINE

## 2024-08-24 PROCEDURE — 6370000000 HC RX 637 (ALT 250 FOR IP): Performed by: INTERNAL MEDICINE

## 2024-08-24 PROCEDURE — 80069 RENAL FUNCTION PANEL: CPT

## 2024-08-24 PROCEDURE — 2500000003 HC RX 250 WO HCPCS: Performed by: INTERNAL MEDICINE

## 2024-08-24 PROCEDURE — 2580000003 HC RX 258: Performed by: INTERNAL MEDICINE

## 2024-08-24 PROCEDURE — 6370000000 HC RX 637 (ALT 250 FOR IP): Performed by: PSYCHIATRY & NEUROLOGY

## 2024-08-24 PROCEDURE — 36415 COLL VENOUS BLD VENIPUNCTURE: CPT

## 2024-08-24 PROCEDURE — 82962 GLUCOSE BLOOD TEST: CPT

## 2024-08-24 PROCEDURE — 1100000000 HC RM PRIVATE

## 2024-08-24 RX ADMIN — LEVOFLOXACIN 250 MG: 250 TABLET, FILM COATED ORAL at 08:18

## 2024-08-24 RX ADMIN — SODIUM BICARBONATE 650 MG: 650 TABLET ORAL at 16:55

## 2024-08-24 RX ADMIN — SODIUM CHLORIDE, PRESERVATIVE FREE 10 ML: 5 INJECTION INTRAVENOUS at 20:38

## 2024-08-24 RX ADMIN — SODIUM CHLORIDE: 9 INJECTION, SOLUTION INTRAVENOUS at 02:23

## 2024-08-24 RX ADMIN — ASPIRIN 81 MG 81 MG: 81 TABLET ORAL at 08:19

## 2024-08-24 RX ADMIN — SODIUM BICARBONATE 650 MG: 650 TABLET ORAL at 08:19

## 2024-08-24 RX ADMIN — SODIUM ZIRCONIUM CYCLOSILICATE 10 G: 10 POWDER, FOR SUSPENSION ORAL at 11:24

## 2024-08-24 RX ADMIN — HYDRALAZINE HYDROCHLORIDE 100 MG: 50 TABLET ORAL at 13:34

## 2024-08-24 RX ADMIN — HYDROMORPHONE HYDROCHLORIDE 0.5 MG: 1 INJECTION, SOLUTION INTRAMUSCULAR; INTRAVENOUS; SUBCUTANEOUS at 23:00

## 2024-08-24 RX ADMIN — ONDANSETRON 4 MG: 2 INJECTION INTRAMUSCULAR; INTRAVENOUS at 10:10

## 2024-08-24 RX ADMIN — OXYCODONE 5 MG: 5 TABLET ORAL at 08:18

## 2024-08-24 RX ADMIN — ATENOLOL 50 MG: 25 TABLET ORAL at 08:18

## 2024-08-24 RX ADMIN — ROSUVASTATIN CALCIUM 20 MG: 20 TABLET, COATED ORAL at 20:38

## 2024-08-24 RX ADMIN — SODIUM BICARBONATE: 84 INJECTION, SOLUTION INTRAVENOUS at 11:23

## 2024-08-24 RX ADMIN — HYDRALAZINE HYDROCHLORIDE 100 MG: 50 TABLET ORAL at 05:53

## 2024-08-24 RX ADMIN — HEPARIN SODIUM 5000 UNITS: 5000 INJECTION INTRAVENOUS; SUBCUTANEOUS at 16:54

## 2024-08-24 RX ADMIN — HEPARIN SODIUM 5000 UNITS: 5000 INJECTION INTRAVENOUS; SUBCUTANEOUS at 08:19

## 2024-08-24 RX ADMIN — POLYETHYLENE GLYCOL 3350 17 G: 17 POWDER, FOR SOLUTION ORAL at 10:03

## 2024-08-24 RX ADMIN — HYDRALAZINE HYDROCHLORIDE 100 MG: 50 TABLET ORAL at 20:38

## 2024-08-24 RX ADMIN — HEPARIN SODIUM 5000 UNITS: 5000 INJECTION INTRAVENOUS; SUBCUTANEOUS at 23:00

## 2024-08-24 RX ADMIN — AMLODIPINE BESYLATE 10 MG: 5 TABLET ORAL at 08:19

## 2024-08-24 RX ADMIN — SODIUM BICARBONATE 650 MG: 650 TABLET ORAL at 12:02

## 2024-08-24 ASSESSMENT — PAIN SCALES - GENERAL
PAINLEVEL_OUTOF10: 0
PAINLEVEL_OUTOF10: 8
PAINLEVEL_OUTOF10: 7
PAINLEVEL_OUTOF10: 0
PAINLEVEL_OUTOF10: 3
PAINLEVEL_OUTOF10: 0
PAINLEVEL_OUTOF10: 4
PAINLEVEL_OUTOF10: 0
PAINLEVEL_OUTOF10: 0

## 2024-08-24 ASSESSMENT — PAIN DESCRIPTION - LOCATION
LOCATION: ABDOMEN
LOCATION: LEG

## 2024-08-24 ASSESSMENT — PAIN SCALES - WONG BAKER
WONGBAKER_NUMERICALRESPONSE: NO HURT
WONGBAKER_NUMERICALRESPONSE: NO HURT

## 2024-08-24 ASSESSMENT — PAIN DESCRIPTION - ORIENTATION: ORIENTATION: LEFT;POSTERIOR

## 2024-08-24 ASSESSMENT — PAIN DESCRIPTION - DESCRIPTORS
DESCRIPTORS: ACHING;PATIENT UNABLE TO DESCRIBE
DESCRIPTORS: ACHING

## 2024-08-24 ASSESSMENT — PAIN - FUNCTIONAL ASSESSMENT: PAIN_FUNCTIONAL_ASSESSMENT: ACTIVITIES ARE NOT PREVENTED

## 2024-08-24 NOTE — PROGRESS NOTES
Q6H NATIN Carlo Sibley MD   5 mg at 08/24/24 0818         Allergies  No Known Allergies    REVIEW OF SYSTEMS     Within above limitations. 14 point review of systems reviewed. Pertinent positive or negative as per HPI or otherwise negative per 14 point systems review.     Reviewed 8/24/2024 at 1:23 PM    PHYSICAL EXAM       Blood pressure (!) 130/48, pulse 61, temperature 98.2 °F (36.8 °C), temperature source Oral, resp. rate 17, height 1.702 m (5' 7\"), weight 83.3 kg (183 lb 10.3 oz), SpO2 91%.    General: Patient is awake, alert, oriented with appropriate mood and affect  HEENT: Atraumatic, normocephalic, pupils equal equally reactive to light.  Mucous membranes moist  Heart: First and second heart sounds present  Lungs: Air entry adequate  Abdomen: Soft, nontender, nondistended.  Bowel sounds present with regular frequency  Genitals: Deferred  Skin:  Extremities:  pedal pulses palpable with good volume.  CNS: Cranial nerves II to XII intact with no focal neurological deficit.      Lines/Drains/Airways/Wounds:  [unfilled]    LABS AND IMAGING   CBC  [unfilled]    Last 3 Hemoglobin  Lab Results   Component Value Date/Time    HGB 9.6 08/22/2024 06:41 AM    HGB 9.5 08/21/2024 07:01 AM    HGB 9.1 08/20/2024 05:29 AM     Last 3 WBC/ANC  Lab Results   Component Value Date/Time    WBC 9.2 08/22/2024 06:41 AM    WBC 10.3 08/21/2024 07:01 AM    WBC 8.7 08/20/2024 05:29 AM     No components found for: \"GRNLOCTYABS\"  Last 3 Platelets  No results found for: \"PLATELET\"  Chemistry  [unfilled]  [unfilled]  No results found for: \"LDH\"  Coagulation Studies  Lab Results   Component Value Date/Time    INR 1.0 01/31/2023 10:48 AM     Liver Function Studies  Lab Results   Component Value Date/Time    ALT 9 08/18/2024 05:36 PM    AST 8 08/18/2024 05:36 PM    ALKPHOS 70 08/18/2024 05:36 PM    ALKPHOS 60 04/13/2023 06:33 AM    ALBUMIN 2.6 08/24/2024 06:52 AM       Recent Imaging        Relevant labs and imaging  reviewed    ASSESSMENT AND PLAN     Klebsiella UTI  Complete Levaquin course    Hypertensive urgency  Continue atenolol, Norvasc as well as hydralazine    History of CVA  Continue aspirin and statin    DM type II  Fingersticks blood sugar monitoring  Insulin blood sugar management    Dyslipidemia  Continue rosuvastatin    MANUEL  This has progressively worsened.  Patient may need dialysis  Follow-up with nephrology recommendations  Patient being initiated on gentle hydration.  Dialysis planned for Monday if renal function does not improve by then.    Significant debility  Patient will need rehab at the time of discharge.    DVT prophylaxis  Continue heparin          [facility]  Hospitalist, Internal Medicine  8/24/2024 at 1:23 PM

## 2024-08-24 NOTE — PROGRESS NOTES
Nephrology follow up          Patient: Yamilet Smith MRN: [id number]  SSN: [id number]    YOB: 1960  Age: 64 y.o.  Sex: female      Subjective:   The patient is seen at the bedside.  She is awake and looks comfortable.  Blood pressures are better  On room air  No lower extremity swelling  Worsening BUN/creatinine  On IV fluids.      Past Medical History:   Diagnosis Date    Chronic kidney disease     Diabetes (HCC)     Hypertension     Stroke (HCC)      Past Surgical History:   Procedure Laterality Date    HYSTERECTOMY (CERVIX STATUS UNKNOWN)      age 24      Family History   Problem Relation Age of Onset    Cancer Sister     Diabetes Sister     Heart Disease Mother     Diabetes Mother      Social History     Tobacco Use    Smoking status: Every Day     Current packs/day: 0.50     Types: Cigarettes    Smokeless tobacco: Never   Substance Use Topics    Alcohol use: Never      Current Facility-Administered Medications   Medication Dose Route Frequency Provider Last Rate Last Admin    HYDROmorphone HCl PF (DILAUDID) injection 0.5 mg  0.5 mg IntraVENous Q4H PRN Alex Hernandez MD   0.5 mg at 08/23/24 2131    0.9 % sodium chloride infusion   IntraVENous Continuous Everton Whittaker  mL/hr at 08/24/24 0223 New Bag at 08/24/24 0223    sodium bicarbonate tablet 650 mg  650 mg Oral TID WC Everton Whittaker MD   650 mg at 08/24/24 0819    levoFLOXacin (LEVAQUIN) tablet 250 mg  250 mg Oral Daily Mg Domingo MD   250 mg at 08/24/24 0818    hydrALAZINE (APRESOLINE) tablet 100 mg  100 mg Oral 3 times per day Everton Whittaker MD   100 mg at 08/24/24 0553    heparin (porcine) injection 5,000 Units  5,000 Units SubCUTAneous 3 times per day Mg Domingo MD   5,000 Units at 08/24/24 0819    atenolol (TENORMIN) tablet 50 mg  50 mg Oral Daily Everton Whittaker MD   50 mg at 08/24/24 0818    sodium chloride flush 0.9 % injection 5-40 mL  5-40 mL IntraVENous PRN Carlo Sibley MD        0.9 % sodium

## 2024-08-24 NOTE — PLAN OF CARE
Problem: Discharge Planning  Goal: Discharge to home or other facility with appropriate resources  8/24/2024 0216 by Nemo Aragon RN  Outcome: Progressing  Flowsheets (Taken 8/23/2024 1955)  Discharge to home or other facility with appropriate resources:   Identify barriers to discharge with patient and caregiver   Arrange for needed discharge resources and transportation as appropriate   Identify discharge learning needs (meds, wound care, etc)  8/23/2024 1458 by Betzaida Monroe RN  Outcome: Progressing  Flowsheets (Taken 8/23/2024 0900)  Discharge to home or other facility with appropriate resources: Identify barriers to discharge with patient and caregiver     Problem: Pain  Goal: Verbalizes/displays adequate comfort level or baseline comfort level  8/24/2024 0216 by Nemo Aragon RN  Outcome: Progressing  8/23/2024 1458 by Betzaida Monroe RN  Outcome: Progressing     Problem: Safety - Adult  Goal: Free from fall injury  8/24/2024 0216 by Nemo Aragon RN  Outcome: Progressing  8/23/2024 1458 by Betzaida Monroe RN  Outcome: Progressing     Problem: Skin/Tissue Integrity  Goal: Absence of new skin breakdown  Description: 1.  Monitor for areas of redness and/or skin breakdown  2.  Assess vascular access sites hourly  3.  Every 4-6 hours minimum:  Change oxygen saturation probe site  4.  Every 4-6 hours:  If on nasal continuous positive airway pressure, respiratory therapy assess nares and determine need for appliance change or resting period.  8/24/2024 0216 by Nemo Aragon RN  Outcome: Progressing  8/23/2024 1458 by Betzaida Monroe RN  Outcome: Progressing     Problem: Chronic Conditions and Co-morbidities  Goal: Patient's chronic conditions and co-morbidity symptoms are monitored and maintained or improved  8/24/2024 0216 by Nemo Aragon RN  Outcome: Progressing  Flowsheets (Taken 8/23/2024 1955)  Care Plan - Patient's Chronic Conditions and Co-Morbidity Symptoms are Monitored and

## 2024-08-25 LAB
ALBUMIN SERPL-MCNC: 2.6 G/DL (ref 3.5–5)
ANION GAP SERPL CALC-SCNC: 9 MMOL/L (ref 5–15)
BUN SERPL-MCNC: 98 MG/DL (ref 6–20)
BUN/CREAT SERPL: 19 (ref 12–20)
CA-I BLD-MCNC: 8 MG/DL (ref 8.5–10.1)
CHLORIDE SERPL-SCNC: 100 MMOL/L (ref 97–108)
CO2 SERPL-SCNC: 24 MMOL/L (ref 21–32)
CREAT SERPL-MCNC: 5.24 MG/DL (ref 0.55–1.02)
GLUCOSE BLD STRIP.AUTO-MCNC: 147 MG/DL (ref 65–100)
GLUCOSE BLD STRIP.AUTO-MCNC: 177 MG/DL (ref 65–100)
GLUCOSE BLD STRIP.AUTO-MCNC: 200 MG/DL (ref 65–100)
GLUCOSE BLD STRIP.AUTO-MCNC: 96 MG/DL (ref 65–100)
GLUCOSE SERPL-MCNC: 161 MG/DL (ref 65–100)
PERFORMED BY:: ABNORMAL
PERFORMED BY:: NORMAL
PHOSPHATE SERPL-MCNC: 6.7 MG/DL (ref 2.6–4.7)
POTASSIUM SERPL-SCNC: 4.1 MMOL/L (ref 3.5–5.1)
SODIUM SERPL-SCNC: 133 MMOL/L (ref 136–145)

## 2024-08-25 PROCEDURE — 6370000000 HC RX 637 (ALT 250 FOR IP): Performed by: INTERNAL MEDICINE

## 2024-08-25 PROCEDURE — 2500000003 HC RX 250 WO HCPCS: Performed by: INTERNAL MEDICINE

## 2024-08-25 PROCEDURE — 2580000003 HC RX 258: Performed by: INTERNAL MEDICINE

## 2024-08-25 PROCEDURE — 80069 RENAL FUNCTION PANEL: CPT

## 2024-08-25 PROCEDURE — 6370000000 HC RX 637 (ALT 250 FOR IP): Performed by: PSYCHIATRY & NEUROLOGY

## 2024-08-25 PROCEDURE — 36415 COLL VENOUS BLD VENIPUNCTURE: CPT

## 2024-08-25 PROCEDURE — 82962 GLUCOSE BLOOD TEST: CPT

## 2024-08-25 PROCEDURE — 97110 THERAPEUTIC EXERCISES: CPT

## 2024-08-25 PROCEDURE — 1100000000 HC RM PRIVATE

## 2024-08-25 PROCEDURE — 6360000002 HC RX W HCPCS: Performed by: INTERNAL MEDICINE

## 2024-08-25 PROCEDURE — 97530 THERAPEUTIC ACTIVITIES: CPT

## 2024-08-25 RX ORDER — DOXAZOSIN 2 MG/1
2 TABLET ORAL DAILY
Status: DISCONTINUED | OUTPATIENT
Start: 2024-08-25 | End: 2024-08-26

## 2024-08-25 RX ORDER — SODIUM CHLORIDE 9 MG/ML
INJECTION, SOLUTION INTRAVENOUS CONTINUOUS
Status: DISCONTINUED | OUTPATIENT
Start: 2024-08-25 | End: 2024-08-26

## 2024-08-25 RX ORDER — LANOLIN ALCOHOL/MO/W.PET/CERES
3 CREAM (GRAM) TOPICAL NIGHTLY PRN
Status: DISCONTINUED | OUTPATIENT
Start: 2024-08-25 | End: 2024-08-30 | Stop reason: HOSPADM

## 2024-08-25 RX ADMIN — LEVOFLOXACIN 250 MG: 250 TABLET, FILM COATED ORAL at 08:41

## 2024-08-25 RX ADMIN — HEPARIN SODIUM 5000 UNITS: 5000 INJECTION INTRAVENOUS; SUBCUTANEOUS at 17:12

## 2024-08-25 RX ADMIN — SODIUM BICARBONATE 650 MG: 650 TABLET ORAL at 11:58

## 2024-08-25 RX ADMIN — HEPARIN SODIUM 5000 UNITS: 5000 INJECTION INTRAVENOUS; SUBCUTANEOUS at 08:42

## 2024-08-25 RX ADMIN — HYDROMORPHONE HYDROCHLORIDE 0.5 MG: 1 INJECTION, SOLUTION INTRAMUSCULAR; INTRAVENOUS; SUBCUTANEOUS at 05:21

## 2024-08-25 RX ADMIN — HYDRALAZINE HYDROCHLORIDE 100 MG: 50 TABLET ORAL at 15:10

## 2024-08-25 RX ADMIN — SODIUM BICARBONATE: 84 INJECTION, SOLUTION INTRAVENOUS at 01:56

## 2024-08-25 RX ADMIN — Medication 3 MG: at 22:57

## 2024-08-25 RX ADMIN — INSULIN LISPRO 1 UNITS: 100 INJECTION, SOLUTION INTRAVENOUS; SUBCUTANEOUS at 11:58

## 2024-08-25 RX ADMIN — AMLODIPINE BESYLATE 10 MG: 5 TABLET ORAL at 08:41

## 2024-08-25 RX ADMIN — OXYCODONE 5 MG: 5 TABLET ORAL at 18:42

## 2024-08-25 RX ADMIN — DOXAZOSIN 2 MG: 2 TABLET ORAL at 11:58

## 2024-08-25 RX ADMIN — ROSUVASTATIN CALCIUM 20 MG: 20 TABLET, COATED ORAL at 21:27

## 2024-08-25 RX ADMIN — HYDRALAZINE HYDROCHLORIDE 100 MG: 50 TABLET ORAL at 05:22

## 2024-08-25 RX ADMIN — SODIUM CHLORIDE: 9 INJECTION, SOLUTION INTRAVENOUS at 22:38

## 2024-08-25 RX ADMIN — SODIUM CHLORIDE, PRESERVATIVE FREE 10 ML: 5 INJECTION INTRAVENOUS at 22:39

## 2024-08-25 RX ADMIN — ATENOLOL 50 MG: 25 TABLET ORAL at 08:42

## 2024-08-25 RX ADMIN — SODIUM CHLORIDE, PRESERVATIVE FREE 10 ML: 5 INJECTION INTRAVENOUS at 08:42

## 2024-08-25 RX ADMIN — SODIUM BICARBONATE 650 MG: 650 TABLET ORAL at 08:42

## 2024-08-25 RX ADMIN — SODIUM BICARBONATE 650 MG: 650 TABLET ORAL at 17:11

## 2024-08-25 RX ADMIN — SODIUM CHLORIDE: 9 INJECTION, SOLUTION INTRAVENOUS at 11:57

## 2024-08-25 RX ADMIN — ASPIRIN 81 MG 81 MG: 81 TABLET ORAL at 08:42

## 2024-08-25 RX ADMIN — HYDRALAZINE HYDROCHLORIDE 100 MG: 50 TABLET ORAL at 21:27

## 2024-08-25 ASSESSMENT — PAIN - FUNCTIONAL ASSESSMENT: PAIN_FUNCTIONAL_ASSESSMENT: PREVENTS OR INTERFERES SOME ACTIVE ACTIVITIES AND ADLS

## 2024-08-25 ASSESSMENT — PAIN DESCRIPTION - LOCATION: LOCATION: LEG

## 2024-08-25 ASSESSMENT — PAIN SCALES - GENERAL
PAINLEVEL_OUTOF10: 9
PAINLEVEL_OUTOF10: 3

## 2024-08-25 ASSESSMENT — PAIN DESCRIPTION - DESCRIPTORS: DESCRIPTORS: ACHING;OTHER (COMMENT)

## 2024-08-25 ASSESSMENT — PAIN SCALES - WONG BAKER: WONGBAKER_NUMERICALRESPONSE: NO HURT

## 2024-08-25 ASSESSMENT — PAIN DESCRIPTION - ORIENTATION: ORIENTATION: LEFT

## 2024-08-25 NOTE — PROGRESS NOTES
Nephrology follow up          Patient: Yamilet Smith MRN: [id number]  SSN: [id number]    YOB: 1960  Age: 64 y.o.  Sex: female      Subjective:   The patient is seen at the bedside.  She is awake and looks comfortable.  Blood pressures are better  On room air  No lower extremity swelling  On IV fluids.      Past Medical History:   Diagnosis Date    Chronic kidney disease     Diabetes (HCC)     Hypertension     Stroke (HCC)      Past Surgical History:   Procedure Laterality Date    HYSTERECTOMY (CERVIX STATUS UNKNOWN)      age 24      Family History   Problem Relation Age of Onset    Cancer Sister     Diabetes Sister     Heart Disease Mother     Diabetes Mother      Social History     Tobacco Use    Smoking status: Every Day     Current packs/day: 0.50     Types: Cigarettes    Smokeless tobacco: Never   Substance Use Topics    Alcohol use: Never      Current Facility-Administered Medications   Medication Dose Route Frequency Provider Last Rate Last Admin    0.9 % sodium chloride infusion   IntraVENous Continuous Everton Whittaker MD        doxazosin (CARDURA) tablet 2 mg  2 mg Oral Daily Everton Whittaker MD        HYDROmorphone HCl PF (DILAUDID) injection 0.5 mg  0.5 mg IntraVENous Q4H PRN Alex Hernandez MD   0.5 mg at 08/25/24 0521    sodium bicarbonate tablet 650 mg  650 mg Oral TID WC Everton Whittaker MD   650 mg at 08/25/24 0842    hydrALAZINE (APRESOLINE) tablet 100 mg  100 mg Oral 3 times per day Everton Whittaker MD   100 mg at 08/25/24 0522    heparin (porcine) injection 5,000 Units  5,000 Units SubCUTAneous 3 times per day Mg Domingo MD   5,000 Units at 08/25/24 0842    atenolol (TENORMIN) tablet 50 mg  50 mg Oral Daily Everton Whittaker MD   50 mg at 08/25/24 0842    sodium chloride flush 0.9 % injection 5-40 mL  5-40 mL IntraVENous PRN Carlo Sibley MD        0.9 % sodium chloride infusion   IntraVENous PRN Carlo Sibley MD        polyethylene glycol (GLYCOLAX) packet 17 g

## 2024-08-25 NOTE — PLAN OF CARE
Problem: Pain  Goal: Verbalizes/displays adequate comfort level or baseline comfort level  Flowsheets (Taken 8/25/2024 0958)  Verbalizes/displays adequate comfort level or baseline comfort level:   Encourage patient to monitor pain and request assistance   Administer analgesics based on type and severity of pain and evaluate response   Assess pain using appropriate pain scale     Problem: Safety - Adult  Goal: Free from fall injury  Outcome: Progressing  Flowsheets  Taken 8/25/2024 0958 by Kaur Trammell, RN  Free From Fall Injury: Instruct family/caregiver on patient safety  Taken 8/24/2024 2035 by de Los Reyes, Beverly Jover, RN  Free From Fall Injury: Instruct family/caregiver on patient safety

## 2024-08-25 NOTE — PROGRESS NOTES
.Progress Note (Hospitalist, Internal Medicine)  IDENTIFYING INFORMATION   PATIENT:  Yamilet Smith  MRN:  [id number]  ADMIT DATE: 8/18/2024  TIME OF EVALUATION: 8/25/2024 12:23 PM      HISTORY OF PRESENT ILLNESS   Yamilet Smith is a 64 y.o. female who presents with       SUBJECTIVE     Renal function continues to worsen.  She otherwise has no new complaints.    MEDICATIONS   Medications Prior to Admission  Medications Prior to Admission: atenolol (TENORMIN) 50 MG tablet, Take 1 tablet by mouth daily  glimepiride (AMARYL) 4 MG tablet, Take 1 tablet by mouth daily  loratadine (CLARITIN) 10 MG tablet, Take 1 tablet by mouth daily  [DISCONTINUED] simvastatin (ZOCOR) 20 MG tablet, Take 1 tablet by mouth nightly  acarbose (PRECOSE) 50 MG tablet, Take by mouth 3 times daily (with meals)  amLODIPine (NORVASC) 10 MG tablet, Take 1 tablet by mouth daily  aspirin 81 MG chewable tablet, Take 1 tablet by mouth daily  atorvastatin (LIPITOR) 40 MG tablet, Take 1 tablet by mouth nightly  [DISCONTINUED] glipiZIDE (GLUCOTROL) 5 MG tablet, Take by mouth daily  [DISCONTINUED] hydroCHLOROthiazide (HYDRODIURIL) 12.5 MG tablet, Take 1 tablet by mouth daily    Current Medications  Current Facility-Administered Medications   Medication Dose Route Frequency Provider Last Rate Last Admin    0.9 % sodium chloride infusion   IntraVENous Continuous Everton Whittaker  mL/hr at 08/25/24 1157 New Bag at 08/25/24 1157    doxazosin (CARDURA) tablet 2 mg  2 mg Oral Daily Everton Whittaker MD   2 mg at 08/25/24 1158    sodium bicarbonate tablet 650 mg  650 mg Oral TID WC Everton Whittaker MD   650 mg at 08/25/24 1158    hydrALAZINE (APRESOLINE) tablet 100 mg  100 mg Oral 3 times per day Everton Whittaker MD   100 mg at 08/25/24 0522    heparin (porcine) injection 5,000 Units  5,000 Units SubCUTAneous 3 times per day Mg Domingo MD   5,000 Units at 08/25/24 0842    atenolol (TENORMIN) tablet 50 mg  50 mg Oral Daily Everton Whittaker MD   50 mg at

## 2024-08-25 NOTE — PLAN OF CARE
Problem: Physical Therapy - Adult  Goal: By Discharge: Performs mobility at highest level of function for planned discharge setting.  See evaluation for individualized goals.  Description: FUNCTIONAL STATUS PRIOR TO ADMISSION: Patient was modified independent using a rollator for functional mobility. and The patient  required minimal assistance for basic and instrumental ADLs.    HOME SUPPORT PRIOR TO ADMISSION: The patient lived with sister and required minimal assistance for ADL's.  Pt reports having an aide come M-F from 9-3 and 5-7 and then her sister cares for her on the weekends.  Pt reports the aide helps with meal prep and ADL's.    Physical Therapy Goals  Initiated 8/20/2024  Pt stated goal: to get stronger and go home  Pt will be I with LE HEP in 7 days.  Pt will perform bed mobility with Contact Guard Assist in 7 days.  Pt will perform transfers with Minimal Assist in 7 days.   Pt will amb 5 feet with LRAD safely with Minimal Assist in 7 days.  Pt will demonstrate improvement in standing balance from poor to fair in 7 days.    Outcome: Progressing            PHYSICAL THERAPY TREATMENT     Patient: Yamilet Smith (64 y.o. female)  Date: 8/25/2024  Diagnosis: Acute on chronic renal insufficiency [N28.9, N18.9]  MANUEL (acute kidney injury) (HCC) [N17.9]  Uncontrolled hypertension [I10]  Diarrhea, unspecified type [R19.7] Acute on chronic renal insufficiency      Precautions: General Precautions, Contact Precautions, Bed Alarm                      Recommendations for nursing mobility: Out of bed to chair for meals, Encourage HEP in prep for ADLs/mobility; see handout for details, Frequent repositioning to prevent skin breakdown, AD and gt belt for bed to chair , Assist x1, and Assist x2    In place during session: Peripheral IV, External Catheter, and EKG/telemetry   Chart, physical therapy assessment, plan of care and goals were reviewed.  ASSESSMENT  Patient continues with skilled PT services and is

## 2024-08-26 ENCOUNTER — APPOINTMENT (OUTPATIENT)
Facility: HOSPITAL | Age: 64
End: 2024-08-26
Attending: INTERNAL MEDICINE
Payer: MEDICARE

## 2024-08-26 LAB
ALBUMIN SERPL-MCNC: 2.5 G/DL (ref 3.5–5)
ANION GAP SERPL CALC-SCNC: 11 MMOL/L (ref 5–15)
BUN SERPL-MCNC: 92 MG/DL (ref 6–20)
BUN/CREAT SERPL: 18 (ref 12–20)
CA-I BLD-MCNC: 7.6 MG/DL (ref 8.5–10.1)
CHLORIDE SERPL-SCNC: 103 MMOL/L (ref 97–108)
CO2 SERPL-SCNC: 23 MMOL/L (ref 21–32)
CREAT SERPL-MCNC: 5.14 MG/DL (ref 0.55–1.02)
GLUCOSE BLD STRIP.AUTO-MCNC: 124 MG/DL (ref 65–100)
GLUCOSE BLD STRIP.AUTO-MCNC: 149 MG/DL (ref 65–100)
GLUCOSE BLD STRIP.AUTO-MCNC: 196 MG/DL (ref 65–100)
GLUCOSE SERPL-MCNC: 137 MG/DL (ref 65–100)
HBV SURFACE AB SER QL: NONREACTIVE
HBV SURFACE AB SER-ACNC: 5.63 MIU/ML
HBV SURFACE AG SER QL: <0.1 INDEX
HBV SURFACE AG SER QL: NEGATIVE
HCV AB SER IA-ACNC: 0.11 INDEX
HCV AB SERPL QL IA: NONREACTIVE
PERFORMED BY:: ABNORMAL
PHOSPHATE SERPL-MCNC: 6.2 MG/DL (ref 2.6–4.7)
POTASSIUM SERPL-SCNC: 4.4 MMOL/L (ref 3.5–5.1)
SODIUM SERPL-SCNC: 137 MMOL/L (ref 136–145)

## 2024-08-26 PROCEDURE — 6370000000 HC RX 637 (ALT 250 FOR IP): Performed by: INTERNAL MEDICINE

## 2024-08-26 PROCEDURE — B5181ZA FLUOROSCOPY OF SUPERIOR VENA CAVA USING LOW OSMOLAR CONTRAST, GUIDANCE: ICD-10-PCS | Performed by: STUDENT IN AN ORGANIZED HEALTH CARE EDUCATION/TRAINING PROGRAM

## 2024-08-26 PROCEDURE — 5A1D70Z PERFORMANCE OF URINARY FILTRATION, INTERMITTENT, LESS THAN 6 HOURS PER DAY: ICD-10-PCS | Performed by: STUDENT IN AN ORGANIZED HEALTH CARE EDUCATION/TRAINING PROGRAM

## 2024-08-26 PROCEDURE — 1100000000 HC RM PRIVATE

## 2024-08-26 PROCEDURE — 86706 HEP B SURFACE ANTIBODY: CPT

## 2024-08-26 PROCEDURE — 87340 HEPATITIS B SURFACE AG IA: CPT

## 2024-08-26 PROCEDURE — 2709999900 HC NON-CHARGEABLE SUPPLY

## 2024-08-26 PROCEDURE — 86704 HEP B CORE ANTIBODY TOTAL: CPT

## 2024-08-26 PROCEDURE — 36560 INSERT TUNNELED CV CATH: CPT

## 2024-08-26 PROCEDURE — 6360000002 HC RX W HCPCS: Performed by: INTERNAL MEDICINE

## 2024-08-26 PROCEDURE — 0JH63XZ INSERTION OF TUNNELED VASCULAR ACCESS DEVICE INTO CHEST SUBCUTANEOUS TISSUE AND FASCIA, PERCUTANEOUS APPROACH: ICD-10-PCS | Performed by: STUDENT IN AN ORGANIZED HEALTH CARE EDUCATION/TRAINING PROGRAM

## 2024-08-26 PROCEDURE — C1750 CATH, HEMODIALYSIS,LONG-TERM: HCPCS

## 2024-08-26 PROCEDURE — 36415 COLL VENOUS BLD VENIPUNCTURE: CPT

## 2024-08-26 PROCEDURE — 2580000003 HC RX 258: Performed by: INTERNAL MEDICINE

## 2024-08-26 PROCEDURE — 02H633Z INSERTION OF INFUSION DEVICE INTO RIGHT ATRIUM, PERCUTANEOUS APPROACH: ICD-10-PCS | Performed by: STUDENT IN AN ORGANIZED HEALTH CARE EDUCATION/TRAINING PROGRAM

## 2024-08-26 PROCEDURE — 82962 GLUCOSE BLOOD TEST: CPT

## 2024-08-26 PROCEDURE — 90935 HEMODIALYSIS ONE EVALUATION: CPT

## 2024-08-26 PROCEDURE — 80069 RENAL FUNCTION PANEL: CPT

## 2024-08-26 PROCEDURE — 86803 HEPATITIS C AB TEST: CPT

## 2024-08-26 PROCEDURE — 6360000002 HC RX W HCPCS: Performed by: PHYSICIAN ASSISTANT

## 2024-08-26 PROCEDURE — 76937 US GUIDE VASCULAR ACCESS: CPT

## 2024-08-26 PROCEDURE — 6370000000 HC RX 637 (ALT 250 FOR IP): Performed by: PSYCHIATRY & NEUROLOGY

## 2024-08-26 PROCEDURE — 97530 THERAPEUTIC ACTIVITIES: CPT

## 2024-08-26 RX ORDER — FENTANYL CITRATE 50 UG/ML
INJECTION, SOLUTION INTRAMUSCULAR; INTRAVENOUS PRN
Status: COMPLETED | OUTPATIENT
Start: 2024-08-26 | End: 2024-08-26

## 2024-08-26 RX ORDER — HEPARIN SODIUM 1000 [USP'U]/ML
3600 INJECTION, SOLUTION INTRAVENOUS; SUBCUTANEOUS PRN
Status: DISCONTINUED | OUTPATIENT
Start: 2024-08-26 | End: 2024-08-30 | Stop reason: HOSPADM

## 2024-08-26 RX ADMIN — OXYCODONE 5 MG: 5 TABLET ORAL at 09:21

## 2024-08-26 RX ADMIN — HEPARIN SODIUM 5000 UNITS: 5000 INJECTION INTRAVENOUS; SUBCUTANEOUS at 08:23

## 2024-08-26 RX ADMIN — ACETAMINOPHEN 650 MG: 325 TABLET ORAL at 22:56

## 2024-08-26 RX ADMIN — HYDRALAZINE HYDROCHLORIDE 100 MG: 50 TABLET ORAL at 20:50

## 2024-08-26 RX ADMIN — SODIUM BICARBONATE 650 MG: 650 TABLET ORAL at 08:22

## 2024-08-26 RX ADMIN — ASPIRIN 81 MG 81 MG: 81 TABLET ORAL at 08:22

## 2024-08-26 RX ADMIN — DOXAZOSIN 2 MG: 2 TABLET ORAL at 08:23

## 2024-08-26 RX ADMIN — SODIUM BICARBONATE 650 MG: 650 TABLET ORAL at 12:32

## 2024-08-26 RX ADMIN — HEPARIN SODIUM 3600 UNITS: 1000 INJECTION INTRAVENOUS; SUBCUTANEOUS at 17:05

## 2024-08-26 RX ADMIN — FENTANYL CITRATE 50 MCG: 50 INJECTION INTRAMUSCULAR; INTRAVENOUS at 14:19

## 2024-08-26 RX ADMIN — SODIUM CHLORIDE, PRESERVATIVE FREE 10 ML: 5 INJECTION INTRAVENOUS at 20:59

## 2024-08-26 RX ADMIN — SODIUM CHLORIDE, PRESERVATIVE FREE 10 ML: 5 INJECTION INTRAVENOUS at 08:24

## 2024-08-26 RX ADMIN — OXYCODONE 5 MG: 5 TABLET ORAL at 01:30

## 2024-08-26 RX ADMIN — ACETAMINOPHEN 650 MG: 325 TABLET ORAL at 03:13

## 2024-08-26 RX ADMIN — ATENOLOL 50 MG: 25 TABLET ORAL at 08:22

## 2024-08-26 RX ADMIN — AMLODIPINE BESYLATE 10 MG: 5 TABLET ORAL at 08:23

## 2024-08-26 RX ADMIN — ROSUVASTATIN CALCIUM 20 MG: 20 TABLET, COATED ORAL at 20:50

## 2024-08-26 RX ADMIN — SODIUM CHLORIDE: 9 INJECTION, SOLUTION INTRAVENOUS at 08:26

## 2024-08-26 ASSESSMENT — PAIN SCALES - WONG BAKER
WONGBAKER_NUMERICALRESPONSE: NO HURT

## 2024-08-26 ASSESSMENT — PAIN SCALES - GENERAL
PAINLEVEL_OUTOF10: 0
PAINLEVEL_OUTOF10: 8
PAINLEVEL_OUTOF10: 6
PAINLEVEL_OUTOF10: 2
PAINLEVEL_OUTOF10: 7
PAINLEVEL_OUTOF10: 3
PAINLEVEL_OUTOF10: 0
PAINLEVEL_OUTOF10: 3
PAINLEVEL_OUTOF10: 9
PAINLEVEL_OUTOF10: 3

## 2024-08-26 ASSESSMENT — PAIN DESCRIPTION - LOCATION
LOCATION: NECK
LOCATION: SACRUM;BUTTOCKS
LOCATION: FOOT
LOCATION: LEG

## 2024-08-26 ASSESSMENT — PAIN DESCRIPTION - ORIENTATION: ORIENTATION: RIGHT

## 2024-08-26 ASSESSMENT — PAIN DESCRIPTION - DESCRIPTORS
DESCRIPTORS: ACHING
DESCRIPTORS: ACHING;DISCOMFORT

## 2024-08-26 NOTE — PROGRESS NOTES
Nephrology follow up          Patient: Yamilet Smith MRN: [id number]  SSN: [id number]    YOB: 1960  Age: 64 y.o.  Sex: female      Subjective:   The patient is seen at the bedside.  She is somnolent.  Blood pressures are better  On room air  No lower extremity swelling  On IV fluids.  Worse MANUEL     Plan to initiate on hemodialysis today  I have spoken with her sister and also daughter who is in [place] right now.  Family agreed to proceed for dialysis.  IR to place tunneled dialysis catheter   consulted for outpatient chair set up    Past Medical History:   Diagnosis Date    Chronic kidney disease     Diabetes (HCC)     Hypertension     Stroke (HCC)      Past Surgical History:   Procedure Laterality Date    HYSTERECTOMY (CERVIX STATUS UNKNOWN)      age 24      Family History   Problem Relation Age of Onset    Cancer Sister     Diabetes Sister     Heart Disease Mother     Diabetes Mother      Social History     Tobacco Use    Smoking status: Every Day     Current packs/day: 0.50     Types: Cigarettes    Smokeless tobacco: Never   Substance Use Topics    Alcohol use: Never      Current Facility-Administered Medications   Medication Dose Route Frequency Provider Last Rate Last Admin    melatonin tablet 3 mg  3 mg Oral Nightly PRN Alex Hernandez MD   3 mg at 08/25/24 2257    sodium bicarbonate tablet 650 mg  650 mg Oral TID WC Everton Whittaker MD   650 mg at 08/26/24 0822    hydrALAZINE (APRESOLINE) tablet 100 mg  100 mg Oral 3 times per day Everton Whittaker MD   100 mg at 08/25/24 2127    heparin (porcine) injection 5,000 Units  5,000 Units SubCUTAneous 3 times per day Mg Domingo MD   5,000 Units at 08/26/24 0823    atenolol (TENORMIN) tablet 50 mg  50 mg Oral Daily Everton Whittaker MD   50 mg at 08/26/24 0822    sodium chloride flush 0.9 % injection 5-40 mL  5-40 mL IntraVENous PRN Carlo Sibley MD        0.9 % sodium chloride infusion   IntraVENous PRN Carlo Sibley

## 2024-08-26 NOTE — PROGRESS NOTES
Spiritual Health Assessment/Progress Note  [facility]    Attempted Encounter,  ,  ,      Name: Yamilet Smith MRN: [id number]    Age: 64 y.o.     Sex: female   Language: English   Druze: Catholic   Acute on chronic renal insufficiency     Date: 8/26/2024            Total Time Calculated: 8 min              Spiritual Assessment began in [facility] [place]        Referral/Consult From: Rounding   Encounter Overview/Reason: Attempted Encounter  Service Provided For: Patient not available    Vaishali, Belief, Meaning:   Patient unable to communicate at this time  Family/Friends No family/friends present      Importance and Influence:  Patient unable to communicate at this time  Family/Friends no family/friends present    Community:  Patient Other: unable to communicate at this time  Family/Friends Other: no family/friends present    Assessment and Plan of Care:     Patient Interventions include: Other: provided pastoral message card, informing pt of attempted visit while pt was off unit  Family/Friends Interventions include: Other: no family/friends present    Patient Plan of Care: Spiritual Care available upon further referral  Family/Friends Plan of Care: Spiritual Care available upon further referral    Electronically signed by Antonio Serratolain Resident on 8/26/2024 at 2:03 PM

## 2024-08-26 NOTE — PROGRESS NOTES
Comprehensive Nutrition Assessment    Type and Reason for Visit:  Initial, RD Nutrition Re-Screen/LOS    Nutrition Recommendations/Plan:   Continue current diet order, encourage PO intake  RD to monitor po intake, labs, GI function, weight     Malnutrition Assessment:  Malnutrition Status:  Insufficient data (08/26/24 1919)    Context:  Acute Illness     Findings of the 6 clinical characteristics of malnutrition:  Energy Intake:  50% or less of estimated energy requirements for 5 or more days  Weight Loss:  No significant weight loss     Body Fat Loss:  Unable to assess     Muscle Mass Loss:  Unable to assess    Fluid Accumulation:  Unable to assess     Strength:  Not Performed    Nutrition Assessment:    Pt assessed for LOS. Pt admitted for renal insufficiency, now on HD treatment. Pt currently on 60g CHO/meal diet. Meds: crestor, humalog, apresoline, atenolol. Labs: BUN 92 H, Creat 5.14 H, Glucose 137 H.    Nutrition Related Findings:    PO intakes 26-50%. Last BM 8/25. Wound Type: None       Current Nutrition Intake & Therapies:    Average Meal Intake: 26-50%  Average Supplements Intake: None Ordered  ADULT DIET; Regular; 4 carb choices (60 gm/meal)    Anthropometric Measures:  Height: 170.2 cm (5' 7\")  Ideal Body Weight (IBW): 135 lbs (61 kg)       Current Body Weight: 83 kg (183 lb), 135.6 % IBW. Weight Source: Bed Scale  Current BMI (kg/m2): 28.7     Weight Adjustment For: No Adjustment     BMI Categories: Overweight (BMI 25.0-29.9)  Last Weight Metrics:      8/26/2024     6:45 PM 8/22/2024     4:15 AM 8/18/2024     4:34 PM 4/6/2023    11:06 AM 1/31/2023    10:51 AM   Weight Loss Metrics   Height 5' 7\"  5' 7\" 5' 7\" 5' 7\"   Weight - Scale  183 lbs 10 oz 177 lbs 175 lbs 178 lbs   BMI (Calculated)  28.8 kg/m2 27.8 kg/m2 27.5 kg/m2 27.9 kg/m2      Estimated Daily Nutrient Needs:  Energy Requirements Based On: Kcal/kg  Weight Used for Energy Requirements: Current  Energy (kcal/day): 2075-2490kcals

## 2024-08-26 NOTE — DIALYSIS
Patient tolerated treatment. 500 ml of fluid removed. Patient was alert and oriented during treatment. 35.6 liters of blood was processed. Report was given to primary nurse Kaur.

## 2024-08-26 NOTE — PROGRESS NOTES
OCCUPATIONAL THERAPY TREATMENT  Patient: Yamilet Smith (64 y.o. female)  Date: 8/26/2024  Primary Diagnosis: Acute on chronic renal insufficiency [N28.9, N18.9]  MANUEL (acute kidney injury) (HCC) [N17.9]  Uncontrolled hypertension [I10]  Diarrhea, unspecified type [R19.7]       Precautions: General Precautions, Contact Precautions, Bed Alarm                Recommendations for nursing mobility: Out of bed to chair for meals, Encourage HEP in prep for ADLs/mobility; see handout for details, Frequent repositioning to prevent skin breakdown, AD and gt belt for bed to chair , Assist x1, and Assist x2    In place during session: Peripheral IV, External Catheter, and EKG/telemetry   Chart, occupational therapy assessment, plan of care, and goals were reviewed.    ASSESSMENT  Patient continues with skilled OT services and is progressing towards goals.  Pt received semi-supine in bed upon arrival, AXO x 3 and agreeable to GODINEZ tx at this time. Pt cooperative and demonstrated fair effort during activities wit additional time d/t weakness and joint stiffness. Noted mild swelling in rahul feet. Pt c/o pain and tightness in rahul feet however increased pain in L foot. Pt noted with improved bed mobility with bed HOB modified with vc's for rolling technique, LE placement and to reach for bed rail. Pt presented with improved sitting balance while performing simple grooming with no LOB noted. Pt attempted STS with therapist however unable to clear bed. Second STS, CNA assisted therapist with MAX A x2 and side stepped 1x with verbal/manual/visual cues for correct hand/feet placement, RW mgmt and weight shifting in prep for toilet tf. Pt returned to supine with SBA. Pt engaged in rahul UE exercises with education and cueing provided regarding joint protection/proper technique/achieve full ROM needed to maintain/improve strength to increase performance in ADL'S and functional tf's, see grid below. (See below for objective details and  assist levels)     Overall, pt limited by generalized weakness, pain in feet and decreased standing tolerance that interferes with performance in ADL's and functional tf's safely.  Will continue to progress. Potential barriers for safe discharge pt is a high fall risk, pt is not safe to be alone, and concern for pt safely navigating or managing the home environment. Current OT recommendations for discharge [facility] Facility .           Start of session End of session   SPO2 (%) 92 92   Heart Rate (BPM) 60 62         GOALS:    Problem: Occupational Therapy - Adult  Goal: By Discharge: Performs self-care activities at highest level of function for planned discharge setting.  See evaluation for individualized goals.  Description: FUNCTIONAL STATUS PRIOR TO ADMISSION:  Pt required assist for bathing PTA otherwise grossly supervision/setup assist. Dependent for IADLs. Supervision/SBA for functional transfers and functional ambulation w/ rollator.    HOME SUPPORT: Pt lives w/ sister who works during day. Has HHA M-F from 9am-3pm and 5pm-7pm.     Occupational Therapy Goals:  Initiated 8/21/2024  Patient/Family stated goal: \"I want to be more independent\".   1.  Patient will perform toilet transfer with Minimal Assist within 7 day(s).  2.  Patient will perform lower body dressing with Minimal Assist within 7 day(s).  3.  Patient will perform grooming with Supervision within 7 day(s).  Outcome: Progressing            PLAN :  Patient continues to benefit from skilled intervention to address functional impairments.  Continue treatment per established plan of care to address goals.    Recommend next OT session: Toileting and Seated grooming    Recommendation for discharge: (in order for the patient to meet his/her long term goals): Inpatient Rehabilitation Facility     IF patient discharges home will need the following DME: continuing to assess with progress       SUBJECTIVE:   Patient stated “My feet feel so

## 2024-08-26 NOTE — CARE COORDINATION
15:00PM Assigned RN informed writer, patient is unclear about dialysis.  Writer needs to speak w/patient re: discharge disposition.      DARLINE Delgado    13:30PM Patient of the floor, went to angiogram re: cath.  Need to speak w/patient re: discharge disposition.      DARLINE Delgado    13:12PM Writer notified auth was denied (final decision by insurance) for [facility].      DARLINE Delgado    10:30AM IDR >48.  Renal function worsening.  Possible new HD chair TBD.     DARLINE Delgado      09:15AM Auth is still pending for IRF ([facility] location).  Back up acceptance is at [facility].  Auth is needed.        DARLINE Delgado

## 2024-08-26 NOTE — PROGRESS NOTES
Hospitalist Progress Note               Daily Progress Note: 8/26/2024      Hospital Day: 9     Chief complaint:   Chief Complaint   Patient presents with    Cough    Diarrhea    Fatigue        Subjective:   Hospital course to date:    64-year-old female with diabetes, CKD, stroke, hypertension and hyperlipidemia presented the ED with cough, fatigue and diarrhea.  Has been sick for about 1 week.  Patient unable to keep her medications down.    In the ED blood pressure was 145/86.  Labs showed MANUEL with a creatinine of 4.2, Baseline around 2.3.    She was admitted, started on IV fluids    Blood pressure was very labile.  She was seen by nephrology for MANUEL/CKD    Renal function did not show any improvement    On 8/28 PT noted left leg to be weak.  Unclear how long that had been present.  CT was obtained which showed subacute right cerebellar infarct.    MRI of the brain was obtained which showed small chronic infarcts in the bilateral cerebellum, right greater than left and right reena.  Does not appear that the right cerebellar infarct was in fact acute    Her renal function worsened with creatinine peaking at 5.57 on 8/24.      --------  Patient is seen today for follow-up.   Patient is awake and alert.  Complains of feet pain and swelling, says she has not received any pain medication although she has oxycodone ordered and was given a dose overnight.    Nephrology reportedly considering initiation of hemodialysis        Medications reviewed  Current Facility-Administered Medications   Medication Dose Route Frequency    0.9 % sodium chloride infusion   IntraVENous Continuous    doxazosin (CARDURA) tablet 2 mg  2 mg Oral Daily    melatonin tablet 3 mg  3 mg Oral Nightly PRN    sodium bicarbonate tablet 650 mg  650 mg Oral TID WC    hydrALAZINE (APRESOLINE) tablet 100 mg  100 mg Oral 3 times per day    heparin (porcine) injection 5,000 Units  5,000 Units SubCUTAneous 3 times per day    atenolol (TENORMIN) tablet 50    Acute to subacute right inferior cerebellar ischemic infarct.   Mild to moderate periventricular white matter chronic disease.      This result was verbally relayed by me at 1430 hours to the patient's nurse   Patt Rodriguez            Electronically signed by Erlinda Das      Vascular duplex lower extremity venous left   Final Result      CT ABDOMEN PELVIS WO CONTRAST Additional Contrast? None   Final Result   No acute intra-abdominal pathology to explain abdominal pain.   Incidental findings as above.      Electronically signed by ADA HURLEY      XR CHEST PORTABLE   Final Result   No acute intrathoracic process is identified.             Electronically signed by KAMILAH PERRY             Discussion/MDM:     [x] High (any 2)    A. Problems (any 1)  [x] Acute/Chronic Illness/injury posing threat to life or bodily function:    [] Severe exacerbation of chronic illness:    ---------------------------------------------------------------------  B. Risk of Treatment (any 1)   [x] Drugs/treatments that require intensive monitoring for toxicity include:    [] IV ABX requiring serial renal monitoring for nephrotoxicity:     [] IV Narcotic analgesia for adverse drug reaction  [] Aggressive IV diuresis requiring serial monitoring for renal impairment and electrolyte derangements  [] Critical electrolyte abnormalities requiring IV replacement and close serial monitoring  [x] SQ Insulin SS- monitoring serial FSBS for Hypoglycemic adverse drug reaction  [] Other -   [] Change in code status:    [] Decision to escalate care:    [] Major surgery/procedure with associated risk factors:    ----------------------------------------------------------------------  C. Data (any 2)  [] Discussed current management and discharge planning options with Case Management.  [x] Discussed management of the case with: Nephrologist  [] Telemetry personally reviewed and interpreted as documented above    [] Imaging personally reviewed and

## 2024-08-26 NOTE — PLAN OF CARE
Problem: Discharge Planning  Goal: Discharge to home or other facility with appropriate resources  Outcome: Progressing     Problem: Pain  Goal: Verbalizes/displays adequate comfort level or baseline comfort level  Outcome: Progressing     Problem: Safety - Adult  Goal: Free from fall injury  Outcome: Progressing     Problem: Skin/Tissue Integrity  Goal: Absence of new skin breakdown  Description: 1.  Monitor for areas of redness and/or skin breakdown  2.  Assess vascular access sites hourly  3.  Every 4-6 hours minimum:  Change oxygen saturation probe site  4.  Every 4-6 hours:  If on nasal continuous positive airway pressure, respiratory therapy assess nares and determine need for appliance change or resting period.  Outcome: Progressing     Problem: Chronic Conditions and Co-morbidities  Goal: Patient's chronic conditions and co-morbidity symptoms are monitored and maintained or improved  Outcome: Progressing     Problem: Physical Therapy - Adult  Goal: By Discharge: Performs mobility at highest level of function for planned discharge setting.  See evaluation for individualized goals.  Description: FUNCTIONAL STATUS PRIOR TO ADMISSION: Patient was modified independent using a rollator for functional mobility. and The patient  required minimal assistance for basic and instrumental ADLs.    HOME SUPPORT PRIOR TO ADMISSION: The patient lived with sister and required minimal assistance for ADL's.  Pt reports having an aide come M-F from 9-3 and 5-7 and then her sister cares for her on the weekends.  Pt reports the aide helps with meal prep and ADL's.    Physical Therapy Goals  Initiated 8/20/2024  Pt stated goal: to get stronger and go home  Pt will be I with LE HEP in 7 days.  Pt will perform bed mobility with Contact Guard Assist in 7 days.  Pt will perform transfers with Minimal Assist in 7 days.   Pt will amb 5 feet with LRAD safely with Minimal Assist in 7 days.  Pt will demonstrate improvement in standing

## 2024-08-26 NOTE — PROGRESS NOTES
2242: RN messaged  via secure messaging as patient requested something to help her sleep. RN requested melatonin.  put in new orders for melatonin PRN.     2257: RN gave patient melatonin 3 mg.     0537: RN messaged  via secure messaging about medication concern. Patient had oral hydralazine due at 0600. RN did vitals and patients BP was 119/50. RN wanted to verify with provider whether to hold medication on give it. Provider stated \"skip this dose\" RN held medication in MAR.

## 2024-08-26 NOTE — PLAN OF CARE
Problem: Pain  Goal: Verbalizes/displays adequate comfort level or baseline comfort level  8/26/2024 1028 by Kaur Trammell, RN  Outcome: Progressing  Flowsheets (Taken 8/26/2024 1028)  Verbalizes/displays adequate comfort level or baseline comfort level:   Encourage patient to monitor pain and request assistance   Assess pain using appropriate pain scale   Administer analgesics based on type and severity of pain and evaluate response     Problem: Safety - Adult  Goal: Free from fall injury  8/26/2024 1028 by Kaur Trammell, RN  Outcome: Progressing  Flowsheets (Taken 8/26/2024 1028)  Free From Fall Injury: Instruct family/caregiver on patient safety

## 2024-08-27 LAB
ALBUMIN SERPL-MCNC: 2.5 G/DL (ref 3.5–5)
ANION GAP SERPL CALC-SCNC: 8 MMOL/L (ref 5–15)
BUN SERPL-MCNC: 65 MG/DL (ref 6–20)
BUN/CREAT SERPL: 16 (ref 12–20)
CA-I BLD-MCNC: 8.1 MG/DL (ref 8.5–10.1)
CHLORIDE SERPL-SCNC: 105 MMOL/L (ref 97–108)
CO2 SERPL-SCNC: 24 MMOL/L (ref 21–32)
CREAT SERPL-MCNC: 3.98 MG/DL (ref 0.55–1.02)
ERYTHROCYTE [DISTWIDTH] IN BLOOD BY AUTOMATED COUNT: 15.3 % (ref 11.5–14.5)
GLUCOSE BLD STRIP.AUTO-MCNC: 104 MG/DL (ref 65–100)
GLUCOSE BLD STRIP.AUTO-MCNC: 137 MG/DL (ref 65–100)
GLUCOSE BLD STRIP.AUTO-MCNC: 141 MG/DL (ref 65–100)
GLUCOSE BLD STRIP.AUTO-MCNC: 229 MG/DL (ref 65–100)
GLUCOSE SERPL-MCNC: 117 MG/DL (ref 65–100)
HCT VFR BLD AUTO: 22.3 % (ref 35–47)
HGB BLD-MCNC: 7.3 G/DL (ref 11.5–16)
MCH RBC QN AUTO: 27 PG (ref 26–34)
MCHC RBC AUTO-ENTMCNC: 32.7 G/DL (ref 30–36.5)
MCV RBC AUTO: 82.6 FL (ref 80–99)
NRBC # BLD: 0 K/UL (ref 0–0.01)
NRBC BLD-RTO: 0 PER 100 WBC
PERFORMED BY:: ABNORMAL
PHOSPHATE SERPL-MCNC: 4.1 MG/DL (ref 2.6–4.7)
PLATELET # BLD AUTO: 232 K/UL (ref 150–400)
PMV BLD AUTO: 9.9 FL (ref 8.9–12.9)
POTASSIUM SERPL-SCNC: 4.4 MMOL/L (ref 3.5–5.1)
RBC # BLD AUTO: 2.7 M/UL (ref 3.8–5.2)
SODIUM SERPL-SCNC: 137 MMOL/L (ref 136–145)
WBC # BLD AUTO: 9 K/UL (ref 3.6–11)

## 2024-08-27 PROCEDURE — 6370000000 HC RX 637 (ALT 250 FOR IP): Performed by: INTERNAL MEDICINE

## 2024-08-27 PROCEDURE — 97535 SELF CARE MNGMENT TRAINING: CPT

## 2024-08-27 PROCEDURE — 1100000000 HC RM PRIVATE

## 2024-08-27 PROCEDURE — 6370000000 HC RX 637 (ALT 250 FOR IP): Performed by: PSYCHIATRY & NEUROLOGY

## 2024-08-27 PROCEDURE — 6360000002 HC RX W HCPCS: Performed by: INTERNAL MEDICINE

## 2024-08-27 PROCEDURE — 97530 THERAPEUTIC ACTIVITIES: CPT

## 2024-08-27 PROCEDURE — 82962 GLUCOSE BLOOD TEST: CPT

## 2024-08-27 PROCEDURE — 2709999900 HC NON-CHARGEABLE SUPPLY

## 2024-08-27 PROCEDURE — 80069 RENAL FUNCTION PANEL: CPT

## 2024-08-27 PROCEDURE — 36415 COLL VENOUS BLD VENIPUNCTURE: CPT

## 2024-08-27 PROCEDURE — 90935 HEMODIALYSIS ONE EVALUATION: CPT

## 2024-08-27 PROCEDURE — 85027 COMPLETE CBC AUTOMATED: CPT

## 2024-08-27 PROCEDURE — 2580000003 HC RX 258: Performed by: INTERNAL MEDICINE

## 2024-08-27 RX ADMIN — OXYCODONE 5 MG: 5 TABLET ORAL at 19:49

## 2024-08-27 RX ADMIN — ROSUVASTATIN CALCIUM 20 MG: 20 TABLET, COATED ORAL at 19:49

## 2024-08-27 RX ADMIN — HEPARIN SODIUM 5000 UNITS: 5000 INJECTION INTRAVENOUS; SUBCUTANEOUS at 06:14

## 2024-08-27 RX ADMIN — ACETAMINOPHEN 650 MG: 325 TABLET ORAL at 14:51

## 2024-08-27 RX ADMIN — ASPIRIN 81 MG 81 MG: 81 TABLET ORAL at 08:31

## 2024-08-27 RX ADMIN — HEPARIN SODIUM 3600 UNITS: 1000 INJECTION INTRAVENOUS; SUBCUTANEOUS at 12:12

## 2024-08-27 RX ADMIN — HEPARIN SODIUM 5000 UNITS: 5000 INJECTION INTRAVENOUS; SUBCUTANEOUS at 22:28

## 2024-08-27 RX ADMIN — ACETAMINOPHEN 650 MG: 325 TABLET ORAL at 22:27

## 2024-08-27 RX ADMIN — HYDRALAZINE HYDROCHLORIDE 100 MG: 50 TABLET ORAL at 06:14

## 2024-08-27 RX ADMIN — EPOETIN ALFA-EPBX 10000 UNITS: 10000 INJECTION, SOLUTION INTRAVENOUS; SUBCUTANEOUS at 11:00

## 2024-08-27 RX ADMIN — AMLODIPINE BESYLATE 10 MG: 5 TABLET ORAL at 08:31

## 2024-08-27 RX ADMIN — Medication 3 MG: at 22:27

## 2024-08-27 RX ADMIN — SODIUM CHLORIDE, PRESERVATIVE FREE 10 ML: 5 INJECTION INTRAVENOUS at 19:52

## 2024-08-27 RX ADMIN — ACETAMINOPHEN 650 MG: 325 TABLET ORAL at 08:31

## 2024-08-27 RX ADMIN — HYDRALAZINE HYDROCHLORIDE 100 MG: 50 TABLET ORAL at 14:51

## 2024-08-27 RX ADMIN — Medication 3 MG: at 03:28

## 2024-08-27 RX ADMIN — HYDRALAZINE HYDROCHLORIDE 100 MG: 50 TABLET ORAL at 22:26

## 2024-08-27 RX ADMIN — ATENOLOL 50 MG: 25 TABLET ORAL at 08:32

## 2024-08-27 ASSESSMENT — PAIN DESCRIPTION - DESCRIPTORS
DESCRIPTORS: ACHING;DISCOMFORT
DESCRIPTORS: ACHING;PRESSURE

## 2024-08-27 ASSESSMENT — PAIN SCALES - GENERAL
PAINLEVEL_OUTOF10: 4
PAINLEVEL_OUTOF10: 0
PAINLEVEL_OUTOF10: 0
PAINLEVEL_OUTOF10: 8
PAINLEVEL_OUTOF10: 8
PAINLEVEL_OUTOF10: 5

## 2024-08-27 ASSESSMENT — PAIN DESCRIPTION - LOCATION
LOCATION: ABDOMEN;BACK
LOCATION: ABDOMEN
LOCATION: LEG

## 2024-08-27 ASSESSMENT — PAIN DESCRIPTION - ORIENTATION
ORIENTATION: RIGHT;LEFT
ORIENTATION: LEFT;RIGHT

## 2024-08-27 ASSESSMENT — PAIN SCALES - WONG BAKER
WONGBAKER_NUMERICALRESPONSE: NO HURT
WONGBAKER_NUMERICALRESPONSE: NO HURT

## 2024-08-27 NOTE — CARE COORDINATION
CM notified patient that she was denied at [facility] by her insurance, patient is agreeable to [facility] and has been accepted at [facility] pending insurance authorization.  Patient is agreeable with CM setting up outpatient HD chair at  [facility] [place].  CM sent admissions form and required documentation to  [facility] to fax # [phone].  Patient is still pending Hepatitis B Surface Antibody, Total lab result. Once resulted, this will need to be faxed to [facility] as well.

## 2024-08-27 NOTE — DIALYSIS
Patient tolerated treatment. 1000 ml of fluid was removed. Patient was alert and oriented during treatment. 63.9 liters of blood was processed. Report was given to primary nurse Ky.

## 2024-08-27 NOTE — PLAN OF CARE
Problem: Discharge Planning  Goal: Discharge to home or other facility with appropriate resources  8/27/2024 1048 by Ky Grissom RN  Outcome: Progressing  8/26/2024 2221 by Vidhya Hurtado RN  Outcome: Progressing  Flowsheets (Taken 8/26/2024 2033)  Discharge to home or other facility with appropriate resources:   Identify barriers to discharge with patient and caregiver   Identify discharge learning needs (meds, wound care, etc)     Problem: Pain  Goal: Verbalizes/displays adequate comfort level or baseline comfort level  8/27/2024 1048 by Ky Grissom, RN  Outcome: Progressing  Flowsheets (Taken 8/27/2024 0300 by Vidhya Hurtado, RN)  Verbalizes/displays adequate comfort level or baseline comfort level: Encourage patient to monitor pain and request assistance  8/26/2024 2221 by Vidhya Hurtado RN  Outcome: Progressing     Problem: Safety - Adult  Goal: Free from fall injury  8/27/2024 1048 by Ky Grissom RN  Outcome: Progressing  8/26/2024 2221 by Vidhya Hurtado RN  Outcome: Progressing     Problem: Skin/Tissue Integrity  Goal: Absence of new skin breakdown  Description: 1.  Monitor for areas of redness and/or skin breakdown  2.  Assess vascular access sites hourly  3.  Every 4-6 hours minimum:  Change oxygen saturation probe site  4.  Every 4-6 hours:  If on nasal continuous positive airway pressure, respiratory therapy assess nares and determine need for appliance change or resting period.  Outcome: Progressing

## 2024-08-27 NOTE — PROGRESS NOTES
Nephrology follow up          Patient: Yamilet Smith MRN: [id number]  SSN: [id number]    YOB: 1960  Age: 64 y.o.  Sex: female      Subjective:   The patient is seen at the bedside.  She had her first dialysis treatment yesterday.  Clinically she is more awake and alert today  Plan for dialysis today          Past Medical History:   Diagnosis Date    Chronic kidney disease     Diabetes (HCC)     Hypertension     Stroke (HCC)      Past Surgical History:   Procedure Laterality Date    HYSTERECTOMY (CERVIX STATUS UNKNOWN)      age 24    IR TUNNELED CATHETER PLACEMENT GREATER THAN 5 YEARS  8/26/2024    IR TUNNELED CATHETER PLACEMENT GREATER THAN 5 YEARS 8/26/2024 Whitney Madrigal PA-C [facility] IR      Family History   Problem Relation Age of Onset    Cancer Sister     Diabetes Sister     Heart Disease Mother     Diabetes Mother      Social History     Tobacco Use    Smoking status: Every Day     Current packs/day: 0.50     Types: Cigarettes    Smokeless tobacco: Never   Substance Use Topics    Alcohol use: Never      Current Facility-Administered Medications   Medication Dose Route Frequency Provider Last Rate Last Admin    ferric gluconate (FERRLECIT) 125 mg in sodium chloride 0.9 % 100 mL IVPB  125 mg IntraVENous Q MWF Everton Whittaker MD        epoetin santos-epbx (RETACRIT) injection 10,000 Units  10,000 Units IntraVENous Once per day on Monday Wednesday Friday Everton Whittaker MD   10,000 Units at 08/27/24 1100    heparin (porcine) injection 3,600 Units  3,600 Units IntraCATHeter PRN Everton Whittaker MD   3,600 Units at 08/27/24 1212    melatonin tablet 3 mg  3 mg Oral Nightly PRN Alex Hernandez MD   3 mg at 08/27/24 0328    hydrALAZINE (APRESOLINE) tablet 100 mg  100 mg Oral 3 times per day Everton Whittaker MD   100 mg at 08/27/24 1451    heparin (porcine) injection 5,000 Units  5,000 Units SubCUTAneous 3 times per day Mg Domingo MD   5,000 Units at 08/27/24 0614    atenolol (TENORMIN)  Allergies    Review of Systems:  A comprehensive review of systems was negative except for that written in the History of Present Illness.    Objective:     Vitals:    08/27/24 1120 08/27/24 1150 08/27/24 1451 08/27/24 1533   BP: (!) 146/62 (!) 155/68 (!) 148/76 (!) 136/52   Pulse: 51 53  55   Resp:       Temp:    98.1 °F (36.7 °C)   TempSrc:    Oral   SpO2:    97%   Weight:       Height:            Physical Exam:  General: NAD  Eyes: sclera anicteric  Oral Cavity: No thrush or ulcers  Neck: no JVD  Chest: Fair bilateral air entry  Heart: normal sounds  Abdomen: soft and non tender   : no jones  Lower Extremities: no edema  Skin: no rash  Neuro: intact  Psychiatric: non-depressed          Assessment/Plan:     Acute kidney injury on chronic kidney disease stage IV  Approaching to ESRD.  2/2 prerenal azotemia from volume depletion plus on HCTZ.  On admission BUN/creatinine 64/4.22  BUN/creatinine peaked at 105/5.57.  No idea about recent baseline Cr and last available creatinine was 2.3 in April 2023.  No evidence of hydronephrosis per CT of the abdomen.  Urine analysis + urinary tract infection and 0.7 g/g of proteinuria.  Initiated on hemodialysis on 8/26.  Plan for hemodialysis today.  Okay to discharge from renal standpoint once she has chair set up.    Hypertension  Came with hypertensive urgency.  Blood pressure 226/71 on arrival.  Better blood pressures.  Cannot increase atenolol dose in the setting of low estimated GFR.  Continue atenolol 50 mg/amlodipine 10 mg/hydralazine 100 3 times daily.      Anemia  Anemia of chronic kidney disease  Hemoglobin 10.4->9.6->7.3.  Iron saturation 16% and ferritin 140.  IV Ferrlecit 125 mg with each dialysis x 6 doses..  IV erythropoietin with each dialysis.    Renal osteodystrophy  Calcium and phosphorus are okay.  Pending intact PTH level.    Diabetes mellitus type 2  On oral hypoglycemic agents at home.    Acute UTI  Came with abdominal pain and vomiting  UA positive for

## 2024-08-27 NOTE — PROGRESS NOTES
PT attempted to see pt for PT treatment session.  Pt declined stating she was too fatigued post dialysis.  Will attempt PT again at a later time.  Thank you.

## 2024-08-27 NOTE — PROGRESS NOTES
Hospitalist Progress Note               Daily Progress Note: 8/27/2024      Hospital Day: 10     Chief complaint:   Chief Complaint   Patient presents with    Cough    Diarrhea    Fatigue        Subjective:   Hospital course to date:    64-year-old female with diabetes, CKD, stroke, hypertension and hyperlipidemia presented the ED with cough, fatigue and diarrhea.  Has been sick for about 1 week.  Patient unable to keep her medications down.    In the ED blood pressure was 145/86.  Labs showed MANUEL with a creatinine of 4.2, Baseline around 2.3.    She was admitted, started on IV fluids    Blood pressure was very labile.  She was seen by nephrology for MANUEL/CKD    Renal function did not show any improvement    On 8/28 PT noted left leg to be weak.  Unclear how long that had been present.  CT was obtained which showed subacute right cerebellar infarct.    MRI of the brain was obtained which showed small chronic infarcts in the bilateral cerebellum, right greater than left and right reena.  Does not appear that the right cerebellar infarct was in fact acute    Her renal function worsened with creatinine peaking at 5.57 on 8/24.    Patient was initiated on hemodialysis 8/26  --------  Patient is seen today for follow-up.    She was started on dialysis yesterday.  Complains of some lower abdominal pain and is requesting Tylenol.  Blood pressure has been stable.        Medications reviewed  Current Facility-Administered Medications   Medication Dose Route Frequency    ferric gluconate (FERRLECIT) 125 mg in sodium chloride 0.9 % 100 mL IVPB  125 mg IntraVENous Q MWF    epoetin santos-epbx (RETACRIT) injection 10,000 Units  10,000 Units IntraVENous Once per day on Monday Wednesday Friday    heparin (porcine) injection 3,600 Units  3,600 Units IntraCATHeter PRN    melatonin tablet 3 mg  3 mg Oral Nightly PRN    hydrALAZINE (APRESOLINE) tablet 100 mg  100 mg Oral 3 times per day    heparin (porcine) injection 5,000 Units   Permacath catheter with   ultrasound and fluoroscopic guidance. Catheter is ready for immediate use.            Electronically signed by LYNDSEY LOREDO      IR ULTRASOUND GUIDANCE VASCULAR ACCESS   Final Result   Successful placement of a tunneled Permacath catheter with   ultrasound and fluoroscopic guidance. Catheter is ready for immediate use.            Electronically signed by LYNDSEY LOREDO      Vascular duplex carotid bilateral   Final Result      MRI BRAIN WO CONTRAST   Final Result      1. No acute intracranial abnormality.   2. Mild chronic microvascular ischemic disease. Small chronic infarcts in the   bilateral cerebellum, right larger than left, and right reena.         Electronically signed by Dalton Merrill      CT HEAD WO CONTRAST   Final Result   Acute to subacute right inferior cerebellar ischemic infarct.   Mild to moderate periventricular white matter chronic disease.      This result was verbally relayed by me at 1430 hours to the patient's nurse   Patt Rodriguez            Electronically signed by Erlinda Das      Vascular duplex lower extremity venous left   Final Result      CT ABDOMEN PELVIS WO CONTRAST Additional Contrast? None   Final Result   No acute intra-abdominal pathology to explain abdominal pain.   Incidental findings as above.      Electronically signed by ADA HURLEY      XR CHEST PORTABLE   Final Result   No acute intrathoracic process is identified.             Electronically signed by KAMILAH PERRY             Discussion/MDM:     [x] High (any 2)    A. Problems (any 1)  [x] Acute/Chronic Illness/injury posing threat to life or bodily function:    [] Severe exacerbation of chronic illness:    ---------------------------------------------------------------------  B. Risk of Treatment (any 1)   [x] Drugs/treatments that require intensive monitoring for toxicity include:    [] IV ABX requiring serial renal monitoring for nephrotoxicity:     [] IV Narcotic analgesia for

## 2024-08-27 NOTE — PLAN OF CARE
Problem: Discharge Planning  Goal: Discharge to home or other facility with appropriate resources  Outcome: Progressing  Flowsheets (Taken 8/26/2024 2033)  Discharge to home or other facility with appropriate resources:   Identify barriers to discharge with patient and caregiver   Identify discharge learning needs (meds, wound care, etc)     Problem: Pain  Goal: Verbalizes/displays adequate comfort level or baseline comfort level  8/26/2024 2221 by Vidhya Hurtado RN  Outcome: Progressing  8/26/2024 1028 by Kaur Trammell RN  Outcome: Progressing  Flowsheets (Taken 8/26/2024 1028)  Verbalizes/displays adequate comfort level or baseline comfort level:   Encourage patient to monitor pain and request assistance   Assess pain using appropriate pain scale   Administer analgesics based on type and severity of pain and evaluate response     Problem: Safety - Adult  Goal: Free from fall injury  8/26/2024 2221 by Vidhya Hurtado RN  Outcome: Progressing  8/26/2024 1028 by Kaur Trammell RN  Outcome: Progressing  Flowsheets (Taken 8/26/2024 1028)  Free From Fall Injury: Instruct family/caregiver on patient safety     Problem: Occupational Therapy - Adult  Goal: By Discharge: Performs self-care activities at highest level of function for planned discharge setting.  See evaluation for individualized goals.  Description: FUNCTIONAL STATUS PRIOR TO ADMISSION:  Pt required assist for bathing PTA otherwise grossly supervision/setup assist. Dependent for IADLs. Supervision/SBA for functional transfers and functional ambulation w/ rollator.    HOME SUPPORT: Pt lives w/ sister who works during day. Has HHA M-F from 9am-3pm and 5pm-7pm.     Occupational Therapy Goals:  Initiated 8/21/2024  Patient/Family stated goal: \"I want to be more independent\".   1.  Patient will perform toilet transfer with Minimal Assist within 7 day(s).  2.  Patient will perform lower body dressing with Minimal Assist within 7 day(s).  3.

## 2024-08-27 NOTE — PROGRESS NOTES
Spiritual Health Assessment/Progress Note  [facility]    Follow-up,  ,  ,      Name: Yamilet Smith MRN: [id number]    Age: 64 y.o.     Sex: female   Language: English   Synagogue: [employer]   Acute on chronic renal insufficiency     Date: 8/27/2024            Total Time Calculated: 13 min              Spiritual Assessment began in [facility]        Referral/Consult From: Rounding   Encounter Overview/Reason: Follow-up  Service Provided For: Patient    Vaishali, Belief, Meaning:   Patient is connected with a vaishali tradition or spiritual practice and has beliefs or practices that help with coping during difficult times  Family/Friends No family/friends present      Importance and Influence:  Patient has no beliefs influential to healthcare decision-making identified during this visit  Family/Friends no family/friends present    Community:  Patient Other: unable to assess  Family/Friends Other: no family/friends present    Assessment and Plan of Care:     Patient Interventions include: Facilitated expression of thoughts and feelings, Explored spiritual coping/struggle/distress and theological reflection, and Affirmed coping skills/support systems, Prayer  Family/Friends Interventions include: Other: no family/friends present    Patient Plan of Care: Spiritual Care available upon further referral  Family/Friends Plan of Care: Spiritual Care available upon further referral     visited pt Yamilet Smith (Pat) while rounding on [facility] for the purpose of making a spiritual assessment. Pt is resting in bed, welcomes visit. Pt shares of her new treatment and coming to accept the changes it will cause for her. Pt expresses acceptance while identifying the challenge of it. Pt reflects that she has been praying about her situation for support.  provided prayer and vaishali encouragement in line with her vaishali. Pt shares she is hopeful to get some rest and requests warm blanket.   conferred with HAYDEN Mcpherson and provided this. Provided peaceful presence, emotional support, affirmation of her experience, invited reflection on coping and changes, provided wade encouragement.    Electronically signed by Tigist Serratoin Resident on 8/27/2024 at 2:06 PM

## 2024-08-27 NOTE — PROGRESS NOTES
OCCUPATIONAL THERAPY TREATMENT  Patient: Yamilet Smith (64 y.o. female)  Date: 8/27/2024  Primary Diagnosis: Acute on chronic renal insufficiency [N28.9, N18.9]  MANUEL (acute kidney injury) (HCC) [N17.9]  Uncontrolled hypertension [I10]  Diarrhea, unspecified type [R19.7]       Precautions: General Precautions, Contact Precautions, Bed Alarm                Recommendations for nursing mobility: Out of bed to chair for meals, Encourage HEP in prep for ADLs/mobility; see handout for details, Frequent repositioning to prevent skin breakdown, Use of bed/chair alarm for safety, Use of BSC for toileting , AD and gt belt for bed to chair , Assist x1, and Assist x2    In place during session: External Catheter and EKG/telemetry   Chart, occupational therapy assessment, plan of care, and goals were reviewed.  ASSESSMENT  Patient continues with skilled OT services and is progressing towards goals. Pt presented attempting to sit EOB upon GODINEZ arrival, agreeable to session. Pt A&O x 3. Pt oriented to situation. Pt assisted to EOB.  Pt reported dizziness, pt's BP was 148/76.  Pt educated different reasons for dizziness including not sitting up or getting out of bed much.  Pt tolerated sitting EOB for ~20 minutes.  Pt with fair sitting balance.  Pt at part of her lunch while sitting EOB.  Pt reported dizziness was better after ~10 minutes.  Pt completed grooming and UB dressing while seated EOB.  Pt returned semi supine and was left with all needs met. (See below for objective details and assist levels).     Overall pt tolerated session fair today with UB dressing and sitting balance. Current OT recommendations for discharge High intensity and comprehensive skilled occupational therapy in a multidisciplinary setting as patient is working towards tolerating up to 3 hours of therapy/day x 5-7 days/week. Will continue to benefit from skilled OT services, and will continue to progress as tolerated.      Start of Session End of

## 2024-08-28 PROBLEM — N17.9 AKI (ACUTE KIDNEY INJURY) (HCC): Status: ACTIVE | Noted: 2024-08-28

## 2024-08-28 LAB
ALBUMIN SERPL-MCNC: 2.6 G/DL (ref 3.5–5)
ANION GAP SERPL CALC-SCNC: 10 MMOL/L (ref 5–15)
BUN SERPL-MCNC: 38 MG/DL (ref 6–20)
BUN/CREAT SERPL: 12 (ref 12–20)
CA-I BLD-MCNC: 8.3 MG/DL (ref 8.5–10.1)
CA-I BLD-MCNC: 8.4 MG/DL (ref 8.5–10.1)
CHLORIDE SERPL-SCNC: 102 MMOL/L (ref 97–108)
CO2 SERPL-SCNC: 26 MMOL/L (ref 21–32)
CREAT SERPL-MCNC: 3.29 MG/DL (ref 0.55–1.02)
EKG ATRIAL RATE: 55 BPM
EKG DIAGNOSIS: NORMAL
EKG P AXIS: 52 DEGREES
EKG P-R INTERVAL: 164 MS
EKG Q-T INTERVAL: 518 MS
EKG QRS DURATION: 80 MS
EKG QTC CALCULATION (BAZETT): 495 MS
EKG R AXIS: 31 DEGREES
EKG T AXIS: 67 DEGREES
EKG VENTRICULAR RATE: 55 BPM
GLUCOSE BLD STRIP.AUTO-MCNC: 131 MG/DL (ref 65–100)
GLUCOSE BLD STRIP.AUTO-MCNC: 139 MG/DL (ref 65–100)
GLUCOSE BLD STRIP.AUTO-MCNC: 182 MG/DL (ref 65–100)
GLUCOSE SERPL-MCNC: 119 MG/DL (ref 65–100)
HBV CORE AB SERPL QL IA: NEGATIVE
PERFORMED BY:: ABNORMAL
PHOSPHATE SERPL-MCNC: 3.8 MG/DL (ref 2.6–4.7)
POTASSIUM SERPL-SCNC: 3.9 MMOL/L (ref 3.5–5.1)
PTH-INTACT SERPL-MCNC: 189.9 PG/ML (ref 18.4–88)
SODIUM SERPL-SCNC: 138 MMOL/L (ref 136–145)

## 2024-08-28 PROCEDURE — 36415 COLL VENOUS BLD VENIPUNCTURE: CPT

## 2024-08-28 PROCEDURE — 82962 GLUCOSE BLOOD TEST: CPT

## 2024-08-28 PROCEDURE — 6370000000 HC RX 637 (ALT 250 FOR IP): Performed by: INTERNAL MEDICINE

## 2024-08-28 PROCEDURE — 94761 N-INVAS EAR/PLS OXIMETRY MLT: CPT

## 2024-08-28 PROCEDURE — 83970 ASSAY OF PARATHORMONE: CPT

## 2024-08-28 PROCEDURE — 2709999900 HC NON-CHARGEABLE SUPPLY

## 2024-08-28 PROCEDURE — 80069 RENAL FUNCTION PANEL: CPT

## 2024-08-28 PROCEDURE — 90935 HEMODIALYSIS ONE EVALUATION: CPT

## 2024-08-28 PROCEDURE — 2580000003 HC RX 258: Performed by: INTERNAL MEDICINE

## 2024-08-28 PROCEDURE — 97530 THERAPEUTIC ACTIVITIES: CPT

## 2024-08-28 PROCEDURE — 6360000002 HC RX W HCPCS: Performed by: INTERNAL MEDICINE

## 2024-08-28 PROCEDURE — 6370000000 HC RX 637 (ALT 250 FOR IP): Performed by: PSYCHIATRY & NEUROLOGY

## 2024-08-28 PROCEDURE — 93005 ELECTROCARDIOGRAM TRACING: CPT | Performed by: INTERNAL MEDICINE

## 2024-08-28 PROCEDURE — 1100000000 HC RM PRIVATE

## 2024-08-28 RX ADMIN — SODIUM CHLORIDE, PRESERVATIVE FREE 10 ML: 5 INJECTION INTRAVENOUS at 08:48

## 2024-08-28 RX ADMIN — ACETAMINOPHEN 650 MG: 325 TABLET ORAL at 08:51

## 2024-08-28 RX ADMIN — ROSUVASTATIN CALCIUM 20 MG: 20 TABLET, COATED ORAL at 20:52

## 2024-08-28 RX ADMIN — ONDANSETRON 4 MG: 4 TABLET, ORALLY DISINTEGRATING ORAL at 20:51

## 2024-08-28 RX ADMIN — OXYCODONE 5 MG: 5 TABLET ORAL at 18:52

## 2024-08-28 RX ADMIN — OXYCODONE 5 MG: 5 TABLET ORAL at 13:06

## 2024-08-28 RX ADMIN — HEPARIN SODIUM 5000 UNITS: 5000 INJECTION INTRAVENOUS; SUBCUTANEOUS at 06:03

## 2024-08-28 RX ADMIN — AMLODIPINE BESYLATE 10 MG: 5 TABLET ORAL at 08:48

## 2024-08-28 RX ADMIN — ASPIRIN 81 MG 81 MG: 81 TABLET ORAL at 08:48

## 2024-08-28 RX ADMIN — HEPARIN SODIUM 5000 UNITS: 5000 INJECTION INTRAVENOUS; SUBCUTANEOUS at 20:54

## 2024-08-28 RX ADMIN — HEPARIN SODIUM 5000 UNITS: 5000 INJECTION INTRAVENOUS; SUBCUTANEOUS at 14:05

## 2024-08-28 RX ADMIN — ACETAMINOPHEN 650 MG: 325 TABLET ORAL at 16:30

## 2024-08-28 RX ADMIN — HYDRALAZINE HYDROCHLORIDE 100 MG: 50 TABLET ORAL at 20:51

## 2024-08-28 RX ADMIN — HYDRALAZINE HYDROCHLORIDE 100 MG: 50 TABLET ORAL at 14:06

## 2024-08-28 RX ADMIN — ATENOLOL 50 MG: 25 TABLET ORAL at 08:48

## 2024-08-28 RX ADMIN — SODIUM CHLORIDE, PRESERVATIVE FREE 10 ML: 5 INJECTION INTRAVENOUS at 20:56

## 2024-08-28 RX ADMIN — ACETAMINOPHEN 650 MG: 325 TABLET ORAL at 23:42

## 2024-08-28 ASSESSMENT — PAIN SCALES - GENERAL
PAINLEVEL_OUTOF10: 8
PAINLEVEL_OUTOF10: 6
PAINLEVEL_OUTOF10: 4
PAINLEVEL_OUTOF10: 7
PAINLEVEL_OUTOF10: 0
PAINLEVEL_OUTOF10: 7
PAINLEVEL_OUTOF10: 3

## 2024-08-28 ASSESSMENT — PAIN SCALES - WONG BAKER
WONGBAKER_NUMERICALRESPONSE: NO HURT
WONGBAKER_NUMERICALRESPONSE: NO HURT

## 2024-08-28 ASSESSMENT — PAIN DESCRIPTION - LOCATION
LOCATION: LEG

## 2024-08-28 ASSESSMENT — PAIN DESCRIPTION - ORIENTATION
ORIENTATION: RIGHT;LEFT
ORIENTATION: LEFT;RIGHT

## 2024-08-28 ASSESSMENT — PAIN DESCRIPTION - DESCRIPTORS: DESCRIPTORS: DISCOMFORT;CRAMPING;THROBBING

## 2024-08-28 NOTE — PLAN OF CARE
OCCUPATIONAL THERAPY TREATMENT: WEEKLY REASSESSMENT    Patient: Yamilet Smith (64 y.o. female)  Date: 8/28/2024  Primary Diagnosis: Acute on chronic renal insufficiency [N28.9, N18.9]  MANUEL (acute kidney injury) (HCC) [N17.9]  Uncontrolled hypertension [I10]  Diarrhea, unspecified type [R19.7]       Precautions: General Precautions, Contact Precautions, Bed Alarm                Recommendations for nursing mobility: Out of bed to chair for meals, Encourage HEP in prep for ADLs/mobility; see handout for details, and Frequent repositioning to prevent skin breakdown    In place during session: Peripheral IV and External Catheter  Chart, occupational therapy assessment, plan of care, and goals were reviewed.  ASSESSMENT  Patient initially seen for OT evaluation on 8/21/24 and 3 skilled OT sessions since evalution. Patient seen today for OT reevaluation s/t LOS and reassessment of pt progress. Patient A&O x4. Pt long sitting in bed upon arrival, agreeable to session.      Based on the objective data described, the patient currently presents with impaired functional mobility, impaired strength, impaired sensation, poor body mechanics, and decreased activity tolerance. (See below for objective details and assist levels).    Overall pt tolerated session poor today, currently with c/o 5-6/10 headache, nausea, discomfort. OT facilitated OOB to chair in preporation for self feeding, however pt uncooperative and did not want to get up.. OT educated pt on importance of OOB activity tolerance for recovery. Pt continued to decline. Pt completed self feeding and grooming tasks while long sitting in bed, with AMBRIZ A.  Pt continues to benefit from skilled OT to address the above impairments.return to PLOF, OT goals and POC reviewed on this date and continue to remain appropriate at this time. Potential barriers for safe discharge: pts current support system is unable to meet their requirements for physical assistance, pt has poor

## 2024-08-28 NOTE — PROGRESS NOTES
Hospitalist Progress Note               Daily Progress Note: 8/28/2024      Hospital Day: 11     Chief complaint:   Chief Complaint   Patient presents with    Cough    Diarrhea    Fatigue        Subjective:   Hospital course to date:    64-year-old female with diabetes, CKD, stroke, hypertension and hyperlipidemia presented the ED with cough, fatigue and diarrhea.  Has been sick for about 1 week.  Patient unable to keep her medications down.    In the ED blood pressure was 145/86.  Labs showed MANUEL with a creatinine of 4.2, Baseline around 2.3.    She was admitted, started on IV fluids    Blood pressure was very labile.  She was seen by nephrology for MANUEL/CKD    Renal function did not show any improvement    On 8/28 PT noted left leg to be weak.  Unclear how long that had been present.  CT was obtained which showed subacute right cerebellar infarct.    MRI of the brain was obtained which showed small chronic infarcts in the bilateral cerebellum, right greater than left and right reena.  Does not appear that the right cerebellar infarct was in fact acute    Her renal function worsened with creatinine peaking at 5.57 on 8/24.    Patient was initiated on hemodialysis 8/26  --------  Patient is seen today for follow-up.   Was seen in HD.  No new complaints today.      She was accepted to [facility] pending insurance authorization.           Medications reviewed  Current Facility-Administered Medications   Medication Dose Route Frequency    ferric gluconate (FERRLECIT) 125 mg in sodium chloride 0.9 % 100 mL IVPB  125 mg IntraVENous Q MWF    epoetin santos-epbx (RETACRIT) injection 10,000 Units  10,000 Units IntraVENous Once per day on Monday Wednesday Friday    heparin (porcine) injection 3,600 Units  3,600 Units IntraCATHeter PRN    melatonin tablet 3 mg  3 mg Oral Nightly PRN    hydrALAZINE (APRESOLINE) tablet 100 mg  100 mg Oral 3 times per day    heparin (porcine) injection 5,000 Units  5,000 Units SubCUTAneous 3  MEI      XR CHEST PORTABLE   Final Result   No acute intrathoracic process is identified.             Electronically signed by KAMILAH PERRY             Discussion/MDM:     [x] High (any 2)    A. Problems (any 1)  [x] Acute/Chronic Illness/injury posing threat to life or bodily function:    [] Severe exacerbation of chronic illness:    ---------------------------------------------------------------------  B. Risk of Treatment (any 1)   [x] Drugs/treatments that require intensive monitoring for toxicity include:    [] IV ABX requiring serial renal monitoring for nephrotoxicity:     [] IV Narcotic analgesia for adverse drug reaction  [] Aggressive IV diuresis requiring serial monitoring for renal impairment and electrolyte derangements  [] Critical electrolyte abnormalities requiring IV replacement and close serial monitoring  [x] SQ Insulin SS- monitoring serial FSBS for Hypoglycemic adverse drug reaction  [] Other -   [] Change in code status:    [] Decision to escalate care:    [] Major surgery/procedure with associated risk factors:    ----------------------------------------------------------------------  C. Data (any 2)  [] Discussed current management and discharge planning options with Case Management.  [x] Discussed management of the case with: Nephrologist  [] Telemetry personally reviewed and interpreted as documented above    [] Imaging personally reviewed and interpreted, includes:    [x] Data Review (any 3)  [x] All available Consultant notes from yesterday/today were reviewed  [x] All current labs were reviewed and interpreted for clinical significance   [x] Appropriate follow-up labs were ordered  [] Collateral history obtained from:               Assessment:  Acute kidney injury, likely prerenal due to hypovolemia/dehydration  Chronic kidney disease stage 4, likely ESRD   -Initiated on HD 8/26    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

## 2024-08-28 NOTE — DIALYSIS
Patient tolerated treatment. 1000 ml of fluid was removed. Patient was alert and oriented during treatment. 54.2 liters of blood was processed. Report was given to primary nurse Ky.

## 2024-08-28 NOTE — PROGRESS NOTES
Nephrology follow up          Patient: Yamilet Smith MRN: [id number]  SSN: [id number]    YOB: 1960  Age: 64 y.o.  Sex: female      Subjective:   The patient is seen at the bedside.  She had received her second dialysis treatment yesterday.  Plan for dialysis today    Past Medical History:   Diagnosis Date    Chronic kidney disease     Diabetes (HCC)     Hypertension     Stroke (HCC)      Past Surgical History:   Procedure Laterality Date    HYSTERECTOMY (CERVIX STATUS UNKNOWN)      age 24    IR TUNNELED CATHETER PLACEMENT GREATER THAN 5 YEARS  8/26/2024    IR TUNNELED CATHETER PLACEMENT GREATER THAN 5 YEARS 8/26/2024 Whitney Madrigal PA-C [facility]      Family History   Problem Relation Age of Onset    Cancer Sister     Diabetes Sister     Heart Disease Mother     Diabetes Mother      Social History     Tobacco Use    Smoking status: Every Day     Current packs/day: 0.50     Types: Cigarettes    Smokeless tobacco: Never   Substance Use Topics    Alcohol use: Never      Current Facility-Administered Medications   Medication Dose Route Frequency Provider Last Rate Last Admin    ferric gluconate (FERRLECIT) 125 mg in sodium chloride 0.9 % 100 mL IVPB  125 mg IntraVENous Q MWF Everton Whittaker MD        epoetin santos-epbx (RETACRIT) injection 10,000 Units  10,000 Units IntraVENous Once per day on Monday Wednesday Friday Everton Whittaker MD   10,000 Units at 08/27/24 1100    heparin (porcine) injection 3,600 Units  3,600 Units IntraCATHeter PRN Everton Whittaker MD   3,600 Units at 08/27/24 1212    melatonin tablet 3 mg  3 mg Oral Nightly PRN Alex Hernandez MD   3 mg at 08/27/24 2227    hydrALAZINE (APRESOLINE) tablet 100 mg  100 mg Oral 3 times per day Everton Whittaker MD   100 mg at 08/28/24 1406    heparin (porcine) injection 5,000 Units  5,000 Units SubCUTAneous 3 times per day Mg Domingo MD   5,000 Units at 08/28/24 1405    atenolol (TENORMIN) tablet 50 mg  50 mg Oral Daily Panfilo  review of systems was negative except for that written in the History of Present Illness.    Objective:     Vitals:    08/28/24 1150 08/28/24 1220 08/28/24 1250 08/28/24 1417   BP: (!) 141/57 (!) 140/87 (!) 150/115 (!) 143/64   Pulse: 60 76 68 69   Resp: 16 16 16 16   Temp:    98.2 °F (36.8 °C)   TempSrc:    Oral   SpO2:    94%   Weight:       Height:            Physical Exam:  General: NAD  Eyes: sclera anicteric  Oral Cavity: No thrush or ulcers  Neck: no JVD  Chest: Fair bilateral air entry  Heart: normal sounds  Abdomen: soft and non tender   : no jones  Lower Extremities: no edema  Skin: no rash  Neuro: intact  Psychiatric: non-depressed          Assessment/Plan:     Acute kidney injury on chronic kidney disease stage IV/V.  Now with ESRD.  On admission BUN/creatinine 64/4.22  BUN/creatinine peaked at 105/5.57.  No idea about recent baseline Cr and last available creatinine was 2.3 in April 2023.  No evidence of hydronephrosis per CT of the abdomen.  Urine analysis + urinary tract infection and 0.7 g/g of proteinuria.  Initiated on hemodialysis on 8/26.  Received HD on 8/27  Plan for HD today.  Okay to discharge from renal standpoint once she has chair set up.    Hypertension  Came with hypertensive urgency.  Blood pressure 226/71 on arrival.  Better blood pressures.  Cannot increase atenolol dose in the setting of low estimated GFR.  Continue atenolol 50 mg/amlodipine 10 mg/hydralazine 100 3 times daily.      Anemia  Anemia of chronic kidney disease  Hemoglobin 10.4->9.6->7.3.  Iron saturation 16% and ferritin 140.  IV Ferrlecit 125 mg with each dialysis x 6 doses..  IV erythropoietin with each dialysis.    Renal osteodystrophy  Calcium and phosphorus are okay.  intact PTH level 189.9.    Diabetes mellitus type 2  On oral hypoglycemic agents at home.    Acute UTI  Came with abdominal pain and vomiting  UA positive for pyuria/hematuria and large leukocyte esterase  urine culture pan sensitive Klebsiella

## 2024-08-28 NOTE — PLAN OF CARE
Problem: Discharge Planning  Goal: Discharge to home or other facility with appropriate resources  8/28/2024 0043 by Randolph Dailey RN  Outcome: Progressing  8/27/2024 1048 by Ky Grissom RN  Outcome: Progressing     Problem: Pain  Goal: Verbalizes/displays adequate comfort level or baseline comfort level  8/28/2024 0043 by Randolph Dailey RN  Outcome: Progressing  8/27/2024 1048 by Ky Grissom RN  Outcome: Progressing  Flowsheets (Taken 8/27/2024 0300 by Vidhya Hurtado RN)  Verbalizes/displays adequate comfort level or baseline comfort level: Encourage patient to monitor pain and request assistance     Problem: Safety - Adult  Goal: Free from fall injury  8/28/2024 0043 by Randolph Dailey RN  Outcome: Progressing  8/27/2024 1048 by Ky Grissom RN  Outcome: Progressing     Problem: Skin/Tissue Integrity  Goal: Absence of new skin breakdown  Description: 1.  Monitor for areas of redness and/or skin breakdown  2.  Assess vascular access sites hourly  3.  Every 4-6 hours minimum:  Change oxygen saturation probe site  4.  Every 4-6 hours:  If on nasal continuous positive airway pressure, respiratory therapy assess nares and determine need for appliance change or resting period.  8/28/2024 0043 by Randolph Dailey RN  Outcome: Progressing  8/27/2024 1048 by Ky Grissom RN  Outcome: Progressing     Problem: Chronic Conditions and Co-morbidities  Goal: Patient's chronic conditions and co-morbidity symptoms are monitored and maintained or improved  8/28/2024 0043 by Randolph Dailey RN  Outcome: Progressing  8/27/2024 1048 by Ky Grissom RN  Outcome: Progressing  Flowsheets (Taken 8/27/2024 0920)  Care Plan - Patient's Chronic Conditions and Co-Morbidity Symptoms are Monitored and Maintained or Improved: Monitor and assess patient's chronic conditions and comorbid symptoms for stability, deterioration, or improvement     Problem:

## 2024-08-28 NOTE — PROGRESS NOTES
Tele monitors called at 2329, Patient dipped in the 40s for a few seconds and went back up to 63bpm. Checked on patient she said she is fine, and pain has relieved. Had oxycodone 5mg then tylenol 650mg. Also had Melatonin to aid with sleep. Nil SOB or chest pain

## 2024-08-28 NOTE — CARE COORDINATION
JAMARI received telephone call from [facility] admissions [phone] from [facility].  She is requesting 3rd flowsheet and PT notes.  CM has sent PT notes this morning, 3rd flowsheet is not available as patient has only had 2 HD treatments in hospital.     Patient will need insurance authorization to go to [facility] as well. Auth has not been started at [place] as of yet due to we are awaiting confirmation of HD chair first.

## 2024-08-28 NOTE — PROGRESS NOTES
PT attempted to see pt for PT session.  Pt currently off the floor at dialysis.  Will attempt PT again at a later time.  Thank you!

## 2024-08-28 NOTE — CARE COORDINATION
1535: CM received telephone call from [facility] in admissions at  Renal, CM notified that patient had been approved and she will send the information to the branch for chair time.     1447: Patient requires additional assistance with transfers and  Renal in [place] is attempting to obtain approval to accept patient.  JAMARI consulted with Nephrologist.  It may be beneficial for the patient to go to a SNF where she can get dialysis in the SNF.  CM met with patient at bedside, she is agreeable. Freedom of choice given for [facility] at this time.  CM sent referral. Patient has been accepted at [facility]. JAMARI has called patients sister Narda Pace [phone] and notified her of above. She verbalized understanding and agrees with this plan.

## 2024-08-28 NOTE — PROGRESS NOTES
Hospitalist Progress Note               Daily Progress Note: 8/28/2024      Hospital Day: 11     Chief complaint:   Chief Complaint   Patient presents with    Cough    Diarrhea    Fatigue        Subjective:   Hospital course to date:    64-year-old female with diabetes, CKD, stroke, hypertension and hyperlipidemia presented the ED with cough, fatigue and diarrhea.  Has been sick for about 1 week.  Patient unable to keep her medications down.    In the ED blood pressure was 145/86.  Labs showed MANUEL with a creatinine of 4.2, Baseline around 2.3.    She was admitted, started on IV fluids    Blood pressure was very labile.  She was seen by nephrology for MANUEL/CKD    Renal function did not show any improvement    On 8/28 PT noted left leg to be weak.  Unclear how long that had been present.  CT was obtained which showed subacute right cerebellar infarct.    MRI of the brain was obtained which showed small chronic infarcts in the bilateral cerebellum, right greater than left and right reena.  Does not appear that the right cerebellar infarct was in fact acute    Her renal function worsened with creatinine peaking at 5.57 on 8/24.    Patient was initiated on hemodialysis 8/26  --------  Patient is seen today for follow-up.  Will start her on dialysis.  No new complaints today.  She was accepted to [facility] pending insurance authorization.  Because of hypertension this morning.  On EKG sinus bradycardia.        Medications reviewed  Current Facility-Administered Medications   Medication Dose Route Frequency    ferric gluconate (FERRLECIT) 125 mg in sodium chloride 0.9 % 100 mL IVPB  125 mg IntraVENous Q MWF    epoetin santos-epbx (RETACRIT) injection 10,000 Units  10,000 Units IntraVENous Once per day on Monday Wednesday Friday    heparin (porcine) injection 3,600 Units  3,600 Units IntraCATHeter PRN    melatonin tablet 3 mg  3 mg Oral Nightly PRN    hydrALAZINE (APRESOLINE) tablet 100 mg  100 mg Oral 3 times per day     heparin (porcine) injection 5,000 Units  5,000 Units SubCUTAneous 3 times per day    atenolol (TENORMIN) tablet 50 mg  50 mg Oral Daily    sodium chloride flush 0.9 % injection 5-40 mL  5-40 mL IntraVENous PRN    0.9 % sodium chloride infusion   IntraVENous PRN    polyethylene glycol (GLYCOLAX) packet 17 g  17 g Oral Daily PRN    aspirin chewable tablet 81 mg  81 mg Oral Daily    Or    aspirin suppository 300 mg  300 mg Rectal Daily    rosuvastatin (CRESTOR) tablet 20 mg  20 mg Oral Nightly    insulin lispro (HUMALOG,ADMELOG) injection vial 0-4 Units  0-4 Units SubCUTAneous TID WC    insulin lispro (HUMALOG,ADMELOG) injection vial 0-4 Units  0-4 Units SubCUTAneous Nightly    sodium chloride flush 0.9 % injection 5-40 mL  5-40 mL IntraVENous 2 times per day    sodium chloride flush 0.9 % injection 5-40 mL  5-40 mL IntraVENous PRN    ondansetron (ZOFRAN-ODT) disintegrating tablet 4 mg  4 mg Oral Q8H PRN    Or    ondansetron (ZOFRAN) injection 4 mg  4 mg IntraVENous Q6H PRN    acetaminophen (TYLENOL) tablet 650 mg  650 mg Oral Q6H PRN    Or    acetaminophen (TYLENOL) suppository 650 mg  650 mg Rectal Q6H PRN    amLODIPine (NORVASC) tablet 10 mg  10 mg Oral Daily    hydrALAZINE (APRESOLINE) injection 10 mg  10 mg IntraVENous Q4H PRN    oxyCODONE (ROXICODONE) immediate release tablet 5 mg  5 mg Oral Q6H PRN       Review of Systems:   Pertinent items are noted in HPI.    Objective:   Physical Exam:     BP (!) 146/56   Pulse 70   Temp 98.2 °F (36.8 °C) (Oral)   Resp 16   Ht 1.702 m (5' 7\")   Wt 83.3 kg (183 lb 10.3 oz)   SpO2 100%   BMI 28.76 kg/m²    O2 Device: None (Room air)    Temp (24hrs), Av.8 °F (36.6 °C), Min:97.3 °F (36.3 °C), Max:98.2 °F (36.8 °C)    No intake/output data recorded.    1901 -  0700  In: 4900 [P.O.:700; I.V.:3600]  Out: 1900 [Urine:300]    Mode Rate TV Press PEEP FiO2 PIP Min. Vent                              General:   Awake and alert   Lungs:   Clear to auscultation

## 2024-08-28 NOTE — PROGRESS NOTES
4 Eyes Skin Assessment     NAME:  Yamilet Smith  YOB: 1960  MEDICAL RECORD NUMBER:  [id number]    The patient is being assessed for  Other weekly assessment     I agree that at least one RN has performed a thorough Head to Toe Skin Assessment on the patient. ALL assessment sites listed below have been assessed.      Areas assessed by both nurses:    Head, Face, Ears, Shoulders, Back, Chest, Arms, Elbows, Hands, Sacrum. Buttock, Coccyx, Ischium, Legs. Feet and Heels, and Under Medical Devices         Does the Patient have a Wound? Yes wound(s) were present on assessment. LDA wound assessment was Initiated and completed by RN       Sesar Prevention initiated by RN: Yes  Wound Care Orders initiated by RN: No    Pressure Injury (Stage 3,4, Unstageable, DTI, NWPT, and Complex wounds) if present, place Wound referral order by RN under : Yes    New Ostomies, if present place, Ostomy referral order under : No     Nurse 1 eSignature: Electronically signed by Ky Grissom RN on 8/28/24 at 7:46 PM EDT    **SHARE this note so that the co-signing nurse can place an eSignature**    Nurse 2 eSignature: Electronically signed by Tiffanie Sánchez RN on 8/28/24 at 7:48 PM EDT

## 2024-08-28 NOTE — PROGRESS NOTES
Spoke with pt about moving her IV due to policy and pt refused to have another put in.     Pts dressing is up to date 8/27/2024 was last change.

## 2024-08-29 LAB
GLUCOSE BLD STRIP.AUTO-MCNC: 105 MG/DL (ref 65–100)
GLUCOSE BLD STRIP.AUTO-MCNC: 132 MG/DL (ref 65–100)
GLUCOSE BLD STRIP.AUTO-MCNC: 174 MG/DL (ref 65–100)
GLUCOSE BLD STRIP.AUTO-MCNC: 204 MG/DL (ref 65–100)
PERFORMED BY:: ABNORMAL

## 2024-08-29 PROCEDURE — 1100000000 HC RM PRIVATE

## 2024-08-29 PROCEDURE — 6360000002 HC RX W HCPCS: Performed by: INTERNAL MEDICINE

## 2024-08-29 PROCEDURE — 97530 THERAPEUTIC ACTIVITIES: CPT

## 2024-08-29 PROCEDURE — 82962 GLUCOSE BLOOD TEST: CPT

## 2024-08-29 PROCEDURE — 6370000000 HC RX 637 (ALT 250 FOR IP): Performed by: INTERNAL MEDICINE

## 2024-08-29 PROCEDURE — 6370000000 HC RX 637 (ALT 250 FOR IP): Performed by: PSYCHIATRY & NEUROLOGY

## 2024-08-29 PROCEDURE — 2580000003 HC RX 258: Performed by: INTERNAL MEDICINE

## 2024-08-29 RX ORDER — GABAPENTIN 100 MG/1
100 CAPSULE ORAL 2 TIMES DAILY
Status: DISCONTINUED | OUTPATIENT
Start: 2024-08-29 | End: 2024-08-30 | Stop reason: HOSPADM

## 2024-08-29 RX ORDER — LEVOFLOXACIN 250 MG/1
250 TABLET, FILM COATED ORAL
Status: DISCONTINUED | OUTPATIENT
Start: 2024-08-29 | End: 2024-08-30 | Stop reason: HOSPADM

## 2024-08-29 RX ADMIN — SODIUM CHLORIDE, PRESERVATIVE FREE 10 ML: 5 INJECTION INTRAVENOUS at 08:18

## 2024-08-29 RX ADMIN — GABAPENTIN 100 MG: 100 CAPSULE ORAL at 09:39

## 2024-08-29 RX ADMIN — AMLODIPINE BESYLATE 10 MG: 5 TABLET ORAL at 08:17

## 2024-08-29 RX ADMIN — OXYCODONE 5 MG: 5 TABLET ORAL at 15:11

## 2024-08-29 RX ADMIN — ASPIRIN 81 MG 81 MG: 81 TABLET ORAL at 08:17

## 2024-08-29 RX ADMIN — HYDRALAZINE HYDROCHLORIDE 100 MG: 50 TABLET ORAL at 06:37

## 2024-08-29 RX ADMIN — HYDRALAZINE HYDROCHLORIDE 100 MG: 50 TABLET ORAL at 14:33

## 2024-08-29 RX ADMIN — GABAPENTIN 100 MG: 100 CAPSULE ORAL at 22:14

## 2024-08-29 RX ADMIN — HYDRALAZINE HYDROCHLORIDE 100 MG: 50 TABLET ORAL at 22:13

## 2024-08-29 RX ADMIN — HEPARIN SODIUM 5000 UNITS: 5000 INJECTION INTRAVENOUS; SUBCUTANEOUS at 14:32

## 2024-08-29 RX ADMIN — OXYCODONE 5 MG: 5 TABLET ORAL at 09:39

## 2024-08-29 RX ADMIN — LEVOFLOXACIN 250 MG: 250 TABLET, FILM COATED ORAL at 09:40

## 2024-08-29 RX ADMIN — HEPARIN SODIUM 5000 UNITS: 5000 INJECTION INTRAVENOUS; SUBCUTANEOUS at 06:37

## 2024-08-29 RX ADMIN — OXYCODONE 5 MG: 5 TABLET ORAL at 00:53

## 2024-08-29 RX ADMIN — SODIUM CHLORIDE, PRESERVATIVE FREE 10 ML: 5 INJECTION INTRAVENOUS at 22:14

## 2024-08-29 RX ADMIN — INSULIN LISPRO 1 UNITS: 100 INJECTION, SOLUTION INTRAVENOUS; SUBCUTANEOUS at 12:35

## 2024-08-29 RX ADMIN — HEPARIN SODIUM 5000 UNITS: 5000 INJECTION INTRAVENOUS; SUBCUTANEOUS at 22:14

## 2024-08-29 RX ADMIN — ROSUVASTATIN CALCIUM 20 MG: 20 TABLET, COATED ORAL at 22:13

## 2024-08-29 RX ADMIN — ATENOLOL 50 MG: 25 TABLET ORAL at 08:17

## 2024-08-29 ASSESSMENT — PAIN SCALES - GENERAL
PAINLEVEL_OUTOF10: 7
PAINLEVEL_OUTOF10: 3
PAINLEVEL_OUTOF10: 0
PAINLEVEL_OUTOF10: 4
PAINLEVEL_OUTOF10: 9
PAINLEVEL_OUTOF10: 5
PAINLEVEL_OUTOF10: 7
PAINLEVEL_OUTOF10: 9

## 2024-08-29 ASSESSMENT — PAIN DESCRIPTION - LOCATION
LOCATION: ABDOMEN;FOOT
LOCATION: LEG;KNEE
LOCATION: LEG

## 2024-08-29 ASSESSMENT — PAIN DESCRIPTION - ORIENTATION
ORIENTATION: LOWER
ORIENTATION: RIGHT;LEFT
ORIENTATION: RIGHT;LEFT;ANTERIOR

## 2024-08-29 ASSESSMENT — PAIN DESCRIPTION - DESCRIPTORS
DESCRIPTORS: SHARP;ACHING
DESCRIPTORS: ACHING
DESCRIPTORS: ACHING

## 2024-08-29 ASSESSMENT — PAIN - FUNCTIONAL ASSESSMENT: PAIN_FUNCTIONAL_ASSESSMENT: ACTIVITIES ARE NOT PREVENTED

## 2024-08-29 NOTE — PLAN OF CARE
PHYSICAL THERAPY WEEKLY REASSESSMENT  Patient: Yamilet Smith (64 y.o. female)  Date: 8/29/2024  Primary Diagnosis: Acute on chronic renal insufficiency [N28.9, N18.9]  MANUEL (acute kidney injury) (HCC) [N17.9]  Uncontrolled hypertension [I10]  Diarrhea, unspecified type [R19.7]       Precautions: General Precautions, Contact Precautions, Bed Alarm                      Recommendations for nursing mobility: Encourage HEP in prep for ADLs/mobility; see handout for details, Frequent repositioning to prevent skin breakdown, Use of bed/chair alarm for safety, LE elevation for management of edema, Quynh Stedy for bed to chair transfers , and Assist x2    In place during session: External Catheter and EKG/telemetry   Chart, physical therapy assessment, plan of care, and goals were reviewed.      ASSESSMENT  Patient initially seen for PT evaluation on 8/20/24 and had 3 skilled PT sessions since evalution. Patient seen today for PT reevaluation s/t LOS. Patient A&O x3. Pt received semi supine on bed upon arrival, agreeable to session.      Based on the objective data described, the patient currently presents with impaired functional mobility, decreased ROM, impaired strength, decreased activity tolerance, poor safety awareness, decreased coordination, impaired balance, and increased pain levels. (See below for objective details and assist levels).    Overall pt tolerated session fair today, currently with c/o pain b/l LE - 8/10. Pt required additional time, min/modA and cues for bed mobility with use of bed rails and HOB elevated for sup>sit transfers. Performed STS transfers x 2 with maxAx 2 with cues for hand placement and technique. Pt able to stand upright but b/l knees buckling on attempt to move towards the chair, require max cues for weight shifting, unable to advance her b/l LE or take lateral steps for ambulation. Recommend nsg to use Quynh Steady for bed<>chair transfers.  Pt will benefit from continued skilled PT  2   [x] 3   [] 4          How much help from another person does the patient currently need... Total A Lot A Little None   4.  Moving to and from a bed to a chair (including a wheelchair)?   [] 1   [x] 2   [] 3   [] 4   5.  Need to walk in hospital room?   [x] 1   [] 2   [] 3   [] 4   6.  Climbing 3-5 steps with a railing?   [x] 1   [] 2   [] 3   [] 4   © , Trustees of [facility], under license to [employer]. All rights reserved     Score:  Initial:  Most Recent: (Date: 2024 )   Interpretation of Tool:  Represents activities that are increasingly more difficult (i.e. Bed mobility, Transfers, Gait).  Score 24 23 22-20 19-15 14-10 9-7 6   Modifier CH CI CJ CK CL CM CN     Pain Ratin10 b/l LE reported  Pain Intervention(s):   nursing notified and addressing    Activity Tolerance:   Fair     After treatment patient left in no apparent distress:   Bed locked and in lowest position Patient left in no apparent distress in bed, Call bell within reach, Bed/ chair alarm activated, and Side rails x3 and nsg updated.    COMMUNICATION/EDUCATION:   The patient’s plan of care was discussed with: Registered nurse    Patient Education  Education Given To: Patient  Education Provided: Role of Therapy;Plan of Care;Transfer Training;Equipment;Fall Prevention Strategies;Energy Conservation;Home Exercise Program  Education Method: Verbal;Demonstration;Teach Back  Barriers to Learning: None  Education Outcome: Verbalized understanding;Continued education needed;Demonstrated understanding    Thank you for this referral.  Katie Calzada PT  Minutes: 28

## 2024-08-29 NOTE — CARE COORDINATION
JAMARI received telephone call from  Renal Admissions [phone] and spoke with Toi.  She stated that the patient will be set up for MWF chair and patient can start tomorrow.  JAMARI explained that we will need insurance authorization for patient to go to SNF.  If we can get the insurance authorization today, patient can go to HD tomorrow, If not today, then Monday she can start out patient HD.  CM met with patient at bedside to discuss her options to either go to [facility] with outpatient HD at  Renal or attempt [facility] in [place] where she will be able to get HD onsite.  Patient opted for [facility] with out patient HD at  Renal.  Patient will need updated PT note for insurance authorization, CM has coordinated with PT this morning.  CM messaged [facility] asking if we are good to start insurance auth once we have PT notes available. [place] informed CM that auth may be started for their facility once able.

## 2024-08-29 NOTE — PROGRESS NOTES
Nephrology follow up          Patient: Yamilet Smith MRN: [id number]  SSN: [id number]    YOB: 1960  Age: 64 y.o.  Sex: female      Subjective:   The patient is seen at the bedside.  She had received her second dialysis treatment yesterday.  Plan for dialysis in AM    Past Medical History:   Diagnosis Date    Chronic kidney disease     Diabetes (HCC)     Hypertension     Stroke (HCC)      Past Surgical History:   Procedure Laterality Date    HYSTERECTOMY (CERVIX STATUS UNKNOWN)      age 24    IR TUNNELED CATHETER PLACEMENT GREATER THAN 5 YEARS  8/26/2024    IR TUNNELED CATHETER PLACEMENT GREATER THAN 5 YEARS 8/26/2024 Whitney Madrigal PA-C SSR RAD ANGIO IR      Family History   Problem Relation Age of Onset    Cancer Sister     Diabetes Sister     Heart Disease Mother     Diabetes Mother      Social History     Tobacco Use    Smoking status: Every Day     Current packs/day: 0.50     Types: Cigarettes    Smokeless tobacco: Never   Substance Use Topics    Alcohol use: Never      Current Facility-Administered Medications   Medication Dose Route Frequency Provider Last Rate Last Admin    levoFLOXacin (LEVAQUIN) tablet 250 mg  250 mg Oral Q48H Carlo Sibley MD   250 mg at 08/29/24 0940    gabapentin (NEURONTIN) capsule 100 mg  100 mg Oral BID Carlo Sibley MD   100 mg at 08/29/24 0939    ferric gluconate (FERRLECIT) 125 mg in sodium chloride 0.9 % 100 mL IVPB  125 mg IntraVENous Q MWF Everton Whittaker MD        epoetin santos-epbx (RETACRIT) injection 10,000 Units  10,000 Units IntraVENous Once per day on Monday Wednesday Friday Everton Whittaker MD   10,000 Units at 08/27/24 1100    heparin (porcine) injection 3,600 Units  3,600 Units IntraCATHeter PRN Everton Whittaker MD   3,600 Units at 08/27/24 1212    melatonin tablet 3 mg  3 mg Oral Nightly PRN Alex Hernandez MD   3 mg at 08/27/24 2227    hydrALAZINE (APRESOLINE) tablet 100 mg  100 mg Oral 3 times per day Everton Whittaker MD   100 mg at

## 2024-08-29 NOTE — PROGRESS NOTES
Pharmacy Note - Renal dose adjustment made per P/T protocol    Original order:  Levofloxacin 250 mg PO daily x 3 days    Estimated Creatinine Clearance: 19 mL/min (A) (based on SCr of 3.29 mg/dL (H)). Dialysis    Recent Labs     08/26/24  1039 08/27/24  0549 08/28/24  0452   BUN 92* 65* 38*   CREATININE 5.14* 3.98* 3.29*       Renally adjusted order:  Levofloxacin 250 mg PO q 48 h x 3 days    Please call pharmacy with any questions.    Thank you,  JOHNNY RAMON [facility] MS  8/29/2024 9:28 AM

## 2024-08-29 NOTE — PROGRESS NOTES
Hospitalist Progress Note               Daily Progress Note: 8/29/2024      Hospital Day: 12     Chief complaint:   Chief Complaint   Patient presents with    Cough    Diarrhea    Fatigue        Subjective:   Hospital course to date:    64-year-old female with diabetes, CKD, stroke, hypertension and hyperlipidemia presented the ED with cough, fatigue and diarrhea.  Has been sick for about 1 week.  Patient unable to keep her medications down.    In the ED blood pressure was 145/86.  Labs showed MANUEL with a creatinine of 4.2, Baseline around 2.3.    She was admitted, started on IV fluids    Blood pressure was very labile.  She was seen by nephrology for MANUEL/CKD    Renal function did not show any improvement    On 8/28 PT noted left leg to be weak.  Unclear how long that had been present.  CT was obtained which showed subacute right cerebellar infarct.    MRI of the brain was obtained which showed small chronic infarcts in the bilateral cerebellum, right greater than left and right reena.  Does not appear that the right cerebellar infarct was in fact acute    Her renal function worsened with creatinine peaking at 5.57 on 8/24.    Patient was initiated on hemodialysis 8/26  --------  Patient is seen today for follow-up.   Was seen in HD.  No new complaints today.      She was accepted to [facility] pending insurance authorization.       Patient continues to complain of pain in her legs and feet, likely related to neuropathy    Medications reviewed  Current Facility-Administered Medications   Medication Dose Route Frequency    ferric gluconate (FERRLECIT) 125 mg in sodium chloride 0.9 % 100 mL IVPB  125 mg IntraVENous Q MWF    epoetin santos-epbx (RETACRIT) injection 10,000 Units  10,000 Units IntraVENous Once per day on Monday Wednesday Friday    heparin (porcine) injection 3,600 Units  3,600 Units IntraCATHeter PRN    melatonin tablet 3 mg  3 mg Oral Nightly PRN    hydrALAZINE (APRESOLINE) tablet 100 mg  100 mg Oral

## 2024-08-29 NOTE — CARE COORDINATION
PT note is available.  CM will start insurance authorization for patient to go to [facility].  CM called [facility] [phone] Opt 3, Auth Started, Reference Number is # [id number]  Clinicals faxed to [phone].     CM called US renal admissions [phone], CM inquired about patient starting HD on Monday 9/2/24 as it is a holiday, JAMARI was informed they will email the center to make sure it is okay for patient to start on Monday 9/2/24. Chair time is MWF at 2pm.     JAMARI spoke with Dr. Whittaker, Dr. Whittaker stated if patient has dialysis here tomorrow 8/30/24, she can wait until Wednesday 9/4/24 for her next treatment outpatient if the center is unable to accommodate HD on Monday 9/2/24.

## 2024-08-30 VITALS
BODY MASS INDEX: 28.82 KG/M2 | OXYGEN SATURATION: 92 % | TEMPERATURE: 98.8 F | DIASTOLIC BLOOD PRESSURE: 54 MMHG | SYSTOLIC BLOOD PRESSURE: 145 MMHG | HEIGHT: 67 IN | WEIGHT: 183.64 LBS | RESPIRATION RATE: 18 BRPM | HEART RATE: 59 BPM

## 2024-08-30 LAB
GLUCOSE BLD STRIP.AUTO-MCNC: 105 MG/DL (ref 65–100)
GLUCOSE BLD STRIP.AUTO-MCNC: 111 MG/DL (ref 65–100)
GLUCOSE BLD STRIP.AUTO-MCNC: 149 MG/DL (ref 65–100)
GLUCOSE BLD STRIP.AUTO-MCNC: 256 MG/DL (ref 65–100)
PERFORMED BY:: ABNORMAL

## 2024-08-30 PROCEDURE — 2580000003 HC RX 258: Performed by: INTERNAL MEDICINE

## 2024-08-30 PROCEDURE — 6370000000 HC RX 637 (ALT 250 FOR IP): Performed by: PSYCHIATRY & NEUROLOGY

## 2024-08-30 PROCEDURE — 6370000000 HC RX 637 (ALT 250 FOR IP): Performed by: INTERNAL MEDICINE

## 2024-08-30 PROCEDURE — 6360000002 HC RX W HCPCS: Performed by: INTERNAL MEDICINE

## 2024-08-30 PROCEDURE — 90935 HEMODIALYSIS ONE EVALUATION: CPT

## 2024-08-30 PROCEDURE — 2709999900 HC NON-CHARGEABLE SUPPLY

## 2024-08-30 PROCEDURE — 97535 SELF CARE MNGMENT TRAINING: CPT

## 2024-08-30 PROCEDURE — 82962 GLUCOSE BLOOD TEST: CPT

## 2024-08-30 RX ORDER — GABAPENTIN 100 MG/1
100 CAPSULE ORAL 2 TIMES DAILY
Qty: 6 CAPSULE | Refills: 0 | Status: SHIPPED | OUTPATIENT
Start: 2024-08-30 | End: 2024-09-02

## 2024-08-30 RX ORDER — OXYCODONE HYDROCHLORIDE 5 MG/1
5 TABLET ORAL EVERY 6 HOURS PRN
Qty: 6 TABLET | Refills: 0 | Status: SHIPPED | OUTPATIENT
Start: 2024-08-30 | End: 2024-09-02

## 2024-08-30 RX ADMIN — OXYCODONE 5 MG: 5 TABLET ORAL at 15:01

## 2024-08-30 RX ADMIN — OXYCODONE 5 MG: 5 TABLET ORAL at 08:43

## 2024-08-30 RX ADMIN — AMLODIPINE BESYLATE 10 MG: 5 TABLET ORAL at 08:43

## 2024-08-30 RX ADMIN — GABAPENTIN 100 MG: 100 CAPSULE ORAL at 08:43

## 2024-08-30 RX ADMIN — ROSUVASTATIN CALCIUM 20 MG: 20 TABLET, COATED ORAL at 20:39

## 2024-08-30 RX ADMIN — GABAPENTIN 100 MG: 100 CAPSULE ORAL at 20:38

## 2024-08-30 RX ADMIN — SODIUM CHLORIDE 125 MG: 9 INJECTION, SOLUTION INTRAVENOUS at 11:18

## 2024-08-30 RX ADMIN — SODIUM CHLORIDE: 9 INJECTION, SOLUTION INTRAVENOUS at 11:17

## 2024-08-30 RX ADMIN — SODIUM CHLORIDE, PRESERVATIVE FREE 10 ML: 5 INJECTION INTRAVENOUS at 08:43

## 2024-08-30 RX ADMIN — ASPIRIN 81 MG 81 MG: 81 TABLET ORAL at 08:43

## 2024-08-30 RX ADMIN — HEPARIN SODIUM 5000 UNITS: 5000 INJECTION INTRAVENOUS; SUBCUTANEOUS at 20:39

## 2024-08-30 RX ADMIN — HEPARIN SODIUM 5000 UNITS: 5000 INJECTION INTRAVENOUS; SUBCUTANEOUS at 06:38

## 2024-08-30 RX ADMIN — ACETAMINOPHEN 650 MG: 325 TABLET ORAL at 16:41

## 2024-08-30 RX ADMIN — HYDRALAZINE HYDROCHLORIDE 100 MG: 50 TABLET ORAL at 06:38

## 2024-08-30 RX ADMIN — HEPARIN SODIUM 3600 UNITS: 1000 INJECTION INTRAVENOUS; SUBCUTANEOUS at 16:09

## 2024-08-30 RX ADMIN — EPOETIN ALFA-EPBX 10000 UNITS: 10000 INJECTION, SOLUTION INTRAVENOUS; SUBCUTANEOUS at 15:45

## 2024-08-30 RX ADMIN — ATENOLOL 50 MG: 25 TABLET ORAL at 08:43

## 2024-08-30 ASSESSMENT — PAIN SCALES - WONG BAKER: WONGBAKER_NUMERICALRESPONSE: NO HURT

## 2024-08-30 ASSESSMENT — PAIN SCALES - GENERAL
PAINLEVEL_OUTOF10: 8
PAINLEVEL_OUTOF10: 8
PAINLEVEL_OUTOF10: 7
PAINLEVEL_OUTOF10: 7
PAINLEVEL_OUTOF10: 10

## 2024-08-30 ASSESSMENT — PAIN DESCRIPTION - ORIENTATION: ORIENTATION: RIGHT;LEFT

## 2024-08-30 ASSESSMENT — PAIN DESCRIPTION - LOCATION: LOCATION: LEG;FOOT

## 2024-08-30 NOTE — DISCHARGE SUMMARY
organomegaly.   Extremities: Extremities normal, atraumatic, no cyanosis or edema.   Pulses: 2+ and symmetric all extremities.   Skin: Skin color, texture, turgor normal. No rashes or lesions   Neurologic: CNII-XII intact. No gross sensory or motor deficits             Significant Diagnostic Studies:   8/18/2024: BUN 64 mg/dL (H; Ref range: 6 - 20 mg/dL); Calcium 9.2 mg/dL (Ref range: 8.5 - 10.1 mg/dL); Chloride 106 mmol/L (Ref range: 97 - 108 mmol/L); CO2 24 mmol/L (Ref range: 21 - 32 mmol/L); Creatinine 4.22 mg/dL (H; Ref range: 0.55 - 1.02 mg/dL); Glucose 149 mg/dL (H; Ref range: 65 - 100 mg/dL); Hematocrit 32.0 % (L; Ref range: 35.0 - 47.0 %); Hemoglobin 10.4 g/dL (L; Ref range: 11.5 - 16.0 g/dL); Potassium 4.0 mmol/L (Ref range: 3.5 - 5.1 mmol/L); Sodium 138 mmol/L (Ref range: 136 - 145 mmol/L)  8/19/2024: BUN 70 mg/dL (H; Ref range: 6 - 20 mg/dL); Calcium 9.2 mg/dL (Ref range: 8.5 - 10.1 mg/dL); Chloride 105 mmol/L (Ref range: 97 - 108 mmol/L); CO2 19 mmol/L (L; Ref range: 21 - 32 mmol/L); Creatinine 4.76 mg/dL (H; Ref range: 0.55 - 1.02 mg/dL); Glucose 169 mg/dL (H; Ref range: 65 - 100 mg/dL); Potassium 4.0 mmol/L (Ref range: 3.5 - 5.1 mmol/L); Sodium 139 mmol/L (Ref range: 136 - 145 mmol/L)  No results for input(s): \"WBC\", \"HGB\", \"HCT\", \"PLT\" in the last 72 hours.  Recent Labs     08/28/24  0451 08/28/24  0452   NA  --  138   K  --  3.9   CL  --  102   CO2  --  26   BUN  --  38*   CREATININE  --  3.29*   GLUCOSE  --  119*   CALCIUM 8.3* 8.4*   PHOS  --  3.8     No results for input(s): \"AST\", \"ALT\", \"ALKPHOS\", \"BILITOT\", \"LABALBU\", \"GLOB\", \"GGT\", \"AMYLASE\", \"LIPASE\" in the last 72 hours.    Invalid input(s): \"GPT\", \"PROT\"  No results for input(s): \"INR\", \"PROTIME\", \"APTT\" in the last 72 hours.   No results for input(s): \"IRON\", \"TIBC\" in the last 72 hours.    Invalid input(s): \"PSAT\", \"FERR\"   No results for input(s): \"PH\", \"PCO2\", \"PO2\" in the last 72 hours.  No results for input(s): \"CKTOTAL\", \"CKMB\",

## 2024-08-30 NOTE — DIALYSIS
Report given to Tiffanie, pt's primary nurse. Pt dialyzed for 3.5 hours, removed 2 liters of fluid, processed 61.6 liters of blood.  Epogen given intra-dialysis. Pt tolerated treatment well. Pt en route to her room.

## 2024-08-30 NOTE — CARE COORDINATION
Transition of Care Plan:    RUR: 20%  Prior Level of Functioning:   Disposition: SNF.  [facility]  RN to call report @ [phone]   If SNF or IPR: Date FOC offered: 22 Aug 2024  Date FOC received: 22 Aug 2024  Accepting facility: [facility]  Date authorization started with reference number:   Date authorization received and expires: 29 Aug 2024  Follow up appointments: PCP, Nephrology, and all other recommended specialists  DME needed:   Transportation at discharge: 18:00PM   IM/IMM Medicare/ letter given: Medicare pt has received, reviewed, and signed 2nd IM letter informing them of their right to appeal the discharge.  Signed copy has been placed on pt bedside chart.  Is patient a  and connected with [facility]? N/A  If yes, was [place] transfer form completed and [facility] notified? N/A  Caregiver Contact:   Discharge Caregiver contacted prior to discharge? Patient to contact family  Care Conference needed? N/A  Barriers to discharge: Dialysis scheduled this afternoon @ 14:00PM     13:20PM Outbound call to patient's sister Narda Pace, @ [phone]. Informed of discharge to [facility]; dialysis location; days/time of HD; and the phone number to Medicaid to arrange transportation, after discharge from [place].     DARLINE Delgado    12:19PM Room assignment changed from 206 to 110.      DARLINE Delgado    10:28AM Outbound call to Medicaid @ [phone].  Spoke to \"Attila.\"  Informed need to set up the first three rides for new outpatient HD chair.      Sep 2nd, 2024 p/u time @ 13:00PM. Trip# [id number]  Sep 4th, 2024 p/u time @ 13:00PM   Trip# [id number]  Sep 6th, 2024 p/u time @ 13:00PM   Trip#[id number]    DARLINE Delgado    09:03AM  [employer] approval/auth #[id number].  Reference #[id number].  MWF [facility] [place] w/a chair time of 14:00PM     DARLINE Delgado

## 2024-08-30 NOTE — PROGRESS NOTES
Nephrology follow up          Patient: Yamilet Smith MRN: [id number]  SSN: [id number]    YOB: 1960  Age: 64 y.o.  Sex: female      Subjective:   The patient is seen on dialysis.  She looks comfortable.  No LE swelling.     Past Medical History:   Diagnosis Date    Chronic kidney disease     Diabetes (HCC)     Hypertension     Stroke (HCC)      Past Surgical History:   Procedure Laterality Date    HYSTERECTOMY (CERVIX STATUS UNKNOWN)      age 24    IR TUNNELED CATHETER PLACEMENT GREATER THAN 5 YEARS  8/26/2024    IR TUNNELED CATHETER PLACEMENT GREATER THAN 5 YEARS 8/26/2024 Whitney Madrigal PA-C SSR RAD ANGIO IR      Family History   Problem Relation Age of Onset    Cancer Sister     Diabetes Sister     Heart Disease Mother     Diabetes Mother      Social History     Tobacco Use    Smoking status: Every Day     Current packs/day: 0.50     Types: Cigarettes    Smokeless tobacco: Never   Substance Use Topics    Alcohol use: Never      Current Facility-Administered Medications   Medication Dose Route Frequency Provider Last Rate Last Admin    epoetin santos-epbx (RETACRIT) injection 10,000 Units  10,000 Units IntraVENous Once per day on Monday Wednesday Friday Everton Whittaker MD        levoFLOXacin (LEVAQUIN) tablet 250 mg  250 mg Oral Q48H Carlo Sibley MD   250 mg at 08/29/24 0940    gabapentin (NEURONTIN) capsule 100 mg  100 mg Oral BID Carlo Sibley MD   100 mg at 08/30/24 0843    ferric gluconate (FERRLECIT) 125 mg in sodium chloride 0.9 % 100 mL IVPB  125 mg IntraVENous Q MWF Everton Whittaker  mL/hr at 08/30/24 1118 125 mg at 08/30/24 1118    heparin (porcine) injection 3,600 Units  3,600 Units IntraCATHeter PRN Everton Whittaker MD   3,600 Units at 08/27/24 1212    melatonin tablet 3 mg  3 mg Oral Nightly PRN Alex Hernandez MD   3 mg at 08/27/24 2227    hydrALAZINE (APRESOLINE) tablet 100 mg  100 mg Oral 3 times per day Everton Whittaker MD   100 mg at 08/30/24 0638    heparin  10 mg IntraVENous Q4H PRN Everton Whittaker MD        oxyCODONE (ROXICODONE) immediate release tablet 5 mg  5 mg Oral Q6H PRN Carlo Sibley MD   5 mg at 08/30/24 0843        No Known Allergies    Review of Systems:  A comprehensive review of systems was negative except for that written in the History of Present Illness.    Objective:     Vitals:    08/30/24 1245 08/30/24 1315 08/30/24 1345 08/30/24 1415   BP: (!) 144/65 (!) 135/53 138/61 (!) 145/54   Pulse: 66 60 61 63   Resp:       Temp:       TempSrc:       SpO2:       Weight:       Height:            Physical Exam:  General: NAD  Eyes: sclera anicteric  Oral Cavity: No thrush or ulcers  Neck: no JVD  Chest: Fair bilateral air entry  Heart: normal sounds  Abdomen: soft and non tender   : no jones  Lower Extremities: no edema  Skin: no rash  Neuro: intact  Psychiatric: non-depressed          Assessment/Plan:     Acute kidney injury on chronic kidney disease stage IV/V.  Now with ESRD.  On admission BUN/creatinine 64/4.22  BUN/creatinine peaked at 105/5.57.  No idea about recent baseline Cr and last available creatinine was 2.3 in April 2023.  No evidence of hydronephrosis per CT of the abdomen.  Urine analysis + urinary tract infection and 0.7 g/g of proteinuria.  Initiated on hemodialysis on 8/26.  Received HD on 8/27  Getting dialyzed today.  Okay to discharge from renal standpoint once she has chair set up.  Will be starting at [facility].     Hypertension  Came with hypertensive urgency.  Blood pressure 226/71 on arrival.  Better blood pressures.  Cannot increase atenolol dose in the setting of low estimated GFR.  Continue atenolol 50 mg/amlodipine 10 mg/hydralazine 100 3 times daily.      Anemia  Anemia of chronic kidney disease  Hemoglobin 10.4->9.6->7.3.  Iron saturation 16% and ferritin 140.  IV Ferrlecit 125 mg with each dialysis x 6 doses..  IV erythropoietin with each dialysis.    Renal osteodystrophy  Calcium and phosphorus are

## 2024-08-30 NOTE — PLAN OF CARE
Problem: Discharge Planning  Goal: Discharge to home or other facility with appropriate resources  Outcome: Progressing     Problem: Pain  Goal: Verbalizes/displays adequate comfort level or baseline comfort level  Outcome: Progressing     Problem: Safety - Adult  Goal: Free from fall injury  Outcome: Progressing     Problem: Skin/Tissue Integrity  Goal: Absence of new skin breakdown  Description: 1.  Monitor for areas of redness and/or skin breakdown  2.  Assess vascular access sites hourly  3.  Every 4-6 hours minimum:  Change oxygen saturation probe site  4.  Every 4-6 hours:  If on nasal continuous positive airway pressure, respiratory therapy assess nares and determine need for appliance change or resting period.  Outcome: Progressing     Problem: Chronic Conditions and Co-morbidities  Goal: Patient's chronic conditions and co-morbidity symptoms are monitored and maintained or improved  Outcome: Progressing     Problem: Physical Therapy - Adult  Goal: By Discharge: Performs mobility at highest level of function for planned discharge setting.  See evaluation for individualized goals.  Description: FUNCTIONAL STATUS PRIOR TO ADMISSION: Patient was modified independent using a rollator for functional mobility. and The patient  required minimal assistance for basic and instrumental ADLs.    HOME SUPPORT PRIOR TO ADMISSION: The patient lived with sister and required minimal assistance for ADL's.  Pt reports having an aide come M-F from 9-3 and 5-7 and then her sister cares for her on the weekends.  Pt reports the aide helps with meal prep and ADL's.    Physical Therapy Goals  Initiated 8/20/2024  Goals revised on 8/29/24  Pt stated goal: to get stronger and go home  Pt will be I with LE HEP in 7 days.  Pt will perform bed mobility with Contact Guard Assist in 7 days.  Pt will perform transfers with Minimal Assist in 7 days.   Pt will amb 5 feet with LRAD safely with Minimal Assist in 7 days.  Pt will demonstrate

## 2024-08-30 NOTE — PROGRESS NOTES
OCCUPATIONAL THERAPY TREATMENT  Patient: Yamilet Smith (64 y.o. female)  Date: 8/30/2024  Primary Diagnosis: Acute on chronic renal insufficiency [N28.9, N18.9]  MANUEL (acute kidney injury) (HCC) [N17.9]  Uncontrolled hypertension [I10]  Diarrhea, unspecified type [R19.7]       Precautions: General Precautions, Contact Precautions, Bed Alarm                Recommendations for nursing mobility: Encourage HEP in prep for ADLs/mobility; see handout for details, Frequent repositioning to prevent skin breakdown, Use of bed/chair alarm for safety, LE elevation for management of edema, Quynh Stedy for bed to chair transfers , and Assist x2    In place during session: External Catheter and EKG/telemetry   Chart, occupational therapy assessment, plan of care, and goals were reviewed.  ASSESSMENT  Patient continues with skilled OT services and is slowly progressing towards goals. Pt presented semi supine upon GODINEZ arrival, agreeable to session. Pt A&O x 4. Pt agreeable to therapy but deferred sitting EOB or transferring OOB secondary going to dialysis shortly. Pt found incontinent of bladder (bed linens and pt's gown soaked).  Bed linens changed. Pt completed anterior and posterior hygiene with encouragement and bathed part of thighs.  Pt required assistance for thoroughness.  Pt changed hospital gown and was left with nursing in room.  (See below for objective details and assist levels).     Overall pt tolerated session fair today with ADL activities.  Current OT recommendations for discharge High intensity and comprehensive skilled occupational therapy in a multidisciplinary setting as patient is working towards tolerating up to 3 hours of therapy/day x 5-7 days/week. Will continue to benefit from skilled OT services, and will continue to progress as tolerated.       GOALS:    Problem: Occupational Therapy - Adult  Goal: By Discharge: Performs self-care activities at highest level of function for planned discharge  setting.  See evaluation for individualized goals.  Description: FUNCTIONAL STATUS PRIOR TO ADMISSION:  Pt required assist for bathing PTA otherwise grossly supervision/setup assist. Dependent for IADLs. Supervision/SBA for functional transfers and functional ambulation w/ rollator.    HOME SUPPORT: Pt lives w/ sister who works during day. Has HHA M-F from 9am-3pm and 5pm-7pm.     Occupational Therapy Goals:  Initiated 8/21/2024  Patient/Family stated goal: \"I want to be more independent\".   1.  Patient will perform toilet transfer with Minimal Assist within 7 day(s).  2.  Patient will perform lower body dressing with Minimal Assist within 7 day(s).  3.  Patient will perform grooming with Supervision within 7 day(s).  Outcome: Progressing        PLAN :  Patient continues to benefit from skilled intervention to address functional impairments. Continue treatment per established plan of care to address goals.    Recommend next OT session: LB dressing    Recommendation for discharge: (in order for the patient to meet his/her long term goals): High intensity and comprehensive skilled occupational therapy in a multidisciplinary setting as patient is working towards tolerating up to 3 hours of therapy/day x 5-7 days/week    Potential barriers for safe discharge: pt has poor safety awareness, pt is a high fall risk, pt is not safe to be alone, and concern for pt safely navigating or managing the home environment.     IF patient discharges home will need the following DME: continuing to assess with progress     SUBJECTIVE:   Patient stated “That's all I'm doing.”      OBJECTIVE DATA SUMMARY:   Cognitive/Behavioral Status:  Orientation  Orientation Level: Oriented to place;Oriented to time;Oriented to person  Cognition  Overall Cognitive Status: Exceptions  Arousal/Alertness: Appears intact  Attention Span: Appears intact  Insights: Decreased awareness of deficits  Initiation: Requires cues for all  Sequencing: Requires cues

## 2024-09-09 ENCOUNTER — APPOINTMENT (OUTPATIENT)
Facility: HOSPITAL | Age: 64
DRG: 193 | End: 2024-09-09
Payer: MEDICARE

## 2024-09-09 ENCOUNTER — HOSPITAL ENCOUNTER (INPATIENT)
Facility: HOSPITAL | Age: 64
LOS: 11 days | Discharge: NURSING, REHAB OR LONG-TERM CARE | DRG: 193 | End: 2024-09-20
Attending: STUDENT IN AN ORGANIZED HEALTH CARE EDUCATION/TRAINING PROGRAM | Admitting: FAMILY MEDICINE
Payer: MEDICARE

## 2024-09-09 ENCOUNTER — APPOINTMENT (OUTPATIENT)
Facility: HOSPITAL | Age: 64
DRG: 193 | End: 2024-09-09
Attending: STUDENT IN AN ORGANIZED HEALTH CARE EDUCATION/TRAINING PROGRAM
Payer: MEDICARE

## 2024-09-09 DIAGNOSIS — J15.7 PNEUMONIA DUE TO MYCOPLASMA PNEUMONIAE, UNSPECIFIED LATERALITY, UNSPECIFIED PART OF LUNG: ICD-10-CM

## 2024-09-09 DIAGNOSIS — N18.6 ESRD (END STAGE RENAL DISEASE) (HCC): ICD-10-CM

## 2024-09-09 DIAGNOSIS — E87.70 HYPERVOLEMIA, UNSPECIFIED HYPERVOLEMIA TYPE: Primary | ICD-10-CM

## 2024-09-09 DIAGNOSIS — J18.9 PNEUMONIA OF BOTH LOWER LOBES DUE TO INFECTIOUS ORGANISM: ICD-10-CM

## 2024-09-09 PROBLEM — D72.829 LEUKOCYTOSIS: Status: ACTIVE | Noted: 2024-09-09

## 2024-09-09 LAB
ANION GAP SERPL CALC-SCNC: 13 MMOL/L (ref 2–12)
APPEARANCE UR: ABNORMAL
BACTERIA URNS QL MICRO: NEGATIVE /HPF
BASOPHILS # BLD: 0 K/UL (ref 0–0.1)
BASOPHILS NFR BLD: 0 % (ref 0–1)
BILIRUB UR QL: NEGATIVE
BUN SERPL-MCNC: 78 MG/DL (ref 6–20)
BUN/CREAT SERPL: 11 (ref 12–20)
CA-I BLD-MCNC: 8.2 MG/DL (ref 8.5–10.1)
CHLORIDE SERPL-SCNC: 100 MMOL/L (ref 97–108)
CO2 SERPL-SCNC: 21 MMOL/L (ref 21–32)
COLOR UR: ABNORMAL
CREAT SERPL-MCNC: 7.18 MG/DL (ref 0.55–1.02)
DIFFERENTIAL METHOD BLD: ABNORMAL
EOSINOPHIL # BLD: 0 K/UL (ref 0–0.4)
EOSINOPHIL NFR BLD: 0 % (ref 0–7)
EPITH CASTS URNS QL MICRO: ABNORMAL /LPF
ERYTHROCYTE [DISTWIDTH] IN BLOOD BY AUTOMATED COUNT: 16.7 % (ref 11.5–14.5)
GLUCOSE BLD STRIP.AUTO-MCNC: 104 MG/DL (ref 65–100)
GLUCOSE BLD STRIP.AUTO-MCNC: 108 MG/DL (ref 65–100)
GLUCOSE SERPL-MCNC: 58 MG/DL (ref 65–100)
GLUCOSE UR STRIP.AUTO-MCNC: NEGATIVE MG/DL
HBV SURFACE AG SER QL: <0.1 INDEX
HBV SURFACE AG SER QL: NEGATIVE
HCT VFR BLD AUTO: 21.7 % (ref 35–47)
HGB BLD-MCNC: 7 G/DL (ref 11.5–16)
HGB UR QL STRIP: ABNORMAL
IMM GRANULOCYTES # BLD AUTO: 0.6 K/UL (ref 0–0.04)
IMM GRANULOCYTES NFR BLD AUTO: 2 % (ref 0–0.5)
KETONES UR QL STRIP.AUTO: NEGATIVE MG/DL
LEUKOCYTE ESTERASE UR QL STRIP.AUTO: ABNORMAL
LYMPHOCYTES # BLD: 1.6 K/UL (ref 0.8–3.5)
LYMPHOCYTES NFR BLD: 5 % (ref 12–49)
MCH RBC QN AUTO: 27.6 PG (ref 26–34)
MCHC RBC AUTO-ENTMCNC: 32.3 G/DL (ref 30–36.5)
MCV RBC AUTO: 85.4 FL (ref 80–99)
MONOCYTES # BLD: 1.6 K/UL (ref 0–1)
MONOCYTES NFR BLD: 5 % (ref 5–13)
NEUTS SEG # BLD: 27.3 K/UL (ref 1.8–8)
NEUTS SEG NFR BLD: 88 % (ref 32–75)
NITRITE UR QL STRIP.AUTO: NEGATIVE
NRBC # BLD: 0.02 K/UL (ref 0–0.01)
NRBC BLD-RTO: 0.1 PER 100 WBC
PERFORMED BY:: ABNORMAL
PERFORMED BY:: ABNORMAL
PH UR STRIP: 5 (ref 5–8)
PLATELET # BLD AUTO: 236 K/UL (ref 150–400)
PMV BLD AUTO: 9.3 FL (ref 8.9–12.9)
POTASSIUM SERPL-SCNC: 5.9 MMOL/L (ref 3.5–5.1)
PROT UR STRIP-MCNC: 30 MG/DL
RBC # BLD AUTO: 2.54 M/UL (ref 3.8–5.2)
RBC #/AREA URNS HPF: ABNORMAL /HPF (ref 0–5)
RBC MORPH BLD: ABNORMAL
SODIUM SERPL-SCNC: 134 MMOL/L (ref 136–145)
SP GR UR REFRACTOMETRY: 1.01 (ref 1–1.03)
TROPONIN I SERPL HS-MCNC: 33 NG/L (ref 0–51)
URINE CULTURE IF INDICATED: ABNORMAL
UROBILINOGEN UR QL STRIP.AUTO: 0.1 EU/DL (ref 0.1–1)
WBC # BLD AUTO: 31.1 K/UL (ref 3.6–11)
WBC CASTS URNS QL MICRO: PRESENT
WBC URNS QL MICRO: >100 /HPF (ref 0–4)

## 2024-09-09 PROCEDURE — 6360000002 HC RX W HCPCS: Performed by: STUDENT IN AN ORGANIZED HEALTH CARE EDUCATION/TRAINING PROGRAM

## 2024-09-09 PROCEDURE — 2580000003 HC RX 258: Performed by: NURSE PRACTITIONER

## 2024-09-09 PROCEDURE — 84484 ASSAY OF TROPONIN QUANT: CPT

## 2024-09-09 PROCEDURE — 2580000003 HC RX 258: Performed by: STUDENT IN AN ORGANIZED HEALTH CARE EDUCATION/TRAINING PROGRAM

## 2024-09-09 PROCEDURE — 94761 N-INVAS EAR/PLS OXIMETRY MLT: CPT

## 2024-09-09 PROCEDURE — 90935 HEMODIALYSIS ONE EVALUATION: CPT

## 2024-09-09 PROCEDURE — 36415 COLL VENOUS BLD VENIPUNCTURE: CPT

## 2024-09-09 PROCEDURE — 6360000004 HC RX CONTRAST MEDICATION: Performed by: STUDENT IN AN ORGANIZED HEALTH CARE EDUCATION/TRAINING PROGRAM

## 2024-09-09 PROCEDURE — 93005 ELECTROCARDIOGRAM TRACING: CPT | Performed by: STUDENT IN AN ORGANIZED HEALTH CARE EDUCATION/TRAINING PROGRAM

## 2024-09-09 PROCEDURE — 96374 THER/PROPH/DIAG INJ IV PUSH: CPT

## 2024-09-09 PROCEDURE — 75635 CT ANGIO ABDOMINAL ARTERIES: CPT

## 2024-09-09 PROCEDURE — 87040 BLOOD CULTURE FOR BACTERIA: CPT

## 2024-09-09 PROCEDURE — 6370000000 HC RX 637 (ALT 250 FOR IP): Performed by: NURSE PRACTITIONER

## 2024-09-09 PROCEDURE — 87340 HEPATITIS B SURFACE AG IA: CPT

## 2024-09-09 PROCEDURE — 81001 URINALYSIS AUTO W/SCOPE: CPT

## 2024-09-09 PROCEDURE — 6360000002 HC RX W HCPCS: Performed by: NURSE PRACTITIONER

## 2024-09-09 PROCEDURE — 99285 EMERGENCY DEPT VISIT HI MDM: CPT

## 2024-09-09 PROCEDURE — 85025 COMPLETE CBC W/AUTO DIFF WBC: CPT

## 2024-09-09 PROCEDURE — 96361 HYDRATE IV INFUSION ADD-ON: CPT

## 2024-09-09 PROCEDURE — 82962 GLUCOSE BLOOD TEST: CPT

## 2024-09-09 PROCEDURE — 93925 LOWER EXTREMITY STUDY: CPT

## 2024-09-09 PROCEDURE — 6360000002 HC RX W HCPCS: Performed by: INTERNAL MEDICINE

## 2024-09-09 PROCEDURE — 71045 X-RAY EXAM CHEST 1 VIEW: CPT

## 2024-09-09 PROCEDURE — 96375 TX/PRO/DX INJ NEW DRUG ADDON: CPT

## 2024-09-09 PROCEDURE — 80048 BASIC METABOLIC PNL TOTAL CA: CPT

## 2024-09-09 PROCEDURE — 1100000000 HC RM PRIVATE

## 2024-09-09 PROCEDURE — 2500000003 HC RX 250 WO HCPCS: Performed by: STUDENT IN AN ORGANIZED HEALTH CARE EDUCATION/TRAINING PROGRAM

## 2024-09-09 PROCEDURE — 74018 RADEX ABDOMEN 1 VIEW: CPT

## 2024-09-09 PROCEDURE — 6370000000 HC RX 637 (ALT 250 FOR IP): Performed by: STUDENT IN AN ORGANIZED HEALTH CARE EDUCATION/TRAINING PROGRAM

## 2024-09-09 PROCEDURE — 87086 URINE CULTURE/COLONY COUNT: CPT

## 2024-09-09 PROCEDURE — 5A1D70Z PERFORMANCE OF URINARY FILTRATION, INTERMITTENT, LESS THAN 6 HOURS PER DAY: ICD-10-PCS | Performed by: INTERNAL MEDICINE

## 2024-09-09 PROCEDURE — 2709999900 HC NON-CHARGEABLE SUPPLY

## 2024-09-09 RX ORDER — CETIRIZINE HYDROCHLORIDE 10 MG/1
10 TABLET ORAL DAILY
Status: DISCONTINUED | OUTPATIENT
Start: 2024-09-10 | End: 2024-09-21 | Stop reason: HOSPADM

## 2024-09-09 RX ORDER — ACETAMINOPHEN 325 MG/1
650 TABLET ORAL EVERY 6 HOURS PRN
Status: DISCONTINUED | OUTPATIENT
Start: 2024-09-09 | End: 2024-09-21 | Stop reason: HOSPADM

## 2024-09-09 RX ORDER — ALOGLIPTIN 6.25 MG/1
6.25 TABLET, FILM COATED ORAL DAILY
Status: DISCONTINUED | OUTPATIENT
Start: 2024-09-09 | End: 2024-09-17

## 2024-09-09 RX ORDER — SODIUM CHLORIDE 0.9 % (FLUSH) 0.9 %
5-40 SYRINGE (ML) INJECTION PRN
Status: DISCONTINUED | OUTPATIENT
Start: 2024-09-09 | End: 2024-09-21 | Stop reason: HOSPADM

## 2024-09-09 RX ORDER — OXYCODONE HYDROCHLORIDE 5 MG/1
5 TABLET ORAL EVERY 6 HOURS PRN
Status: DISCONTINUED | OUTPATIENT
Start: 2024-09-09 | End: 2024-09-21 | Stop reason: HOSPADM

## 2024-09-09 RX ORDER — HYDRALAZINE HYDROCHLORIDE 50 MG/1
100 TABLET, FILM COATED ORAL EVERY 8 HOURS SCHEDULED
Status: DISCONTINUED | OUTPATIENT
Start: 2024-09-09 | End: 2024-09-11

## 2024-09-09 RX ORDER — HEPARIN SODIUM 5000 [USP'U]/ML
5000 INJECTION, SOLUTION INTRAVENOUS; SUBCUTANEOUS EVERY 8 HOURS SCHEDULED
Status: DISCONTINUED | OUTPATIENT
Start: 2024-09-09 | End: 2024-09-10

## 2024-09-09 RX ORDER — IOPAMIDOL 755 MG/ML
100 INJECTION, SOLUTION INTRAVASCULAR
Status: COMPLETED | OUTPATIENT
Start: 2024-09-09 | End: 2024-09-09

## 2024-09-09 RX ORDER — ASPIRIN 81 MG/1
81 TABLET ORAL DAILY
Status: DISCONTINUED | OUTPATIENT
Start: 2024-09-10 | End: 2024-09-21 | Stop reason: HOSPADM

## 2024-09-09 RX ORDER — DEXTROSE MONOHYDRATE 100 MG/ML
INJECTION, SOLUTION INTRAVENOUS CONTINUOUS PRN
Status: DISCONTINUED | OUTPATIENT
Start: 2024-09-09 | End: 2024-09-21 | Stop reason: HOSPADM

## 2024-09-09 RX ORDER — GLUCAGON 1 MG/ML
1 KIT INJECTION PRN
Status: DISCONTINUED | OUTPATIENT
Start: 2024-09-09 | End: 2024-09-21 | Stop reason: HOSPADM

## 2024-09-09 RX ORDER — IOPAMIDOL 755 MG/ML
100 INJECTION, SOLUTION INTRAVASCULAR
Status: DISCONTINUED | OUTPATIENT
Start: 2024-09-09 | End: 2024-09-09

## 2024-09-09 RX ORDER — POLYETHYLENE GLYCOL 3350 17 G/17G
17 POWDER, FOR SOLUTION ORAL DAILY PRN
Status: DISCONTINUED | OUTPATIENT
Start: 2024-09-09 | End: 2024-09-21 | Stop reason: HOSPADM

## 2024-09-09 RX ORDER — FENTANYL CITRATE 50 UG/ML
25 INJECTION, SOLUTION INTRAMUSCULAR; INTRAVENOUS
Status: COMPLETED | OUTPATIENT
Start: 2024-09-09 | End: 2024-09-09

## 2024-09-09 RX ORDER — GABAPENTIN 100 MG/1
100 CAPSULE ORAL EVERY 6 HOURS SCHEDULED
Status: DISCONTINUED | OUTPATIENT
Start: 2024-09-09 | End: 2024-09-10

## 2024-09-09 RX ORDER — ACETAMINOPHEN 650 MG/1
650 SUPPOSITORY RECTAL EVERY 6 HOURS PRN
Status: DISCONTINUED | OUTPATIENT
Start: 2024-09-09 | End: 2024-09-21 | Stop reason: HOSPADM

## 2024-09-09 RX ORDER — SENNA AND DOCUSATE SODIUM 50; 8.6 MG/1; MG/1
1 TABLET, FILM COATED ORAL 2 TIMES DAILY
Status: DISCONTINUED | OUTPATIENT
Start: 2024-09-09 | End: 2024-09-21 | Stop reason: HOSPADM

## 2024-09-09 RX ORDER — INSULIN LISPRO 100 [IU]/ML
0-16 INJECTION, SOLUTION INTRAVENOUS; SUBCUTANEOUS
Status: DISCONTINUED | OUTPATIENT
Start: 2024-09-09 | End: 2024-09-15

## 2024-09-09 RX ORDER — SODIUM CHLORIDE 0.9 % (FLUSH) 0.9 %
5-40 SYRINGE (ML) INJECTION EVERY 12 HOURS SCHEDULED
Status: DISCONTINUED | OUTPATIENT
Start: 2024-09-09 | End: 2024-09-21 | Stop reason: HOSPADM

## 2024-09-09 RX ORDER — ONDANSETRON 4 MG/1
4 TABLET, ORALLY DISINTEGRATING ORAL EVERY 8 HOURS PRN
Status: DISCONTINUED | OUTPATIENT
Start: 2024-09-09 | End: 2024-09-21 | Stop reason: HOSPADM

## 2024-09-09 RX ORDER — INSULIN LISPRO 100 [IU]/ML
0-4 INJECTION, SOLUTION INTRAVENOUS; SUBCUTANEOUS NIGHTLY
Status: DISCONTINUED | OUTPATIENT
Start: 2024-09-09 | End: 2024-09-21 | Stop reason: HOSPADM

## 2024-09-09 RX ORDER — ATENOLOL 25 MG/1
50 TABLET ORAL DAILY
Status: DISCONTINUED | OUTPATIENT
Start: 2024-09-10 | End: 2024-09-17

## 2024-09-09 RX ORDER — ATORVASTATIN CALCIUM 40 MG/1
40 TABLET, FILM COATED ORAL NIGHTLY
Status: DISCONTINUED | OUTPATIENT
Start: 2024-09-09 | End: 2024-09-17

## 2024-09-09 RX ORDER — AMLODIPINE BESYLATE 5 MG/1
10 TABLET ORAL DAILY
Status: DISCONTINUED | OUTPATIENT
Start: 2024-09-10 | End: 2024-09-14

## 2024-09-09 RX ORDER — SODIUM CHLORIDE 9 MG/ML
INJECTION, SOLUTION INTRAVENOUS PRN
Status: DISCONTINUED | OUTPATIENT
Start: 2024-09-09 | End: 2024-09-21 | Stop reason: HOSPADM

## 2024-09-09 RX ORDER — ONDANSETRON 2 MG/ML
4 INJECTION INTRAMUSCULAR; INTRAVENOUS EVERY 6 HOURS PRN
Status: DISCONTINUED | OUTPATIENT
Start: 2024-09-09 | End: 2024-09-21 | Stop reason: HOSPADM

## 2024-09-09 RX ORDER — HEPARIN SODIUM 1000 [USP'U]/ML
3600 INJECTION, SOLUTION INTRAVENOUS; SUBCUTANEOUS PRN
Status: DISCONTINUED | OUTPATIENT
Start: 2024-09-09 | End: 2024-09-09

## 2024-09-09 RX ADMIN — ATORVASTATIN CALCIUM 40 MG: 40 TABLET, FILM COATED ORAL at 21:00

## 2024-09-09 RX ADMIN — IOPAMIDOL 100 ML: 755 INJECTION, SOLUTION INTRAVENOUS at 13:23

## 2024-09-09 RX ADMIN — HEPARIN SODIUM 5000 UNITS: 5000 INJECTION INTRAVENOUS; SUBCUTANEOUS at 23:50

## 2024-09-09 RX ADMIN — AZITHROMYCIN DIHYDRATE 500 MG: 500 INJECTION, POWDER, LYOPHILIZED, FOR SOLUTION INTRAVENOUS at 18:02

## 2024-09-09 RX ADMIN — ACETAMINOPHEN 650 MG: 325 TABLET ORAL at 22:36

## 2024-09-09 RX ADMIN — SODIUM CHLORIDE, PRESERVATIVE FREE 10 ML: 5 INJECTION INTRAVENOUS at 21:01

## 2024-09-09 RX ADMIN — HYDRALAZINE HYDROCHLORIDE 100 MG: 50 TABLET ORAL at 21:00

## 2024-09-09 RX ADMIN — DEXTROSE MONOHYDRATE 250 ML: 100 INJECTION, SOLUTION INTRAVENOUS at 13:45

## 2024-09-09 RX ADMIN — CEFTRIAXONE SODIUM 1000 MG: 1 INJECTION, POWDER, FOR SOLUTION INTRAMUSCULAR; INTRAVENOUS at 17:56

## 2024-09-09 RX ADMIN — HEPARIN SODIUM 3.6 UNITS: 1000 INJECTION INTRAVENOUS; SUBCUTANEOUS at 17:53

## 2024-09-09 RX ADMIN — EPOETIN ALFA-EPBX 10000 UNITS: 10000 INJECTION, SOLUTION INTRAVENOUS; SUBCUTANEOUS at 17:53

## 2024-09-09 RX ADMIN — SENNOSIDES AND DOCUSATE SODIUM 1 TABLET: 50; 8.6 TABLET ORAL at 21:01

## 2024-09-09 RX ADMIN — SODIUM BICARBONATE 50 MEQ: 84 INJECTION, SOLUTION INTRAVENOUS at 13:50

## 2024-09-09 RX ADMIN — FENTANYL CITRATE 25 MCG: 50 INJECTION, SOLUTION INTRAMUSCULAR; INTRAVENOUS at 13:46

## 2024-09-09 RX ADMIN — GABAPENTIN 100 MG: 100 CAPSULE ORAL at 21:13

## 2024-09-09 RX ADMIN — SODIUM ZIRCONIUM CYCLOSILICATE 10 G: 10 POWDER, FOR SUSPENSION ORAL at 17:58

## 2024-09-09 RX ADMIN — INSULIN HUMAN 10 UNITS: 100 INJECTION, SOLUTION PARENTERAL at 13:47

## 2024-09-09 RX ADMIN — OXYCODONE HYDROCHLORIDE 5 MG: 5 TABLET ORAL at 19:07

## 2024-09-09 ASSESSMENT — PAIN DESCRIPTION - LOCATION
LOCATION: ABDOMEN;LEG
LOCATION: LEG

## 2024-09-09 ASSESSMENT — PAIN SCALES - GENERAL
PAINLEVEL_OUTOF10: 8
PAINLEVEL_OUTOF10: 10
PAINLEVEL_OUTOF10: 3

## 2024-09-09 ASSESSMENT — PAIN DESCRIPTION - DESCRIPTORS
DESCRIPTORS: ACHING
DESCRIPTORS: SHARP

## 2024-09-09 ASSESSMENT — PAIN DESCRIPTION - ORIENTATION
ORIENTATION: RIGHT;LEFT
ORIENTATION: RIGHT;LEFT

## 2024-09-09 NOTE — DIALYSIS
Pt complaining of leg pain, notified Maria Victoria in ER that pt is requesting something for pain. Maria Victoria to contact attending for pain medication order. Pt continues to moan.

## 2024-09-09 NOTE — ED NOTES
ED TO INPATIENT SBAR HANDOFF    Patient Name: Brii Lambert   Preferred Name: Brii  : 1960  64 y.o.   Family/Caregiver Present: no   Code Status Order: No Order  PO Status: NPO:No  Telemetry Order:   C-SSRS: Risk of Suicide: No Risk  Sitter no   Restraints:     Sepsis Risk Score      Situation  Chief Complaint   Patient presents with    missed dialysis     Brief Description of Patient's Condition: [facility] dialysis Monday, normal MWF. Per facility pt skips days of dialysis. Complaining of spO2 70% 10L on non re breather mask. Patient grunting. Has not had a bowel movement in 13 days per patient   Mental Status: oriented, alert, coherent, logical, thought processes intact, and able to concentrate and follow conversation  Arrived from:Home  Imaging:   CTA ABDOMINAL AORTA W BILAT RUNOFF W WO CONTRAST   Final Result      1. Occlusion of the infrarenal abdominal aorta. There is weak reconstitution of   the bilateral iliac trunks.   2. Penetrating atherosclerotic ulceration of the descending thoracic aorta.   3. 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 The left renal artery is occluded,   with left renal atrophy.   4.Multifocal stenosis of the right SFA, with occlusion of the right posterior   tibial artery.   5. Multifocal airspace disease favoring pneumonia.   6. Small left pleural effusion.   7. The bladder is severely distended. Any clinical concern for urinary retention   or urinary outlet obstruction?      Electronically signed by KAT WHITE      Vascular duplex lower extremity arteries bilateral         XR CHEST PORTABLE   Final Result      Mild cardiomegaly and edema.         Electronically signed by BROOKE TORRES      XR ABDOMEN (KUB) (SINGLE AP VIEW)    (Results Pending)     Abnormal labs:   Abnormal Labs Reviewed   BASIC METABOLIC PANEL - Abnormal; Notable for the following components:       Result Value    Sodium 134 (*)     Potassium 5.9 (*)     Anion Gap

## 2024-09-09 NOTE — ED TRIAGE NOTES
Per EMS [facility] pt missed dialysis Monday, normal MWF. Per facility pt skips days of dialysis. Complaining of spO2 70% 10L on non re breather mask. Patient grunting. Has not had a bowel movement in 13 days per patient

## 2024-09-09 NOTE — DIALYSIS
Report called to MALLORY Irene. Pt dialyzed for 3 hours, removed 2.76 liters of fluid. Pt requested to stop treatment early due to leg pain. Epogen ordered but not available to give at this time.  Hep B drawn, unable to draw cultures in hemodialysis. Dressing changed. Pt en route to ED.

## 2024-09-09 NOTE — ED PROVIDER NOTES
[facility] EMERGENCY DEPT  EMERGENCY DEPARTMENT HISTORY AND PHYSICAL EXAM      Date: 2024  Patient Name: Brii Lambert  MRN: [id number]  YOB: 1960  Date of evaluation: 2024  Provider: Gautam Centeno MD   Note Started: 11:46 AM EDT 24    HISTORY OF PRESENT ILLNESS     Chief Complaint   Patient presents with    missed dialysis       History Provided By: Patient    HPI: Brii Lambert is a 64 y.o. female presents to emergency department from [facility] for evaluation of abdominal pain, lower extremity pain.  Patient reportedly has missed several days of dialysis due to abdominal pain.  Patient states she has not had a bowel movement in over 2 weeks.  Patient states that over the course of the weekend she has had pain burning to her lower extremities, difficulty moving her lower extremities.  Patient states she is wheelchair-bound normally.  Patient denies any back pain, no swelling to lower extremities no injury.    PAST MEDICAL HISTORY   Past Medical History:  No past medical history on file.    Past Surgical History:  No past surgical history on file.    Family History:  No family history on file.    Social History:  Social History     Tobacco Use    Smoking status: Former     Current packs/day: 0.00     Types: Cigarettes     Quit date:      Years since quittin.6    Smokeless tobacco: Former       Allergies:  No Known Allergies    PCP: Janet Daily MD    Current Meds:   Current Facility-Administered Medications   Medication Dose Route Frequency Provider Last Rate Last Admin    traMADol (ULTRAM) tablet 50 mg  50 mg Oral Q6H PRN Janet Daily MD   50 mg at 09/10/24 0216    glucose chewable tablet 16 g  4 tablet Oral PRN Gautam Centeno MD        dextrose bolus 10% 125 mL  125 mL IntraVENous PRN Gautam Centeno MD        Or    dextrose bolus 10% 250 mL  250 mL IntraVENous PRN Gautam Centeno MD        glucagon injection 1 mg  1 mg SubCUTAneous PRN Gautam Centeno  06:14:38 PM  Condition at Disposition: Data Unavailable    Discharge Note: The patient is stable for discharge home. The signs, symptoms, diagnosis, and discharge instructions have been discussed, understanding conveyed, and agreed upon. The patient is to follow up as recommended or return to ER should their symptoms worsen.      PATIENT REFERRED TO:  No follow-up provider specified.      DISCHARGE MEDICATIONS:     Medication List      You have not been prescribed any medications.           DISCONTINUED MEDICATIONS:  There are no discharge medications for this patient.      I am the Primary Clinician of Record: Gautam Centeno MD (electronically signed)    (Please note that parts of this dictation were completed with voice recognition software. Quite often unanticipated grammatical, syntax, homophones, and other interpretive errors are inadvertently transcribed by the computer software. Please disregards these errors. Please excuse any errors that have escaped final proofreading.)     Gautam Centeno MD  09/10/24 0754

## 2024-09-09 NOTE — PROGRESS NOTES
1845 Patient arrived to unit. Complaining of abdominal pain and leg pain.   Bladder scanned patient had 999+ retention. Straight cath patient. Vital signs done. CHG bath done. [place] calling General Surgery consult.

## 2024-09-09 NOTE — ED NOTES
Pt has multiple family members calling to get updates, writer asked if they could have one person in the family to call and get updates and then relay it to others, writer also got  an order for tylenol per Radha MEI for pain at the moment

## 2024-09-09 NOTE — H&P
History and Physical    NAME:   Brii Lambert   :  1960   MRN:  [id number]     Date/Time: 2024 6:16 PM    Patient PCP: Janet Daily MD  ______________________________________________________________________       Subjective:     CHIEF COMPLAINT:   Low o2 sats      HISTORY OF PRESENT ILLNESS:     General Daily Progress Note  Patient is a 64 y.o. year old female past medical history of end-stage renal disease on hemodialysis, hypertension, chronic bilateral cerebellar and pontine strokes, diabetic neuropathy was recently discharged from [place] and sent to [place] for rehab.  Patient has a habit of skipping dialysis days and patient missed Monday and O2 sat was 70% on 10 L nonrebreather mask with grunting.  Patient seen in the ED, white count 31.1, potassium 5.9, BUN 78, creatinine 7.1 hemoglobin 7.0.  CTA of the abdomen  1. Occlusion of the infrarenal abdominal aorta. There is weak reconstitution of  the bilateral iliac trunks.  2. Penetrating atherosclerotic ulceration of the descending thoracic aorta.  3. 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 The left renal artery is occluded,  with left renal atrophy.  4.Multifocal stenosis of the right SFA, with occlusion of the right posterior  tibial artery.  5. Multifocal airspace disease favoring pneumonia.  6. Small left pleural effusion.  7. The bladder is severely distended. Any clinical concern for urinary retention  or urinary outlet obstruction?  ER doctor spoke with general surgeon determined artery disease chronic.  Patient seen in ED seems confused at times.  Patient states had not had a bowel movement in 14-15 days.  Patient be admitted for further evaluation and treatment.  No past medical history on file.   See above  No past surgical history on file.  Hysterectomy  Permacath placement  Social History     Tobacco Use    Smoking status: Not on file    Smokeless tobacco: Not on file   Substance Use Topics    Alcohol use: Not on file

## 2024-09-09 NOTE — CONSULTS
Nephrology Consultation          Patient: Brii Lambert MRN: [id number]  SSN: [id number]    YOB: 1960  Age: 64 y.o.  Sex: female      Subjective:   Reason for the consultation.  ESRD/needs hemodialysis    History of present illness.  The patient is 64-year-old woman with underlying history of ESRD recently started on hemodialysis at  [facility] in [place] Monday Wednesday Friday, right IJ permacath as a dialysis access, hypertension was sent to the ER after she skipped 2 sessions of hemodialysis.  She was sent for acute hypoxic respiratory failure with sats 70% requiring 10 L nonrebreather mask.  She was also complaining of abdominal pain and found to have severe leukocytosis.  CT of the abdomen with IV contrast showed occlusion of infrarenal abdominal aorta and multifocal airspace disease favoring pneumonia.  Initial chemistry showed creatinine of 7.18 and potassium 5.9. she is getting dialyzed now.  No lower extremity swelling.    No past medical history on file.  No past surgical history on file.   No family history on file.  Social History     Tobacco Use    Smoking status: Not on file    Smokeless tobacco: Not on file   Substance Use Topics    Alcohol use: Not on file      Current Facility-Administered Medications   Medication Dose Route Frequency Provider Last Rate Last Admin    glucose chewable tablet 16 g  4 tablet Oral PRN Gautam Centeno MD        dextrose bolus 10% 125 mL  125 mL IntraVENous PRN Gautam Centeno MD        Or    dextrose bolus 10% 250 mL  250 mL IntraVENous PRN Gautam Centeno MD        glucagon injection 1 mg  1 mg SubCUTAneous PRN Gautam Centeno MD        dextrose 10 % infusion   IntraVENous Continuous PRN Gautam Centeno MD        sodium zirconium cyclosilicate (LOKELMA) oral suspension 10 g  10 g Oral Once Gautam Centeno MD        heparin (porcine) injection 3,600 Units  3,600 Units IntraCATHeter PRN Priscila Sparks MD        cefTRIAXone

## 2024-09-09 NOTE — ED NOTES
Family states that the pt is not really eating at home and is not had a BM, pt does PT and then goes to dialysis

## 2024-09-10 LAB
ALBUMIN SERPL-MCNC: 2.3 G/DL (ref 3.5–5)
ALBUMIN/GLOB SERPL: 0.5 (ref 1.1–2.2)
ALP SERPL-CCNC: 56 U/L (ref 45–117)
ALT SERPL-CCNC: 15 U/L (ref 12–78)
ANION GAP SERPL CALC-SCNC: 10 MMOL/L (ref 2–12)
AST SERPL W P-5'-P-CCNC: 63 U/L (ref 15–37)
BASOPHILS # BLD: 0 K/UL (ref 0–0.1)
BASOPHILS NFR BLD: 0 % (ref 0–1)
BILIRUB SERPL-MCNC: 0.3 MG/DL (ref 0.2–1)
BUN SERPL-MCNC: 53 MG/DL (ref 6–20)
BUN/CREAT SERPL: 10 (ref 12–20)
CA-I BLD-MCNC: 8.5 MG/DL (ref 8.5–10.1)
CHLORIDE SERPL-SCNC: 99 MMOL/L (ref 97–108)
CO2 SERPL-SCNC: 25 MMOL/L (ref 21–32)
CREAT SERPL-MCNC: 5.23 MG/DL (ref 0.55–1.02)
DIFFERENTIAL METHOD BLD: ABNORMAL
ECHO BSA: 1.89 M2
EKG ATRIAL RATE: 83 BPM
EKG DIAGNOSIS: NORMAL
EKG P AXIS: 66 DEGREES
EKG P-R INTERVAL: 154 MS
EKG Q-T INTERVAL: 390 MS
EKG QRS DURATION: 72 MS
EKG QTC CALCULATION (BAZETT): 458 MS
EKG R AXIS: 51 DEGREES
EKG T AXIS: 59 DEGREES
EKG VENTRICULAR RATE: 83 BPM
EOSINOPHIL # BLD: 0 K/UL (ref 0–0.4)
EOSINOPHIL NFR BLD: 0 % (ref 0–7)
ERYTHROCYTE [DISTWIDTH] IN BLOOD BY AUTOMATED COUNT: 17.1 % (ref 11.5–14.5)
GLOBULIN SER CALC-MCNC: 4.2 G/DL (ref 2–4)
GLUCOSE BLD STRIP.AUTO-MCNC: 71 MG/DL (ref 65–100)
GLUCOSE BLD STRIP.AUTO-MCNC: 84 MG/DL (ref 65–100)
GLUCOSE BLD STRIP.AUTO-MCNC: 98 MG/DL (ref 65–100)
GLUCOSE SERPL-MCNC: 91 MG/DL (ref 65–100)
HCT VFR BLD AUTO: 18.9 % (ref 35–47)
HGB BLD-MCNC: 5.9 G/DL (ref 11.5–16)
IMM GRANULOCYTES # BLD AUTO: 0.3 K/UL (ref 0–0.04)
IMM GRANULOCYTES NFR BLD AUTO: 1 % (ref 0–0.5)
LYMPHOCYTES # BLD: 2.1 K/UL (ref 0.8–3.5)
LYMPHOCYTES NFR BLD: 7 % (ref 12–49)
MCH RBC QN AUTO: 26.7 PG (ref 26–34)
MCHC RBC AUTO-ENTMCNC: 31.2 G/DL (ref 30–36.5)
MCV RBC AUTO: 85.5 FL (ref 80–99)
MONOCYTES # BLD: 2.1 K/UL (ref 0–1)
MONOCYTES NFR BLD: 7 % (ref 5–13)
NEUTS SEG # BLD: 25.9 K/UL (ref 1.8–8)
NEUTS SEG NFR BLD: 85 % (ref 32–75)
NRBC # BLD: 0.08 K/UL (ref 0–0.01)
NRBC BLD-RTO: 0.3 PER 100 WBC
PERFORMED BY:: NORMAL
PLATELET # BLD AUTO: 204 K/UL (ref 150–400)
PMV BLD AUTO: 9 FL (ref 8.9–12.9)
POTASSIUM SERPL-SCNC: 4.5 MMOL/L (ref 3.5–5.1)
PROT SERPL-MCNC: 6.5 G/DL (ref 6.4–8.2)
RBC # BLD AUTO: 2.21 M/UL (ref 3.8–5.2)
RBC MORPH BLD: ABNORMAL
RBC MORPH BLD: ABNORMAL
SODIUM SERPL-SCNC: 134 MMOL/L (ref 136–145)
VAS LEFT CFA PROX PSV: 68.4 CM/S
VAS LEFT PERONEAL DIST PSV: 13.7 CM/S
VAS LEFT PFA PROX PSV: 49.3 CM/S
VAS LEFT POP A PROX PSV: 23.6 CM/S
VAS LEFT PTA DIST PSV: 18.9 CM/S
VAS LEFT SFA PROX PSV: 57.4 CM/S
VAS LEFT SFA PROX VEL RATIO: 0.84
VAS RIGHT CFA PROX PSV: 76.6 CM/S
VAS RIGHT PERONEAL DIST PSV: 15.1 CM/S
VAS RIGHT PFA PROX PSV: 38.4 CM/S
VAS RIGHT POP A PROX PSV: 58.4 CM/S
VAS RIGHT PTA DIST PSV: 18.5 CM/S
VAS RIGHT SFA PROX PSV: 46.1 CM/S
VAS RIGHT SFA PROX VEL RATIO: 0.6
WBC # BLD AUTO: 30.4 K/UL (ref 3.6–11)

## 2024-09-10 PROCEDURE — 90935 HEMODIALYSIS ONE EVALUATION: CPT

## 2024-09-10 PROCEDURE — 86901 BLOOD TYPING SEROLOGIC RH(D): CPT

## 2024-09-10 PROCEDURE — 2580000003 HC RX 258: Performed by: NURSE PRACTITIONER

## 2024-09-10 PROCEDURE — 86850 RBC ANTIBODY SCREEN: CPT

## 2024-09-10 PROCEDURE — 36415 COLL VENOUS BLD VENIPUNCTURE: CPT

## 2024-09-10 PROCEDURE — 30233N1 TRANSFUSION OF NONAUTOLOGOUS RED BLOOD CELLS INTO PERIPHERAL VEIN, PERCUTANEOUS APPROACH: ICD-10-PCS | Performed by: FAMILY MEDICINE

## 2024-09-10 PROCEDURE — 6360000002 HC RX W HCPCS: Performed by: NURSE PRACTITIONER

## 2024-09-10 PROCEDURE — 86923 COMPATIBILITY TEST ELECTRIC: CPT

## 2024-09-10 PROCEDURE — 99222 1ST HOSP IP/OBS MODERATE 55: CPT | Performed by: SURGERY

## 2024-09-10 PROCEDURE — 6370000000 HC RX 637 (ALT 250 FOR IP): Performed by: NURSE PRACTITIONER

## 2024-09-10 PROCEDURE — 85025 COMPLETE CBC W/AUTO DIFF WBC: CPT

## 2024-09-10 PROCEDURE — 82962 GLUCOSE BLOOD TEST: CPT

## 2024-09-10 PROCEDURE — 94761 N-INVAS EAR/PLS OXIMETRY MLT: CPT

## 2024-09-10 PROCEDURE — 2700000000 HC OXYGEN THERAPY PER DAY

## 2024-09-10 PROCEDURE — 6360000002 HC RX W HCPCS: Performed by: FAMILY MEDICINE

## 2024-09-10 PROCEDURE — 80053 COMPREHEN METABOLIC PANEL: CPT

## 2024-09-10 PROCEDURE — P9016 RBC LEUKOCYTES REDUCED: HCPCS

## 2024-09-10 PROCEDURE — 2709999900 HC NON-CHARGEABLE SUPPLY

## 2024-09-10 PROCEDURE — 86900 BLOOD TYPING SEROLOGIC ABO: CPT

## 2024-09-10 PROCEDURE — 36430 TRANSFUSION BLD/BLD COMPNT: CPT

## 2024-09-10 PROCEDURE — 1100000000 HC RM PRIVATE

## 2024-09-10 PROCEDURE — 6370000000 HC RX 637 (ALT 250 FOR IP): Performed by: FAMILY MEDICINE

## 2024-09-10 PROCEDURE — 6360000002 HC RX W HCPCS: Performed by: INTERNAL MEDICINE

## 2024-09-10 PROCEDURE — 93925 LOWER EXTREMITY STUDY: CPT | Performed by: SURGERY

## 2024-09-10 RX ORDER — HEPARIN SODIUM 1000 [USP'U]/ML
1000 INJECTION, SOLUTION INTRAVENOUS; SUBCUTANEOUS PRN
Status: DISCONTINUED | OUTPATIENT
Start: 2024-09-10 | End: 2024-09-10

## 2024-09-10 RX ORDER — SODIUM CHLORIDE 9 MG/ML
INJECTION, SOLUTION INTRAVENOUS PRN
Status: DISCONTINUED | OUTPATIENT
Start: 2024-09-10 | End: 2024-09-21 | Stop reason: HOSPADM

## 2024-09-10 RX ORDER — GABAPENTIN 100 MG/1
100 CAPSULE ORAL NIGHTLY
Status: DISCONTINUED | OUTPATIENT
Start: 2024-09-11 | End: 2024-09-17

## 2024-09-10 RX ORDER — HEPARIN SODIUM 1000 [USP'U]/ML
3600 INJECTION, SOLUTION INTRAVENOUS; SUBCUTANEOUS PRN
Status: DISCONTINUED | OUTPATIENT
Start: 2024-09-10 | End: 2024-09-19

## 2024-09-10 RX ORDER — TRAMADOL HYDROCHLORIDE 50 MG/1
50 TABLET ORAL EVERY 6 HOURS PRN
Status: DISCONTINUED | OUTPATIENT
Start: 2024-09-10 | End: 2024-09-21 | Stop reason: HOSPADM

## 2024-09-10 RX ADMIN — ACETAMINOPHEN 650 MG: 325 TABLET ORAL at 21:56

## 2024-09-10 RX ADMIN — ALOGLIPTIN 6.25 MG: 6.25 TABLET, FILM COATED ORAL at 08:39

## 2024-09-10 RX ADMIN — SENNOSIDES AND DOCUSATE SODIUM 1 TABLET: 50; 8.6 TABLET ORAL at 21:56

## 2024-09-10 RX ADMIN — SENNOSIDES AND DOCUSATE SODIUM 1 TABLET: 50; 8.6 TABLET ORAL at 08:39

## 2024-09-10 RX ADMIN — GABAPENTIN 100 MG: 100 CAPSULE ORAL at 05:30

## 2024-09-10 RX ADMIN — OXYCODONE HYDROCHLORIDE 5 MG: 5 TABLET ORAL at 22:44

## 2024-09-10 RX ADMIN — HYDRALAZINE HYDROCHLORIDE 100 MG: 50 TABLET ORAL at 21:56

## 2024-09-10 RX ADMIN — HYDRALAZINE HYDROCHLORIDE 100 MG: 50 TABLET ORAL at 05:28

## 2024-09-10 RX ADMIN — ATENOLOL 50 MG: 25 TABLET ORAL at 08:39

## 2024-09-10 RX ADMIN — GABAPENTIN 100 MG: 100 CAPSULE ORAL at 13:26

## 2024-09-10 RX ADMIN — PIPERACILLIN AND TAZOBACTAM 3375 MG: 3; .375 INJECTION, POWDER, FOR SOLUTION INTRAVENOUS; PARENTERAL at 04:45

## 2024-09-10 RX ADMIN — TRAMADOL HYDROCHLORIDE 50 MG: 50 TABLET ORAL at 21:56

## 2024-09-10 RX ADMIN — SODIUM CHLORIDE, PRESERVATIVE FREE 10 ML: 5 INJECTION INTRAVENOUS at 08:39

## 2024-09-10 RX ADMIN — ASPIRIN 81 MG: 81 TABLET, COATED ORAL at 08:39

## 2024-09-10 RX ADMIN — ACETAMINOPHEN 650 MG: 325 TABLET ORAL at 04:45

## 2024-09-10 RX ADMIN — GABAPENTIN 100 MG: 100 CAPSULE ORAL at 18:05

## 2024-09-10 RX ADMIN — CETIRIZINE HYDROCHLORIDE 10 MG: 10 TABLET, FILM COATED ORAL at 08:39

## 2024-09-10 RX ADMIN — ATORVASTATIN CALCIUM 40 MG: 40 TABLET, FILM COATED ORAL at 21:56

## 2024-09-10 RX ADMIN — SODIUM CHLORIDE, PRESERVATIVE FREE 10 ML: 5 INJECTION INTRAVENOUS at 21:59

## 2024-09-10 RX ADMIN — TRAMADOL HYDROCHLORIDE 50 MG: 50 TABLET ORAL at 15:28

## 2024-09-10 RX ADMIN — OXYCODONE HYDROCHLORIDE 5 MG: 5 TABLET ORAL at 01:24

## 2024-09-10 RX ADMIN — GABAPENTIN 100 MG: 100 CAPSULE ORAL at 00:14

## 2024-09-10 RX ADMIN — TRAMADOL HYDROCHLORIDE 50 MG: 50 TABLET ORAL at 08:39

## 2024-09-10 RX ADMIN — HEPARIN SODIUM 5000 UNITS: 5000 INJECTION INTRAVENOUS; SUBCUTANEOUS at 05:31

## 2024-09-10 RX ADMIN — HEPARIN SODIUM 3600 UNITS: 1000 INJECTION INTRAVENOUS; SUBCUTANEOUS at 17:07

## 2024-09-10 RX ADMIN — FLUCONAZOLE IN SODIUM CHLORIDE 100 MG: 2 INJECTION, SOLUTION INTRAVENOUS at 16:27

## 2024-09-10 RX ADMIN — PIPERACILLIN AND TAZOBACTAM 3375 MG: 3; .375 INJECTION, POWDER, FOR SOLUTION INTRAVENOUS; PARENTERAL at 18:08

## 2024-09-10 RX ADMIN — AMLODIPINE BESYLATE 10 MG: 5 TABLET ORAL at 08:39

## 2024-09-10 RX ADMIN — TRAMADOL HYDROCHLORIDE 50 MG: 50 TABLET ORAL at 02:16

## 2024-09-10 ASSESSMENT — PAIN SCALES - GENERAL
PAINLEVEL_OUTOF10: 10
PAINLEVEL_OUTOF10: 6
PAINLEVEL_OUTOF10: 10
PAINLEVEL_OUTOF10: 10
PAINLEVEL_OUTOF10: 3
PAINLEVEL_OUTOF10: 6
PAINLEVEL_OUTOF10: 10
PAINLEVEL_OUTOF10: 4
PAINLEVEL_OUTOF10: 10
PAINLEVEL_OUTOF10: 8
PAINLEVEL_OUTOF10: 10
PAINLEVEL_OUTOF10: 10

## 2024-09-10 ASSESSMENT — PAIN DESCRIPTION - DESCRIPTORS
DESCRIPTORS: ACHING

## 2024-09-10 ASSESSMENT — PAIN DESCRIPTION - LOCATION
LOCATION: LEG

## 2024-09-10 ASSESSMENT — PAIN SCALES - WONG BAKER
WONGBAKER_NUMERICALRESPONSE: HURTS WHOLE LOT
WONGBAKER_NUMERICALRESPONSE: NO HURT

## 2024-09-10 ASSESSMENT — PAIN DESCRIPTION - ORIENTATION
ORIENTATION: RIGHT;LEFT
ORIENTATION: RIGHT
ORIENTATION: RIGHT;LEFT
ORIENTATION: RIGHT
ORIENTATION: RIGHT;LEFT

## 2024-09-10 NOTE — CARE COORDINATION
1550: Per Dr. Owens, NPO order after midnight for EGD 09/11/2024 entered.    Nurse covering for lunch notified to relay message.

## 2024-09-10 NOTE — CONSULTS
Patient was not in her room this afternoon.  Consult request, patient's medical record was reviewed.  There are concerns about gastrointestinal blood loss.  She also has occlusion of the abdominal aorta and is being followed by vascular surgery will plan to do upper endoscopy tomorrow to look for source of bleeding in the upper GI tract

## 2024-09-10 NOTE — PROGRESS NOTES
History and Physical    NAME:   Brii Lambert   :  1960   MRN:  [id number]     Date/Time: 9/10/2024 12:26 PM    Patient PCP: Janet Daily MD  ______________________________________________________________________       Subjective:     CHIEF COMPLAINT:     Abdominal pain, Pneumonia     HISTORY OF PRESENT ILLNESS:     General Daily Progress Note  Patient is a 64 y.o. year old female past medical history of end-stage renal disease on hemodialysis, hypertension, chronic bilateral cerebellar and pontine strokes, diabetic neuropathy presented to the ER yesterday for evaluation of abdominal, lower extremity pain. Pt was recently discharged from [place] and sent to [place] for rehab.  Patient reportedly missed several days of dialysis due to abdominal pain. Patient states she has not had a bowel movement in 14-15 days. Patient missed Monday dialysis and O2 sat was 70% on 10 L nonrebreather mask with grunting.  Patient seen in the ED, white count 31.1, potassium 5.9, BUN 78, creatinine 7.1 hemoglobin 7.0.    CTA of the abdomen  1. Occlusion of the infrarenal abdominal aorta. There is weak reconstitution of  the bilateral iliac trunks.  2. Penetrating atherosclerotic ulceration of the descending thoracic aorta.  3. 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 The left renal artery is occluded,  with left renal atrophy.  4.Multifocal stenosis of the right SFA, with occlusion of the right posterior  tibial artery.  5. Multifocal airspace disease favoring pneumonia.  6. Small left pleural effusion.  7. The bladder is severely distended. Any clinical concern for urinary retention  or urinary outlet obstruction?    ER doctor spoke with general surgeon determined artery disease chronic.  Patient seen in ED seems confused at times. Pt states she is wheelchair-bound normally. Denies back pain. No LE swelling.   Patient be admitted for further evaluation and treatment.    Consulted by Nephrology - pt came in with  bladder is severely distended. Any clinical concern for urinary retention   or urinary outlet obstruction?      Electronically signed by KAT WHITE      Vascular duplex lower extremity arteries bilateral   Final Result      XR CHEST PORTABLE   Final Result      Mild cardiomegaly and edema.         Electronically signed by BROOKE TORRES           Review of Systems:  Constitutional: Negative for chills and fever.   HENT: Negative.    Eyes: Negative.    Respiratory: Negative.    Cardiovascular: Negative.    Gastrointestinal: Negative for abdominal pain and nausea.   Skin: Negative.    Neurological: Negative.    Musculoskeletal: Bilateral leg pain  Objective:   VITALS:    Vitals:    09/10/24 1223   BP: 112/81   Pulse: 66   Resp:    Temp: 97.7 °F (36.5 °C)   SpO2: 96%       Physical Exam:   Constitutional: pt is oriented to person,     Head: Normocephalic and atraumatic.   Eyes: Pupils are equal, round, and reactive to light. EOM are normal.   Cardiovascular: Normal rate, regular rhythm and normal heart sounds.   Pulmonary/Chest: Breath sounds normal. No wheezes. No rales.   Exhibits no tenderness.   Abdominal: Soft. Bowel sounds are normal. There is no abdominal tenderness. There is no rebound and no guarding.   Musculoskeletal: Normal range of motion.   Neurological: pt is alert and oriented to person,      ASSESSMENT & PLAN:      Acute GI bleed  UTI with yeast  Pneumonia   Leukocytosis   ESRD - on hemodialysis   Anemia   Urinary distention   Constipation   Diabetes type 2  Hypertension   Hyperlipidemia     K wnl today     Saw vascular surgery - CT scan findings appear to be chronic. Pt has well collateralization throughout pelvis. No acute vascular intervention indicated at this time. Pt will be followed by vascular.      Urine cultures - Pending     Follow up with Dialysis     Zosyn 3375 mg IV BID  Nesina 6.25 1 tablet PO QD  Amlodipine 10 mg PO QD  ASA 81 mg PO QD  Atenolol 50 mg PO QD  Lipitor 40 mg PO

## 2024-09-10 NOTE — PROGRESS NOTES
Patient hemoglobin is 5.9. Called attending provider, order obtained to give two units PRBC, still needs to be consented.

## 2024-09-10 NOTE — PROGRESS NOTES
Nephrology follow-up          Patient: Brii Lambert MRN: [id number]  SSN: [id number]    YOB: 1960  Age: 64 y.o.  Sex: female      Subjective:   The patient is seen in the room  Blood pressures are okay  Afebrile  Leukocytosis  Hb 5.9    She was dialyzed yesterday  Plan to dialyze her today and give 2 units of packed RBCs      No past medical history on file.  No past surgical history on file.   No family history on file.  Social History     Tobacco Use    Smoking status: Former     Current packs/day: 0.00     Types: Cigarettes     Quit date:      Years since quittin.6    Smokeless tobacco: Former   Substance Use Topics    Alcohol use: Not on file      Current Facility-Administered Medications   Medication Dose Route Frequency Provider Last Rate Last Admin    traMADol (ULTRAM) tablet 50 mg  50 mg Oral Q6H PRN Janet Daily MD   50 mg at 09/10/24 1528    0.9 % sodium chloride infusion   IntraVENous PRN Janet Daily MD        fluconazole (DIFLUCAN) 100 mg IVPB  100 mg IntraVENous Q24H Janet Daily MD   Stopped at 09/10/24 1655    0.9 % sodium chloride infusion   IntraVENous PRN Priscila Sparks MD        heparin (porcine) injection 3,600 Units  3,600 Units IntraCATHeter PRN Priscila Sparks MD   3,600 Units at 09/10/24 1707    glucose chewable tablet 16 g  4 tablet Oral PRN Gautam Centeno MD        dextrose bolus 10% 125 mL  125 mL IntraVENous PRN Gautam Centeno MD        Or    dextrose bolus 10% 250 mL  250 mL IntraVENous PRN Gautam Centeno MD        glucagon injection 1 mg  1 mg SubCUTAneous PRN Gautam Centeno MD        dextrose 10 % infusion   IntraVENous Continuous PRN Gautam Centeno MD        epoetin saul-epbx (RETACRIT) injection 10,000 Units  10,000 Units IntraVENous Once per day on  Priscila Sparks MD   10,000 Units at 24 1753    acetaminophen (TYLENOL) tablet 650 mg  650 mg Oral Q6H PRN Janet Daily MD         antibiotics    Anemia  Hemoglobin 7.0-->5.9  No obvious bleeding  IV erythropoietin with dialysis  Blood transfusion 2 units with dialysis.    Abdominal pain  Came with abdominal pain  Leukocytosis  CT of the abdomen with IV contrast occlusion of infrarenal abdominal aorta  Vascular surgery on board.          Plan:       Signed By: Priscila Sparks MD     September 10, 2024

## 2024-09-10 NOTE — PROGRESS NOTES
RRT Clinical Rounding Nurse Progress Report      SUBJECTIVE: Patient assessed secondary to elevated Deterioration Index Score.      Vitals:    09/09/24 1715 09/09/24 1845 09/09/24 1937 09/09/24 2016   BP: (!) 170/65 (!) 144/56  124/68   Pulse: 75 81  89   Resp:  20 18 18   Temp:  98.1 °F (36.7 °C)  98.1 °F (36.7 °C)   TempSrc:  Oral  Oral   SpO2:  96%     Weight:       Height:            DETERIORATION INDEX SCORE: 52    ASSESSMENT:  CAMRON Aguayo made aware    PLAN:   RN to evaluate and monitor pt    Gay Merritt RN  [place]: [phone]  [place]: [phone]

## 2024-09-10 NOTE — CONSULTS
General surgery history and Physical    Patient: Brii Lambert  MRN: [id number]    YOB: 1960  Age: 64 y.o.  Sex: female     Chief Complaint:  Chief Complaint   Patient presents with    missed dialysis       History of Present Illness: Brii Lambert is a 64 y.o. very pleasant woman currently hospitalized with metabolic derangements from missing dialysis.  I was consulted for vascular evaluation.  Patient had a CT scan done shows multiple atherosclerotic disease process abdominal pressures, aorta, iliac and femoral vessels.  Distal aorta is occluded.  Patient complaining of both feet numbness without any pain.    Apparently she has not been mobile for the last 2 months.    Social History:  Social Connections: Somewhat Isolated (9/10/2024)    Social Connections ([facility] HRSN)     If for any reason you need help with day-to-day activities such as bathing, preparing meals, shopping, managing finances, etc., do you get the help you need?: Not on file       Past Medical History:  No past medical history on file.  Surgical History:  No past surgical history on file.    Allergies:  No Known Allergies    Current Meds:  Current Facility-Administered Medications   Medication Dose Route Frequency Provider Last Rate Last Admin    traMADol (ULTRAM) tablet 50 mg  50 mg Oral Q6H PRN Janet Daily MD   50 mg at 09/10/24 0216    glucose chewable tablet 16 g  4 tablet Oral PRN Gautam Centeno MD        dextrose bolus 10% 125 mL  125 mL IntraVENous PRN Gautam Centeno MD        Or    dextrose bolus 10% 250 mL  250 mL IntraVENous PRN Gautam Centeno MD        glucagon injection 1 mg  1 mg SubCUTAneous PRN Gautam Centeno MD        dextrose 10 % infusion   IntraVENous Continuous PRN Gautam Centeno MD        epoetin saul-epbx (RETACRIT) injection 10,000 Units  10,000 Units IntraVENous Once per day on Monday Wednesday Friday Priscila Sparks MD   10,000 Units at 09/09/24 1753    acetaminophen (TYLENOL) tablet

## 2024-09-10 NOTE — H&P
History and Physical    NAME:   Brii aLmbert   :  1960   MRN:  [id number]     Date/Time: 9/10/2024 10:39 AM    Patient PCP: Janet Daily MD  ______________________________________________________________________       Subjective:     CHIEF COMPLAINT:     Abdominal pain, Pneumonia     HISTORY OF PRESENT ILLNESS:     General Daily Progress Note  Patient is a 64 y.o. year old female past medical history of end-stage renal disease on hemodialysis, hypertension, chronic bilateral cerebellar and pontine strokes, diabetic neuropathy presented to the ER yesterday for evaluation of abdominal, lower extremity pain. Pt was recently discharged from [facility] and sent to [facility] for rehab.  Patient reportedly missed several days of dialysis due to abdominal pain. Patient states she has not had a bowel movement in 14-15 days. Patient missed Monday dialysis and O2 sat was 70% on 10 L nonrebreather mask with grunting.  Patient seen in the ED, white count 31.1, potassium 5.9, BUN 78, creatinine 7.1 hemoglobin 7.0.    CTA of the abdomen  1. Occlusion of the infrarenal abdominal aorta. There is weak reconstitution of  the bilateral iliac trunks.  2. Penetrating atherosclerotic ulceration of the descending thoracic aorta.  3. 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 The left renal artery is occluded,  with left renal atrophy.  4.Multifocal stenosis of the right SFA, with occlusion of the right posterior  tibial artery.  5. Multifocal airspace disease favoring pneumonia.  6. Small left pleural effusion.  7. The bladder is severely distended. Any clinical concern for urinary retention  or urinary outlet obstruction?    ER doctor spoke with general surgeon determined artery disease chronic.  Patient seen in ED seems confused at times. Pt states she is wheelchair-bound normally. Denies back pain. No LE swelling.   Patient be admitted for further evaluation and treatment.    Consulted by Nephrology - pt came in with     Left Pop A prox PSV 23.6 cm/s    Left SFA prox PSV 57.4 cm/s    Right CFA prox PSV 76.6 cm/s    Right PTA dist PSV 18.5 cm/s    Right peronal dist PSV 15.1 cm/s    Right CFA prox PSV 38.4 cm/s    Right Pop A prox PSV 58.4 cm/s    Right SFA prox PSV 46.1 cm/s    Body Surface Area 1.89 m2    Right SFA prox lenny ratio 0.6     Left SFA prox lenny ratio 0.84    POCT Glucose    Collection Time: 09/09/24  2:21 PM   Result Value Ref Range    POC Glucose 104 (H) 65 - 100 mg/dL    Performed by: TRACY DUONG    Hepatitis B Surface Antigen    Collection Time: 09/09/24  3:00 PM   Result Value Ref Range    Hepatitis B Surface Ag <0.10 Index    Hep B S Ag Interp Negative Negative     Urinalysis with Reflex to Culture    Collection Time: 09/09/24  7:03 PM    Specimen: Urine   Result Value Ref Range    Color, UA Yellow/Straw      Appearance Turbid (A) Clear      Specific Gravity, UA 1.014 1.003 - 1.030      pH, Urine 5.0 5.0 - 8.0      Protein, UA 30 (A) Negative mg/dL    Glucose, Ur Negative Negative mg/dL    Ketones, Urine Negative Negative mg/dL    Bilirubin, Urine Negative Negative      Blood, Urine Small (A) Negative      Urobilinogen, Urine 0.1 0.1 - 1.0 EU/dL    Nitrite, Urine Negative Negative      Leukocyte Esterase, Urine Large (A) Negative      WBC, UA >100 (H) 0 - 4 /hpf    RBC, UA 20-50 0 - 5 /hpf    Epithelial Cells, UA Few Few /lpf    BACTERIA, URINE Negative Negative /hpf    Urine Culture if Indicated Urine Culture Ordered (A) Culture not indicated by UA result      Budding Yeast Present (A) Negative     POCT Glucose    Collection Time: 09/09/24 11:16 PM   Result Value Ref Range    POC Glucose 108 (H) 65 - 100 mg/dL    Performed by: Mook Champion LPN    Comprehensive Metabolic Panel w/ Reflex to MG    Collection Time: 09/10/24  6:33 AM   Result Value Ref Range    Sodium 134 (L) 136 - 145 mmol/L    Potassium 4.5 3.5 - 5.1 mmol/L    Chloride 99 97 - 108 mmol/L    CO2 25 21 - 32 mmol/L    Anion Gap 10 2 - 12 mmol/L

## 2024-09-10 NOTE — PROGRESS NOTES
4 [facility] Skin Assessment     NAME:  Brii Lambert  YOB: 1960  MEDICAL RECORD NUMBER:  [id number]    The patient is being assessed for  Other New admission    I agree that at least one RN has performed a thorough Head to Toe Skin Assessment on the patient. ALL assessment sites listed below have been assessed.      Areas assessed by both nurses:    Head, Face, Ears, Shoulders, Back, Chest, Arms, Elbows, Hands, Sacrum. Buttock, Coccyx, Ischium, Legs. Feet and Heels, and Under Medical Devices         Does the Patient have a Wound? No noted wound(s)       Conrad Prevention initiated by RN: Yes  Wound Care Orders initiated by RN: No    Pressure Injury (Stage 3,4, Unstageable, DTI, NWPT, and Complex wounds) if present, place Wound referral order by RN under : Yes    New Ostomies, if present place, Ostomy referral order under : Yes     Nurse 1 eSignature: {Esignature:[id number]}    **SHARE this note so that the co-signing nurse can place an eSignature**    Nurse 2 eSignature: Electronically signed by Dede Valentine RN on 9/10/24 at 7:43 AM EDT

## 2024-09-10 NOTE — CARE COORDINATION
09/10/24 0950   Service Assessment   Patient Orientation Alert and Oriented   Cognition Alert   History Provided By Patient   Primary Caregiver Other (Comment)  ([facility])   Support Systems Family Members   Patient's Healthcare Decision Maker is: Legal Next of Kin   PCP Verified by CM Yes   Last Visit to PCP Within last 3 months   Prior Functional Level Assistance with the following:;Bathing;Dressing;Toileting;Cooking;Housework;Shopping;Mobility   Current Functional Level Toileting;Dressing;Bathing;Assistance with the following:;Cooking;Housework;Shopping;Mobility   Can patient return to prior living arrangement Yes   Ability to make needs known: Good   Family able to assist with home care needs: Other (comment)  (Long Term Care)   Would you like for me to discuss the discharge plan with any other family members/significant others, and if so, who? Yes  (Sister, Danielle)   Financial Resources Medicaid;Medicare   Community Resources None   Discharge Planning   Patient expects to be discharged to: Long-term care   Services At/After Discharge   Transition of Care Consult (CM Consult) Discharge Planning;Long Term Care   Confirm Follow Up Transport Family     0950: CM met with patient at bedside to complete DCP assessment. Demos verified as accurate. Patient shared she went to [facility] for rehab a few months ago and is now a long term care resident. Choice letter received, placed on chart and referral sent confirming information. Patient receives dialysis MWF at  [facility] in [place]. CM will continue to follow patient and recs of medical team.    Current Dispo: [facility]    1455: Update attached in [facility]Port.    Advance Care Planning     General Advance Care Planning (ACP) Conversation    Date of Conversation: 9/10/2024  Conducted with: Patient with Decision Making Capacity  Other persons present: None    Healthcare Decision Maker: No healthcare decision makers have been documented.

## 2024-09-10 NOTE — DIALYSIS
Completed 2 hour dialysis and tolerated 1L fluid removal.    Received [facility] from [facility] with ongoing 1st unit of Blood transfusion. 2nd bag of blood transfused mid dialysis treatment.    Tawana of telemetry made aware patient is en route to  [facility] room [facility] with [id number].    TOBY Espitia received dialysis report.

## 2024-09-11 LAB
ABO + RH BLD: NORMAL
ALBUMIN SERPL-MCNC: 2.4 G/DL (ref 3.5–5)
ALBUMIN/GLOB SERPL: 0.6 (ref 1.1–2.2)
ALP SERPL-CCNC: 63 U/L (ref 45–117)
ALT SERPL-CCNC: 50 U/L (ref 12–78)
ANION GAP SERPL CALC-SCNC: 9 MMOL/L (ref 2–12)
AST SERPL W P-5'-P-CCNC: 278 U/L (ref 15–37)
BACTERIA SPEC CULT: NORMAL
BILIRUB SERPL-MCNC: 0.4 MG/DL (ref 0.2–1)
BLD PROD TYP BPU: NORMAL
BLD PROD TYP BPU: NORMAL
BLOOD BANK BLOOD PRODUCT EXPIRATION DATE: NORMAL
BLOOD BANK BLOOD PRODUCT EXPIRATION DATE: NORMAL
BLOOD BANK DISPENSE STATUS: NORMAL
BLOOD BANK DISPENSE STATUS: NORMAL
BLOOD BANK ISBT PRODUCT BLOOD TYPE: 5100
BLOOD BANK ISBT PRODUCT BLOOD TYPE: 5100
BLOOD BANK PRODUCT CODE: NORMAL
BLOOD BANK UNIT TYPE AND RH: NORMAL
BLOOD BANK UNIT TYPE AND RH: NORMAL
BLOOD GROUP ANTIBODIES SERPL: NEGATIVE
BPU ID: NORMAL
BPU ID: NORMAL
BUN SERPL-MCNC: 23 MG/DL (ref 6–20)
BUN/CREAT SERPL: 7 (ref 12–20)
CA-I BLD-MCNC: 7.6 MG/DL (ref 8.5–10.1)
CHLORIDE SERPL-SCNC: 97 MMOL/L (ref 97–108)
CO2 SERPL-SCNC: 27 MMOL/L (ref 21–32)
COLONY COUNT, CNT: NORMAL
COLONY COUNT, CNT: NORMAL
CREAT SERPL-MCNC: 3.28 MG/DL (ref 0.55–1.02)
CROSSMATCH RESULT: NORMAL
CROSSMATCH RESULT: NORMAL
ERYTHROCYTE [DISTWIDTH] IN BLOOD BY AUTOMATED COUNT: 17.8 % (ref 11.5–14.5)
GLOBULIN SER CALC-MCNC: 3.8 G/DL (ref 2–4)
GLUCOSE BLD STRIP.AUTO-MCNC: 101 MG/DL (ref 65–100)
GLUCOSE BLD STRIP.AUTO-MCNC: 156 MG/DL (ref 65–100)
GLUCOSE BLD STRIP.AUTO-MCNC: 73 MG/DL (ref 65–100)
GLUCOSE SERPL-MCNC: 153 MG/DL (ref 65–100)
HCT VFR BLD AUTO: 29.5 % (ref 35–47)
HGB BLD-MCNC: 9.3 G/DL (ref 11.5–16)
Lab: NORMAL
MCH RBC QN AUTO: 26.3 PG (ref 26–34)
MCHC RBC AUTO-ENTMCNC: 31.5 G/DL (ref 30–36.5)
MCV RBC AUTO: 83.6 FL (ref 80–99)
NRBC # BLD: 0.13 K/UL (ref 0–0.01)
NRBC BLD-RTO: 0.6 PER 100 WBC
PERFORMED BY:: ABNORMAL
PERFORMED BY:: ABNORMAL
PERFORMED BY:: NORMAL
PLATELET # BLD AUTO: 197 K/UL (ref 150–400)
PMV BLD AUTO: 9.1 FL (ref 8.9–12.9)
POTASSIUM SERPL-SCNC: 4.3 MMOL/L (ref 3.5–5.1)
PROT SERPL-MCNC: 6.2 G/DL (ref 6.4–8.2)
RBC # BLD AUTO: 3.53 M/UL (ref 3.8–5.2)
SODIUM SERPL-SCNC: 133 MMOL/L (ref 136–145)
SPECIMEN EXP DATE BLD: NORMAL
TRANSFUSION STATUS PATIENT QL: NORMAL
TRANSFUSION STATUS PATIENT QL: NORMAL
UNIT DIVISION: 0
UNIT DIVISION: 0
UNIT ISSUE DATE/TIME: NORMAL
UNIT ISSUE DATE/TIME: NORMAL
WBC # BLD AUTO: 23.5 K/UL (ref 3.6–11)

## 2024-09-11 PROCEDURE — 99232 SBSQ HOSP IP/OBS MODERATE 35: CPT | Performed by: SURGERY

## 2024-09-11 PROCEDURE — 6360000002 HC RX W HCPCS: Performed by: INTERNAL MEDICINE

## 2024-09-11 PROCEDURE — 6370000000 HC RX 637 (ALT 250 FOR IP): Performed by: INTERNAL MEDICINE

## 2024-09-11 PROCEDURE — 6370000000 HC RX 637 (ALT 250 FOR IP): Performed by: FAMILY MEDICINE

## 2024-09-11 PROCEDURE — 6360000002 HC RX W HCPCS: Performed by: FAMILY MEDICINE

## 2024-09-11 PROCEDURE — 2709999900 HC NON-CHARGEABLE SUPPLY

## 2024-09-11 PROCEDURE — 2580000003 HC RX 258: Performed by: INTERNAL MEDICINE

## 2024-09-11 PROCEDURE — 2700000000 HC OXYGEN THERAPY PER DAY

## 2024-09-11 PROCEDURE — 80053 COMPREHEN METABOLIC PANEL: CPT

## 2024-09-11 PROCEDURE — 94761 N-INVAS EAR/PLS OXIMETRY MLT: CPT

## 2024-09-11 PROCEDURE — 2580000003 HC RX 258: Performed by: NURSE PRACTITIONER

## 2024-09-11 PROCEDURE — 36415 COLL VENOUS BLD VENIPUNCTURE: CPT

## 2024-09-11 PROCEDURE — 82962 GLUCOSE BLOOD TEST: CPT

## 2024-09-11 PROCEDURE — 6360000002 HC RX W HCPCS: Performed by: NURSE PRACTITIONER

## 2024-09-11 PROCEDURE — 1100000000 HC RM PRIVATE

## 2024-09-11 PROCEDURE — 6370000000 HC RX 637 (ALT 250 FOR IP): Performed by: NURSE PRACTITIONER

## 2024-09-11 PROCEDURE — 99223 1ST HOSP IP/OBS HIGH 75: CPT | Performed by: INTERNAL MEDICINE

## 2024-09-11 PROCEDURE — 90935 HEMODIALYSIS ONE EVALUATION: CPT

## 2024-09-11 PROCEDURE — 85027 COMPLETE CBC AUTOMATED: CPT

## 2024-09-11 RX ORDER — HYDRALAZINE HYDROCHLORIDE 50 MG/1
50 TABLET, FILM COATED ORAL EVERY 8 HOURS SCHEDULED
Status: DISCONTINUED | OUTPATIENT
Start: 2024-09-11 | End: 2024-09-14

## 2024-09-11 RX ADMIN — EPOETIN ALFA-EPBX 10000 UNITS: 10000 INJECTION, SOLUTION INTRAVENOUS; SUBCUTANEOUS at 09:45

## 2024-09-11 RX ADMIN — ATENOLOL 50 MG: 25 TABLET ORAL at 11:27

## 2024-09-11 RX ADMIN — ALOGLIPTIN 6.25 MG: 6.25 TABLET, FILM COATED ORAL at 11:27

## 2024-09-11 RX ADMIN — HEPARIN SODIUM 3600 UNITS: 1000 INJECTION INTRAVENOUS; SUBCUTANEOUS at 10:45

## 2024-09-11 RX ADMIN — SENNOSIDES AND DOCUSATE SODIUM 1 TABLET: 50; 8.6 TABLET ORAL at 11:27

## 2024-09-11 RX ADMIN — FLUCONAZOLE IN SODIUM CHLORIDE 100 MG: 2 INJECTION, SOLUTION INTRAVENOUS at 14:11

## 2024-09-11 RX ADMIN — SODIUM CHLORIDE: 9 INJECTION, SOLUTION INTRAVENOUS at 16:59

## 2024-09-11 RX ADMIN — VANCOMYCIN HYDROCHLORIDE 2000 MG: 1 INJECTION, POWDER, LYOPHILIZED, FOR SOLUTION INTRAVENOUS at 17:02

## 2024-09-11 RX ADMIN — GABAPENTIN 100 MG: 100 CAPSULE ORAL at 21:06

## 2024-09-11 RX ADMIN — PIPERACILLIN AND TAZOBACTAM 3375 MG: 3; .375 INJECTION, POWDER, FOR SOLUTION INTRAVENOUS; PARENTERAL at 04:56

## 2024-09-11 RX ADMIN — CETIRIZINE HYDROCHLORIDE 10 MG: 10 TABLET, FILM COATED ORAL at 11:27

## 2024-09-11 RX ADMIN — SENNOSIDES AND DOCUSATE SODIUM 1 TABLET: 50; 8.6 TABLET ORAL at 21:06

## 2024-09-11 RX ADMIN — AMLODIPINE BESYLATE 10 MG: 5 TABLET ORAL at 11:27

## 2024-09-11 RX ADMIN — TRAMADOL HYDROCHLORIDE 50 MG: 50 TABLET ORAL at 21:07

## 2024-09-11 RX ADMIN — ATORVASTATIN CALCIUM 40 MG: 40 TABLET, FILM COATED ORAL at 21:06

## 2024-09-11 RX ADMIN — SODIUM CHLORIDE, PRESERVATIVE FREE 10 ML: 5 INJECTION INTRAVENOUS at 21:08

## 2024-09-11 RX ADMIN — SODIUM CHLORIDE: 9 INJECTION, SOLUTION INTRAVENOUS at 14:10

## 2024-09-11 RX ADMIN — SODIUM CHLORIDE, PRESERVATIVE FREE 10 ML: 5 INJECTION INTRAVENOUS at 11:28

## 2024-09-11 RX ADMIN — PIPERACILLIN AND TAZOBACTAM 3375 MG: 3; .375 INJECTION, POWDER, FOR SOLUTION INTRAVENOUS; PARENTERAL at 17:03

## 2024-09-11 ASSESSMENT — PAIN SCALES - WONG BAKER: WONGBAKER_NUMERICALRESPONSE: NO HURT

## 2024-09-11 ASSESSMENT — PAIN DESCRIPTION - LOCATION: LOCATION: LEG;ABDOMEN

## 2024-09-11 ASSESSMENT — PAIN SCALES - GENERAL
PAINLEVEL_OUTOF10: 0
PAINLEVEL_OUTOF10: 6
PAINLEVEL_OUTOF10: 0

## 2024-09-11 ASSESSMENT — PAIN DESCRIPTION - ORIENTATION: ORIENTATION: LEFT;RIGHT

## 2024-09-11 ASSESSMENT — PAIN DESCRIPTION - DESCRIPTORS: DESCRIPTORS: ACHING

## 2024-09-11 NOTE — PROGRESS NOTES
Review of Systems:  Constitutional: Negative for chills and fever.   HENT: Negative.    Eyes: Negative.    Respiratory: Negative.    Cardiovascular: Negative.    Gastrointestinal: Negative for abdominal pain and nausea.   Skin: Negative.    Neurological: Negative.    Musculoskeletal: Bilateral leg pain  Objective:   VITALS:    Vitals:    09/11/24 1223   BP: (!) 126/44   Pulse: 69   Resp: 18   Temp: 98.6 °F (37 °C)   SpO2: 100%       Physical Exam:   Constitutional: pt is oriented to person,     Head: Normocephalic and atraumatic.   Eyes: Pupils are equal, round, and reactive to light. EOM are normal.   Cardiovascular: Normal rate, regular rhythm and normal heart sounds.   Pulmonary/Chest: Breath sounds normal. No wheezes. No rales.   Exhibits no tenderness.   Abdominal: Soft. Bowel sounds are normal. There is no abdominal tenderness. There is no rebound and no guarding.   Musculoskeletal: Normal range of motion.   Neurological: pt is alert and oriented to person,      ASSESSMENT & PLAN:      Acute GI bleed  UTI with yeast  Pneumonia   Leukocytosis   ESRD - on hemodialysis   Anemia   Urinary distention   Constipation   Diabetes type 2  Hypertension   Hyperlipidemia     K wnl today     Saw vascular surgery - CT scan findings appear to be chronic. Pt has well collateralization throughout pelvis. No acute vascular intervention indicated at this time. Pt will be followed by vascular.      Urine cultures - Pending     Follow up with Dialysis     Zosyn 3375 mg IV BID  Nesina 6.25 1 tablet PO QD  Amlodipine 10 mg PO QD  ASA 81 mg PO QD  Atenolol 50 mg PO QD  Lipitor 40 mg PO QHS  Zyrtec 10 mg PO QD  Epoetin saul-epbx 10,000 units IV one dose per day MWF  Gabapentin 100 mg PO QID   Heparin 5,000 units subcut TID   Hydralazine 100 mg PO TID  Sliding scale insulin   Senokot-S 8.6-50 mg 1 tablet PO BID        Tylenol 650 mg PO every 6 hours PRN  Ondansetron 4 mg PO every 8 hours PRN  Oxycodone IR 5 mg 1 tablet PO every 6  hours PRN  Tramadol 50 mg PO every 6 hours PRN    Patient is a full code    Fluconazole 100 mg IV daily  Transfuse 1 unit packed RBC    EGD tomorrow    Follow-up with nephrology regarding dialysis          Signed: Janet Daily MD

## 2024-09-11 NOTE — CONSULTS
Consult Note            Date:9/11/2024        Patient Name:Brii Lambert     YOB: 1960     Age:64 y.o.    Consults    Chief Complaint     Chief Complaint   Patient presents with    missed dialysis      Persistent leukocytosis    History Obtained From   reason patient could not give history:  altered mental status    History of Present Illness   This is a 64 year old female with ESRD on hemodialysis brought from [facility] for evaluation of abdominal pain and lower extremity pain. She had low grade temperature.  On exam both legs were warm to touch but otherwise unremarkable.  Abdomen was tender with guarding. WBC was markedly elevated.  UA showed marked pyuria and budding yeasts. CXR showed mild cardiomegaly and edema.  XR Abdomen showed stool throughout colon.    CTA Abdomen showed occlusion of the infrarenal abdominal aorta, penetrating atherosclerotic ulceration of the descending thoracic aorta, ostial stenosis of the right renal artery with left renal artery occlusion,  multifocal stenosis of the right SFA, with occlusion of the right posterior tibial artery. Also showed multifocal airspace disease suggesting pneumonia with small left pleural effusion. The urinary bladder was  severely distended, concerning for urinary retention or urinary outlet obstruction.  Blood cultures negative so far and urine culture grew mixed urogenital glenn. She is currently on Vancomycin, Zosyn and Fluconazole but WBC remained elevated yesterday.  ID has been consulted for this reason.      Patient affirms bilateral leg pain but otherwise, poor historian due to confusion.    Past Medical History   No past medical history on file.     Past Surgical History   No past surgical history on file.     Medications     Prior to Admission medications    Not on File        hydrALAZINE (APRESOLINE) tablet 50 mg, 3 times per day  vancomycin (VANCOCIN) 2,000 mg in sodium chloride 0.9 % 500 mL IVPB, Once  traMADol  Comments: No Ortez catheter  Musculoskeletal:      Cervical back: Neck supple.      Right lower leg: No edema.      Left lower leg: No edema.   Skin:     Findings: No rash.   Neurological:      General: No focal deficit present.      Mental Status: She is alert. She is disoriented.   Psychiatric:         Mood and Affect: Mood normal.      Comments: Unable to assess           Labs    CBC:  Recent Labs     09/09/24  1144 09/10/24  0633   WBC 31.1* 30.4*   RBC 2.54* 2.21*   HGB 7.0* 5.9*   HCT 21.7* 18.9*   MCV 85.4 85.5   RDW 16.7* 17.1*    204     CHEMISTRIES:  Recent Labs     09/09/24  1144 09/10/24  0633   * 134*   K 5.9* 4.5    99   CO2 21 25   BUN 78* 53*   CREATININE 7.18* 5.23*   GLUCOSE 58* 91      Latest Reference Range & Units 09/09/24 19:03   Color, UA -   Yellow/Straw   Glucose, Ur Negative mg/dL Negative   Bilirubin, Urine Negative   Negative   Ketones, Urine Negative mg/dL Negative   Specific Gravity, UA 1.003 - 1.030   1.014   Blood, Urine Negative   Small !   Protein, UA Negative mg/dL 30 !   Urobilinogen 0.1 - 1.0 EU/dL 0.1   Nitrite, Urine Negative   Negative   Leukocyte Esterase, Urine Negative   Large !   Appearance Clear   Turbid !   pH, Urine 5.0 - 8.0   5.0   WBC, UA 0 - 4 /hpf >100 (H)   RBC, UA 0 - 5 /hpf 20-50   Epithelial Cells, UA Few /lpf Few   Bacteria, UA Negative /hpf Negative   Budding Yeast Negative   Present !         Blood cultures (9/9) in process  Blood cultures (9/9) in process  Urine culture (9/9) 20,000 col/ml mixed urogenital glenn FINAL    Imaging/Diagnostics   XR ABDOMEN (KUB) (SINGLE AP VIEW)    Result Date: 9/9/2024  Moderate amount of stool throughout the colon. Electronically signed by Vinay Yanes    Personally reviewed by me    CTA ABDOMINAL AORTA W BILAT RUNOFF W WO CONTRAST    Result Date: 9/9/2024  1. Occlusion of the infrarenal abdominal aorta. There is weak reconstitution of the bilateral iliac trunks. 2. Penetrating atherosclerotic ulceration of

## 2024-09-11 NOTE — PROGRESS NOTES
Vancomycin Dosing Consult  Brii Lambert is a 64 y.o. female with sepsis. Pharmacy was consulted by Dr. Sparks to dose and monitor vancomycin. Today is day 1.    Antibiotic regimen: Vancomycin + Zosyn    Temp (24hrs), Av.4 °F (36.9 °C), Min:97.7 °F (36.5 °C), Max:99 °F (37.2 °C)    Recent Labs     24  1144 09/10/24  0633 24  1616   WBC 31.1* 30.4* 23.5*   CREATININE 7.18* 5.23* 3.28*   BUN 78* 53* 23*     Est CrCl: 18 mL/min, HD MWF  Concomitant nephrotoxic drugs: Contrast agents     Cultures:    Blood cultures x 2- pending   Urine culture- Mixed urogenital glenn isolated, final    MRSA Swab: Not ordered, patient already received first dose of vancomycin    Target range: Trough 21-24 mcg/mL (Intermittent hemodialysis, invasive MRSA infection or sepsis)    Recent level history:  Date/Time Dose & Interval Measured Level (mcg/mL) Associated AUC/AURELIANO              Assessment/Plan:   LD Vancomycin IV 2000 mg x 1 given.  Pre-HD level scheduled for  @0600  Labs ordered through 9/15.  Antimicrobial stop date 7 days per consult

## 2024-09-11 NOTE — CONSULTS
Gastroenterology Consult     Referring Physician: Janet Daily MD     Consult Date: 2024     Subjective:     Chief Complaint: Abdominal Pain, Pneumonia     History of Present Illness: Brii Lambert is a 64 y.o. female with history of end-stage renal disease on hemodialysis, hypertension, chronic bilateral cerebellar and pontine strokes, an diabetic neuropathy who is seen in consultation for concerns of GI bleed. Patient was sitting up in her bed upon entering the room. Patient complains of generalized abdominal pain that is sharp in nature x1 week. Reports of vomiting. Patient was able to have a bowel movement since being at the hospital, but states it was watery in nature. Denies ever seeing blood in stool. Denies being on any anticoagulants or blood thinners, although current orders include aspirin 81mg. Patient was not able to get EGD yesterday as she was at dialysis.    Last set of labs from 9/10 show low hemoglobin and hematocrit at 5.9 and 18.9, respectively. BUN and Cr at 53 and 5.23, respectively. Most recent BP read was 122/48.    No past medical history on file.  No past surgical history on file.   No family history on file.  Social History     Tobacco Use    Smoking status: Former     Current packs/day: 0.00     Types: Cigarettes     Quit date:      Years since quittin.6    Smokeless tobacco: Former   Substance Use Topics    Alcohol use: Not on file      No Known Allergies  Current Facility-Administered Medications   Medication Dose Route Frequency    hydrALAZINE (APRESOLINE) tablet 50 mg  50 mg Oral 3 times per day    vancomycin (VANCOCIN) 2,000 mg in sodium chloride 0.9 % 500 mL IVPB  25 mg/kg IntraVENous Once    traMADol (ULTRAM) tablet 50 mg  50 mg Oral Q6H PRN    0.9 % sodium chloride infusion   IntraVENous PRN    fluconazole (DIFLUCAN) 100 mg IVPB  100 mg IntraVENous Q24H    0.9 % sodium chloride infusion   IntraVENous PRN    heparin (porcine) injection 3,600 Units  3,600 Units  review of systems is obtained with pertinent positives as listed in the History of Present Illness and Past Medical History. All others are negative.    Objective:     Physical Exam:  BP (!) 122/48   Pulse 68   Temp 98.6 °F (37 °C) (Axillary)   Resp 20   Ht 1.676 m (5' 6\")   Wt 82.4 kg (181 lb 11.2 oz)   SpO2 92%   BMI 29.33 kg/m²      Skin:  Extremities and face reveal no rashes.   HEENT: Sclerae anicteric. No abnormal pigmentation of the lips. The neck is supple.  Cardiovascular: Regular rate and rhythm.   Respiratory:  Comfortable breathing with no accessory muscle use.   GI:  Upper portions of abdomen tender to palpation.No enlargement of the liver or spleen. No masses palpable.  Rectal:  Deferred  Musculoskeletal: Extremities have good range of motion.  Neurological:  Patient alert and oriented. Patient is lethargic.   Psychiatric:  Mood and affect congruent.   Lymphatic:  No cervical or supraclavicular adenopathy.    Lab/Data Review:  Labs reviewed.       Assessment/Plan:   Patient was admitted with diffuse abdominal pain and has obstruction of the abdominal aorta however with drop in hemoglobin there are concerns for upper GI Bleed   Anemia   Hypotension  Occlusion of abdominal aorta     Obtain new labs- CBC, CMP,     Transfuse as necessary   Patient to have EGD tomorrow    Patient being followed by vascular surgery     Thank you for allowing me to participate in this patients care

## 2024-09-11 NOTE — PROGRESS NOTES
PROGRESS NOTE      Chief Complaints:  Vascular follow-up.  HPI and  Objective:    No new complaints.  Still some complaint of numbness of both feet.  However no pain.  Denies any chest pain shortness of breath.  However she does complain of some abdominal discomfort.  Review of Systems:  Rest of review of system reviewed personally and they are negative.    EXAM:  BP (!) 114/55   Pulse 58   Temp 98.8 °F (37.1 °C)   Resp 18   Ht 1.676 m (5' 6\")   Wt 82.4 kg (181 lb 11.2 oz)   SpO2 96%   BMI 29.33 kg/m²     Patient is awake   Head and neck atraumatic, normocephalic.  ENT: No hoarse voice, gaze appropriate.  Cardiac system regular rate rhythm.  Pulmonary no audible wheeze, no cyanosis.  Chest wall excursion normal with respiration cycle  Abdomen is soft not particularly distended.  Neurologically nonfocal.  Hematology system: No bruising noted.  Musculoskeletal system: No joint deformity noted.  Genitourinary system: No hematuria noted.  Skin is warm and moist.  Psychosocial: Cooperative.  Vascular examination as previously noted no changes.    Current Facility-Administered Medications   Medication Dose Route Frequency Provider Last Rate Last Admin    traMADol (ULTRAM) tablet 50 mg  50 mg Oral Q6H PRN Janet Daily MD   50 mg at 09/10/24 2156    0.9 % sodium chloride infusion   IntraVENous PRN Janet Daily MD        fluconazole (DIFLUCAN) 100 mg IVPB  100 mg IntraVENous Q24H Janet Daily MD   Stopped at 09/10/24 1655    0.9 % sodium chloride infusion   IntraVENous PRN Priscila Sparks MD        heparin (porcine) injection 3,600 Units  3,600 Units IntraCATHeter PRN Priscila Sparks MD   3,600 Units at 09/10/24 1707    gabapentin (NEURONTIN) capsule 100 mg  100 mg Oral Nightly Priscila Sparks MD        glucose chewable tablet 16 g  4 tablet Oral PRN Gautam Centeno MD        dextrose bolus 10% 125 mL  125 mL IntraVENous PRN Gautam Centeno MD        Or    dextrose bolus 10% 250 mL  250 mL IntraVENous  ondansetron (ZOFRAN-ODT) disintegrating tablet 4 mg  4 mg Oral Q8H PRN Radha Keating APRN - CNP        Or    ondansetron (ZOFRAN) injection 4 mg  4 mg IntraVENous Q6H PRN Radha Keaitng APRN - CNP        polyethylene glycol (GLYCOLAX) packet 17 g  17 g Oral Daily PRN Radha Keating APRN - CNP        acetaminophen (TYLENOL) tablet 650 mg  650 mg Oral Q6H PRN Radha Keating APRN - CNP   650 mg at 09/10/24 2156    Or    acetaminophen (TYLENOL) suppository 650 mg  650 mg Rectal Q6H PRN Radha Keating APRN - CNP        sennosides-docusate sodium (SENOKOT-S) 8.6-50 MG tablet 1 tablet  1 tablet Oral BID Radha Keating APRN - CNP   1 tablet at 09/10/24 2156    piperacillin-tazobactam (ZOSYN) 3,375 mg in sodium chloride 0.9 % 50 mL IVPB (mini-bag)  3,375 mg IntraVENous Q12H Radha Keating APRN - CNP 12.5 mL/hr at 09/11/24 0456 3,375 mg at 09/11/24 0456           Recent Results (from the past 24 hour(s))   POCT Glucose    Collection Time: 09/10/24 12:14 PM   Result Value Ref Range    POC Glucose 84 65 - 100 mg/dL    Performed by: Ed Ga    POCT Glucose    Collection Time: 09/10/24  8:30 PM   Result Value Ref Range    POC Glucose 71 65 - 100 mg/dL    Performed by: MIRLANDE NAQVI        ASSESSMENT:   Patient is 64 y.o. with diagnosis of : Principal Problem:    Pneumonia due to organism  Active Problems:    Leukocytosis  Resolved Problems:    * No resolved hospital problems. *      PLAN:                 Close monitoring of abdominal discomfort.    Vascular examination shows both feet is lukewarm and there is no signs of ischemia.  Doppler signal noted.    Patient has a chronic aortoiliac occlusion.  Pelvic vessels has a significant collateralization.    Will continue to follow

## 2024-09-11 NOTE — PROGRESS NOTES
Nephrology follow-up          Patient: Brii Lambert MRN: [id number]  SSN: [id number]    YOB: 1960  Age: 64 y.o.  Sex: female      Subjective:   The patient is seen on dialysis.  Blood pressures are okay  Afebrile  Leukocytosis  Hb 5.9  Received blood Tx 2 units yesterday        No past medical history on file.  No past surgical history on file.   No family history on file.  Social History     Tobacco Use    Smoking status: Former     Current packs/day: 0.00     Types: Cigarettes     Quit date:      Years since quittin.6    Smokeless tobacco: Former   Substance Use Topics    Alcohol use: Not on file      Current Facility-Administered Medications   Medication Dose Route Frequency Provider Last Rate Last Admin    traMADol (ULTRAM) tablet 50 mg  50 mg Oral Q6H PRN Janet Daily MD   50 mg at 09/10/24 2156    0.9 % sodium chloride infusion   IntraVENous PRN Janet Daily MD        fluconazole (DIFLUCAN) 100 mg IVPB  100 mg IntraVENous Q24H Janet Daily  mL/hr at 24 1411 100 mg at 24 1411    0.9 % sodium chloride infusion   IntraVENous PRN Priscila Sparks MD        heparin (porcine) injection 3,600 Units  3,600 Units IntraCATHeter PRN Priscila Sparks MD   3,600 Units at 24 1045    gabapentin (NEURONTIN) capsule 100 mg  100 mg Oral Nightly Priscila Sparks MD        glucose chewable tablet 16 g  4 tablet Oral PRN Gautam Centeno MD        dextrose bolus 10% 125 mL  125 mL IntraVENous PRN Gautam Centeno MD        Or    dextrose bolus 10% 250 mL  250 mL IntraVENous PRN Gautam Centeno MD        glucagon injection 1 mg  1 mg SubCUTAneous PRN Gautam Centeno MD        dextrose 10 % infusion   IntraVENous Continuous PRN Gautam Centeno MD        epoetin saul-epbx (RETACRIT) injection 10,000 Units  10,000 Units IntraVENous Once per day on  Priscila Sparks MD   10,000 Units at 24 0945    acetaminophen (TYLENOL) tablet 650  mg  650 mg Oral Q6H PRN Janet Daily MD        alogliptin (NESINA) tablet 6.25 mg  6.25 mg Oral Daily Radha Keating, APRN - CNP   6.25 mg at 09/11/24 1127    insulin lispro (HUMALOG,ADMELOG) injection vial 0-16 Units  0-16 Units SubCUTAneous TID WC Radha Keating APRN - CNP        insulin lispro (HUMALOG,ADMELOG) injection vial 0-4 Units  0-4 Units SubCUTAneous Nightly Radha Keating, APRN - CNP        amLODIPine (NORVASC) tablet 10 mg  10 mg Oral Daily Radha Keating APRN - CNP   10 mg at 09/11/24 1127    aspirin EC tablet 81 mg  81 mg Oral Daily Radha Keating, APRN - CNP   81 mg at 09/10/24 0839    atenolol (TENORMIN) tablet 50 mg  50 mg Oral Daily Radha Keating APRN - CNP   50 mg at 09/11/24 1127    atorvastatin (LIPITOR) tablet 40 mg  40 mg Oral Nightly Radha Keating APRN - CNP   40 mg at 09/10/24 2156    hydrALAZINE (APRESOLINE) tablet 100 mg  100 mg Oral 3 times per day Radha Keating APRN - CNP   100 mg at 09/10/24 2156    cetirizine (ZYRTEC) tablet 10 mg  10 mg Oral Daily Radha Keating APRN - CNP   10 mg at 09/11/24 1127    oxyCODONE (ROXICODONE) immediate release tablet 5 mg  5 mg Oral Q6H PRN Radha Keating APRN - CNP   5 mg at 09/10/24 2244    sodium chloride flush 0.9 % injection 5-40 mL  5-40 mL IntraVENous 2 times per day Radha Keating APRN - CNP   10 mL at 09/11/24 1128    sodium chloride flush 0.9 % injection 5-40 mL  5-40 mL IntraVENous PRN Radha Keating APRN - CNP        0.9 % sodium chloride infusion   IntraVENous PRN Radha Keating APRN - CNP 10 mL/hr at 09/11/24 1410 New Bag at 09/11/24 1410    ondansetron (ZOFRAN-ODT) disintegrating tablet 4 mg  4 mg Oral Q8H PRN Radha Keating APRN - CNP        Or    ondansetron (ZOFRAN) injection 4 mg  4 mg IntraVENous Q6H PRN Radha Keating APRN - CNP        polyethylene glycol (GLYCOLAX) packet 17 g  17 g Oral Daily PRN Radha Keating APRN - CNP        acetaminophen (TYLENOL) tablet

## 2024-09-11 NOTE — DIALYSIS
Completed 3hrs and 15mins dialysis, well tolerfated 3 L fluid removal.    Epo 10,000 units given intra dialysis.    Infromed telemetry patient is en route to 4 [facility] Room 410 with [id number].    4 [facility] received dialysis report.

## 2024-09-11 NOTE — CARE COORDINATION
Patient is a long term care resident at [facility].  Patient does not need insurance authorization to return to SNF.  Patient also attends [facility] in [facility].

## 2024-09-11 NOTE — CONSULTS
Gastroenterology Consult     Referring Physician: Janet Daily MD     Consult Date: 2024     Subjective:     Chief Complaint: Abdominal Pain, Pneumonia     History of Present Illness: Brii Lambert is a 64 y.o. female with history of end-stage renal disease on hemodialysis, hypertension, chronic bilateral cerebellar and pontine strokes, an diabetic neuropathy who is seen in consultation for concerns of GI bleed. Patient was sitting up in her bed upon entering the room. Patient complains of generalized abdominal pain that is sharp in nature x1 week. Reports of vomiting. Patient was able to have a bowel movement since being at the hospital, but states it was watery in nature. Denies ever seeing blood in stool. Denies being on any anticoagulants or blood thinners, although current orders include aspirin 81mg. Patient was not able to get EGD yesterday as she was at dialysis.    Last set of labs from 9/10 show low hemoglobin and hematocrit at 5.9 and 18.9, respectively. BUN and Cr at 53 and 5.23, respectively. Most recent BP read was 122/48.    No past medical history on file.  No past surgical history on file.   No family history on file.  Social History     Tobacco Use    Smoking status: Former     Current packs/day: 0.00     Types: Cigarettes     Quit date:      Years since quittin.6    Smokeless tobacco: Former   Substance Use Topics    Alcohol use: Not on file      No Known Allergies  Current Facility-Administered Medications   Medication Dose Route Frequency    hydrALAZINE (APRESOLINE) tablet 50 mg  50 mg Oral 3 times per day    vancomycin (VANCOCIN) 2,000 mg in sodium chloride 0.9 % 500 mL IVPB  25 mg/kg IntraVENous Once    traMADol (ULTRAM) tablet 50 mg  50 mg Oral Q6H PRN    0.9 % sodium chloride infusion   IntraVENous PRN    fluconazole (DIFLUCAN) 100 mg IVPB  100 mg IntraVENous Q24H    0.9 % sodium chloride infusion   IntraVENous PRN    heparin (porcine) injection 3,600 Units  3,600 Units  IntraCATHeter PRN    gabapentin (NEURONTIN) capsule 100 mg  100 mg Oral Nightly    glucose chewable tablet 16 g  4 tablet Oral PRN    dextrose bolus 10% 125 mL  125 mL IntraVENous PRN    Or    dextrose bolus 10% 250 mL  250 mL IntraVENous PRN    glucagon injection 1 mg  1 mg SubCUTAneous PRN    dextrose 10 % infusion   IntraVENous Continuous PRN    epoetin saul-epbx (RETACRIT) injection 10,000 Units  10,000 Units IntraVENous Once per day on Monday Wednesday Friday    acetaminophen (TYLENOL) tablet 650 mg  650 mg Oral Q6H PRN    alogliptin (NESINA) tablet 6.25 mg  6.25 mg Oral Daily    insulin lispro (HUMALOG,ADMELOG) injection vial 0-16 Units  0-16 Units SubCUTAneous TID WC    insulin lispro (HUMALOG,ADMELOG) injection vial 0-4 Units  0-4 Units SubCUTAneous Nightly    [Held by provider] amLODIPine (NORVASC) tablet 10 mg  10 mg Oral Daily    aspirin EC tablet 81 mg  81 mg Oral Daily    atenolol (TENORMIN) tablet 50 mg  50 mg Oral Daily    atorvastatin (LIPITOR) tablet 40 mg  40 mg Oral Nightly    cetirizine (ZYRTEC) tablet 10 mg  10 mg Oral Daily    oxyCODONE (ROXICODONE) immediate release tablet 5 mg  5 mg Oral Q6H PRN    sodium chloride flush 0.9 % injection 5-40 mL  5-40 mL IntraVENous 2 times per day    sodium chloride flush 0.9 % injection 5-40 mL  5-40 mL IntraVENous PRN    0.9 % sodium chloride infusion   IntraVENous PRN    ondansetron (ZOFRAN-ODT) disintegrating tablet 4 mg  4 mg Oral Q8H PRN    Or    ondansetron (ZOFRAN) injection 4 mg  4 mg IntraVENous Q6H PRN    polyethylene glycol (GLYCOLAX) packet 17 g  17 g Oral Daily PRN    acetaminophen (TYLENOL) tablet 650 mg  650 mg Oral Q6H PRN    Or    acetaminophen (TYLENOL) suppository 650 mg  650 mg Rectal Q6H PRN    sennosides-docusate sodium (SENOKOT-S) 8.6-50 MG tablet 1 tablet  1 tablet Oral BID    piperacillin-tazobactam (ZOSYN) 3,375 mg in sodium chloride 0.9 % 50 mL IVPB (mini-bag)  3,375 mg IntraVENous Q12H        Review of Systems:  A detailed 10 organ

## 2024-09-11 NOTE — PROGRESS NOTES
4 Eyes Skin Assessment     NAME:  Brii Lambert  YOB: 1960  MEDICAL RECORD NUMBER:  [id number]    The patient is being assessed for  Other      I agree that at least one RN has performed a thorough Head to Toe Skin Assessment on the patient. ALL assessment sites listed below have been assessed.      Areas assessed by both nurses:    Head, Face, Ears, Shoulders, Back, Chest, Arms, Elbows, Hands, Sacrum. Buttock, Coccyx, Ischium, Legs. Feet and Heels, and Under Medical Devices         Does the Patient have a Wound? No noted wound(s),        Conrad Prevention initiated by RN: No  Wound Care Orders initiated by RN: No    Pressure Injury (Stage 3,4, Unstageable, DTI, NWPT, and Complex wounds) if present, place Wound referral order by RN under : No    New Ostomies, if present place, Ostomy referral order under : No     Nurse 1 eSignature: Electronically signed by Daniel Rider RN on 9/11/24 at 4:23 AM EDT    **SHARE this note so that the co-signing nurse can place an eSignature**    Nurse 2 eSignature: Electronically signed by Jersey Raymundo RN on 9/11/24 at 5:35 AM EDT

## 2024-09-12 PROBLEM — J15.7 PNEUMONIA DUE TO MYCOPLASMA PNEUMONIAE: Status: ACTIVE | Noted: 2024-09-12

## 2024-09-12 PROBLEM — N18.6 ESRD (END STAGE RENAL DISEASE) ON DIALYSIS (HCC): Status: ACTIVE | Noted: 2024-09-12

## 2024-09-12 PROBLEM — R10.84 GENERALIZED ABDOMINAL PAIN: Status: ACTIVE | Noted: 2024-09-12

## 2024-09-12 PROBLEM — R40.0 SOMNOLENCE: Status: ACTIVE | Noted: 2024-09-12

## 2024-09-12 PROBLEM — N39.0 URINARY TRACT INFECTION WITH HEMATURIA: Status: ACTIVE | Noted: 2024-09-12

## 2024-09-12 PROBLEM — R31.9 URINARY TRACT INFECTION WITH HEMATURIA: Status: ACTIVE | Noted: 2024-09-12

## 2024-09-12 PROBLEM — Z99.2 ESRD (END STAGE RENAL DISEASE) ON DIALYSIS (HCC): Status: ACTIVE | Noted: 2024-09-12

## 2024-09-12 PROBLEM — A41.9 SEPSIS WITHOUT ACUTE ORGAN DYSFUNCTION (HCC): Status: ACTIVE | Noted: 2024-09-12

## 2024-09-12 LAB
ALBUMIN SERPL-MCNC: 2.3 G/DL (ref 3.5–5)
ALBUMIN/GLOB SERPL: 0.5 (ref 1.1–2.2)
ALP SERPL-CCNC: 56 U/L (ref 45–117)
ALT SERPL-CCNC: 70 U/L (ref 12–78)
ANION GAP SERPL CALC-SCNC: 9 MMOL/L (ref 2–12)
AST SERPL W P-5'-P-CCNC: 441 U/L (ref 15–37)
BILIRUB SERPL-MCNC: 0.5 MG/DL (ref 0.2–1)
BUN SERPL-MCNC: 33 MG/DL (ref 6–20)
BUN/CREAT SERPL: 8 (ref 12–20)
CA-I BLD-MCNC: 8.2 MG/DL (ref 8.5–10.1)
CHLORIDE SERPL-SCNC: 98 MMOL/L (ref 97–108)
CO2 SERPL-SCNC: 25 MMOL/L (ref 21–32)
CREAT SERPL-MCNC: 4.35 MG/DL (ref 0.55–1.02)
CRP SERPL-MCNC: 18.1 MG/DL (ref 0–0.3)
ERYTHROCYTE [DISTWIDTH] IN BLOOD BY AUTOMATED COUNT: 18 % (ref 11.5–14.5)
GLOBULIN SER CALC-MCNC: 4.8 G/DL (ref 2–4)
GLUCOSE BLD STRIP.AUTO-MCNC: 100 MG/DL (ref 65–100)
GLUCOSE BLD STRIP.AUTO-MCNC: 106 MG/DL (ref 65–100)
GLUCOSE BLD STRIP.AUTO-MCNC: 125 MG/DL (ref 65–100)
GLUCOSE BLD STRIP.AUTO-MCNC: 20 MG/DL (ref 65–100)
GLUCOSE BLD STRIP.AUTO-MCNC: 41 MG/DL (ref 65–100)
GLUCOSE BLD STRIP.AUTO-MCNC: 53 MG/DL (ref 65–100)
GLUCOSE BLD STRIP.AUTO-MCNC: 70 MG/DL (ref 65–100)
GLUCOSE SERPL-MCNC: 28 MG/DL (ref 65–100)
HCT VFR BLD AUTO: 29.9 % (ref 35–47)
HGB BLD-MCNC: 9.3 G/DL (ref 11.5–16)
M PNEUMO IGM SER IA-ACNC: REACTIVE
MCH RBC QN AUTO: 26.4 PG (ref 26–34)
MCHC RBC AUTO-ENTMCNC: 31.1 G/DL (ref 30–36.5)
MCV RBC AUTO: 84.9 FL (ref 80–99)
NRBC # BLD: 0.1 K/UL (ref 0–0.01)
NRBC BLD-RTO: 0.5 PER 100 WBC
PERFORMED BY:: ABNORMAL
PERFORMED BY:: NORMAL
PERFORMED BY:: NORMAL
PHOSPHATE SERPL-MCNC: 6 MG/DL (ref 2.6–4.7)
PLATELET # BLD AUTO: 222 K/UL (ref 150–400)
PMV BLD AUTO: 9.2 FL (ref 8.9–12.9)
POTASSIUM SERPL-SCNC: 4.3 MMOL/L (ref 3.5–5.1)
PROCALCITONIN SERPL-MCNC: 8.06 NG/ML
PROT SERPL-MCNC: 7.1 G/DL (ref 6.4–8.2)
RBC # BLD AUTO: 3.52 M/UL (ref 3.8–5.2)
SODIUM SERPL-SCNC: 132 MMOL/L (ref 136–145)
WBC # BLD AUTO: 21.8 K/UL (ref 3.6–11)

## 2024-09-12 PROCEDURE — 2580000003 HC RX 258: Performed by: NURSE PRACTITIONER

## 2024-09-12 PROCEDURE — 2580000003 HC RX 258: Performed by: INTERNAL MEDICINE

## 2024-09-12 PROCEDURE — 6360000002 HC RX W HCPCS: Performed by: NURSE PRACTITIONER

## 2024-09-12 PROCEDURE — 84145 PROCALCITONIN (PCT): CPT

## 2024-09-12 PROCEDURE — 2580000003 HC RX 258: Performed by: STUDENT IN AN ORGANIZED HEALTH CARE EDUCATION/TRAINING PROGRAM

## 2024-09-12 PROCEDURE — 6370000000 HC RX 637 (ALT 250 FOR IP): Performed by: NURSE PRACTITIONER

## 2024-09-12 PROCEDURE — 36415 COLL VENOUS BLD VENIPUNCTURE: CPT

## 2024-09-12 PROCEDURE — 2580000003 HC RX 258: Performed by: FAMILY MEDICINE

## 2024-09-12 PROCEDURE — 1100000000 HC RM PRIVATE

## 2024-09-12 PROCEDURE — 6370000000 HC RX 637 (ALT 250 FOR IP): Performed by: INTERNAL MEDICINE

## 2024-09-12 PROCEDURE — 99232 SBSQ HOSP IP/OBS MODERATE 35: CPT | Performed by: SURGERY

## 2024-09-12 PROCEDURE — 85027 COMPLETE CBC AUTOMATED: CPT

## 2024-09-12 PROCEDURE — 82962 GLUCOSE BLOOD TEST: CPT

## 2024-09-12 PROCEDURE — 6360000002 HC RX W HCPCS: Performed by: INTERNAL MEDICINE

## 2024-09-12 PROCEDURE — 99232 SBSQ HOSP IP/OBS MODERATE 35: CPT | Performed by: INTERNAL MEDICINE

## 2024-09-12 PROCEDURE — 80053 COMPREHEN METABOLIC PANEL: CPT

## 2024-09-12 PROCEDURE — 2700000000 HC OXYGEN THERAPY PER DAY

## 2024-09-12 PROCEDURE — 86140 C-REACTIVE PROTEIN: CPT

## 2024-09-12 PROCEDURE — 6370000000 HC RX 637 (ALT 250 FOR IP): Performed by: FAMILY MEDICINE

## 2024-09-12 PROCEDURE — 86738 MYCOPLASMA ANTIBODY: CPT

## 2024-09-12 PROCEDURE — 94761 N-INVAS EAR/PLS OXIMETRY MLT: CPT

## 2024-09-12 PROCEDURE — 6360000002 HC RX W HCPCS: Performed by: FAMILY MEDICINE

## 2024-09-12 PROCEDURE — 84100 ASSAY OF PHOSPHORUS: CPT

## 2024-09-12 RX ORDER — DEXTROSE MONOHYDRATE AND SODIUM CHLORIDE 5; .9 G/100ML; G/100ML
INJECTION, SOLUTION INTRAVENOUS CONTINUOUS
Status: DISCONTINUED | OUTPATIENT
Start: 2024-09-12 | End: 2024-09-12

## 2024-09-12 RX ORDER — DEXTROSE MONOHYDRATE 100 MG/ML
INJECTION, SOLUTION INTRAVENOUS CONTINUOUS
Status: DISCONTINUED | OUTPATIENT
Start: 2024-09-12 | End: 2024-09-14

## 2024-09-12 RX ADMIN — SODIUM CHLORIDE: 9 INJECTION, SOLUTION INTRAVENOUS at 11:45

## 2024-09-12 RX ADMIN — ALOGLIPTIN 6.25 MG: 6.25 TABLET, FILM COATED ORAL at 09:50

## 2024-09-12 RX ADMIN — FLUCONAZOLE IN SODIUM CHLORIDE 100 MG: 2 INJECTION, SOLUTION INTRAVENOUS at 12:47

## 2024-09-12 RX ADMIN — CETIRIZINE HYDROCHLORIDE 10 MG: 10 TABLET, FILM COATED ORAL at 09:51

## 2024-09-12 RX ADMIN — SODIUM CHLORIDE, PRESERVATIVE FREE 10 ML: 5 INJECTION INTRAVENOUS at 20:08

## 2024-09-12 RX ADMIN — TRAMADOL HYDROCHLORIDE 50 MG: 50 TABLET ORAL at 12:56

## 2024-09-12 RX ADMIN — ATENOLOL 50 MG: 25 TABLET ORAL at 09:50

## 2024-09-12 RX ADMIN — GABAPENTIN 100 MG: 100 CAPSULE ORAL at 21:21

## 2024-09-12 RX ADMIN — SENNOSIDES AND DOCUSATE SODIUM 1 TABLET: 50; 8.6 TABLET ORAL at 09:51

## 2024-09-12 RX ADMIN — ATORVASTATIN CALCIUM 40 MG: 40 TABLET, FILM COATED ORAL at 20:05

## 2024-09-12 RX ADMIN — DEXTROSE MONOHYDRATE: 100 INJECTION, SOLUTION INTRAVENOUS at 20:28

## 2024-09-12 RX ADMIN — SENNOSIDES AND DOCUSATE SODIUM 1 TABLET: 50; 8.6 TABLET ORAL at 20:04

## 2024-09-12 RX ADMIN — AZITHROMYCIN DIHYDRATE 500 MG: 500 INJECTION, POWDER, LYOPHILIZED, FOR SOLUTION INTRAVENOUS at 21:58

## 2024-09-12 RX ADMIN — PIPERACILLIN AND TAZOBACTAM 3375 MG: 3; .375 INJECTION, POWDER, FOR SOLUTION INTRAVENOUS; PARENTERAL at 15:41

## 2024-09-12 RX ADMIN — SODIUM CHLORIDE, PRESERVATIVE FREE 10 ML: 5 INJECTION INTRAVENOUS at 09:06

## 2024-09-12 RX ADMIN — OXYCODONE HYDROCHLORIDE 5 MG: 5 TABLET ORAL at 20:05

## 2024-09-12 RX ADMIN — HYDRALAZINE HYDROCHLORIDE 50 MG: 50 TABLET ORAL at 09:50

## 2024-09-12 RX ADMIN — DEXTROSE AND SODIUM CHLORIDE: 5; 900 INJECTION, SOLUTION INTRAVENOUS at 17:20

## 2024-09-12 RX ADMIN — SODIUM CHLORIDE: 9 INJECTION, SOLUTION INTRAVENOUS at 11:54

## 2024-09-12 RX ADMIN — PIPERACILLIN AND TAZOBACTAM 3375 MG: 3; .375 INJECTION, POWDER, FOR SOLUTION INTRAVENOUS; PARENTERAL at 04:57

## 2024-09-12 RX ADMIN — DEXTROSE MONOHYDRATE 125 ML: 100 INJECTION, SOLUTION INTRAVENOUS at 16:32

## 2024-09-12 RX ADMIN — HYDRALAZINE HYDROCHLORIDE 50 MG: 50 TABLET ORAL at 20:04

## 2024-09-12 RX ADMIN — ASPIRIN 81 MG: 81 TABLET, COATED ORAL at 09:50

## 2024-09-12 RX ADMIN — DEXTROSE MONOHYDRATE 125 ML: 100 INJECTION, SOLUTION INTRAVENOUS at 11:57

## 2024-09-12 ASSESSMENT — PAIN DESCRIPTION - LOCATION
LOCATION: LEG
LOCATION: LEG

## 2024-09-12 ASSESSMENT — PAIN SCALES - GENERAL
PAINLEVEL_OUTOF10: 0
PAINLEVEL_OUTOF10: 4
PAINLEVEL_OUTOF10: 6
PAINLEVEL_OUTOF10: 4
PAINLEVEL_OUTOF10: 9

## 2024-09-12 ASSESSMENT — PAIN DESCRIPTION - ORIENTATION
ORIENTATION: RIGHT;LEFT
ORIENTATION: LEFT;RIGHT

## 2024-09-12 ASSESSMENT — PAIN DESCRIPTION - DESCRIPTORS: DESCRIPTORS: ACHING

## 2024-09-12 NOTE — PLAN OF CARE
Problem: Discharge Planning  Goal: Discharge to home or other facility with appropriate resources  9/12/2024 1751 by Nadja Sarah, RN  Outcome: Progressing  9/12/2024 0434 by Jillian Chapa, RN  Outcome: Progressing     Problem: Skin/Tissue Integrity  Goal: Absence of new skin breakdown  Description: 1.  Monitor for areas of redness and/or skin breakdown  2.  Assess vascular access sites hourly  3.  Every 4-6 hours minimum:  Change oxygen saturation probe site  4.  Every 4-6 hours:  If on nasal continuous positive airway pressure, respiratory therapy assess nares and determine need for appliance change or resting period.  Outcome: Progressing     Problem: Safety - Adult  Goal: Free from fall injury  9/12/2024 1751 by Nadja Sarah, RN  Outcome: Progressing  9/12/2024 0434 by Jillian Chapa, RN  Outcome: Progressing

## 2024-09-12 NOTE — CARE COORDINATION
0745: Chart reviewed.    Per notes; dialysis patient on IV ABX followed via general surgery, GI, ID and nephrology.    Patient receives dialysis MWF at [facility] in [place] at 12:45PM.    When medically stable for discharge, patient will return to [facility] as long term care resident.    Updates attached in CarePort.    CM will continue to follow patient and recs of medical team.    1120: EGD rescheduled for today.     1318: Attending's note attached in CarePort.

## 2024-09-12 NOTE — PROGRESS NOTES
PROGRESS NOTE      Chief Complaints:  Vascular follow-up  HPI and  Objective:    No acute issues overnight.  Patient denies abdominal pain chest pain shortness of breath this morning.  Review of Systems:  Rest of review of system reviewed personally and they are negative.    EXAM:  BP (!) 117/53   Pulse 70   Temp 99.7 °F (37.6 °C) (Oral)   Resp 18   Ht 1.676 m (5' 6\")   Wt 81.8 kg (180 lb 5.4 oz)   SpO2 100%   BMI 29.11 kg/m²     Patient is awake   Head and neck atraumatic, normocephalic.  ENT: No hoarse voice, gaze appropriate.  Cardiac system regular rate rhythm.  Pulmonary no audible wheeze, no cyanosis.  Chest wall excursion normal with respiration cycle  Abdomen is soft not particularly distended.  Neurologically nonfocal.  Hematology system: No bruising noted.  Musculoskeletal system: No joint deformity noted.  Genitourinary system: No hematuria noted.  Skin is warm and moist.  Psychosocial: Cooperative.  Vascular examination as previously noted no changes.    Current Facility-Administered Medications   Medication Dose Route Frequency Provider Last Rate Last Admin    hydrALAZINE (APRESOLINE) tablet 50 mg  50 mg Oral 3 times per day Priscila Sparks MD        vancomycin (VANCOCIN) intermittent dosing (placeholder)   Other RX Placeholder Priscila Sparks MD        [START ON 9/13/2024] Vancomycin Level- Please draw level on 9/13 @0600   Other RX Placeholder Janet Daily MD        traMADol (ULTRAM) tablet 50 mg  50 mg Oral Q6H PRN Janet Daily MD   50 mg at 09/11/24 2107    0.9 % sodium chloride infusion   IntraVENous PRN Janet Daily MD        fluconazole (DIFLUCAN) 100 mg IVPB  100 mg IntraVENous Q24H Janet Daily MD   Stopped at 09/11/24 1441    0.9 % sodium chloride infusion   IntraVENous PRN Priscila Sparks MD        heparin (porcine) injection 3,600 Units  3,600 Units IntraCATHeter PRN Priscila Sparks MD   3,600 Units at 09/11/24 1045    gabapentin (NEURONTIN) capsule 100 mg  100 mg Oral  U/L    ALT 70 12 - 78 U/L    Alk Phosphatase 56 45 - 117 U/L    Total Protein 7.1 6.4 - 8.2 g/dL    Albumin 2.3 (L) 3.5 - 5.0 g/dL    Globulin 4.8 (H) 2.0 - 4.0 g/dL    Albumin/Globulin Ratio 0.5 (L) 1.1 - 2.2     Phosphorus    Collection Time: 09/12/24  5:53 AM   Result Value Ref Range    Phosphorus 6.0 (H) 2.6 - 4.7 mg/dL       ASSESSMENT:   Patient is 64 y.o. with diagnosis of : Principal Problem:    HCAP (healthcare-associated pneumonia)  Active Problems:    Leukocytosis    Generalized abdominal pain    Urinary tract infection with hematuria    Somnolence    ESRD (end stage renal disease) on dialysis (HCC)    Sepsis without acute organ dysfunction (HCC)  Resolved Problems:    * No resolved hospital problems. *      PLAN:                 Vascular examination shows both feet is lukewarm and there is no signs of ischemia.  Doppler signal noted.     Patient has a chronic aortoiliac occlusion.  Pelvic vessels has a significant collateralization.     Will continue to follow

## 2024-09-12 NOTE — PROGRESS NOTES
History and Physical    NAME:   Brii Lambert   :  1960   MRN:  [id number]     Date/Time: 2024 12:40 PM    Patient PCP: Janet Daily MD  ______________________________________________________________________       Subjective:     CHIEF COMPLAINT:     Abdominal pain, Pneumonia     HISTORY OF PRESENT ILLNESS:     General Daily Progress Note  Patient is a 64 y.o. year old female past medical history of end-stage renal disease on hemodialysis, hypertension, chronic bilateral cerebellar and pontine strokes, diabetic neuropathy presented to the ER yesterday for evaluation of abdominal, lower extremity pain. Pt was recently discharged from [facility] and sent to [place] for rehab.  Patient reportedly missed several days of dialysis due to abdominal pain. Patient states she has not had a bowel movement in 14-15 days. Patient missed Monday dialysis and O2 sat was 70% on 10 L nonrebreather mask with grunting.  Patient seen in the ED, white count 31.1, potassium 5.9, BUN 78, creatinine 7.1 hemoglobin 7.0.    CTA of the abdomen  1. Occlusion of the infrarenal abdominal aorta. There is weak reconstitution of  the bilateral iliac trunks.  2. Penetrating atherosclerotic ulceration of the descending thoracic aorta.  3. 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 The left renal artery is occluded,  with left renal atrophy.  4.Multifocal stenosis of the right SFA, with occlusion of the right posterior  tibial artery.  5. Multifocal airspace disease favoring pneumonia.  6. Small left pleural effusion.  7. The bladder is severely distended. Any clinical concern for urinary retention  or urinary outlet obstruction?    ER doctor spoke with general surgeon determined artery disease chronic.  Patient seen in ED seems confused at times. Pt states she is wheelchair-bound normally. Denies back pain. No LE swelling.   Patient be admitted for further evaluation and treatment.    Consulted by Nephrology - pt came in with  CONTRAST    Result Date: 9/9/2024  1. Occlusion of the infrarenal abdominal aorta. There is weak reconstitution of the bilateral iliac trunks. 2. Penetrating atherosclerotic ulceration of the descending thoracic aorta. 3. 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 The left renal artery is occluded, with left renal atrophy. 4.Multifocal stenosis of the right SFA, with occlusion of the right posterior tibial artery. 5. Multifocal airspace disease favoring pneumonia. 6. Small left pleural effusion. 7. The bladder is severely distended. Any clinical concern for urinary retention or urinary outlet obstruction? Electronically signed by KAT WHITE    XR CHEST PORTABLE    Result Date: 9/9/2024  Mild cardiomegaly and edema. Electronically signed by BROOKE TORRES       XR ABDOMEN (KUB) (SINGLE AP VIEW)   Final Result   Moderate amount of stool throughout the colon.         Electronically signed by Vinay Yanes      CTA ABDOMINAL AORTA W BILAT RUNOFF W WO CONTRAST   Final Result      1. Occlusion of the infrarenal abdominal aorta. There is weak reconstitution of   the bilateral iliac trunks.   2. Penetrating atherosclerotic ulceration of the descending thoracic aorta.   3. 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 The left renal artery is occluded,   with left renal atrophy.   4.Multifocal stenosis of the right SFA, with occlusion of the right posterior   tibial artery.   5. Multifocal airspace disease favoring pneumonia.   6. Small left pleural effusion.   7. The bladder is severely distended. Any clinical concern for urinary retention   or urinary outlet obstruction?      Electronically signed by KAT WHITE      Vascular duplex lower extremity arteries bilateral   Final Result      XR CHEST PORTABLE   Final Result      Mild cardiomegaly and edema.         Electronically signed by BROOKE TORRES           Review of Systems:  Constitutional: Negative for chills and fever.   HENT: Negative.    Eyes: Negative.    Respiratory:

## 2024-09-13 LAB
ALBUMIN SERPL-MCNC: 1.9 G/DL (ref 3.5–5)
ANION GAP SERPL CALC-SCNC: 11 MMOL/L (ref 2–12)
BUN SERPL-MCNC: 45 MG/DL (ref 6–20)
BUN/CREAT SERPL: 8 (ref 12–20)
CA-I BLD-MCNC: 7.8 MG/DL (ref 8.5–10.1)
CHLORIDE SERPL-SCNC: 96 MMOL/L (ref 97–108)
CO2 SERPL-SCNC: 22 MMOL/L (ref 21–32)
CREAT SERPL-MCNC: 6 MG/DL (ref 0.55–1.02)
CRP SERPL-MCNC: 24.8 MG/DL (ref 0–0.3)
DATE LAST DOSE: NORMAL
DOSE AMOUNT: NORMAL UNITS
GLUCOSE BLD STRIP.AUTO-MCNC: 127 MG/DL (ref 65–100)
GLUCOSE BLD STRIP.AUTO-MCNC: 138 MG/DL (ref 65–100)
GLUCOSE BLD STRIP.AUTO-MCNC: 179 MG/DL (ref 65–100)
GLUCOSE BLD STRIP.AUTO-MCNC: 284 MG/DL (ref 65–100)
GLUCOSE BLD STRIP.AUTO-MCNC: 297 MG/DL (ref 65–100)
GLUCOSE BLD STRIP.AUTO-MCNC: 51 MG/DL (ref 65–100)
GLUCOSE BLD STRIP.AUTO-MCNC: 51 MG/DL (ref 65–100)
GLUCOSE SERPL-MCNC: 56 MG/DL (ref 65–100)
PERFORMED BY:: ABNORMAL
PHOSPHATE SERPL-MCNC: 7.7 MG/DL (ref 2.6–4.7)
POTASSIUM SERPL-SCNC: 4.8 MMOL/L (ref 3.5–5.1)
PROCALCITONIN SERPL-MCNC: 6.65 NG/ML
SODIUM SERPL-SCNC: 129 MMOL/L (ref 136–145)
VANCOMYCIN SERPL-MCNC: 24.1 UG/ML

## 2024-09-13 PROCEDURE — 6360000002 HC RX W HCPCS: Performed by: INTERNAL MEDICINE

## 2024-09-13 PROCEDURE — 2580000003 HC RX 258: Performed by: STUDENT IN AN ORGANIZED HEALTH CARE EDUCATION/TRAINING PROGRAM

## 2024-09-13 PROCEDURE — 86140 C-REACTIVE PROTEIN: CPT

## 2024-09-13 PROCEDURE — 2709999900 HC NON-CHARGEABLE SUPPLY

## 2024-09-13 PROCEDURE — 6360000002 HC RX W HCPCS: Performed by: NURSE PRACTITIONER

## 2024-09-13 PROCEDURE — 6370000000 HC RX 637 (ALT 250 FOR IP): Performed by: FAMILY MEDICINE

## 2024-09-13 PROCEDURE — 1100000000 HC RM PRIVATE

## 2024-09-13 PROCEDURE — 99232 SBSQ HOSP IP/OBS MODERATE 35: CPT | Performed by: INTERNAL MEDICINE

## 2024-09-13 PROCEDURE — 36415 COLL VENOUS BLD VENIPUNCTURE: CPT

## 2024-09-13 PROCEDURE — 82962 GLUCOSE BLOOD TEST: CPT

## 2024-09-13 PROCEDURE — 6360000002 HC RX W HCPCS: Performed by: FAMILY MEDICINE

## 2024-09-13 PROCEDURE — 94761 N-INVAS EAR/PLS OXIMETRY MLT: CPT

## 2024-09-13 PROCEDURE — 2580000003 HC RX 258: Performed by: INTERNAL MEDICINE

## 2024-09-13 PROCEDURE — 6370000000 HC RX 637 (ALT 250 FOR IP): Performed by: NURSE PRACTITIONER

## 2024-09-13 PROCEDURE — 2580000003 HC RX 258: Performed by: NURSE PRACTITIONER

## 2024-09-13 PROCEDURE — 80202 ASSAY OF VANCOMYCIN: CPT

## 2024-09-13 PROCEDURE — 80069 RENAL FUNCTION PANEL: CPT

## 2024-09-13 PROCEDURE — 6370000000 HC RX 637 (ALT 250 FOR IP): Performed by: INTERNAL MEDICINE

## 2024-09-13 PROCEDURE — 99232 SBSQ HOSP IP/OBS MODERATE 35: CPT | Performed by: SURGERY

## 2024-09-13 PROCEDURE — 84145 PROCALCITONIN (PCT): CPT

## 2024-09-13 PROCEDURE — 2700000000 HC OXYGEN THERAPY PER DAY

## 2024-09-13 PROCEDURE — 90935 HEMODIALYSIS ONE EVALUATION: CPT

## 2024-09-13 RX ADMIN — ALOGLIPTIN 6.25 MG: 6.25 TABLET, FILM COATED ORAL at 11:59

## 2024-09-13 RX ADMIN — SENNOSIDES AND DOCUSATE SODIUM 1 TABLET: 50; 8.6 TABLET ORAL at 11:58

## 2024-09-13 RX ADMIN — PIPERACILLIN AND TAZOBACTAM 3375 MG: 3; .375 INJECTION, POWDER, FOR SOLUTION INTRAVENOUS; PARENTERAL at 04:21

## 2024-09-13 RX ADMIN — ONDANSETRON 4 MG: 2 INJECTION INTRAMUSCULAR; INTRAVENOUS at 11:59

## 2024-09-13 RX ADMIN — INSULIN LISPRO 0 UNITS: 100 INJECTION, SOLUTION INTRAVENOUS; SUBCUTANEOUS at 20:50

## 2024-09-13 RX ADMIN — SODIUM CHLORIDE, PRESERVATIVE FREE 10 ML: 5 INJECTION INTRAVENOUS at 20:51

## 2024-09-13 RX ADMIN — ASPIRIN 81 MG: 81 TABLET, COATED ORAL at 11:58

## 2024-09-13 RX ADMIN — VANCOMYCIN HYDROCHLORIDE 1000 MG: 1 INJECTION, POWDER, LYOPHILIZED, FOR SOLUTION INTRAVENOUS at 20:50

## 2024-09-13 RX ADMIN — AZITHROMYCIN DIHYDRATE 250 MG: 500 INJECTION, POWDER, LYOPHILIZED, FOR SOLUTION INTRAVENOUS at 22:08

## 2024-09-13 RX ADMIN — ATENOLOL 50 MG: 25 TABLET ORAL at 11:58

## 2024-09-13 RX ADMIN — ATORVASTATIN CALCIUM 40 MG: 40 TABLET, FILM COATED ORAL at 20:42

## 2024-09-13 RX ADMIN — SODIUM CHLORIDE, PRESERVATIVE FREE 10 ML: 5 INJECTION INTRAVENOUS at 12:19

## 2024-09-13 RX ADMIN — FLUCONAZOLE IN SODIUM CHLORIDE 100 MG: 2 INJECTION, SOLUTION INTRAVENOUS at 13:27

## 2024-09-13 RX ADMIN — ACETAMINOPHEN 650 MG: 325 TABLET ORAL at 09:36

## 2024-09-13 RX ADMIN — DEXTROSE MONOHYDRATE 125 ML: 100 INJECTION, SOLUTION INTRAVENOUS at 07:45

## 2024-09-13 RX ADMIN — TRAMADOL HYDROCHLORIDE 50 MG: 50 TABLET ORAL at 12:17

## 2024-09-13 RX ADMIN — HYDRALAZINE HYDROCHLORIDE 50 MG: 50 TABLET ORAL at 20:42

## 2024-09-13 RX ADMIN — OXYCODONE HYDROCHLORIDE 5 MG: 5 TABLET ORAL at 23:14

## 2024-09-13 RX ADMIN — GABAPENTIN 100 MG: 100 CAPSULE ORAL at 20:42

## 2024-09-13 RX ADMIN — DEXTROSE MONOHYDRATE: 100 INJECTION, SOLUTION INTRAVENOUS at 14:08

## 2024-09-13 RX ADMIN — PIPERACILLIN AND TAZOBACTAM 3375 MG: 3; .375 INJECTION, POWDER, FOR SOLUTION INTRAVENOUS; PARENTERAL at 18:14

## 2024-09-13 RX ADMIN — INSULIN LISPRO 8 UNITS: 100 INJECTION, SOLUTION INTRAVENOUS; SUBCUTANEOUS at 18:14

## 2024-09-13 RX ADMIN — CETIRIZINE HYDROCHLORIDE 10 MG: 10 TABLET, FILM COATED ORAL at 11:58

## 2024-09-13 RX ADMIN — OXYCODONE HYDROCHLORIDE 5 MG: 5 TABLET ORAL at 07:55

## 2024-09-13 RX ADMIN — SENNOSIDES AND DOCUSATE SODIUM 1 TABLET: 50; 8.6 TABLET ORAL at 20:42

## 2024-09-13 RX ADMIN — DEXTROSE MONOHYDRATE: 100 INJECTION, SOLUTION INTRAVENOUS at 23:12

## 2024-09-13 RX ADMIN — EPOETIN ALFA-EPBX 10000 UNITS: 10000 INJECTION, SOLUTION INTRAVENOUS; SUBCUTANEOUS at 10:27

## 2024-09-13 ASSESSMENT — PAIN SCALES - GENERAL
PAINLEVEL_OUTOF10: 9
PAINLEVEL_OUTOF10: 0
PAINLEVEL_OUTOF10: 0
PAINLEVEL_OUTOF10: 2
PAINLEVEL_OUTOF10: 8
PAINLEVEL_OUTOF10: 7

## 2024-09-13 ASSESSMENT — PAIN DESCRIPTION - LOCATION
LOCATION: LEG
LOCATION: LEG
LOCATION: ABDOMEN;FOOT
LOCATION: ABDOMEN;HEAD;LEG

## 2024-09-13 ASSESSMENT — PAIN DESCRIPTION - DESCRIPTORS
DESCRIPTORS: ACHING

## 2024-09-13 ASSESSMENT — PAIN SCALES - WONG BAKER: WONGBAKER_NUMERICALRESPONSE: NO HURT

## 2024-09-13 ASSESSMENT — PAIN DESCRIPTION - ORIENTATION
ORIENTATION: RIGHT;LEFT
ORIENTATION: LEFT
ORIENTATION: RIGHT;LEFT

## 2024-09-13 NOTE — PROGRESS NOTES
Nephrology follow-up          Patient: Brii Lambert MRN: [id number]  SSN: [id number]    YOB: 1960  Age: 64 y.o.  Sex: female      Subjective:   The patient is seen in the room.  Blood pressures are okay  Afebrile  Leukocytosis  Hb 5.9-->9.3  Received blood Tx 2 units         No past medical history on file.  No past surgical history on file.   No family history on file.  Social History     Tobacco Use    Smoking status: Former     Current packs/day: 0.00     Types: Cigarettes     Quit date:      Years since quittin.6    Smokeless tobacco: Former   Substance Use Topics    Alcohol use: Not on file      Current Facility-Administered Medications   Medication Dose Route Frequency Provider Last Rate Last Admin    dextrose 5 % and 0.9 % sodium chloride infusion   IntraVENous Continuous Janet Daily MD 50 mL/hr at 24 192 Rate Verify at 24 192    hydrALAZINE (APRESOLINE) tablet 50 mg  50 mg Oral 3 times per day Priscila Sparks MD   50 mg at 24 0950    vancomycin (VANCOCIN) intermittent dosing (placeholder)   Other RX Placeholder Priscila Sparks MD        [START ON 2024] Vancomycin Level- Please draw level on  @0600   Other RX Placeholder Janet Daily MD        traMADol (ULTRAM) tablet 50 mg  50 mg Oral Q6H PRN Janet Daily MD   50 mg at 24 1256    0.9 % sodium chloride infusion   IntraVENous PRN Janet Daily MD        fluconazole (DIFLUCAN) 100 mg IVPB  100 mg IntraVENous Q24H Janet Daily MD   Stopped at 24 1323    0.9 % sodium chloride infusion   IntraVENous PRN Priscila Sparks MD        heparin (porcine) injection 3,600 Units  3,600 Units IntraCATHeter PRN Priscila Sparks MD   3,600 Units at 24 1045    gabapentin (NEURONTIN) capsule 100 mg  100 mg Oral Nightly Priscila Sparks MD   100 mg at 24 2106    glucose chewable tablet 16 g  4 tablet Oral PRN Gautam Centeno MD        dextrose bolus 10% 125 mL  125 mL  IntraVENous PRN Gautam Centeno MD   Stopped at 09/12/24 1641    Or    dextrose bolus 10% 250 mL  250 mL IntraVENous PRN Gautam Centeno MD        glucagon injection 1 mg  1 mg SubCUTAneous PRN Gautam Cetneno MD        dextrose 10 % infusion   IntraVENous Continuous PRN Gautam Centeno MD        epoetin saul-epbx (RETACRIT) injection 10,000 Units  10,000 Units IntraVENous Once per day on Monday Wednesday Friday Priscila Sparks MD   10,000 Units at 09/11/24 0945    acetaminophen (TYLENOL) tablet 650 mg  650 mg Oral Q6H PRN Janet Daily MD        alogliptin (NESINA) tablet 6.25 mg  6.25 mg Oral Daily Radha Keating, APRN - CNP   6.25 mg at 09/12/24 0950    insulin lispro (HUMALOG,ADMELOG) injection vial 0-16 Units  0-16 Units SubCUTAneous TID WC Radha Keating, APRN - CNP        insulin lispro (HUMALOG,ADMELOG) injection vial 0-4 Units  0-4 Units SubCUTAneous Nightly Radha Keating, APRN - CNP        [Held by provider] amLODIPine (NORVASC) tablet 10 mg  10 mg Oral Daily Radha Keating, APRN - CNP   10 mg at 09/11/24 1127    aspirin EC tablet 81 mg  81 mg Oral Daily Radha Keating , APRN - CNP   81 mg at 09/12/24 0950    atenolol (TENORMIN) tablet 50 mg  50 mg Oral Daily Radha Keating , APRN - CNP   50 mg at 09/12/24 0950    atorvastatin (LIPITOR) tablet 40 mg  40 mg Oral Nightly Radha Keating , APRN - CNP   40 mg at 09/11/24 2106    cetirizine (ZYRTEC) tablet 10 mg  10 mg Oral Daily Radha Keating, APRN - CNP   10 mg at 09/12/24 0951    oxyCODONE (ROXICODONE) immediate release tablet 5 mg  5 mg Oral Q6H PRN Radha Keating, APRN - CNP   5 mg at 09/10/24 2244    sodium chloride flush 0.9 % injection 5-40 mL  5-40 mL IntraVENous 2 times per day Radha Keating APRN - CNP   10 mL at 09/12/24 0906    sodium chloride flush 0.9 % injection 5-40 mL  5-40 mL IntraVENous PRN Radha Keating APRN - CNP        0.9 % sodium chloride infusion   IntraVENous PRN Radha Keating, LISA -

## 2024-09-13 NOTE — CARE COORDINATION
0740: Chart reviewed.     Per notes; dialysis patient on IV ABX followed via general surgery, GI, ID and nephrology.    VICTORIA scheduled today.      Patient receives dialysis MWF at [facility] in [place] at 12:45PM.     When medically stable for discharge, patient will return to [facility] as long term care resident.     Updates attached in CarePort.     CM will continue to follow patient and recs of medical team.     1257: Updates attached in CarePort.

## 2024-09-13 NOTE — PROGRESS NOTES
History and Physical    NAME:   Brii Lambert   :  1960   MRN:  [id number]     Date/Time: 2024 11:36 AM    Patient PCP: Janet Daily MD  ______________________________________________________________________       Subjective:     CHIEF COMPLAINT:     Abdominal pain, Pneumonia     HISTORY OF PRESENT ILLNESS:     General Daily Progress Note  Patient is a 64 y.o. year old female past medical history of end-stage renal disease on hemodialysis, hypertension, chronic bilateral cerebellar and pontine strokes, diabetic neuropathy presented to the ER yesterday for evaluation of abdominal, lower extremity pain. Pt was recently discharged from [facility] and sent to [facility] for rehab.  Patient reportedly missed several days of dialysis due to abdominal pain. Patient states she has not had a bowel movement in 14-15 days. Patient missed Monday dialysis and O2 sat was 70% on 10 L nonrebreather mask with grunting.  Patient seen in the ED, white count 31.1, potassium 5.9, BUN 78, creatinine 7.1 hemoglobin 7.0.    CTA of the abdomen  1. Occlusion of the infrarenal abdominal aorta. There is weak reconstitution of  the bilateral iliac trunks.  2. Penetrating atherosclerotic ulceration of the descending thoracic aorta.  3. 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 The left renal artery is occluded,  with left renal atrophy.  4.Multifocal stenosis of the right SFA, with occlusion of the right posterior  tibial artery.  5. Multifocal airspace disease favoring pneumonia.  6. Small left pleural effusion.  7. The bladder is severely distended. Any clinical concern for urinary retention  or urinary outlet obstruction?    ER doctor spoke with general surgeon determined artery disease chronic.  Patient seen in ED seems confused at times. Pt states she is wheelchair-bound normally. Denies back pain. No LE swelling.   Patient be admitted for further evaluation and treatment.    Consulted by Nephrology - pt came in with  volume overload and hyperkalemia from missed dialysis sessions     9/10    K wnl today     Hemoglobin 5.5    UA yesterday is positive with budding yeast - pending urine culture       Vascular surgery consulted today - CTA scan shows multiple atherosclerotic disease process; abdominal pressures, aorta, iliac, and femoral vessels. Distal Aorta occluded. Pt complaining of both feet numbness without any pain. Pt apparently has not been mobile in last 2 months    LE vascular duplex done today -   Impression:  1.  Right leg common femoral artery, superficial femoral artery, and popliteal are patent.  However below-knee level shows significant dampened signal on dorsalis pedis, peroneal, and posterior tibial artery, suspicious presence of hemodynamically significant right below-knee level atherosclerotic occlusive disease process.  2.  Left lower leg common femoral artery, superficial femoral artery and popliteal patent.  From proximal superficial femoral artery to proximal popliteal artery shows changes in peak systolic velocity, suspicious for presence of hemodynamically significant atherosclerotic disease process.  Left below-knee level also shows significantly diminished Doppler signal, suspicious for presence of hemodynamically significant atherosclerotic disease process.    Xray abd (KUB) done yesterday shows moderate amount of stool throughout colon   Chest xray yesterday shows mild cardiomegaly and edema     No past medical history on file.   See above  No past surgical history on file.  Hysterectomy  Permacath placement    9/11    EGD was canceled for today  's seen by vascular surgeon no sign of ischemia    9/12    Patient alert awake refused endoscopy refused dialysis  Patient want to eat blood sugar low this morning cultures so far negative    Hemoglobin is 5.9/after transfusion hemoglobin 9 point      9/13    GEN alert awake still not compliant had dialysis for 2 hours only  Seen by the vascular surgeon order

## 2024-09-13 NOTE — PROGRESS NOTES
Vancomycin Dosing Consult  Brii Lambert is a 64 y.o. female with sepsis secondary to UTI, mycoplasma PNA. Pharmacy was consulted by Dr. Sparks to dose and monitor vancomycin. Today is day 3.    Antibiotic regimen: Vancomycin + Zosyn  + Azithromycin    Temp (24hrs), Av.1 °F (36.7 °C), Min:97.4 °F (36.3 °C), Max:98.8 °F (37.1 °C)    Recent Labs     24  1616 24  0553 24  0621   WBC 23.5* 21.8*  --    CREATININE 3.28* 4.35* 6.00*   BUN 23* 33* 45*     Est CrCl: 18 mL/min, HD MWF  Concomitant nephrotoxic drugs: Contrast agents     Cultures:    Blood cultures x 2 - NGTD, pending   Urine culture- Mixed urogenital glenn isolated, final    MRSA Swab: Not ordered, patient already received first dose of vancomycin    Target range: Trough 21-24 mcg/mL (Intermittent hemodialysis, invasive MRSA infection or sepsis)    Recent level history:  Date/Time Dose & Interval Measured Level (mcg/mL) Associated AUC/AURELIANO    @ 0621 2000 mg x 1 ( 1702) 24.1         Assessment/Plan:   Patient is afebrile, with leukocytosis;   Called lab to update  blood cultures - NGTD  HD schedule MW, scheduled to receive @12pm for 3 hours and 20 minutes  Current level of 24.1 mg/L is therapeutic  Anticipate if full HD session today is tolerated Vanc level to be ~ 17 mg/L and subtherapeutic  Will continue pulse dosing - Vancomycin 1000 mg IV x 1 post-HD @ 1800  Pre-HD level scheduled for  @ 0600  Labs ordered through .  Antimicrobial stop date 7 days per consult (last day )

## 2024-09-13 NOTE — PROGRESS NOTES
Nephrology follow-up          Patient: Brii Lambert MRN: [id number]  SSN: [id number]    YOB: 1960  Age: 64 y.o.  Sex: female      Subjective:   The patient is seen in the room.  Blood pressures are okay  Afebrile  Leukocytosis  Hb 5.9-->9.3.  Received blood Tx 2 units   Hypoglycemia.  On D10 drip   Better BS        No past medical history on file.  No past surgical history on file.   No family history on file.  Social History     Tobacco Use    Smoking status: Former     Current packs/day: 0.00     Types: Cigarettes     Quit date:      Years since quittin.7    Smokeless tobacco: Former   Substance Use Topics    Alcohol use: Not on file      Current Facility-Administered Medications   Medication Dose Route Frequency Provider Last Rate Last Admin    vancomycin (VANCOCIN) 1,000 mg in sodium chloride 0.9 % 250 mL IVPB ([id number])  1,000 mg IntraVENous Once Priscila Sparks MD        [START ON 2024] Vancomycin - Please draw level  @ 0600 prior to HD  1 each Other Once Priscila Sparks MD        dextrose 10 % infusion   IntraVENous Continuous Priscila Sparks  mL/hr at 24 1408 New Bag at 24 1408    azithromycin (ZITHROMAX) 250 mg in sodium chloride 0.9 % 250 mL IVPB  250 mg IntraVENous Q24H Gui Elizalde MD        hydrALAZINE (APRESOLINE) tablet 50 mg  50 mg Oral 3 times per day Priscila Sparks MD   50 mg at 24    vancomycin (VANCOCIN) intermittent dosing (placeholder)   Other RX Placeholder Priscila Sparks MD        Vancomycin Level- Please draw level on  @0600   Other RX Placeholder Janet Daily MD        traMADol (ULTRAM) tablet 50 mg  50 mg Oral Q6H PRN Janet Daily MD   50 mg at 24 1217    0.9 % sodium chloride infusion   IntraVENous PRN Janet Daily MD        fluconazole (DIFLUCAN) 100 mg IVPB  100 mg IntraVENous Q24H Janet Daily  mL/hr at 24 1327 100 mg at 24 1327    0.9 % sodium chloride infusion   smith  Lower Extremities: no edema  Skin: no rash  Neuro: intact  Psychiatric: non-depressed          Assessment/Plan:   ESRD  Recently started on hemodialysis  She dialyzes at  renal care in [place] Wednesday Friday  Missed 2 dialysis sessions  Came with volume overload  Potassium 5.9  She was dialyzed 9/10 and 9/11.  Right IJ permacath as a dialysis access    Hyperkalemia  From missed dialysis  Potassium 5.9  Received Lokelma  Dialyzing with 2K bath  K 4.3.    Acute hypoxic respiratory failure  From fluid overload/pulmonary edema/bilateral pneumonia  Leukocytosis  Dialyzing with UF 3.0 L  She came with hypoxia and sats 70%  Currently on nasal cannula  She might need IV antibiotics    Anemia  Hemoglobin 7.0-->5.9  No obvious bleeding  IV erythropoietin with dialysis  Received blood transfusion 2 units with dialysis.    Abdominal pain  Came with abdominal pain  Leukocytosis  CT of the abdomen with IV contrast occlusion of infrarenal abdominal aorta  Vascular surgery on board.    Hypoglycemia  Severe  D10 at 100 cc/hr.  Last   Reduce D10 drip 75 cc/hr  Plan:       Signed By: Priscila Sparks MD     September 13, 2024

## 2024-09-13 NOTE — PROGRESS NOTES
PROGRESS NOTE      Chief Complaints:  No new complaints.  HPI and  Objective:    Patient denies any numbness on the left foot this morning.  Denies any chest pain shortness of breath.  Review of Systems:  Rest of review of system reviewed personally and they are negative.    EXAM:  BP (!) 123/50 Comment: Reported to Dede LYON  Pulse 65   Temp 97.7 °F (36.5 °C) (Oral)   Resp 16   Ht 1.676 m (5' 6\")   Wt 81.8 kg (180 lb 5.4 oz)   SpO2 98%   BMI 29.11 kg/m²     Patient is awake   Head and neck atraumatic, normocephalic.  ENT: No hoarse voice, gaze appropriate.  Cardiac system regular rate rhythm.  Pulmonary no audible wheeze, no cyanosis.  Chest wall excursion normal with respiration cycle  Abdomen is soft not particularly distended.  Neurologically nonfocal.  Hematology system: No bruising noted.  Musculoskeletal system: No joint deformity noted.  Genitourinary system: No hematuria noted.  Skin is warm and moist.  Psychosocial: Cooperative.  Vascular examination as previously noted no changes.    Current Facility-Administered Medications   Medication Dose Route Frequency Provider Last Rate Last Admin    dextrose 10 % infusion   IntraVENous Continuous Priscila Sparks MD   Stopped at 09/13/24 0418    azithromycin (ZITHROMAX) 250 mg in sodium chloride 0.9 % 250 mL IVPB  250 mg IntraVENous Q24H Gui Elizalde MD        hydrALAZINE (APRESOLINE) tablet 50 mg  50 mg Oral 3 times per day Priscila Sparks MD   50 mg at 09/12/24 2004    vancomycin (VANCOCIN) intermittent dosing (placeholder)   Other RX Placeholder Priscila Sparks MD        Vancomycin Level- Please draw level on 9/13 @0600   Other RX Placeholder Janet Daily MD        traMADol (ULTRAM) tablet 50 mg  50 mg Oral Q6H PRN aJnet Daily MD   50 mg at 09/12/24 1256    0.9 % sodium chloride infusion   IntraVENous PRN Janet Daily MD        fluconazole (DIFLUCAN) 100 mg IVPB  100 mg IntraVENous Q24H Janet Daily MD   Stopped at 09/12/24 1323

## 2024-09-13 NOTE — PLAN OF CARE
Problem: Discharge Planning  Goal: Discharge to home or other facility with appropriate resources  9/12/2024 2252 by Chip Garcia RN  Outcome: Progressing  9/12/2024 1751 by Nadja Sarah RN  Outcome: Progressing     Problem: Skin/Tissue Integrity  Goal: Absence of new skin breakdown  Description: 1.  Monitor for areas of redness and/or skin breakdown  2.  Assess vascular access sites hourly  3.  Every 4-6 hours minimum:  Change oxygen saturation probe site  4.  Every 4-6 hours:  If on nasal continuous positive airway pressure, respiratory therapy assess nares and determine need for appliance change or resting period.  9/12/2024 2252 by Chip Garcia, RN  Outcome: Progressing  9/12/2024 1751 by Nadja Sarah RN  Outcome: Progressing     Problem: Safety - Adult  Goal: Free from fall injury  9/12/2024 2252 by Chip Garcia RN  Outcome: Progressing  9/12/2024 1751 by Nadja Sarah RN  Outcome: Progressing     Problem: Genitourinary - Adult  Goal: Urinary catheter remains patent  Outcome: Progressing

## 2024-09-13 NOTE — PROGRESS NOTES
Gastroenterology Consult     Referring Physician: Janet Daily MD     Consult Date: 2024     Subjective:       Patient laying in her bed. Denies abdominal pain today.   Patient has yet to have EGD. Patient refused endoscopy on  according to hospitalist note.     Last hemoglobin and hematocrit levels from  are 9.3 and 29.9, respectively.     No past medical history on file.  No past surgical history on file.   No family history on file.  Social History     Tobacco Use    Smoking status: Former     Current packs/day: 0.00     Types: Cigarettes     Quit date:      Years since quittin.7    Smokeless tobacco: Former   Substance Use Topics    Alcohol use: Not on file      No Known Allergies  Current Facility-Administered Medications   Medication Dose Route Frequency    vancomycin (VANCOCIN) 1,000 mg in sodium chloride 0.9 % 250 mL IVPB ([id number])  1,000 mg IntraVENous Once    [START ON 2024] Vancomycin - Please draw level  @ 0600 prior to HD  1 each Other Once    dextrose 10 % infusion   IntraVENous Continuous    azithromycin (ZITHROMAX) 250 mg in sodium chloride 0.9 % 250 mL IVPB  250 mg IntraVENous Q24H    hydrALAZINE (APRESOLINE) tablet 50 mg  50 mg Oral 3 times per day    vancomycin (VANCOCIN) intermittent dosing (placeholder)   Other RX Placeholder    Vancomycin Level- Please draw level on  @0600   Other RX Placeholder    traMADol (ULTRAM) tablet 50 mg  50 mg Oral Q6H PRN    0.9 % sodium chloride infusion   IntraVENous PRN    fluconazole (DIFLUCAN) 100 mg IVPB  100 mg IntraVENous Q24H    0.9 % sodium chloride infusion   IntraVENous PRN    heparin (porcine) injection 3,600 Units  3,600 Units IntraCATHeter PRN    gabapentin (NEURONTIN) capsule 100 mg  100 mg Oral Nightly    glucose chewable tablet 16 g  4 tablet Oral PRN    dextrose bolus 10% 125 mL  125 mL IntraVENous PRN    Or    dextrose bolus 10% 250 mL  250 mL IntraVENous PRN    glucagon injection 1 mg  1 mg SubCUTAneous PRN    2. Penetrating atherosclerotic ulceration of the descending thoracic aorta.   3. 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 The left renal artery is occluded,   with left renal atrophy.   4.Multifocal stenosis of the right SFA, with occlusion of the right posterior   tibial artery.   5. Multifocal airspace disease favoring pneumonia.   6. Small left pleural effusion.   7. The bladder is severely distended. Any clinical concern for urinary retention   or urinary outlet obstruction?      Electronically signed by KAT WHITE      Vascular duplex lower extremity arteries bilateral   Final Result      XR CHEST PORTABLE   Final Result      Mild cardiomegaly and edema.         Electronically signed by BROOKE TORRES             Assessment/Plan:     Principal Problem:    HCAP (healthcare-associated pneumonia)  Active Problems:    Leukocytosis    Generalized abdominal pain    Urinary tract infection with hematuria    Somnolence    ESRD (end stage renal disease) on dialysis (HCC)    Sepsis without acute organ dysfunction (HCC)    Pneumonia due to Mycoplasma pneumoniae  Resolved Problems:    * No resolved hospital problems. *       Anemia, Occlusion of abdominal aorta. Previous drop in hemoglobin prompted concern for GI bleed.     Plan for EGD when possible for patient & when/if patient consents. Transfuse as necessary.     Patient being followed by vascular surgery.       IP CONSULT TO NEPHROLOGY  IP CONSULT TO GENERAL SURGERY  IP CONSULT TO GI  IP CONSULT TO VASCULAR ACCESS TEAM  IP CONSULT TO PHARMACY  IP CONSULT TO INFECTIOUS DISEASES    Thank you for allowing me to participate in this patients care  Cc Referring Physician   Janet Daily MD

## 2024-09-14 LAB
ALBUMIN SERPL-MCNC: 1.7 G/DL (ref 3.5–5)
ANION GAP SERPL CALC-SCNC: 11 MMOL/L (ref 2–12)
BUN SERPL-MCNC: 47 MG/DL (ref 6–20)
BUN/CREAT SERPL: 8 (ref 12–20)
CA-I BLD-MCNC: 7.4 MG/DL (ref 8.5–10.1)
CHLORIDE SERPL-SCNC: 94 MMOL/L (ref 97–108)
CO2 SERPL-SCNC: 22 MMOL/L (ref 21–32)
CREAT SERPL-MCNC: 6 MG/DL (ref 0.55–1.02)
CRP SERPL-MCNC: 29.7 MG/DL (ref 0–0.3)
ERYTHROCYTE [DISTWIDTH] IN BLOOD BY AUTOMATED COUNT: 18.4 % (ref 11.5–14.5)
GLUCOSE BLD STRIP.AUTO-MCNC: 123 MG/DL (ref 65–100)
GLUCOSE BLD STRIP.AUTO-MCNC: 198 MG/DL (ref 65–100)
GLUCOSE BLD STRIP.AUTO-MCNC: 206 MG/DL (ref 65–100)
GLUCOSE BLD STRIP.AUTO-MCNC: 225 MG/DL (ref 65–100)
GLUCOSE BLD STRIP.AUTO-MCNC: 265 MG/DL (ref 65–100)
GLUCOSE SERPL-MCNC: 186 MG/DL (ref 65–100)
HCT VFR BLD AUTO: 25.4 % (ref 35–47)
HGB BLD-MCNC: 7.9 G/DL (ref 11.5–16)
MCH RBC QN AUTO: 26.6 PG (ref 26–34)
MCHC RBC AUTO-ENTMCNC: 31.1 G/DL (ref 30–36.5)
MCV RBC AUTO: 85.5 FL (ref 80–99)
NRBC # BLD: 0.03 K/UL (ref 0–0.01)
NRBC BLD-RTO: 0.2 PER 100 WBC
PERFORMED BY:: ABNORMAL
PHOSPHATE SERPL-MCNC: 8.6 MG/DL (ref 2.6–4.7)
PLATELET # BLD AUTO: 197 K/UL (ref 150–400)
PMV BLD AUTO: 9.1 FL (ref 8.9–12.9)
POTASSIUM SERPL-SCNC: 5 MMOL/L (ref 3.5–5.1)
PROCALCITONIN SERPL-MCNC: 18.72 NG/ML
RBC # BLD AUTO: 2.97 M/UL (ref 3.8–5.2)
SODIUM SERPL-SCNC: 127 MMOL/L (ref 136–145)
WBC # BLD AUTO: 19.4 K/UL (ref 3.6–11)

## 2024-09-14 PROCEDURE — 84145 PROCALCITONIN (PCT): CPT

## 2024-09-14 PROCEDURE — 2700000000 HC OXYGEN THERAPY PER DAY

## 2024-09-14 PROCEDURE — 6360000002 HC RX W HCPCS: Performed by: INTERNAL MEDICINE

## 2024-09-14 PROCEDURE — 1100000000 HC RM PRIVATE

## 2024-09-14 PROCEDURE — 36415 COLL VENOUS BLD VENIPUNCTURE: CPT

## 2024-09-14 PROCEDURE — 2580000003 HC RX 258: Performed by: NURSE PRACTITIONER

## 2024-09-14 PROCEDURE — 6360000002 HC RX W HCPCS: Performed by: NURSE PRACTITIONER

## 2024-09-14 PROCEDURE — 6370000000 HC RX 637 (ALT 250 FOR IP): Performed by: INTERNAL MEDICINE

## 2024-09-14 PROCEDURE — 85027 COMPLETE CBC AUTOMATED: CPT

## 2024-09-14 PROCEDURE — 94761 N-INVAS EAR/PLS OXIMETRY MLT: CPT

## 2024-09-14 PROCEDURE — 82962 GLUCOSE BLOOD TEST: CPT

## 2024-09-14 PROCEDURE — 6370000000 HC RX 637 (ALT 250 FOR IP): Performed by: NURSE PRACTITIONER

## 2024-09-14 PROCEDURE — 80069 RENAL FUNCTION PANEL: CPT

## 2024-09-14 PROCEDURE — 2580000003 HC RX 258: Performed by: INTERNAL MEDICINE

## 2024-09-14 PROCEDURE — 86140 C-REACTIVE PROTEIN: CPT

## 2024-09-14 PROCEDURE — 99232 SBSQ HOSP IP/OBS MODERATE 35: CPT | Performed by: INTERNAL MEDICINE

## 2024-09-14 RX ADMIN — MEROPENEM 1000 MG: 1 INJECTION INTRAVENOUS at 18:09

## 2024-09-14 RX ADMIN — SODIUM CHLORIDE, PRESERVATIVE FREE 10 ML: 5 INJECTION INTRAVENOUS at 22:23

## 2024-09-14 RX ADMIN — SENNOSIDES AND DOCUSATE SODIUM 1 TABLET: 50; 8.6 TABLET ORAL at 09:36

## 2024-09-14 RX ADMIN — FLUCONAZOLE IN SODIUM CHLORIDE 100 MG: 2 INJECTION, SOLUTION INTRAVENOUS at 13:01

## 2024-09-14 RX ADMIN — GABAPENTIN 100 MG: 100 CAPSULE ORAL at 22:28

## 2024-09-14 RX ADMIN — ALOGLIPTIN 6.25 MG: 6.25 TABLET, FILM COATED ORAL at 09:36

## 2024-09-14 RX ADMIN — SENNOSIDES AND DOCUSATE SODIUM 1 TABLET: 50; 8.6 TABLET ORAL at 22:28

## 2024-09-14 RX ADMIN — ASPIRIN 81 MG: 81 TABLET, COATED ORAL at 09:36

## 2024-09-14 RX ADMIN — AZITHROMYCIN DIHYDRATE 250 MG: 500 INJECTION, POWDER, LYOPHILIZED, FOR SOLUTION INTRAVENOUS at 22:28

## 2024-09-14 RX ADMIN — CETIRIZINE HYDROCHLORIDE 10 MG: 10 TABLET, FILM COATED ORAL at 09:36

## 2024-09-14 RX ADMIN — PIPERACILLIN AND TAZOBACTAM 3375 MG: 3; .375 INJECTION, POWDER, FOR SOLUTION INTRAVENOUS; PARENTERAL at 04:06

## 2024-09-14 RX ADMIN — INSULIN LISPRO 4 UNITS: 100 INJECTION, SOLUTION INTRAVENOUS; SUBCUTANEOUS at 17:36

## 2024-09-14 RX ADMIN — INSULIN LISPRO 8 UNITS: 100 INJECTION, SOLUTION INTRAVENOUS; SUBCUTANEOUS at 12:13

## 2024-09-14 RX ADMIN — ATORVASTATIN CALCIUM 40 MG: 40 TABLET, FILM COATED ORAL at 22:28

## 2024-09-14 RX ADMIN — SODIUM CHLORIDE, PRESERVATIVE FREE 10 ML: 5 INJECTION INTRAVENOUS at 09:37

## 2024-09-14 ASSESSMENT — PAIN SCALES - GENERAL
PAINLEVEL_OUTOF10: 0
PAINLEVEL_OUTOF10: 0
PAINLEVEL_OUTOF10: 2

## 2024-09-14 NOTE — PROGRESS NOTES
History and Physical    NAME:   Brii Lambert   :  1960   MRN:  [id number]     Date/Time: 2024 11:24 AM    Patient PCP: Janet Daily MD  ______________________________________________________________________       Subjective:     CHIEF COMPLAINT:     Abdominal pain, Pneumonia     HISTORY OF PRESENT ILLNESS:     General Daily Progress Note  Patient is a 64 y.o. year old female past medical history of end-stage renal disease on hemodialysis, hypertension, chronic bilateral cerebellar and pontine strokes, diabetic neuropathy presented to the ER yesterday for evaluation of abdominal, lower extremity pain. Pt was recently discharged from [facility] and sent to [place] for rehab.  Patient reportedly missed several days of dialysis due to abdominal pain. Patient states she has not had a bowel movement in 14-15 days. Patient missed Monday dialysis and O2 sat was 70% on 10 L nonrebreather mask with grunting.  Patient seen in the ED, white count 31.1, potassium 5.9, BUN 78, creatinine 7.1 hemoglobin 7.0.    CTA of the abdomen  1. Occlusion of the infrarenal abdominal aorta. There is weak reconstitution of  the bilateral iliac trunks.  2. Penetrating atherosclerotic ulceration of the descending thoracic aorta.  3. 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 The left renal artery is occluded,  with left renal atrophy.  4.Multifocal stenosis of the right SFA, with occlusion of the right posterior  tibial artery.  5. Multifocal airspace disease favoring pneumonia.  6. Small left pleural effusion.  7. The bladder is severely distended. Any clinical concern for urinary retention  or urinary outlet obstruction?    ER doctor spoke with general surgeon determined artery disease chronic.  Patient seen in ED seems confused at times. Pt states she is wheelchair-bound normally. Denies back pain. No LE swelling.   Patient be admitted for further evaluation and treatment.    Consulted by Nephrology - pt came in with  consult for placement to rehab        Signed: Janet Daily MD

## 2024-09-14 NOTE — PROGRESS NOTES
Progress Note  Date:2024       Room:[facility]  Patient Name:Brii Lambert     YOB: 1960     Age:64 y.o.        Subjective    Subjective   Patient followed for presumptive UTI with contaminated urine culture and presumptive Mycoplasma pneumonia. She remains afebrile.     Objective         Vitals Last 24 Hours:  TEMPERATURE:  Temp  Av °F (36.7 °C)  Min: 97.2 °F (36.2 °C)  Max: 98.4 °F (36.9 °C)  RESPIRATIONS RANGE: Resp  Av.2  Min: 12  Max: 20  PULSE OXIMETRY RANGE: SpO2  Av.8 %  Min: 95 %  Max: 99 %  PULSE RANGE: Pulse  Av.1  Min: 62  Max: 85  BLOOD PRESSURE RANGE: Systolic (24hrs), Av , Min:107 , Max:136   ; Diastolic (24hrs), Av, Min:47, Max:81         Objective:  Vital signs: (most recent): Blood pressure (!) 107/47, pulse 65, temperature 98.4 °F (36.9 °C), temperature source Oral, resp. rate 19, height 1.676 m (5' 6\"), weight 81.8 kg (180 lb 5.4 oz), SpO2 95%.      Vitals and nursing note reviewed.   Constitutional:       General: She is not in acute distress.     Appearance: She is ill-appearing.   HENT:      Head: Normocephalic and atraumatic.      Right Ear: External ear normal.      Left Ear: External ear normal.      Nose: Nose normal.      Mouth/Throat:      Mouth: Mucous membranes are dry.   Eyes:      Pupils: Pupils are equal, round, and reactive to light.   Cardiovascular:      Rate and Rhythm: Normal rate and regular rhythm.      Heart sounds: No murmur heard.  Pulmonary:      Effort: Pulmonary effort is normal.      Breath sounds: Normal breath sounds.   Chest:           Comments: Right sided Permacath with small area of erythema at the insertion site  Abdominal:      General: Bowel sounds are normal. There is no distension.      Palpations: Abdomen is soft.      Tenderness: There is abdominal tenderness. There is guarding.   Genitourinary:     Comments: No Ortez catheter  Musculoskeletal:      Cervical back: Neck supple.      Right lower leg: No edema.

## 2024-09-14 NOTE — PROGRESS NOTES
Nephrology follow-up          Patient: Brii Lambert MRN: [id number]  SSN: [id number]    YOB: 1960  Age: 64 y.o.  Sex: female      Subjective:   The patient is seen in the room.  She is looking lethargic  No hypoglycemia episodes  Blood pressures are soft  Afebrile  Resolving leukocytosis  Hb 5.9-->9.3->7.9.  Received blood Tx 2 units   On D10 drip   Blood cultures are negative        No past medical history on file.  No past surgical history on file.   No family history on file.  Social History     Tobacco Use    Smoking status: Former     Current packs/day: 0.00     Types: Cigarettes     Quit date:      Years since quittin.7    Smokeless tobacco: Former   Substance Use Topics    Alcohol use: Not on file      Current Facility-Administered Medications   Medication Dose Route Frequency Provider Last Rate Last Admin    [START ON 2024] Vancomycin - Please draw level  @ 0600 prior to HD  1 each Other Once Priscila Sparks MD        azithromycin (ZITHROMAX) 250 mg in sodium chloride 0.9 % 250 mL IVPB  250 mg IntraVENous Q24H Gui Elizalde MD   Stopped at 24 2308    hydrALAZINE (APRESOLINE) tablet 50 mg  50 mg Oral 3 times per day Priscila Sparks MD   50 mg at 24 204    vancomycin (VANCOCIN) intermittent dosing (placeholder)   Other RX Placeholder Priscila Sparks MD        traMADol (ULTRAM) tablet 50 mg  50 mg Oral Q6H PRN Janet Daily MD   50 mg at 24 1217    0.9 % sodium chloride infusion   IntraVENous PRN Janet Daily MD        fluconazole (DIFLUCAN) 100 mg IVPB  100 mg IntraVENous Q24H Gui Elizalde  mL/hr at 24 1301 100 mg at 24 1301    0.9 % sodium chloride infusion   IntraVENous PRN Priscila Sparks MD        heparin (porcine) injection 3,600 Units  3,600 Units IntraCATHeter PRN Priscila Sparks MD   3,600 Units at 24 1045    gabapentin (NEURONTIN) capsule 100 mg  100 mg Oral Nightly Priscila Sparks MD   100 mg at  chloride flush 0.9 % injection 5-40 mL  5-40 mL IntraVENous PRN Radha Keating APRN - CNP        0.9 % sodium chloride infusion   IntraVENous PRN Radha Keating APRN - CNP 5 mL/hr at 09/13/24 0509 Rate Verify at 09/13/24 0509    ondansetron (ZOFRAN-ODT) disintegrating tablet 4 mg  4 mg Oral Q8H PRN Radha Keating APRN - CNP        Or    ondansetron (ZOFRAN) injection 4 mg  4 mg IntraVENous Q6H PRN Radha Keating APRN - CNP   4 mg at 09/13/24 1159    polyethylene glycol (GLYCOLAX) packet 17 g  17 g Oral Daily PRN Radha Keating APRN - CNP        acetaminophen (TYLENOL) tablet 650 mg  650 mg Oral Q6H PRN Radha Keating APRN - CNP   650 mg at 09/13/24 0936    Or    acetaminophen (TYLENOL) suppository 650 mg  650 mg Rectal Q6H PRN Radha Keating APRN - CNP        sennosides-docusate sodium (SENOKOT-S) 8.6-50 MG tablet 1 tablet  1 tablet Oral BID Radha Keating APRN - CNP   1 tablet at 09/14/24 0936    piperacillin-tazobactam (ZOSYN) 3,375 mg in sodium chloride 0.9 % 50 mL IVPB (mini-bag)  3,375 mg IntraVENous Q12H Radha Keating APRN - CNP   Stopped at 09/14/24 0806        No Known Allergies    Review of Systems:  A comprehensive review of systems was negative except for that written in the History of Present Illness.    Objective:     Vitals:    09/14/24 0643 09/14/24 0857 09/14/24 0933 09/14/24 1154   BP: (!) 101/46  (!) 103/50 (!) 124/49   Pulse: 65 67 68 70   Resp:  18 16 17   Temp:   98.8 °F (37.1 °C) 99.1 °F (37.3 °C)   TempSrc:   Oral Oral   SpO2:  94% 95% 96%   Weight:       Height:            Physical Exam:  General: NAD  Eyes: sclera anicteric  Oral Cavity: No thrush or ulcers  Neck: no JVD  Chest: Fair bilateral air entry  Heart: normal sounds  Abdomen: soft and non tender   : no smith  Lower Extremities: no edema  Skin: no rash  Neuro: intact  Psychiatric: non-depressed          Assessment/Plan:   ESRD  Recently started on hemodialysis  She dialyzes at  renal [place] in

## 2024-09-14 NOTE — PLAN OF CARE
Problem: Discharge Planning  Goal: Discharge to home or other facility with appropriate resources  Outcome: Progressing  Flowsheets (Taken 9/13/2024 1233 by Justus Mcrae RN)  Discharge to home or other facility with appropriate resources: Identify barriers to discharge with patient and caregiver     Problem: Skin/Tissue Integrity  Goal: Absence of new skin breakdown  Description: 1.  Monitor for areas of redness and/or skin breakdown  2.  Assess vascular access sites hourly  3.  Every 4-6 hours minimum:  Change oxygen saturation probe site  4.  Every 4-6 hours:  If on nasal continuous positive airway pressure, respiratory therapy assess nares and determine need for appliance change or resting period.  Outcome: Progressing     Problem: Genitourinary - Adult  Goal: Urinary catheter remains patent  Outcome: Progressing     Problem: Pain  Goal: Verbalizes/displays adequate comfort level or baseline comfort level  Outcome: Progressing

## 2024-09-14 NOTE — PROGRESS NOTES
Progress Note  Date:2024       Room:[facility] [place]  Patient Name:Brii Lambert     YOB: 1960     Age:64 y.o.        Subjective    Subjective   Patient followed for presumptive UTI with contaminated urine culture and presumptive Mycoplasma pneumonia. She remains afebrile but WBC remains elevated and ESR and CRP increased.  Patient resting comfortably.     Objective         Vitals Last 24 Hours:  TEMPERATURE:  Temp  Av.7 °F (37.1 °C)  Min: 98.4 °F (36.9 °C)  Max: 99.1 °F (37.3 °C)  RESPIRATIONS RANGE: Resp  Av.7  Min: 16  Max: 19  PULSE OXIMETRY RANGE: SpO2  Av %  Min: 91 %  Max: 98 %  PULSE RANGE: Pulse  Av.8  Min: 64  Max: 85  BLOOD PRESSURE RANGE: Systolic (24hrs), Av , Min:93 , Max:124   ; Diastolic (24hrs), Av, Min:42, Max:54         Objective:  Vital signs: (most recent): Blood pressure (!) 105/47, pulse 66, temperature 99.1 °F (37.3 °C), temperature source Oral, resp. rate 18, height 1.676 m (5' 6\"), weight 81.8 kg (180 lb 5.4 oz), SpO2 91%.      Vitals and nursing note reviewed.   Constitutional:       General: She is not in acute distress.     Appearance: She is ill-appearing.   HENT:      Head: Normocephalic and atraumatic.      Right Ear: External ear normal.      Left Ear: External ear normal.      Nose: Nose normal.      Mouth/Throat:      Mouth: Mucous membranes are dry.   Eyes:      Pupils: Pupils are equal, round, and reactive to light.   Cardiovascular:      Rate and Rhythm: Normal rate and regular rhythm.      Heart sounds: No murmur heard.  Pulmonary:      Effort: Pulmonary effort is normal.      Breath sounds: Normal breath sounds.   Chest:           Comments: Right sided Permacath with small area of erythema at the insertion site  Abdominal:      General: Bowel sounds are normal. There is no distension.      Palpations: Abdomen is soft.      Tenderness: There is abdominal tenderness. There is guarding.   Genitourinary:     Comments: No Ortez  abdominal aorta. There is weak reconstitution of the bilateral iliac trunks. 2. Penetrating atherosclerotic ulceration of the descending thoracic aorta. 3. 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 The left renal artery is occluded, with left renal atrophy. 4.Multifocal stenosis of the right SFA, with occlusion of the right posterior tibial artery. 5. Multifocal airspace disease favoring pneumonia. 6. Small left pleural effusion. 7. The bladder is severely distended. Any clinical concern for urinary retention or urinary outlet obstruction? Electronically signed by KAT WHITE      Assessment//Plan           Hospital Problems             Last Modified POA    * (Principal) HCAP (healthcare-associated pneumonia) 9/12/2024 Yes    Leukocytosis 9/9/2024 Yes    Generalized abdominal pain 9/12/2024 Yes    Urinary tract infection with hematuria 9/12/2024 Yes    Somnolence 9/12/2024 Yes    ESRD (end stage renal disease) on dialysis (HCC) 9/12/2024 Yes    Sepsis without acute organ dysfunction (HCC) 9/12/2024 Yes    Pneumonia due to Mycoplasma pneumoniae 9/12/2024 Yes     Persistent leukocytosis, elevated procal and CRP  Presumpitve UTI with marked pyuria and funguria but urine culture growing mixed urogenital glenn, Day #5 IV Zosyn  Presumptive Mycoplasma multifocal pneumonia  based on CT scan, Day #3/5 Azithromycin  ESRD on hemodialysis  Constipation     Comment:  Concerned about upward trending of procal and CTP.        1.  Discontinue Zosyn  2.  Start IV Meropenem for possible resistance  3.  Continue Fluconazole for UTI  4.   Continue Vancomycin for possible CLABSI pending blood cultures  5.   Azithromycin 250 mg daily for 3 more days  6.  In am, repeat CBC, repeat procal and CRP   7.  Reorder fungal urine culture       Electronically signed by Gui Elizalde MD

## 2024-09-15 ENCOUNTER — APPOINTMENT (OUTPATIENT)
Facility: HOSPITAL | Age: 64
DRG: 193 | End: 2024-09-15
Payer: MEDICARE

## 2024-09-15 LAB
ALBUMIN SERPL-MCNC: 1.6 G/DL (ref 3.5–5)
ALBUMIN/GLOB SERPL: 0.3 (ref 1.1–2.2)
ALP SERPL-CCNC: 68 U/L (ref 45–117)
ALT SERPL-CCNC: 94 U/L (ref 12–78)
ANION GAP SERPL CALC-SCNC: 12 MMOL/L (ref 2–12)
ANION GAP SERPL CALC-SCNC: 15 MMOL/L (ref 2–12)
AST SERPL W P-5'-P-CCNC: 412 U/L (ref 15–37)
BILIRUB SERPL-MCNC: 0.4 MG/DL (ref 0.2–1)
BUN SERPL-MCNC: 57 MG/DL (ref 6–20)
BUN SERPL-MCNC: 59 MG/DL (ref 6–20)
BUN/CREAT SERPL: 7 (ref 12–20)
BUN/CREAT SERPL: 8 (ref 12–20)
CA-I BLD-MCNC: 7.7 MG/DL (ref 8.5–10.1)
CA-I BLD-MCNC: 7.8 MG/DL (ref 8.5–10.1)
CHLORIDE SERPL-SCNC: 93 MMOL/L (ref 97–108)
CHLORIDE SERPL-SCNC: 96 MMOL/L (ref 97–108)
CO2 SERPL-SCNC: 15 MMOL/L (ref 21–32)
CO2 SERPL-SCNC: 23 MMOL/L (ref 21–32)
CREAT SERPL-MCNC: 7.65 MG/DL (ref 0.55–1.02)
CREAT SERPL-MCNC: 7.71 MG/DL (ref 0.55–1.02)
CRP SERPL-MCNC: 28.6 MG/DL (ref 0–0.3)
ERYTHROCYTE [DISTWIDTH] IN BLOOD BY AUTOMATED COUNT: 18.9 % (ref 11.5–14.5)
EST. AVERAGE GLUCOSE BLD GHB EST-MCNC: 117 MG/DL
GLOBULIN SER CALC-MCNC: 4.8 G/DL (ref 2–4)
GLUCOSE BLD STRIP.AUTO-MCNC: 115 MG/DL (ref 65–100)
GLUCOSE BLD STRIP.AUTO-MCNC: 270 MG/DL (ref 65–100)
GLUCOSE BLD STRIP.AUTO-MCNC: 335 MG/DL (ref 65–100)
GLUCOSE BLD STRIP.AUTO-MCNC: 338 MG/DL (ref 65–100)
GLUCOSE BLD STRIP.AUTO-MCNC: 45 MG/DL (ref 65–100)
GLUCOSE BLD STRIP.AUTO-MCNC: 50 MG/DL (ref 65–100)
GLUCOSE SERPL-MCNC: 177 MG/DL (ref 65–100)
GLUCOSE SERPL-MCNC: 47 MG/DL (ref 65–100)
HBA1C MFR BLD: 5.7 % (ref 4–5.6)
HCT VFR BLD AUTO: 29.5 % (ref 35–47)
HGB BLD-MCNC: 8.9 G/DL (ref 11.5–16)
LACTATE SERPL-SCNC: 0.9 MMOL/L (ref 0.4–2)
MCH RBC QN AUTO: 27.1 PG (ref 26–34)
MCHC RBC AUTO-ENTMCNC: 30.2 G/DL (ref 30–36.5)
MCV RBC AUTO: 89.9 FL (ref 80–99)
NRBC # BLD: 0.04 K/UL (ref 0–0.01)
NRBC BLD-RTO: 0.2 PER 100 WBC
PERFORMED BY:: ABNORMAL
PLATELET # BLD AUTO: 178 K/UL (ref 150–400)
POTASSIUM SERPL-SCNC: 5.8 MMOL/L (ref 3.5–5.1)
POTASSIUM SERPL-SCNC: 5.9 MMOL/L (ref 3.5–5.1)
PROCALCITONIN SERPL-MCNC: 21.05 NG/ML
PROT SERPL-MCNC: 6.4 G/DL (ref 6.4–8.2)
RBC # BLD AUTO: 3.28 M/UL (ref 3.8–5.2)
SODIUM SERPL-SCNC: 126 MMOL/L (ref 136–145)
SODIUM SERPL-SCNC: 128 MMOL/L (ref 136–145)
WBC # BLD AUTO: 16.1 K/UL (ref 3.6–11)

## 2024-09-15 PROCEDURE — 6370000000 HC RX 637 (ALT 250 FOR IP): Performed by: NURSE PRACTITIONER

## 2024-09-15 PROCEDURE — 84145 PROCALCITONIN (PCT): CPT

## 2024-09-15 PROCEDURE — 6360000002 HC RX W HCPCS: Performed by: INTERNAL MEDICINE

## 2024-09-15 PROCEDURE — 99232 SBSQ HOSP IP/OBS MODERATE 35: CPT | Performed by: INTERNAL MEDICINE

## 2024-09-15 PROCEDURE — 2580000003 HC RX 258: Performed by: NURSE PRACTITIONER

## 2024-09-15 PROCEDURE — 6360000002 HC RX W HCPCS: Performed by: STUDENT IN AN ORGANIZED HEALTH CARE EDUCATION/TRAINING PROGRAM

## 2024-09-15 PROCEDURE — 2500000003 HC RX 250 WO HCPCS: Performed by: FAMILY MEDICINE

## 2024-09-15 PROCEDURE — 85027 COMPLETE CBC AUTOMATED: CPT

## 2024-09-15 PROCEDURE — 83605 ASSAY OF LACTIC ACID: CPT

## 2024-09-15 PROCEDURE — 36415 COLL VENOUS BLD VENIPUNCTURE: CPT

## 2024-09-15 PROCEDURE — 6370000000 HC RX 637 (ALT 250 FOR IP): Performed by: INTERNAL MEDICINE

## 2024-09-15 PROCEDURE — 2580000003 HC RX 258: Performed by: INTERNAL MEDICINE

## 2024-09-15 PROCEDURE — 82962 GLUCOSE BLOOD TEST: CPT

## 2024-09-15 PROCEDURE — 80053 COMPREHEN METABOLIC PANEL: CPT

## 2024-09-15 PROCEDURE — 70450 CT HEAD/BRAIN W/O DYE: CPT

## 2024-09-15 PROCEDURE — 6370000000 HC RX 637 (ALT 250 FOR IP): Performed by: FAMILY MEDICINE

## 2024-09-15 PROCEDURE — 86140 C-REACTIVE PROTEIN: CPT

## 2024-09-15 PROCEDURE — 1100000000 HC RM PRIVATE

## 2024-09-15 PROCEDURE — 83036 HEMOGLOBIN GLYCOSYLATED A1C: CPT

## 2024-09-15 PROCEDURE — 80048 BASIC METABOLIC PNL TOTAL CA: CPT

## 2024-09-15 RX ORDER — INSULIN LISPRO 100 [IU]/ML
0-4 INJECTION, SOLUTION INTRAVENOUS; SUBCUTANEOUS NIGHTLY
Status: DISCONTINUED | OUTPATIENT
Start: 2024-09-15 | End: 2024-09-15 | Stop reason: SDUPTHER

## 2024-09-15 RX ORDER — DEXTROSE MONOHYDRATE 100 MG/ML
INJECTION, SOLUTION INTRAVENOUS CONTINUOUS
Status: DISCONTINUED | OUTPATIENT
Start: 2024-09-15 | End: 2024-09-16

## 2024-09-15 RX ORDER — INSULIN LISPRO 100 [IU]/ML
0-4 INJECTION, SOLUTION INTRAVENOUS; SUBCUTANEOUS
Status: DISCONTINUED | OUTPATIENT
Start: 2024-09-15 | End: 2024-09-21 | Stop reason: HOSPADM

## 2024-09-15 RX ORDER — DEXTROSE MONOHYDRATE 100 MG/ML
INJECTION, SOLUTION INTRAVENOUS CONTINUOUS PRN
Status: DISCONTINUED | OUTPATIENT
Start: 2024-09-15 | End: 2024-09-15 | Stop reason: SDUPTHER

## 2024-09-15 RX ORDER — GLUCAGON 1 MG/ML
1 KIT INJECTION PRN
Status: DISCONTINUED | OUTPATIENT
Start: 2024-09-15 | End: 2024-09-15 | Stop reason: SDUPTHER

## 2024-09-15 RX ADMIN — SODIUM CHLORIDE: 9 INJECTION, SOLUTION INTRAVENOUS at 12:59

## 2024-09-15 RX ADMIN — SODIUM CHLORIDE, PRESERVATIVE FREE 10 ML: 5 INJECTION INTRAVENOUS at 09:37

## 2024-09-15 RX ADMIN — MEROPENEM 1000 MG: 1 INJECTION INTRAVENOUS at 17:37

## 2024-09-15 RX ADMIN — GLUCAGON 1 MG: 1 INJECTION, POWDER, LYOPHILIZED, FOR SOLUTION INTRAMUSCULAR; INTRAVENOUS at 08:17

## 2024-09-15 RX ADMIN — ASPIRIN 81 MG: 81 TABLET, COATED ORAL at 09:36

## 2024-09-15 RX ADMIN — ACETAMINOPHEN 650 MG: 325 TABLET ORAL at 17:41

## 2024-09-15 RX ADMIN — ATENOLOL 50 MG: 25 TABLET ORAL at 09:36

## 2024-09-15 RX ADMIN — DEXTROSE MONOHYDRATE: 100 INJECTION, SOLUTION INTRAVENOUS at 09:48

## 2024-09-15 RX ADMIN — SENNOSIDES AND DOCUSATE SODIUM 1 TABLET: 50; 8.6 TABLET ORAL at 09:36

## 2024-09-15 RX ADMIN — AZITHROMYCIN DIHYDRATE 250 MG: 500 INJECTION, POWDER, LYOPHILIZED, FOR SOLUTION INTRAVENOUS at 21:41

## 2024-09-15 RX ADMIN — INSULIN LISPRO 3 UNITS: 100 INJECTION, SOLUTION INTRAVENOUS; SUBCUTANEOUS at 17:33

## 2024-09-15 RX ADMIN — FLUCONAZOLE IN SODIUM CHLORIDE 100 MG: 2 INJECTION, SOLUTION INTRAVENOUS at 13:01

## 2024-09-15 RX ADMIN — SODIUM CHLORIDE, PRESERVATIVE FREE 10 ML: 5 INJECTION INTRAVENOUS at 21:42

## 2024-09-15 RX ADMIN — SODIUM ZIRCONIUM CYCLOSILICATE 10 G: 10 POWDER, FOR SUSPENSION ORAL at 11:21

## 2024-09-15 RX ADMIN — SODIUM ZIRCONIUM CYCLOSILICATE 10 G: 10 POWDER, FOR SUSPENSION ORAL at 21:41

## 2024-09-15 RX ADMIN — ATORVASTATIN CALCIUM 40 MG: 40 TABLET, FILM COATED ORAL at 21:41

## 2024-09-15 RX ADMIN — INSULIN LISPRO 3 UNITS: 100 INJECTION, SOLUTION INTRAVENOUS; SUBCUTANEOUS at 12:54

## 2024-09-15 RX ADMIN — CETIRIZINE HYDROCHLORIDE 10 MG: 10 TABLET, FILM COATED ORAL at 09:36

## 2024-09-15 RX ADMIN — SODIUM BICARBONATE 50 MEQ: 84 INJECTION, SOLUTION INTRAVENOUS at 09:39

## 2024-09-15 RX ADMIN — SODIUM ZIRCONIUM CYCLOSILICATE 10 G: 10 POWDER, FOR SUSPENSION ORAL at 14:28

## 2024-09-15 RX ADMIN — MEROPENEM 1000 MG: 1 INJECTION INTRAVENOUS at 06:07

## 2024-09-15 ASSESSMENT — PAIN SCALES - GENERAL
PAINLEVEL_OUTOF10: 0
PAINLEVEL_OUTOF10: 0

## 2024-09-15 NOTE — PROGRESS NOTES
Nephrology follow-up          Patient: Brii Lambert MRN: [id number]  SSN: [id number]    YOB: 1960  Age: 64 y.o.  Sex: female      Subjective:   The patient is seen in the room.  She is looking lethargic  hypoglycemia episodes  Blood pressures are soft  Afebrile  Resolving leukocytosis  On D10 drip   Blood cultures are negative    Hyperkalemia  Metabolic acidosis  Hyponatremia        No past medical history on file.  No past surgical history on file.   No family history on file.  Social History     Tobacco Use    Smoking status: Former     Current packs/day: 0.00     Types: Cigarettes     Quit date:      Years since quittin.7    Smokeless tobacco: Former   Substance Use Topics    Alcohol use: Not on file      Current Facility-Administered Medications   Medication Dose Route Frequency Provider Last Rate Last Admin    dextrose 10 % infusion   IntraVENous Continuous Priscila Sparks MD 50 mL/hr at 09/15/24 0948 New Bag at 09/15/24 0948    sodium zirconium cyclosilicate (LOKELMA) oral suspension 10 g  10 g Oral TID Janet Daily MD   10 g at 09/15/24 1428    insulin lispro (HUMALOG,ADMELOG) injection vial 0-4 Units  0-4 Units SubCUTAneous TID WC Janet Daily MD   3 Units at 09/15/24 1254    meropenem (MERREM) 1,000 mg in sodium chloride 0.9 % 100 mL IVPB (mini-bag)  1,000 mg IntraVENous Q12H Gui Elizalde MD   Stopped at 09/15/24 0643    [START ON 2024] Vancomycin - Please draw level  @ 0600 prior to HD  1 each Other Once Priscila Sparks MD        azithromycin (ZITHROMAX) 250 mg in sodium chloride 0.9 % 250 mL IVPB  250 mg IntraVENous Q24H Gui Elizalde MD   Stopped at 24 2330    vancomycin (VANCOCIN) intermittent dosing (placeholder)   Other RX Placeholder Priscila Sparks MD        traMADol (ULTRAM) tablet 50 mg  50 mg Oral Q6H PRN Janet Daily MD   50 mg at 24 1217    0.9 % sodium chloride infusion   IntraVENous PRN Janet Daily MD         09/13/24 2314    sodium chloride flush 0.9 % injection 5-40 mL  5-40 mL IntraVENous 2 times per day Radha Keating APRN - CNP   10 mL at 09/15/24 0937    sodium chloride flush 0.9 % injection 5-40 mL  5-40 mL IntraVENous PRN Radha Keating APRN - CNP        0.9 % sodium chloride infusion   IntraVENous PRRadha Voss APRN - CNP 10 mL/hr at 09/15/24 1259 New Bag at 09/15/24 1259    ondansetron (ZOFRAN-ODT) disintegrating tablet 4 mg  4 mg Oral Q8H PRN Radha Keating APRN - CNP        Or    ondansetron (ZOFRAN) injection 4 mg  4 mg IntraVENous Q6H PRN Radha Keating APRN - CNP   4 mg at 09/13/24 1159    polyethylene glycol (GLYCOLAX) packet 17 g  17 g Oral Daily PRN Radha Keating APRN - CNP        acetaminophen (TYLENOL) tablet 650 mg  650 mg Oral Q6H PRN Radha Keating APRN - CNP   650 mg at 09/13/24 0936    Or    acetaminophen (TYLENOL) suppository 650 mg  650 mg Rectal Q6H PRN Radha Keating APRN - CNP        sennosides-docusate sodium (SENOKOT-S) 8.6-50 MG tablet 1 tablet  1 tablet Oral BID Radha Keating APRN - CNP   1 tablet at 09/15/24 0936        No Known Allergies    Review of Systems:  A comprehensive review of systems was negative except for that written in the History of Present Illness.    Objective:     Vitals:    09/15/24 0509 09/15/24 0600 09/15/24 0730 09/15/24 1201   BP: (!) 106/50  (!) 125/52 (!) 110/50   Pulse: 71  74 81   Resp: 18  18 16   Temp: 99 °F (37.2 °C)  98.8 °F (37.1 °C)    TempSrc: Oral  Oral Oral   SpO2: 98%  95% 96%   Weight:  82.8 kg (182 lb 8.7 oz)     Height:            Physical Exam:  General: NAD  Eyes: sclera anicteric  Oral Cavity: No thrush or ulcers  Neck: no JVD  Chest: Fair bilateral air entry  Heart: normal sounds  Abdomen: soft and non tender   : no smith  Lower Extremities: no edema  Skin: no rash  Neuro: intact  Psychiatric: non-depressed          Assessment/Plan:   ESRD  Recently started on hemodialysis  [facility] [place] [facility] Friday  Missed 2 dialysis sessions.  Came with volume overload/hyperkalemia  She was dialyzed 9/10, 9/11 and 9/13..  Plan for HD in a.m. for 4 hours.  Right IJ permacath as a dialysis access.      Metabolic acidosis  CO2 was 15 this morning  Repeat BMP CO2 23 confirmed lab error  Lactic acid is normal      Hyperkalemia  Recurrent  Potassium 5.9  Putting on Lokelma 10 g daily.  Will use 2K bath with dialysis tomorrow.    Acute hypoxic respiratory failure  From fluid overload/pulmonary edema/bilateral pneumonia  Leukocytosis  She came with hypoxia and sats 70%  Currently on nasal cannula 2 L.  On IV antibiotics.    Anemia  Hemoglobin 7.0-->5.9  No obvious bleeding  IV erythropoietin with dialysis  Received blood transfusion 2 units with dialysis.    Abdominal pain  Came with abdominal pain  Leukocytosis  CT of the abdomen with IV contrast occlusion of infrarenal abdominal aorta  Vascular surgery on board.    Hypoglycemia  Recurrent  I will discontinue sitagliptin  D10 at 50 cc/h  Last blood sugar 335  I will decrease to 30 cc/h.    Hyponatremia  Of moderate severity  From hypotonic IV fluids  Sodium 128  I will try to correct with dialysis.    Hypertension  Noticed to have low blood pressures  I will discontinue amlodipine and hydralazine  I will atenolol.    Acute encephalopathy  Metabolic/underlying infection/recurrent hypoglycemia  She is very lethargic  CT head to rule out acute stroke    Plan:       Signed By: Priscila Sparks MD     September 15, 2024

## 2024-09-15 NOTE — PROGRESS NOTES
Gastroenterology Consult     Referring Physician: Janet Daily MD     Consult Date: 9/15/2024     Subjective:     Patient was laying in her bed this AM. Appearing moderately dazed, confused, lethargic. Patient had delayed response in answering questions, so not much information about current status was able to be gathered from patient. Patient speech consisted of mumbling that made it difficult to discern what she was saying. She does complain of right sided abdominal pain.     Last hemoglobin and hematocrit at 8.9 and 29.5, respectively. Most recent BP reading of 125/52.    No past medical history on file.  No past surgical history on file.   No family history on file.  Social History     Tobacco Use    Smoking status: Former     Current packs/day: 0.00     Types: Cigarettes     Quit date:      Years since quittin.7    Smokeless tobacco: Former   Substance Use Topics    Alcohol use: Not on file      No Known Allergies  Current Facility-Administered Medications   Medication Dose Route Frequency    dextrose 10 % infusion   IntraVENous Continuous    sodium zirconium cyclosilicate (LOKELMA) oral suspension 10 g  10 g Oral TID    glucose chewable tablet 16 g  4 tablet Oral PRN    dextrose bolus 10% 125 mL  125 mL IntraVENous PRN    Or    dextrose bolus 10% 250 mL  250 mL IntraVENous PRN    glucagon injection 1 mg  1 mg SubCUTAneous PRN    dextrose 10 % infusion   IntraVENous Continuous PRN    insulin lispro (HUMALOG,ADMELOG) injection vial 0-4 Units  0-4 Units SubCUTAneous TID WC    insulin lispro (HUMALOG,ADMELOG) injection vial 0-4 Units  0-4 Units SubCUTAneous Nightly    meropenem (MERREM) 1,000 mg in sodium chloride 0.9 % 100 mL IVPB (mini-bag)  1,000 mg IntraVENous Q12H    [START ON 2024] Vancomycin - Please draw level  @ 0600 prior to HD  1 each Other Once    azithromycin (ZITHROMAX) 250 mg in sodium chloride 0.9 % 250 mL IVPB  250 mg IntraVENous Q24H    vancomycin (VANCOCIN) intermittent  dosing (placeholder)   Other RX Placeholder    traMADol (ULTRAM) tablet 50 mg  50 mg Oral Q6H PRN    0.9 % sodium chloride infusion   IntraVENous PRN    fluconazole (DIFLUCAN) 100 mg IVPB  100 mg IntraVENous Q24H    0.9 % sodium chloride infusion   IntraVENous PRN    heparin (porcine) injection 3,600 Units  3,600 Units IntraCATHeter PRN    gabapentin (NEURONTIN) capsule 100 mg  100 mg Oral Nightly    glucose chewable tablet 16 g  4 tablet Oral PRN    dextrose bolus 10% 125 mL  125 mL IntraVENous PRN    Or    dextrose bolus 10% 250 mL  250 mL IntraVENous PRN    glucagon injection 1 mg  1 mg SubCUTAneous PRN    dextrose 10 % infusion   IntraVENous Continuous PRN    epoetin saul-epbx (RETACRIT) injection 10,000 Units  10,000 Units IntraVENous Once per day on Monday Wednesday Friday    acetaminophen (TYLENOL) tablet 650 mg  650 mg Oral Q6H PRN    alogliptin (NESINA) tablet 6.25 mg  6.25 mg Oral Daily    insulin lispro (HUMALOG,ADMELOG) injection vial 0-16 Units  0-16 Units SubCUTAneous TID WC    insulin lispro (HUMALOG,ADMELOG) injection vial 0-4 Units  0-4 Units SubCUTAneous Nightly    aspirin EC tablet 81 mg  81 mg Oral Daily    atenolol (TENORMIN) tablet 50 mg  50 mg Oral Daily    atorvastatin (LIPITOR) tablet 40 mg  40 mg Oral Nightly    cetirizine (ZYRTEC) tablet 10 mg  10 mg Oral Daily    oxyCODONE (ROXICODONE) immediate release tablet 5 mg  5 mg Oral Q6H PRN    sodium chloride flush 0.9 % injection 5-40 mL  5-40 mL IntraVENous 2 times per day    sodium chloride flush 0.9 % injection 5-40 mL  5-40 mL IntraVENous PRN    0.9 % sodium chloride infusion   IntraVENous PRN    ondansetron (ZOFRAN-ODT) disintegrating tablet 4 mg  4 mg Oral Q8H PRN    Or    ondansetron (ZOFRAN) injection 4 mg  4 mg IntraVENous Q6H PRN    polyethylene glycol (GLYCOLAX) packet 17 g  17 g Oral Daily PRN    acetaminophen (TYLENOL) tablet 650 mg  650 mg Oral Q6H PRN    Or    acetaminophen (TYLENOL) suppository 650 mg  650 mg Rectal Q6H PRN     (L) 97 - 108 mmol/L    CO2 23 21 - 32 mmol/L    Anion Gap 12 2 - 12 mmol/L    Glucose 177 (H) 65 - 100 mg/dL    BUN 59 (H) 6 - 20 mg/dL    Creatinine 7.71 (H) 0.55 - 1.02 mg/dL    BUN/Creatinine Ratio 8 (L) 12 - 20      Est, Glom Filt Rate 5 (L) >60 ml/min/1.73m2    Calcium 7.7 (L) 8.5 - 10.1 mg/dL   Lactic Acid    Collection Time: 09/15/24  9:50 AM   Result Value Ref Range    Lactic Acid, Plasma 0.9 0.4 - 2.0 mmol/L        XR ABDOMEN (KUB) (SINGLE AP VIEW)   Final Result   Moderate amount of stool throughout the colon.         Electronically signed by Vinay Yanes      CTA ABDOMINAL AORTA W BILAT RUNOFF W WO CONTRAST   Final Result      1. Occlusion of the infrarenal abdominal aorta. There is weak reconstitution of   the bilateral iliac trunks.   2. Penetrating atherosclerotic ulceration of the descending thoracic aorta.   3. 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 The left renal artery is occluded,   with left renal atrophy.   4.Multifocal stenosis of the right SFA, with occlusion of the right posterior   tibial artery.   5. Multifocal airspace disease favoring pneumonia.   6. Small left pleural effusion.   7. The bladder is severely distended. Any clinical concern for urinary retention   or urinary outlet obstruction?      Electronically signed by KAT WHITE      Vascular duplex lower extremity arteries bilateral   Final Result      XR CHEST PORTABLE   Final Result      Mild cardiomegaly and edema.         Electronically signed by BROOKE TORRES             Assessment/Plan:     Principal Problem:    HCAP (healthcare-associated pneumonia)  Active Problems:    Leukocytosis    Generalized abdominal pain    Urinary tract infection with hematuria    Somnolence    ESRD (end stage renal disease) on dialysis (HCC)    Sepsis without acute organ dysfunction (HCC)    Pneumonia due to Mycoplasma pneumoniae  Resolved Problems:    * No resolved hospital problems. *     Anemia, occlusion of abdominal aorta. Concern for GI

## 2024-09-15 NOTE — PROGRESS NOTES
Chart reviewed and history obtained. Attempt PT eval today. MD walked out of room and stated this therapist could attempt however patient was lethargic. Attempt eval and patient could not complete statements without closing eyes and mumbling. Do not feel patient is appropriate for PT eval today. Will attempt eval on later date when patient is more alert. Thank you.

## 2024-09-15 NOTE — PROGRESS NOTES
History and Physical    NAME:   Brii Lambert   :  1960   MRN:  [id number]     Date/Time: 9/15/2024 11:18 AM    Patient PCP: Janet Daily MD  ______________________________________________________________________       Subjective:     CHIEF COMPLAINT:     Abdominal pain, Pneumonia     HISTORY OF PRESENT ILLNESS:     General Daily Progress Note  Patient is a 64 y.o. year old female past medical history of end-stage renal disease on hemodialysis, hypertension, chronic bilateral cerebellar and pontine strokes, diabetic neuropathy presented to the ER yesterday for evaluation of abdominal, lower extremity pain. Pt was recently discharged from [facility] and sent to [facility] for rehab.  Patient reportedly missed several days of dialysis due to abdominal pain. Patient states she has not had a bowel movement in 14-15 days. Patient missed Monday dialysis and O2 sat was 70% on 10 L nonrebreather mask with grunting.  Patient seen in the ED, white count 31.1, potassium 5.9, BUN 78, creatinine 7.1 hemoglobin 7.0.    CTA of the abdomen  1. Occlusion of the infrarenal abdominal aorta. There is weak reconstitution of  the bilateral iliac trunks.  2. Penetrating atherosclerotic ulceration of the descending thoracic aorta.  3. 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 The left renal artery is occluded,  with left renal atrophy.  4.Multifocal stenosis of the right SFA, with occlusion of the right posterior  tibial artery.  5. Multifocal airspace disease favoring pneumonia.  6. Small left pleural effusion.  7. The bladder is severely distended. Any clinical concern for urinary retention  or urinary outlet obstruction?    ER doctor spoke with general surgeon determined artery disease chronic.  Patient seen in ED seems confused at times. Pt states she is wheelchair-bound normally. Denies back pain. No LE swelling.   Patient be admitted for further evaluation and treatment.    Consulted by Nephrology - pt came in with  mg, Oral, Q6H PRN, Radha Keating APRN - CNP, 5 mg at 09/13/24 2314    sodium chloride flush 0.9 % injection 5-40 mL, 5-40 mL, IntraVENous, 2 times per day, Radha Keating APRN - CNP, 10 mL at 09/15/24 0937    sodium chloride flush 0.9 % injection 5-40 mL, 5-40 mL, IntraVENous, PRN, Radha Keating APRN - CNP    0.9 % sodium chloride infusion, , IntraVENous, PRN, Radha Keating APRN - CNP, Last Rate: 5 mL/hr at 09/13/24 0509, Rate Verify at 09/13/24 0509    ondansetron (ZOFRAN-ODT) disintegrating tablet 4 mg, 4 mg, Oral, Q8H PRN **OR** ondansetron (ZOFRAN) injection 4 mg, 4 mg, IntraVENous, Q6H PRN, Radha Keating APRN - CNP, 4 mg at 09/13/24 1159    polyethylene glycol (GLYCOLAX) packet 17 g, 17 g, Oral, Daily PRN, Radha Keating APRN - CNP    acetaminophen (TYLENOL) tablet 650 mg, 650 mg, Oral, Q6H PRN, 650 mg at 09/13/24 0936 **OR** acetaminophen (TYLENOL) suppository 650 mg, 650 mg, Rectal, Q6H PRN, Radha Keating APRN - CNP    sennosides-docusate sodium (SENOKOT-S) 8.6-50 MG tablet 1 tablet, 1 tablet, Oral, BID, Radha Keating APRN - CNP, 1 tablet at 09/15/24 0936    LAB DATA REVIEWED:    Recent Results (from the past 24 hour(s))   POCT Glucose    Collection Time: 09/14/24 11:55 AM   Result Value Ref Range    POC Glucose 265 (H) 65 - 100 mg/dL    Performed by: Tariq DelgadoFloat)    POCT Glucose    Collection Time: 09/14/24  4:15 PM   Result Value Ref Range    POC Glucose 225 (H) 65 - 100 mg/dL    Performed by: Aditi Romero    POCT Glucose    Collection Time: 09/14/24  9:53 PM   Result Value Ref Range    POC Glucose 123 (H) 65 - 100 mg/dL    Performed by: Enrrique Russell PCT    C-Reactive Protein    Collection Time: 09/15/24  6:34 AM   Result Value Ref Range    CRP 28.60 (H) 0.00 - 0.30 mg/dL   Procalcitonin    Collection Time: 09/15/24  6:34 AM   Result Value Ref Range    Procalcitonin 21.05 (H) 0 ng/mL   CBC    Collection Time: 09/15/24  6:34 AM   Result Value Ref Range    WBC  shows changes in peak systolic velocity, suspicious for presence of hemodynamically significant atherosclerotic disease process.  Left below-knee level also shows significantly diminished Doppler signal, suspicious for presence of hemodynamically significant atherosclerotic disease sam verma today     Saw vascular surgery - CT scan findings appear to be chronic. Pt has well collateralization throughout pelvis. No acute vascular intervention indicated at this time. Pt will be followed by vascular.             Aspirin 81 mg daily   atenolol 50 mg daily  Lipitor 40 mg daily  Zithromax to 50 mg daily  Zyrtec 10 mg daily  Fluconazole 100 mg daily  Gabapentin 100 mg at night  Hydralazine 50 mg every 6 hours  Sliding-scale insulin  Zosyn 3.375 IV every 12 hours  Vancomycin pharmacy protocol  Patient on D10 50 cc/h    Lokelma 10 mg 3 times a day  Sodium bicarb IV push 1 dose    Patient is a full code    Seen by infectious disease recommend to continue Zosyn flucanazole vancomycin and Zithromax  Since VICTORIA pending  Patient blood sugar running low patient only on sliding scale and hypoglycemic protocol    Follow-up with the vascular surgeon/regarding vascular disease    PT OT consult for placement to rehab    Follow-up with the nephrology        Signed: Janet Daily MD

## 2024-09-15 NOTE — PLAN OF CARE
Problem: Discharge Planning  Goal: Discharge to home or other facility with appropriate resources  9/14/2024 2031 by Alley Cat RN  Outcome: Progressing  9/14/2024 1357 by Denita Shetty RN  Outcome: Progressing  Flowsheets (Taken 9/14/2024 0924)  Discharge to home or other facility with appropriate resources: Identify barriers to discharge with patient and caregiver     Problem: Skin/Tissue Integrity  Goal: Absence of new skin breakdown  Description: 1.  Monitor for areas of redness and/or skin breakdown  2.  Assess vascular access sites hourly  3.  Every 4-6 hours minimum:  Change oxygen saturation probe site  4.  Every 4-6 hours:  If on nasal continuous positive airway pressure, respiratory therapy assess nares and determine need for appliance change or resting period.  9/14/2024 2031 by Alley Cat RN  Outcome: Progressing  9/14/2024 1357 by Denita Shetty RN  Outcome: Progressing     Problem: Safety - Adult  Goal: Free from fall injury  9/14/2024 2031 by Alley Cat RN  Outcome: Progressing  9/14/2024 1357 by Denita Shetty RN  Outcome: Progressing     Problem: Genitourinary - Adult  Goal: Urinary catheter remains patent  9/14/2024 2031 by Alley Cat RN  Outcome: Progressing  9/14/2024 1357 by Denita Shetty RN  Outcome: Progressing     Problem: Pain  Goal: Verbalizes/displays adequate comfort level or baseline comfort level  9/14/2024 2031 by Alley Cat RN  Outcome: Progressing  9/14/2024 1357 by Denita Shetty RN  Outcome: Progressing  Flowsheets (Taken 9/14/2024 0933)  Verbalizes/displays adequate comfort level or baseline comfort level: Assess pain using appropriate pain scale

## 2024-09-16 LAB
ALBUMIN SERPL-MCNC: 1.4 G/DL (ref 3.5–5)
ALBUMIN/GLOB SERPL: 0.3 (ref 1.1–2.2)
ALP SERPL-CCNC: 94 U/L (ref 45–117)
ALT SERPL-CCNC: 103 U/L (ref 12–78)
ANION GAP SERPL CALC-SCNC: 13 MMOL/L (ref 2–12)
AST SERPL W P-5'-P-CCNC: 444 U/L (ref 15–37)
BILIRUB SERPL-MCNC: 0.4 MG/DL (ref 0.2–1)
BUN SERPL-MCNC: 67 MG/DL (ref 6–20)
BUN/CREAT SERPL: 8 (ref 12–20)
CA-I BLD-MCNC: 7.3 MG/DL (ref 8.5–10.1)
CHLORIDE SERPL-SCNC: 90 MMOL/L (ref 97–108)
CO2 SERPL-SCNC: 20 MMOL/L (ref 21–32)
CREAT SERPL-MCNC: 8.9 MG/DL (ref 0.55–1.02)
CRP SERPL-MCNC: 30.5 MG/DL (ref 0–0.3)
DATE LAST DOSE: ABNORMAL
DOSE AMOUNT: ABNORMAL UNITS
ERYTHROCYTE [DISTWIDTH] IN BLOOD BY AUTOMATED COUNT: 18.2 % (ref 11.5–14.5)
GLOBULIN SER CALC-MCNC: 4.5 G/DL (ref 2–4)
GLUCOSE BLD STRIP.AUTO-MCNC: 206 MG/DL (ref 65–100)
GLUCOSE BLD STRIP.AUTO-MCNC: 221 MG/DL (ref 65–100)
GLUCOSE BLD STRIP.AUTO-MCNC: 247 MG/DL (ref 65–100)
GLUCOSE SERPL-MCNC: 206 MG/DL (ref 65–100)
HCT VFR BLD AUTO: 22.1 % (ref 35–47)
HGB BLD-MCNC: 7 G/DL (ref 11.5–16)
MCH RBC QN AUTO: 26.7 PG (ref 26–34)
MCHC RBC AUTO-ENTMCNC: 31.7 G/DL (ref 30–36.5)
MCV RBC AUTO: 84.4 FL (ref 80–99)
NRBC # BLD: 0.03 K/UL (ref 0–0.01)
NRBC BLD-RTO: 0.2 PER 100 WBC
PERFORMED BY:: ABNORMAL
PHOSPHATE SERPL-MCNC: 10.7 MG/DL (ref 2.6–4.7)
PLATELET # BLD AUTO: 222 K/UL (ref 150–400)
PMV BLD AUTO: 9.3 FL (ref 8.9–12.9)
POTASSIUM SERPL-SCNC: 5.7 MMOL/L (ref 3.5–5.1)
PROCALCITONIN SERPL-MCNC: 81.66 NG/ML
PROT SERPL-MCNC: 5.9 G/DL (ref 6.4–8.2)
RBC # BLD AUTO: 2.62 M/UL (ref 3.8–5.2)
SODIUM SERPL-SCNC: 123 MMOL/L (ref 136–145)
VANCOMYCIN TROUGH SERPL-MCNC: 27.9 UG/ML (ref 5–10)
WBC # BLD AUTO: 18.5 K/UL (ref 3.6–11)

## 2024-09-16 PROCEDURE — 2700000000 HC OXYGEN THERAPY PER DAY

## 2024-09-16 PROCEDURE — 80202 ASSAY OF VANCOMYCIN: CPT

## 2024-09-16 PROCEDURE — 6360000002 HC RX W HCPCS: Performed by: NURSE PRACTITIONER

## 2024-09-16 PROCEDURE — 36415 COLL VENOUS BLD VENIPUNCTURE: CPT

## 2024-09-16 PROCEDURE — 2580000003 HC RX 258: Performed by: INTERNAL MEDICINE

## 2024-09-16 PROCEDURE — 82962 GLUCOSE BLOOD TEST: CPT

## 2024-09-16 PROCEDURE — 84145 PROCALCITONIN (PCT): CPT

## 2024-09-16 PROCEDURE — 36556 INSERT NON-TUNNEL CV CATH: CPT

## 2024-09-16 PROCEDURE — 80053 COMPREHEN METABOLIC PANEL: CPT

## 2024-09-16 PROCEDURE — 99232 SBSQ HOSP IP/OBS MODERATE 35: CPT | Performed by: INTERNAL MEDICINE

## 2024-09-16 PROCEDURE — 1100000000 HC RM PRIVATE

## 2024-09-16 PROCEDURE — 86140 C-REACTIVE PROTEIN: CPT

## 2024-09-16 PROCEDURE — 99232 SBSQ HOSP IP/OBS MODERATE 35: CPT | Performed by: SURGERY

## 2024-09-16 PROCEDURE — 85027 COMPLETE CBC AUTOMATED: CPT

## 2024-09-16 PROCEDURE — 6360000002 HC RX W HCPCS: Performed by: INTERNAL MEDICINE

## 2024-09-16 PROCEDURE — 6370000000 HC RX 637 (ALT 250 FOR IP): Performed by: NURSE PRACTITIONER

## 2024-09-16 PROCEDURE — 84100 ASSAY OF PHOSPHORUS: CPT

## 2024-09-16 PROCEDURE — 2580000003 HC RX 258: Performed by: NURSE PRACTITIONER

## 2024-09-16 PROCEDURE — 94761 N-INVAS EAR/PLS OXIMETRY MLT: CPT

## 2024-09-16 PROCEDURE — 6370000000 HC RX 637 (ALT 250 FOR IP): Performed by: FAMILY MEDICINE

## 2024-09-16 RX ORDER — SEVELAMER CARBONATE 800 MG/1
1600 TABLET, FILM COATED ORAL
Status: DISCONTINUED | OUTPATIENT
Start: 2024-09-16 | End: 2024-09-19

## 2024-09-16 RX ADMIN — SODIUM ZIRCONIUM CYCLOSILICATE 10 G: 10 POWDER, FOR SUSPENSION ORAL at 22:48

## 2024-09-16 RX ADMIN — SODIUM ZIRCONIUM CYCLOSILICATE 10 G: 10 POWDER, FOR SUSPENSION ORAL at 09:10

## 2024-09-16 RX ADMIN — CETIRIZINE HYDROCHLORIDE 10 MG: 10 TABLET, FILM COATED ORAL at 09:11

## 2024-09-16 RX ADMIN — OXYCODONE HYDROCHLORIDE 5 MG: 5 TABLET ORAL at 19:04

## 2024-09-16 RX ADMIN — OXYCODONE HYDROCHLORIDE 5 MG: 5 TABLET ORAL at 12:50

## 2024-09-16 RX ADMIN — ONDANSETRON 4 MG: 2 INJECTION INTRAMUSCULAR; INTRAVENOUS at 22:56

## 2024-09-16 RX ADMIN — FLUCONAZOLE IN SODIUM CHLORIDE 100 MG: 2 INJECTION, SOLUTION INTRAVENOUS at 12:09

## 2024-09-16 RX ADMIN — INSULIN LISPRO 1 UNITS: 100 INJECTION, SOLUTION INTRAVENOUS; SUBCUTANEOUS at 09:11

## 2024-09-16 RX ADMIN — MEROPENEM 1000 MG: 1 INJECTION INTRAVENOUS at 19:02

## 2024-09-16 RX ADMIN — AZITHROMYCIN DIHYDRATE 250 MG: 500 INJECTION, POWDER, LYOPHILIZED, FOR SOLUTION INTRAVENOUS at 22:48

## 2024-09-16 RX ADMIN — ASPIRIN 81 MG: 81 TABLET, COATED ORAL at 09:11

## 2024-09-16 RX ADMIN — INSULIN LISPRO 1 UNITS: 100 INJECTION, SOLUTION INTRAVENOUS; SUBCUTANEOUS at 11:54

## 2024-09-16 RX ADMIN — HEPARIN SODIUM 3600 UNITS: 1000 INJECTION INTRAVENOUS; SUBCUTANEOUS at 18:47

## 2024-09-16 RX ADMIN — DEXTROSE MONOHYDRATE: 100 INJECTION, SOLUTION INTRAVENOUS at 11:32

## 2024-09-16 RX ADMIN — SODIUM CHLORIDE, PRESERVATIVE FREE 10 ML: 5 INJECTION INTRAVENOUS at 09:12

## 2024-09-16 RX ADMIN — EPOETIN ALFA-EPBX 10000 UNITS: 10000 INJECTION, SOLUTION INTRAVENOUS; SUBCUTANEOUS at 18:46

## 2024-09-16 RX ADMIN — MEROPENEM 1000 MG: 1 INJECTION INTRAVENOUS at 05:53

## 2024-09-16 ASSESSMENT — PAIN DESCRIPTION - LOCATION
LOCATION: ABDOMEN
LOCATION: ABDOMEN

## 2024-09-16 ASSESSMENT — PAIN SCALES - GENERAL
PAINLEVEL_OUTOF10: 5
PAINLEVEL_OUTOF10: 5
PAINLEVEL_OUTOF10: 7
PAINLEVEL_OUTOF10: 6
PAINLEVEL_OUTOF10: 6
PAINLEVEL_OUTOF10: 0

## 2024-09-16 ASSESSMENT — PAIN SCALES - WONG BAKER: WONGBAKER_NUMERICALRESPONSE: NO HURT

## 2024-09-16 NOTE — PLAN OF CARE
Problem: Discharge Planning  Goal: Discharge to home or other facility with appropriate resources  Outcome: Progressing     Problem: Skin/Tissue Integrity  Goal: Absence of new skin breakdown  Description: 1.  Monitor for areas of redness and/or skin breakdown  2.  Assess vascular access sites hourly  3.  Every 4-6 hours minimum:  Change oxygen saturation probe site  4.  Every 4-6 hours:  If on nasal continuous positive airway pressure, respiratory therapy assess nares and determine need for appliance change or resting period.  Outcome: Progressing     Problem: Safety - Adult  Goal: Free from fall injury  Outcome: Progressing     Problem: Genitourinary - Adult  Goal: Urinary catheter remains patent  Outcome: Progressing     Problem: Pain  Goal: Verbalizes/displays adequate comfort level or baseline comfort level  Outcome: Progressing     Problem: Confusion  Goal: Confusion, delirium, dementia, or psychosis is improved or at baseline  Description: INTERVENTIONS:  1. Assess for possible contributors to thought disturbance, including medications, impaired vision or hearing, underlying metabolic abnormalities, dehydration, psychiatric diagnoses, and notify attending LIP  2. [place] high risk fall precautions, as indicated  3. Provide frequent short contacts to provide reality reorientation, refocusing and direction  4. Decrease environmental stimuli, including noise as appropriate  5. Monitor and intervene to maintain adequate nutrition, hydration, elimination, sleep and activity  6. If unable to ensure safety without constant attention obtain sitter and review sitter guidelines with assigned personnel  7. Initiate Psychosocial CNS and Spiritual Care consult, as indicated  Outcome: Progressing

## 2024-09-16 NOTE — PROGRESS NOTES
OT eval order received and acknowledged. Per PT, patient is demonstrating baseline total care for self care and functional mobility/transfers. OT evaluation order will therefore be discontinued this pt has no acute OT needs. Please reorder OT if pt's functional status changes. Thank you.

## 2024-09-16 NOTE — PROGRESS NOTES
Progress Note  Date:2024       Room:[facility]  Patient Name:Brii Lambert     YOB: 1960     Age:64 y.o.        Subjective    Subjective   Patient followed for presumptive UTI with contaminated urine culture and presumptive Mycoplasma pneumonia. She remains afebrile but WBC remains elevated and ESR and CRP increased.  Patient resting comfortably.     Objective         Vitals Last 24 Hours:  TEMPERATURE:  Temp  Av.4 °F (37.4 °C)  Min: 98.8 °F (37.1 °C)  Max: 100.9 °F (38.3 °C)  RESPIRATIONS RANGE: Resp  Av.3  Min: 16  Max: 18  PULSE OXIMETRY RANGE: SpO2  Av %  Min: 95 %  Max: 100 %  PULSE RANGE: Pulse  Av.4  Min: 68  Max: 81  BLOOD PRESSURE RANGE: Systolic (24hrs), Av , Min:110 , Max:135   ; Diastolic (24hrs), Av, Min:49, Max:104         Objective:  Vital signs: (most recent): Blood pressure (!) 124/49, pulse 68, temperature 99.1 °F (37.3 °C), temperature source Axillary, resp. rate 18, height 1.676 m (5' 6\"), weight 83.4 kg (183 lb 13.8 oz), SpO2 97%.      Vitals and nursing note reviewed.   Constitutional:       General: She is not in acute distress.     Appearance: She is ill-appearing.   HENT:      Head: Normocephalic and atraumatic.      Right Ear: External ear normal.      Left Ear: External ear normal.      Nose: Nose normal.      Mouth/Throat:      Mouth: Mucous membranes are dry.   Eyes:      Pupils: Pupils are equal, round, and reactive to light.   Cardiovascular:      Rate and Rhythm: Normal rate and regular rhythm.      Heart sounds: No murmur heard.  Pulmonary:      Effort: Pulmonary effort is normal.      Breath sounds: Normal breath sounds.   Chest:           Comments: Right sided Permacath with small area of erythema at the insertion site  Abdominal:      General: Bowel sounds are normal. There is no distension.      Palpations: Abdomen is soft.      Tenderness: There is abdominal tenderness. There is guarding.   Genitourinary:     Comments: No Ortez

## 2024-09-16 NOTE — PLAN OF CARE
Problem: Discharge Planning  Goal: Discharge to home or other facility with appropriate resources  9/15/2024 2005 by Alley Cat RN  Outcome: Progressing  9/15/2024 0741 by Denita Shetty RN  Outcome: Progressing  Flowsheets (Taken 9/15/2024 0731)  Discharge to home or other facility with appropriate resources: Identify barriers to discharge with patient and caregiver     Problem: Skin/Tissue Integrity  Goal: Absence of new skin breakdown  Description: 1.  Monitor for areas of redness and/or skin breakdown  2.  Assess vascular access sites hourly  3.  Every 4-6 hours minimum:  Change oxygen saturation probe site  4.  Every 4-6 hours:  If on nasal continuous positive airway pressure, respiratory therapy assess nares and determine need for appliance change or resting period.  9/15/2024 2005 by Alley Cat RN  Outcome: Progressing  9/15/2024 0741 by Denita Shetty RN  Outcome: Progressing     Problem: Safety - Adult  Goal: Free from fall injury  9/15/2024 2005 by Alley Cat RN  Outcome: Progressing  9/15/2024 0741 by Denita Shetty RN  Outcome: Progressing     Problem: Genitourinary - Adult  Goal: Urinary catheter remains patent  9/15/2024 2005 by Alley Cat RN  Outcome: Progressing  9/15/2024 0741 by Denita Shetty RN  Outcome: Progressing     Problem: Pain  Goal: Verbalizes/displays adequate comfort level or baseline comfort level  9/15/2024 2005 by Alley Cat RN  Outcome: Progressing  9/15/2024 0741 by Denita Shetty RN  Outcome: Progressing     Problem: Confusion  Goal: Confusion, delirium, dementia, or psychosis is improved or at baseline  Description: INTERVENTIONS:  1. Assess for possible contributors to thought disturbance, including medications, impaired vision or hearing, underlying metabolic abnormalities, dehydration, psychiatric diagnoses, and notify attending LIP  2. [place] high risk fall precautions, as indicated  3.  Provide frequent short contacts to provide reality reorientation, refocusing and direction  4. Decrease environmental stimuli, including noise as appropriate  5. Monitor and intervene to maintain adequate nutrition, hydration, elimination, sleep and activity  6. If unable to ensure safety without constant attention obtain sitter and review sitter guidelines with assigned personnel  7. Initiate Psychosocial CNS and Spiritual Care consult, as indicated  Outcome: Progressing

## 2024-09-16 NOTE — PROGRESS NOTES
Transportation arrives to take patient to dialysis, and patient refuses. Placed call to Dr. Daily who advised me to rech out to nephrology. Call placed to Dr. Sparks who stated to give the patient pain medication and inform her of the importance of dialysis. Pain medication given and educated patient on the importance. Patient continues to refuse dialysis at this time.

## 2024-09-16 NOTE — PROGRESS NOTES
Vancomycin Dosing Consult  Brii Lambert is a 64 y.o. female with sepsis secondary to UTI, mycoplasma PNA. Pharmacy was consulted by Dr. Sparks to dose and monitor vancomycin. Today is day 6.    Antibiotic regimen: Vancomycin + meropenem  + fluconazole    Temp (24hrs), Av.3 °F (37.4 °C), Min:98.8 °F (37.1 °C), Max:99.7 °F (37.6 °C)    Recent Labs     24  0738 24  0739 09/15/24  0634 09/15/24  0950 24  0559   WBC 19.4*  --  16.1*  --  18.5*   CREATININE  --    < > 7.65* 7.71* 8.90*   BUN  --    < > 57* 59* 67*    < > = values in this interval not displayed.     Est CrCl: 7 mL/min, HD MWF  Concomitant nephrotoxic drugs: Contrast agents     Cultures:    Blood cultures x 2 - NGTDx 4 days    Urine culture- Mixed urogenital glenn isolated, final    MRSA Swab: Not ordered, patient already received first dose of vancomycin    Target range: Trough 21-24 mcg/mL (Intermittent hemodialysis, invasive MRSA infection or sepsis)    Recent level history:  Date/Time Dose & Interval Measured Level (mcg/mL) Associated AUC/AURELIANO    @ 0621 2000 mg x 1 ( 1702) 24.1     @0559 1000mg IV x1 27.9         Assessment/Plan:   Patient is slightly febrile, with leukocytosis  HD schedule MWF   Level came back supratherapeutic at 27.9, Dose will be held; also nephro note states patient refusing to go to dialysis today. Will tentatively schedule pre-HD level for next scheduled hd day, Wednesday,  and will continue to monitor incase of any changes  Tentatively scheduling Pre-HD level for  @ 0600  Labs ordered through   Antimicrobial stop date 7 days per consult (last day )

## 2024-09-16 NOTE — PROGRESS NOTES
PROGRESS NOTE      Chief Complaints:  Vascular follow-up  HPI and  Objective:    No new issues.  Left foot is cool to touch however Doppler signal present.  Denies any chest pain shortness of breath.  Review of Systems:  Rest of review of system reviewed personally and they are negative.    EXAM:  BP (!) 124/49 Comment: Alley LYON notified  Pulse 68   Temp 99.1 °F (37.3 °C) (Axillary)   Resp 18   Ht 1.676 m (5' 6\")   Wt 83.4 kg (183 lb 13.8 oz)   SpO2 97%   BMI 29.68 kg/m²     Patient is awake   Head and neck atraumatic, normocephalic.  ENT: No hoarse voice, gaze appropriate.  Cardiac system regular rate rhythm.  Pulmonary no audible wheeze, no cyanosis.  Chest wall excursion normal with respiration cycle  Abdomen is soft not particularly distended.  Neurologically nonfocal.  Hematology system: No bruising noted.  Musculoskeletal system: No joint deformity noted.  Genitourinary system: No hematuria noted.  Skin is warm and moist.  Psychosocial: Cooperative.  Vascular examination as previously noted no changes.    Current Facility-Administered Medications   Medication Dose Route Frequency Provider Last Rate Last Admin    dextrose 10 % infusion   IntraVENous Continuous Priscila Sparks MD 30 mL/hr at 09/15/24 2139 Rate Change at 09/15/24 2139    sodium zirconium cyclosilicate (LOKELMA) oral suspension 10 g  10 g Oral TID Janet Daily MD   10 g at 09/15/24 2141    insulin lispro (HUMALOG,ADMELOG) injection vial 0-4 Units  0-4 Units SubCUTAneous TID  Janet Daily MD   3 Units at 09/15/24 1733    meropenem (MERREM) 1,000 mg in sodium chloride 0.9 % 100 mL IVPB (mini-bag)  1,000 mg IntraVENous Q12H Gui Elizalde MD   Stopped at 09/16/24 0637    azithromycin (ZITHROMAX) 250 mg in sodium chloride 0.9 % 250 mL IVPB  250 mg IntraVENous Q24H Gui Elizalde MD   Stopped at 09/15/24 2245    vancomycin (VANCOCIN) intermittent dosing (placeholder)   Other RX Placeholder Priscila Sparks MD        traMADol

## 2024-09-16 NOTE — PROGRESS NOTES
History and Physical    NAME:   Brii Lambert   :  1960   MRN:  [id number]     Date/Time: 2024 11:32 AM    Patient PCP: Janet Daily MD  ______________________________________________________________________       Subjective:     CHIEF COMPLAINT:     Abdominal pain, Pneumonia     HISTORY OF PRESENT ILLNESS:     General Daily Progress Note  Patient is a 64 y.o. year old female past medical history of end-stage renal disease on hemodialysis, hypertension, chronic bilateral cerebellar and pontine strokes, diabetic neuropathy presented to the ER yesterday for evaluation of abdominal, lower extremity pain. Pt was recently discharged from [place] and sent to [place] for rehab.  Patient reportedly missed several days of dialysis due to abdominal pain. Patient states she has not had a bowel movement in 14-15 days. Patient missed Monday dialysis and O2 sat was 70% on 10 L nonrebreather mask with grunting.  Patient seen in the ED, white count 31.1, potassium 5.9, BUN 78, creatinine 7.1 hemoglobin 7.0.    CTA of the abdomen  1. Occlusion of the infrarenal abdominal aorta. There is weak reconstitution of  the bilateral iliac trunks.  2. Penetrating atherosclerotic ulceration of the descending thoracic aorta.  3. 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 The left renal artery is occluded,  with left renal atrophy.  4.Multifocal stenosis of the right SFA, with occlusion of the right posterior  tibial artery.  5. Multifocal airspace disease favoring pneumonia.  6. Small left pleural effusion.  7. The bladder is severely distended. Any clinical concern for urinary retention  or urinary outlet obstruction?    ER doctor spoke with general surgeon determined artery disease chronic.  Patient seen in ED seems confused at times. Pt states she is wheelchair-bound normally. Denies back pain. No LE swelling.   Patient be admitted for further evaluation and treatment.    Consulted by Nephrology - pt came in with

## 2024-09-16 NOTE — PROGRESS NOTES
I have seen patient at the bedside  She looks lethargic  No hypoglycemia    Hyponatremia  Hyperkalemia  Hyperphosphatemia    She refused to go for dialysis  I had tried to convince her and made her aware about the consequences of not going for dialysis.  Floor nurse did speak with her as well.    I have called her sister and requested her to speak with Ms. Lambert.

## 2024-09-16 NOTE — PROGRESS NOTES
Nephrology follow-up          Patient: Brii Lambert MRN: [id number]  SSN: [id number]    YOB: 1960  Age: 64 y.o.  Sex: female      Subjective:     I have seen patient at the bedside  She looks lethargic  No hypoglycemia     Hyponatremia  Hyperkalemia  Hyperphosphatemia     She refused to go for dialysis  I had tried to convince her and made her aware about the consequences of not going for dialysis.  Floor nurse did speak with her as well.     I have called her sister and requested her to speak with Ms. Lambert.      No past medical history on file.  No past surgical history on file.   No family history on file.  Social History     Tobacco Use    Smoking status: Former     Current packs/day: 0.00     Types: Cigarettes     Quit date:      Years since quittin.7    Smokeless tobacco: Former   Substance Use Topics    Alcohol use: Not on file      Current Facility-Administered Medications   Medication Dose Route Frequency Provider Last Rate Last Admin    dextrose 10 % infusion   IntraVENous Continuous Priscila Sparks MD 30 mL/hr at 24 1132 New Bag at 24 1132    sodium zirconium cyclosilicate (LOKELMA) oral suspension 10 g  10 g Oral TID Janet Daily MD   10 g at 24 0910    insulin lispro (HUMALOG,ADMELOG) injection vial 0-4 Units  0-4 Units SubCUTAneous TID WC Janet Daily MD   1 Units at 24 1154    meropenem (MERREM) 1,000 mg in sodium chloride 0.9 % 100 mL IVPB (mini-bag)  1,000 mg IntraVENous Q12H Gui Elizalde MD   Stopped at 24 0637    azithromycin (ZITHROMAX) 250 mg in sodium chloride 0.9 % 250 mL IVPB  250 mg IntraVENous Q24H Gui Elizalde MD   Stopped at 09/15/24 2245    vancomycin (VANCOCIN) intermittent dosing (placeholder)   Other RX Placeholder Priscila Sparks MD        traMADol (ULTRAM) tablet 50 mg  50 mg Oral Q6H PRN Janet Daily MD   50 mg at 24 1217    0.9 % sodium chloride infusion   IntraVENous PRN Killian

## 2024-09-16 NOTE — PROGRESS NOTES
PT eval order received and acknowledged. Pt screened and is currently presenting with baseline total care for functional mobility/transfers. Per pt report she does not ambulate and does not get to WC or EOB. Pt demonstrates diminished light touch bilateral LE as well as PF contractures, unable to reach neutral DF. PT evaluation order will be discontinued at this time as pt has no acute PT needs. Please reorder PT if pt functional status changes. Thank you.    [facility] AM-PAC™ “6 Clicks”         Basic Mobility Inpatient Short Form  How much difficulty does the patient currently have... Unable A Lot A Little None   1.  Turning over in bed (including adjusting bedclothes, sheets and blankets)?   [] 1   [x] 2   [] 3   [] 4   2.  Sitting down on and standing up from a chair with arms ( e.g., wheelchair, bedside commode, etc.)   [x] 1   [] 2   [] 3   [] 4   3.  Moving from lying on back to sitting on the side of the bed?   [x] 1   [] 2   [] 3   [] 4          How much help from another person does the patient currently need... Total A Lot A Little None   4.  Moving to and from a bed to a chair (including a wheelchair)?   [x] 1   [] 2   [] 3   [] 4   5.  Need to walk in hospital room?   [x] 1   [] 2   [] 3   [] 4   6.  Climbing 3-5 steps with a railing?   [x] 1   [] 2   [] 3   [] 4   © 2007, Trustees of [facility], under license to [employer]. All rights reserved     Score:  Initial: 7/24 Most Recent: X (Date: 9/16/2024 )   Interpretation of Tool:  Represents activities that are increasingly more difficult (i.e. Bed mobility, Transfers, Gait).  Score 24 23 22-20 19-15 14-10 9-7 6   Modifier CH CI CJ CK CL CM CN

## 2024-09-16 NOTE — PROGRESS NOTES
Progress Note  Date:2024       Room:[facility]  Patient Name:Brii Lambert     YOB: 1960     Age:64 y.o.        Subjective    Subjective   Patient followed for presumptive UTI with contaminated urine culture and presumptive Mycoplasma pneumonia. She remains afebrile but WBC remains elevated and ESR and CRP increased.     Objective         Vitals Last 24 Hours:  TEMPERATURE:  Temp  Av.5 °F (37.5 °C)  Min: 98.8 °F (37.1 °C)  Max: 100.9 °F (38.3 °C)  RESPIRATIONS RANGE: Resp  Av.8  Min: 17  Max: 18  PULSE OXIMETRY RANGE: SpO2  Av.8 %  Min: 96 %  Max: 100 %  PULSE RANGE: Pulse  Av.6  Min: 68  Max: 78  BLOOD PRESSURE RANGE: Systolic (24hrs), Av , Min:114 , Max:144   ; Diastolic (24hrs), Av, Min:41, Max:104          Objective:  Vital signs: (most recent): Blood pressure (!) 122/41, pulse 74, temperature 99.7 °F (37.6 °C), temperature source Oral, resp. rate 18, height 1.676 m (5' 6\"), weight 83.4 kg (183 lb 13.8 oz), SpO2 100%.      Vitals and nursing note reviewed.   Constitutional:       General: She is not in acute distress.     Appearance: She is ill-appearing.   HENT:      Head: Normocephalic and atraumatic.      Right Ear: External ear normal.      Left Ear: External ear normal.      Nose: Nose normal.      Mouth/Throat:      Mouth: Mucous membranes are dry.   Eyes:      Pupils: Pupils are equal, round, and reactive to light.   Cardiovascular:      Rate and Rhythm: Normal rate and regular rhythm.      Heart sounds: No murmur heard.  Pulmonary:      Effort: Pulmonary effort is normal.      Breath sounds: Normal breath sounds.   Chest:           Comments: Right sided Permacath with small area of erythema at the insertion site  Abdominal:      General: Bowel sounds are normal. There is no distension.      Palpations: Abdomen is soft.      Tenderness: There is abdominal tenderness. There is guarding.   Genitourinary:     Comments: No Ortez catheter  Musculoskeletal:       signed by Gui Elizalde MD

## 2024-09-17 ENCOUNTER — APPOINTMENT (OUTPATIENT)
Facility: HOSPITAL | Age: 64
DRG: 193 | End: 2024-09-17
Attending: INTERNAL MEDICINE
Payer: MEDICARE

## 2024-09-17 LAB
ALBUMIN SERPL-MCNC: 1.4 G/DL (ref 3.5–5)
ANION GAP SERPL CALC-SCNC: 14 MMOL/L (ref 2–12)
APPEARANCE UR: ABNORMAL
BACTERIA URNS QL MICRO: ABNORMAL /HPF
BILIRUB UR QL: NEGATIVE
BUN SERPL-MCNC: 49 MG/DL (ref 6–20)
BUN/CREAT SERPL: 9 (ref 12–20)
CA-I BLD-MCNC: 7.2 MG/DL (ref 8.5–10.1)
CHLORIDE SERPL-SCNC: 96 MMOL/L (ref 97–108)
CO2 SERPL-SCNC: 22 MMOL/L (ref 21–32)
COLOR UR: ABNORMAL
CREAT SERPL-MCNC: 5.67 MG/DL (ref 0.55–1.02)
EPITH CASTS URNS QL MICRO: ABNORMAL /LPF
GLUCOSE BLD STRIP.AUTO-MCNC: 145 MG/DL (ref 65–100)
GLUCOSE BLD STRIP.AUTO-MCNC: 152 MG/DL (ref 65–100)
GLUCOSE BLD STRIP.AUTO-MCNC: 167 MG/DL (ref 65–100)
GLUCOSE BLD STRIP.AUTO-MCNC: 169 MG/DL (ref 65–100)
GLUCOSE SERPL-MCNC: 157 MG/DL (ref 65–100)
GLUCOSE UR STRIP.AUTO-MCNC: NEGATIVE MG/DL
HGB UR QL STRIP: ABNORMAL
KETONES UR QL STRIP.AUTO: NEGATIVE MG/DL
LEUKOCYTE ESTERASE UR QL STRIP.AUTO: ABNORMAL
NITRITE UR QL STRIP.AUTO: NEGATIVE
PERFORMED BY:: ABNORMAL
PH UR STRIP: 5 (ref 5–8)
PHOSPHATE SERPL-MCNC: 9 MG/DL (ref 2.6–4.7)
POTASSIUM SERPL-SCNC: 4.4 MMOL/L (ref 3.5–5.1)
PROT UR STRIP-MCNC: 100 MG/DL
RBC #/AREA URNS HPF: >100 /HPF (ref 0–5)
SODIUM SERPL-SCNC: 132 MMOL/L (ref 136–145)
SP GR UR REFRACTOMETRY: 1.02 (ref 1–1.03)
UROBILINOGEN UR QL STRIP.AUTO: 0.1 EU/DL (ref 0.1–1)
WBC CASTS URNS QL MICRO: PRESENT
WBC URNS QL MICRO: >100 /HPF (ref 0–4)
YEAST URNS QL MICRO: PRESENT

## 2024-09-17 PROCEDURE — 6360000002 HC RX W HCPCS: Performed by: INTERNAL MEDICINE

## 2024-09-17 PROCEDURE — 82962 GLUCOSE BLOOD TEST: CPT

## 2024-09-17 PROCEDURE — 99232 SBSQ HOSP IP/OBS MODERATE 35: CPT | Performed by: INTERNAL MEDICINE

## 2024-09-17 PROCEDURE — 87106 FUNGI IDENTIFICATION YEAST: CPT

## 2024-09-17 PROCEDURE — 1100000000 HC RM PRIVATE

## 2024-09-17 PROCEDURE — 2580000003 HC RX 258: Performed by: NURSE PRACTITIONER

## 2024-09-17 PROCEDURE — 81001 URINALYSIS AUTO W/SCOPE: CPT

## 2024-09-17 PROCEDURE — 6370000000 HC RX 637 (ALT 250 FOR IP): Performed by: NURSE PRACTITIONER

## 2024-09-17 PROCEDURE — 6370000000 HC RX 637 (ALT 250 FOR IP): Performed by: FAMILY MEDICINE

## 2024-09-17 PROCEDURE — 51701 INSERT BLADDER CATHETER: CPT

## 2024-09-17 PROCEDURE — 87086 URINE CULTURE/COLONY COUNT: CPT

## 2024-09-17 PROCEDURE — 2580000003 HC RX 258: Performed by: INTERNAL MEDICINE

## 2024-09-17 PROCEDURE — 6370000000 HC RX 637 (ALT 250 FOR IP): Performed by: INTERNAL MEDICINE

## 2024-09-17 PROCEDURE — 99232 SBSQ HOSP IP/OBS MODERATE 35: CPT | Performed by: SURGERY

## 2024-09-17 PROCEDURE — 80069 RENAL FUNCTION PANEL: CPT

## 2024-09-17 PROCEDURE — 36589 REMOVAL TUNNELED CV CATH: CPT

## 2024-09-17 PROCEDURE — P9047 ALBUMIN (HUMAN), 25%, 50ML: HCPCS | Performed by: HOSPITALIST

## 2024-09-17 PROCEDURE — 36415 COLL VENOUS BLD VENIPUNCTURE: CPT

## 2024-09-17 PROCEDURE — 6360000002 HC RX W HCPCS: Performed by: HOSPITALIST

## 2024-09-17 RX ORDER — ALBUMIN (HUMAN) 12.5 G/50ML
25 SOLUTION INTRAVENOUS ONCE
Status: COMPLETED | OUTPATIENT
Start: 2024-09-17 | End: 2024-09-17

## 2024-09-17 RX ORDER — MIDODRINE HYDROCHLORIDE 5 MG/1
10 TABLET ORAL 3 TIMES DAILY
Status: DISCONTINUED | OUTPATIENT
Start: 2024-09-17 | End: 2024-09-21 | Stop reason: HOSPADM

## 2024-09-17 RX ADMIN — SODIUM CHLORIDE, PRESERVATIVE FREE 10 ML: 5 INJECTION INTRAVENOUS at 21:45

## 2024-09-17 RX ADMIN — SEVELAMER CARBONATE 1600 MG: 800 TABLET, FILM COATED ORAL at 12:11

## 2024-09-17 RX ADMIN — MICAFUNGIN 100 MG: 20 INJECTION, POWDER, LYOPHILIZED, FOR SOLUTION INTRAVENOUS at 22:23

## 2024-09-17 RX ADMIN — ALBUMIN (HUMAN) 25 G: 0.25 INJECTION, SOLUTION INTRAVENOUS at 09:58

## 2024-09-17 RX ADMIN — MIDODRINE HYDROCHLORIDE 10 MG: 5 TABLET ORAL at 16:41

## 2024-09-17 RX ADMIN — SENNOSIDES AND DOCUSATE SODIUM 1 TABLET: 50; 8.6 TABLET ORAL at 09:28

## 2024-09-17 RX ADMIN — SENNOSIDES AND DOCUSATE SODIUM 1 TABLET: 50; 8.6 TABLET ORAL at 21:47

## 2024-09-17 RX ADMIN — FLUCONAZOLE IN SODIUM CHLORIDE 100 MG: 2 INJECTION, SOLUTION INTRAVENOUS at 13:03

## 2024-09-17 RX ADMIN — SEVELAMER CARBONATE 1600 MG: 800 TABLET, FILM COATED ORAL at 09:28

## 2024-09-17 RX ADMIN — MEROPENEM 1000 MG: 1 INJECTION INTRAVENOUS at 12:09

## 2024-09-17 RX ADMIN — MIDODRINE HYDROCHLORIDE 10 MG: 5 TABLET ORAL at 09:28

## 2024-09-17 RX ADMIN — AZITHROMYCIN DIHYDRATE 250 MG: 500 INJECTION, POWDER, LYOPHILIZED, FOR SOLUTION INTRAVENOUS at 23:35

## 2024-09-17 RX ADMIN — ASPIRIN 81 MG: 81 TABLET, COATED ORAL at 09:28

## 2024-09-17 RX ADMIN — SODIUM ZIRCONIUM CYCLOSILICATE 10 G: 10 POWDER, FOR SUSPENSION ORAL at 09:41

## 2024-09-17 RX ADMIN — MEROPENEM 1000 MG: 1 INJECTION INTRAVENOUS at 21:37

## 2024-09-17 RX ADMIN — CETIRIZINE HYDROCHLORIDE 10 MG: 10 TABLET, FILM COATED ORAL at 09:28

## 2024-09-17 RX ADMIN — ACETAMINOPHEN 650 MG: 325 TABLET ORAL at 02:09

## 2024-09-17 RX ADMIN — MIDODRINE HYDROCHLORIDE 10 MG: 5 TABLET ORAL at 12:11

## 2024-09-17 ASSESSMENT — PAIN SCALES - GENERAL
PAINLEVEL_OUTOF10: 0
PAINLEVEL_OUTOF10: 5
PAINLEVEL_OUTOF10: 0
PAINLEVEL_OUTOF10: 0

## 2024-09-17 ASSESSMENT — PAIN - FUNCTIONAL ASSESSMENT: PAIN_FUNCTIONAL_ASSESSMENT: ACTIVITIES ARE NOT PREVENTED

## 2024-09-17 ASSESSMENT — PAIN DESCRIPTION - LOCATION: LOCATION: ABDOMEN

## 2024-09-17 NOTE — PROGRESS NOTES
Nephrology follow-up          Patient: Brii Lambert MRN: [id number]  SSN: [id number]    YOB: 1960  Age: 64 y.o.  Sex: female      Subjective:     I have seen patient at the bedside  She looks lethargic  No hypoglycemia     Hyponatremia  Hyperkalemia  Hyperphosphatemia     She was dialyzed yesterday.      No past medical history on file.  No past surgical history on file.   No family history on file.  Social History     Tobacco Use    Smoking status: Former     Current packs/day: 0.00     Types: Cigarettes     Quit date:      Years since quittin.7    Smokeless tobacco: Former   Substance Use Topics    Alcohol use: Not on file      Current Facility-Administered Medications   Medication Dose Route Frequency Provider Last Rate Last Admin    midodrine (PROAMATINE) tablet 10 mg  10 mg Oral TID Priscila Sparks MD   10 mg at 24 1641    sevelamer (RENVELA) tablet 1,600 mg  1,600 mg Oral TID  Priscila Sparks MD   1,600 mg at 24 1211    [START ON 2024] vancomycin- draw pre-HD level on  @ 0600   Other RX Placeholder Priscila Sparks MD        insulin lispro (HUMALOG,ADMELOG) injection vial 0-4 Units  0-4 Units SubCUTAneous TID Janet Villanueva MD   1 Units at 24 1154    meropenem (MERREM) 1,000 mg in sodium chloride 0.9 % 100 mL IVPB (mini-bag)  1,000 mg IntraVENous Q12H Gui Elizalde MD   Stopped at 24 1235    azithromycin (ZITHROMAX) 250 mg in sodium chloride 0.9 % 250 mL IVPB  250 mg IntraVENous Q24H Gui Elizalde MD   Stopped at 24 0028    vancomycin (VANCOCIN) intermittent dosing (placeholder)   Other RX Placeholder Priscila Sparks MD        traMADol (ULTRAM) tablet 50 mg  50 mg Oral Q6H PRN Janet Daily MD   50 mg at 24 1217    0.9 % sodium chloride infusion   IntraVENous PRN Janet Daily MD        fluconazole (DIFLUCAN) 100 mg IVPB  100 mg IntraVENous Q24H Gui Elizalde  mL/hr at 24 1303 100 mg at

## 2024-09-17 NOTE — PROGRESS NOTES
History and Physical    NAME:   Brii Lambert   :  1960   MRN:  [id number]     Date/Time: 2024 11:45 AM    Patient PCP: Janet Daily MD  ______________________________________________________________________       Subjective:     CHIEF COMPLAINT:     Abdominal pain, Pneumonia     HISTORY OF PRESENT ILLNESS:     General Daily Progress Note  Patient is a 64 y.o. year old female past medical history of end-stage renal disease on hemodialysis, hypertension, chronic bilateral cerebellar and pontine strokes, diabetic neuropathy presented to the ER yesterday for evaluation of abdominal, lower extremity pain. Pt was recently discharged from [facility] and sent to [facility] for rehab.  Patient reportedly missed several days of dialysis due to abdominal pain. Patient states she has not had a bowel movement in 14-15 days. Patient missed Monday dialysis and O2 sat was 70% on 10 L nonrebreather mask with grunting.  Patient seen in the ED, white count 31.1, potassium 5.9, BUN 78, creatinine 7.1 hemoglobin 7.0.    CTA of the abdomen  1. Occlusion of the infrarenal abdominal aorta. There is weak reconstitution of  the bilateral iliac trunks.  2. Penetrating atherosclerotic ulceration of the descending thoracic aorta.  3. 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 The left renal artery is occluded,  with left renal atrophy.  4.Multifocal stenosis of the right SFA, with occlusion of the right posterior  tibial artery.  5. Multifocal airspace disease favoring pneumonia.  6. Small left pleural effusion.  7. The bladder is severely distended. Any clinical concern for urinary retention  or urinary outlet obstruction?    ER doctor spoke with general surgeon determined artery disease chronic.  Patient seen in ED seems confused at times. Pt states she is wheelchair-bound normally. Denies back pain. No LE swelling.   Patient be admitted for further evaluation and treatment.    Consulted by Nephrology - pt came in with  IVPB (mini-bag), 1,000 mg, IntraVENous, Q12H, Gui Elizalde MD, Stopped at 09/16/24 1930    azithromycin (ZITHROMAX) 250 mg in sodium chloride 0.9 % 250 mL IVPB, 250 mg, IntraVENous, Q24H, Gui Elizalde MD, Stopped at 09/17/24 0028    vancomycin (VANCOCIN) intermittent dosing (placeholder), , Other, RX Placeholder, Priscila Sparks MD    traMADol (ULTRAM) tablet 50 mg, 50 mg, Oral, Q6H PRN, Janet Daily MD, 50 mg at 09/13/24 1217    0.9 % sodium chloride infusion, , IntraVENous, PRN, Janet Daily MD    fluconazole (DIFLUCAN) 100 mg IVPB, 100 mg, IntraVENous, Q24H, Gui Elizalde MD, Last Rate: 100 mL/hr at 09/16/24 1209, 100 mg at 09/16/24 1209    0.9 % sodium chloride infusion, , IntraVENous, PRN, Priscila Sparks MD    heparin (porcine) injection 3,600 Units, 3,600 Units, IntraCATHeter, PRN, Priscila Sparks MD, 3,600 Units at 09/16/24 1847    glucose chewable tablet 16 g, 4 tablet, Oral, PRN, Gautam Centeno MD    dextrose bolus 10% 125 mL, 125 mL, IntraVENous, PRN, Stopped at 09/13/24 0753 **OR** dextrose bolus 10% 250 mL, 250 mL, IntraVENous, PRN, Gautam Centeno MD    glucagon injection 1 mg, 1 mg, SubCUTAneous, PRN, Gautam Centeno MD, 1 mg at 09/15/24 0817    dextrose 10 % infusion, , IntraVENous, Continuous PRN, Gautam Centeno MD    epoetin saul-epbx (RETACRIT) injection 10,000 Units, 10,000 Units, IntraVENous, Once per day on Monday Wednesday Friday, Priscila Sparks MD, 10,000 Units at 09/16/24 1846    acetaminophen (TYLENOL) tablet 650 mg, 650 mg, Oral, Q6H PRN, Janet Daily MD, 650 mg at 09/17/24 0209    insulin lispro (HUMALOG,ADMELOG) injection vial 0-4 Units, 0-4 Units, SubCUTAneous, Nightly, Radha Keating APRN - CNP, 0 Units at 09/13/24 2050    aspirin EC tablet 81 mg, 81 mg, Oral, Daily, Radha Keating APRN - CNP, 81 mg at 09/17/24 0928    cetirizine (ZYRTEC) tablet 10 mg, 10 mg, Oral, Daily, Radha Keating APRN - CNP, 10 mg at 09/17/24 0928    oxyCODONE  (ROXICODONE) immediate release tablet 5 mg, 5 mg, Oral, Q6H PRN, Radha Keating APRN - CNP, 5 mg at 09/16/24 1904    sodium chloride flush 0.9 % injection 5-40 mL, 5-40 mL, IntraVENous, 2 times per day, Radha Keating APRN - CNP, 10 mL at 09/16/24 0912    sodium chloride flush 0.9 % injection 5-40 mL, 5-40 mL, IntraVENous, PRN, Radha Keating APRN - CNP    0.9 % sodium chloride infusion, , IntraVENous, PRN, Radha Keating APRN - CNP, Last Rate: 10 mL/hr at 09/16/24 1902, Restarted at 09/16/24 1902    ondansetron (ZOFRAN-ODT) disintegrating tablet 4 mg, 4 mg, Oral, Q8H PRN **OR** ondansetron (ZOFRAN) injection 4 mg, 4 mg, IntraVENous, Q6H PRN, Radha Keating APRN - CNP, 4 mg at 09/16/24 2256    polyethylene glycol (GLYCOLAX) packet 17 g, 17 g, Oral, Daily PRN, Radha Keating APRN - CNP    acetaminophen (TYLENOL) tablet 650 mg, 650 mg, Oral, Q6H PRN, 650 mg at 09/15/24 1741 **OR** acetaminophen (TYLENOL) suppository 650 mg, 650 mg, Rectal, Q6H PRN, Radha Keating APRN - CNP    sennosides-docusate sodium (SENOKOT-S) 8.6-50 MG tablet 1 tablet, 1 tablet, Oral, BID, Radha Keating APRN - CNP, 1 tablet at 09/17/24 0928    LAB DATA REVIEWED:    Recent Results (from the past 24 hour(s))   POCT Glucose    Collection Time: 09/16/24  8:11 PM   Result Value Ref Range    POC Glucose 206 (H) 65 - 100 mg/dL    Performed by: AYDEE GRIDER    Renal Function Panel    Collection Time: 09/17/24  6:58 AM   Result Value Ref Range    Sodium 132 (L) 136 - 145 mmol/L    Potassium 4.4 3.5 - 5.1 mmol/L    Chloride 96 (L) 97 - 108 mmol/L    CO2 22 21 - 32 mmol/L    Anion Gap 14 (H) 2 - 12 mmol/L    Glucose 157 (H) 65 - 100 mg/dL    BUN 49 (H) 6 - 20 mg/dL    Creatinine 5.67 (H) 0.55 - 1.02 mg/dL    BUN/Creatinine Ratio 9 (L) 12 - 20      Est, Glom Filt Rate 8 (L) >60 ml/min/1.73m2    Calcium 7.2 (L) 8.5 - 10.1 mg/dL    Phosphorus 9.0 (H) 2.6 - 4.7 mg/dL    Albumin 1.4 (L) 3.5 - 5.0 g/dL   POCT Glucose

## 2024-09-17 NOTE — PLAN OF CARE
Problem: Discharge Planning  Goal: Discharge to home or other facility with appropriate resources  Outcome: Progressing     Problem: Skin/Tissue Integrity  Goal: Absence of new skin breakdown  Description: 1.  Monitor for areas of redness and/or skin breakdown  2.  Assess vascular access sites hourly  3.  Every 4-6 hours minimum:  Change oxygen saturation probe site  4.  Every 4-6 hours:  If on nasal continuous positive airway pressure, respiratory therapy assess nares and determine need for appliance change or resting period.  Outcome: Progressing     Problem: Pain  Goal: Verbalizes/displays adequate comfort level or baseline comfort level  Recent Flowsheet Documentation  Taken 9/17/2024 0519 by Adri Matos, RN  Verbalizes/displays adequate comfort level or baseline comfort level: Encourage patient to monitor pain and request assistance

## 2024-09-17 NOTE — CARE COORDINATION
Chart reviewed. Discharge plan remains to return to [facility] once medically stable.

## 2024-09-17 NOTE — PROGRESS NOTES
PROGRESS NOTE      Chief Complaints:  No new issues overnight.  HPI and  Objective:    Vascular follow-up,  Denies chest pain shortness of breath abdominal pain.  Review of Systems:  Rest of review of system reviewed personally and they are negative.    EXAM:  BP (!) 99/48   Pulse 72   Temp 98.1 °F (36.7 °C) (Oral)   Resp 16   Ht 1.676 m (5' 6\")   Wt 83.4 kg (183 lb 13.8 oz)   SpO2 96%   BMI 29.68 kg/m²     Patient is awake   Head and neck atraumatic, normocephalic.  ENT: No hoarse voice, gaze appropriate.  Cardiac system regular rate rhythm.  Pulmonary no audible wheeze, no cyanosis.  Chest wall excursion normal with respiration cycle  Abdomen is soft not particularly distended.  Neurologically nonfocal.  Hematology system: No bruising noted.  Musculoskeletal system: No joint deformity noted.  Genitourinary system: No hematuria noted.  Skin is warm and moist.  Psychosocial: Cooperative.  Vascular examination as previously noted no changes.    Current Facility-Administered Medications   Medication Dose Route Frequency Provider Last Rate Last Admin    albumin human 25% IV solution 25 g  25 g IntraVENous Once Jose Cruz Moore  mL/hr at 09/17/24 0958 25 g at 09/17/24 0958    midodrine (PROAMATINE) tablet 10 mg  10 mg Oral TID Priscila Sparks MD   10 mg at 09/17/24 0928    sevelamer (RENVELA) tablet 1,600 mg  1,600 mg Oral TID  Priscila Sparks MD   1,600 mg at 09/17/24 0928    [START ON 9/18/2024] vancomycin- draw pre-HD level on 9/18 @ 0600   Other RX Placeholder Priscila Sparks MD        sodium zirconium cyclosilicate (LOKELMA) oral suspension 10 g  10 g Oral TID Janet Daily MD   10 g at 09/17/24 0941    insulin lispro (HUMALOG,ADMELOG) injection vial 0-4 Units  0-4 Units SubCUTAneous TID  Janet Daily MD   1 Units at 09/16/24 1154    meropenem (MERREM) 1,000 mg in sodium chloride 0.9 % 100 mL IVPB (mini-bag)  1,000 mg IntraVENous Q12H Gui Elizalde MD   Stopped at 09/16/24 1930     09/16/24 1904    sodium chloride flush 0.9 % injection 5-40 mL  5-40 mL IntraVENous 2 times per day Radha Keating APRN - CNP   10 mL at 09/16/24 0912    sodium chloride flush 0.9 % injection 5-40 mL  5-40 mL IntraVENous PRN Radha Kaeting APRN - CNP        0.9 % sodium chloride infusion   IntraVENous PRRadha Voss APRN - CNP 10 mL/hr at 09/16/24 1902 Restarted at 09/16/24 1902    ondansetron (ZOFRAN-ODT) disintegrating tablet 4 mg  4 mg Oral Q8H PRRadha Voss APRN - CNP        Or    ondansetron (ZOFRAN) injection 4 mg  4 mg IntraVENous Q6H PRN Radha Keating APRN - CNP   4 mg at 09/16/24 2256    polyethylene glycol (GLYCOLAX) packet 17 g  17 g Oral Daily PRN Radha Keating APRN - CNP        acetaminophen (TYLENOL) tablet 650 mg  650 mg Oral Q6H PRN Radha Keating APRN - CNP   650 mg at 09/15/24 1741    Or    acetaminophen (TYLENOL) suppository 650 mg  650 mg Rectal Q6H PRRadha Voss APRN - CNP        sennosides-docusate sodium (SENOKOT-S) 8.6-50 MG tablet 1 tablet  1 tablet Oral BID Radha Keating APRN - CNP   1 tablet at 09/17/24 0928           Recent Results (from the past 24 hour(s))   POCT Glucose    Collection Time: 09/16/24 11:42 AM   Result Value Ref Range    POC Glucose 247 (H) 65 - 100 mg/dL    Performed by: Nargis Ceballos    POCT Glucose    Collection Time: 09/16/24  8:11 PM   Result Value Ref Range    POC Glucose 206 (H) 65 - 100 mg/dL    Performed by: AYDEE GRIDER    Renal Function Panel    Collection Time: 09/17/24  6:58 AM   Result Value Ref Range    Sodium 132 (L) 136 - 145 mmol/L    Potassium 4.4 3.5 - 5.1 mmol/L    Chloride 96 (L) 97 - 108 mmol/L    CO2 22 21 - 32 mmol/L    Anion Gap 14 (H) 2 - 12 mmol/L    Glucose 157 (H) 65 - 100 mg/dL    BUN 49 (H) 6 - 20 mg/dL    Creatinine 5.67 (H) 0.55 - 1.02 mg/dL    BUN/Creatinine Ratio 9 (L) 12 - 20      Est, Glom Filt Rate 8 (L) >60 ml/min/1.73m2    Calcium 7.2 (L) 8.5 - 10.1 mg/dL    Phosphorus

## 2024-09-17 NOTE — PROGRESS NOTES
0494 Paged Teresa\" patients bp is 90/50. Did you want ot do anything about it?    0530 MD said he will place orders for albumin.

## 2024-09-18 ENCOUNTER — APPOINTMENT (OUTPATIENT)
Facility: HOSPITAL | Age: 64
DRG: 193 | End: 2024-09-18
Attending: INTERNAL MEDICINE
Payer: MEDICARE

## 2024-09-18 ENCOUNTER — APPOINTMENT (OUTPATIENT)
Facility: HOSPITAL | Age: 64
DRG: 193 | End: 2024-09-18
Payer: MEDICARE

## 2024-09-18 LAB
ANION GAP SERPL CALC-SCNC: 14 MMOL/L (ref 2–12)
BACTERIA SPEC CULT: NORMAL
BACTERIA SPEC CULT: NORMAL
BUN SERPL-MCNC: 55 MG/DL (ref 6–20)
BUN/CREAT SERPL: 8 (ref 12–20)
CA-I BLD-MCNC: 7.2 MG/DL (ref 8.5–10.1)
CHLORIDE SERPL-SCNC: 96 MMOL/L (ref 97–108)
CO2 SERPL-SCNC: 21 MMOL/L (ref 21–32)
CREAT SERPL-MCNC: 6.86 MG/DL (ref 0.55–1.02)
CRP SERPL-MCNC: 30.1 MG/DL (ref 0–0.3)
DATE LAST DOSE: NORMAL
DOSE AMOUNT: NORMAL UNITS
ERYTHROCYTE [DISTWIDTH] IN BLOOD BY AUTOMATED COUNT: 18.2 % (ref 11.5–14.5)
GLUCOSE BLD STRIP.AUTO-MCNC: 112 MG/DL (ref 65–100)
GLUCOSE BLD STRIP.AUTO-MCNC: 129 MG/DL (ref 65–100)
GLUCOSE BLD STRIP.AUTO-MCNC: 139 MG/DL (ref 65–100)
GLUCOSE BLD STRIP.AUTO-MCNC: 149 MG/DL (ref 65–100)
GLUCOSE SERPL-MCNC: 113 MG/DL (ref 65–100)
HCT VFR BLD AUTO: 22.4 % (ref 35–47)
HGB BLD-MCNC: 7.2 G/DL (ref 11.5–16)
LACTATE SERPL-SCNC: 0.9 MMOL/L (ref 0.4–2)
LACTATE SERPL-SCNC: 1.4 MMOL/L (ref 0.4–2)
Lab: NORMAL
Lab: NORMAL
MCH RBC QN AUTO: 26.9 PG (ref 26–34)
MCHC RBC AUTO-ENTMCNC: 32.1 G/DL (ref 30–36.5)
MCV RBC AUTO: 83.6 FL (ref 80–99)
NRBC # BLD: 0.02 K/UL (ref 0–0.01)
NRBC BLD-RTO: 0.1 PER 100 WBC
PERFORMED BY:: ABNORMAL
PLATELET # BLD AUTO: 217 K/UL (ref 150–400)
PMV BLD AUTO: 9 FL (ref 8.9–12.9)
POTASSIUM SERPL-SCNC: 4.6 MMOL/L (ref 3.5–5.1)
PROCALCITONIN SERPL-MCNC: 98.09 NG/ML
RBC # BLD AUTO: 2.68 M/UL (ref 3.8–5.2)
SODIUM SERPL-SCNC: 131 MMOL/L (ref 136–145)
VANCOMYCIN SERPL-MCNC: 18.1 UG/ML
WBC # BLD AUTO: 24 K/UL (ref 3.6–11)

## 2024-09-18 PROCEDURE — 87040 BLOOD CULTURE FOR BACTERIA: CPT

## 2024-09-18 PROCEDURE — 6370000000 HC RX 637 (ALT 250 FOR IP): Performed by: NURSE PRACTITIONER

## 2024-09-18 PROCEDURE — 76937 US GUIDE VASCULAR ACCESS: CPT

## 2024-09-18 PROCEDURE — 2580000003 HC RX 258: Performed by: INTERNAL MEDICINE

## 2024-09-18 PROCEDURE — 1100000000 HC RM PRIVATE

## 2024-09-18 PROCEDURE — 82962 GLUCOSE BLOOD TEST: CPT

## 2024-09-18 PROCEDURE — 99232 SBSQ HOSP IP/OBS MODERATE 35: CPT | Performed by: SURGERY

## 2024-09-18 PROCEDURE — 86140 C-REACTIVE PROTEIN: CPT

## 2024-09-18 PROCEDURE — 80202 ASSAY OF VANCOMYCIN: CPT

## 2024-09-18 PROCEDURE — 84145 PROCALCITONIN (PCT): CPT

## 2024-09-18 PROCEDURE — 85027 COMPLETE CBC AUTOMATED: CPT

## 2024-09-18 PROCEDURE — 71045 X-RAY EXAM CHEST 1 VIEW: CPT

## 2024-09-18 PROCEDURE — C1894 INTRO/SHEATH, NON-LASER: HCPCS

## 2024-09-18 PROCEDURE — 02H633Z INSERTION OF INFUSION DEVICE INTO RIGHT ATRIUM, PERCUTANEOUS APPROACH: ICD-10-PCS | Performed by: PHYSICIAN ASSISTANT

## 2024-09-18 PROCEDURE — 83605 ASSAY OF LACTIC ACID: CPT

## 2024-09-18 PROCEDURE — 6370000000 HC RX 637 (ALT 250 FOR IP): Performed by: INTERNAL MEDICINE

## 2024-09-18 PROCEDURE — 2580000003 HC RX 258: Performed by: NURSE PRACTITIONER

## 2024-09-18 PROCEDURE — 80048 BASIC METABOLIC PNL TOTAL CA: CPT

## 2024-09-18 PROCEDURE — 6370000000 HC RX 637 (ALT 250 FOR IP): Performed by: FAMILY MEDICINE

## 2024-09-18 PROCEDURE — 6360000002 HC RX W HCPCS: Performed by: INTERNAL MEDICINE

## 2024-09-18 PROCEDURE — C1752 CATH,HEMODIALYSIS,SHORT-TERM: HCPCS

## 2024-09-18 PROCEDURE — 99232 SBSQ HOSP IP/OBS MODERATE 35: CPT | Performed by: INTERNAL MEDICINE

## 2024-09-18 RX ADMIN — MICAFUNGIN 100 MG: 20 INJECTION, POWDER, LYOPHILIZED, FOR SOLUTION INTRAVENOUS at 18:30

## 2024-09-18 RX ADMIN — SODIUM CHLORIDE, PRESERVATIVE FREE 10 ML: 5 INJECTION INTRAVENOUS at 21:11

## 2024-09-18 RX ADMIN — ACETAMINOPHEN 650 MG: 650 SUPPOSITORY RECTAL at 00:15

## 2024-09-18 RX ADMIN — SEVELAMER CARBONATE 1600 MG: 800 TABLET, FILM COATED ORAL at 11:53

## 2024-09-18 RX ADMIN — AZITHROMYCIN DIHYDRATE 250 MG: 500 INJECTION, POWDER, LYOPHILIZED, FOR SOLUTION INTRAVENOUS at 21:09

## 2024-09-18 RX ADMIN — VANCOMYCIN HYDROCHLORIDE 500 MG: 500 INJECTION, POWDER, LYOPHILIZED, FOR SOLUTION INTRAVENOUS at 17:14

## 2024-09-18 RX ADMIN — SEVELAMER CARBONATE 1600 MG: 800 TABLET, FILM COATED ORAL at 17:21

## 2024-09-18 RX ADMIN — SENNOSIDES AND DOCUSATE SODIUM 1 TABLET: 50; 8.6 TABLET ORAL at 21:10

## 2024-09-18 RX ADMIN — MEROPENEM 500 MG: 500 INJECTION, POWDER, FOR SOLUTION INTRAVENOUS at 21:08

## 2024-09-18 NOTE — PROGRESS NOTES
4 Eyes Skin Assessment     NAME:  Brii Lambert  YOB: 1960  MEDICAL RECORD NUMBER:  [id number]    The patient is being assessed for  Shift Handoff    I agree that at least one RN has performed a thorough Head to Toe Skin Assessment on the patient. ALL assessment sites listed below have been assessed.      Areas assessed by both nurses:    Head, Face, Ears, Shoulders, Back, Chest, Arms, Elbows, Hands, Sacrum. Buttock, Coccyx, Ischium, Legs. Feet and Heels, and Under Medical Devices         Does the Patient have a Wound? Yes wound(s) were present on assessment. LDA wound assessment was Initiated and completed by RN       Conrad Prevention initiated by RN: Yes  Wound Care Orders initiated by RN: Yes    Pressure Injury (Stage 3,4, Unstageable, DTI, NWPT, and Complex wounds) if present, place Wound referral order by RN under : Yes    New Ostomies, if present place, Ostomy referral order under : No     Nurse 1 eSignature: Electronically signed by Luiza Arriaza RN on 9/17/24 at 8:09 PM EDT    **SHARE this note so that the co-signing nurse can place an eSignature**    Nurse 2 eSignature: Electronically signed by Pedro Brody RN on 9/17/24 at 8:10 PM EDT

## 2024-09-18 NOTE — PLAN OF CARE
Problem: Discharge Planning  Goal: Discharge to home or other facility with appropriate resources  Outcome: Progressing     Problem: Skin/Tissue Integrity  Goal: Absence of new skin breakdown  Description: 1.  Monitor for areas of redness and/or skin breakdown  2.  Assess vascular access sites hourly  3.  Every 4-6 hours minimum:  Change oxygen saturation probe site  4.  Every 4-6 hours:  If on nasal continuous positive airway pressure, respiratory therapy assess nares and determine need for appliance change or resting period.  Outcome: Progressing     Problem: Safety - Adult  Goal: Free from fall injury  Outcome: Progressing     Problem: Genitourinary - Adult  Goal: Urinary catheter remains patent  Outcome: Progressing     Problem: Pain  Goal: Verbalizes/displays adequate comfort level or baseline comfort level  Outcome: Progressing     Problem: Confusion  Goal: Confusion, delirium, dementia, or psychosis is improved or at baseline  Description: INTERVENTIONS:  1. Assess for possible contributors to thought disturbance, including medications, impaired vision or hearing, underlying metabolic abnormalities, dehydration, psychiatric diagnoses, and notify attending LIP  2. [place] high risk fall precautions, as indicated  3. Provide frequent short contacts to provide reality reorientation, refocusing and direction  4. Decrease environmental stimuli, including noise as appropriate  5. Monitor and intervene to maintain adequate nutrition, hydration, elimination, sleep and activity  6. If unable to ensure safety without constant attention obtain sitter and review sitter guidelines with assigned personnel  7. Initiate Psychosocial CNS and Spiritual Care consult, as indicated  Outcome: Progressing

## 2024-09-18 NOTE — PLAN OF CARE
Problem: Discharge Planning  Goal: Discharge to home or other facility with appropriate resources  9/18/2024 0005 by Pedro Brody RN  Outcome: Progressing  9/17/2024 1550 by Luiza Arriaza RN  Outcome: Progressing     Problem: Skin/Tissue Integrity  Goal: Absence of new skin breakdown  9/18/2024 0005 by Pedro Brody RN  Outcome: Progressing  9/17/2024 1550 by Luiza Arriaza RN  Outcome: Progressing     Problem: Safety - Adult  Goal: Free from fall injury  Outcome: Progressing     Problem: Genitourinary - Adult  Goal: Urinary catheter remains patent  Outcome: Progressing     Problem: Pain  Goal: Verbalizes/displays adequate comfort level or baseline comfort level  Outcome: Progressing     Problem: Confusion  Goal: Confusion, delirium, dementia, or psychosis is improved or at baseline  Outcome: Progressing

## 2024-09-18 NOTE — PROGRESS NOTES
4 Eyes Skin Assessment     NAME:  Brii Lambert  YOB: 1960  MEDICAL RECORD NUMBER:  [id number]    The patient is being assessed for  Other . weekly    I agree that at least one RN has performed a thorough Head to Toe Skin Assessment on the patient. ALL assessment sites listed below have been assessed.      Areas assessed by both nurses:    Head, Face, Ears, Shoulders, Back, Chest, Arms, Elbows, Hands, Sacrum. Buttock, Coccyx, Ischium, Legs. Feet and Heels, Under Medical Devices , and Other . abd fold, pannus         Does the Patient have a Wound? No noted wound(s)       Conrad Prevention initiated by RN: Yes  Wound Care Orders initiated by RN: No    Pressure Injury (Stage 3,4, Unstageable, DTI, NWPT, and Complex wounds) if present, place Wound referral order by RN under : No    New Ostomies, if present place, Ostomy referral order under : No     Nurse 1 eSignature: Electronically signed by Pedro Brody RN on 9/18/24 at 5:38 AM EDT    **SHARE this note so that the co-signing nurse can place an eSignature**    Nurse 2 eSignature: Electronically signed by Dede Valentine RN on 9/18/24 at 6:18 AM EDT

## 2024-09-18 NOTE — PROGRESS NOTES
Nephrology follow-up          Patient: Brii Lambert MRN: [id number]  SSN: [id number]    YOB: 1960  Age: 64 y.o.  Sex: female      Subjective:     I have seen patient at the bedside  She looks lethargic  No hypoglycemia          She was dialyzed yesterday.      No past medical history on file.  Past Surgical History:   Procedure Laterality Date    IR NONTUNNELED VASCULAR CATHETER  2024    IR NONTUNNELED VASCULAR CATHETER 2024 Vi Kenny PA SSR RAD ANGIO IR      No family history on file.  Social History     Tobacco Use    Smoking status: Former     Current packs/day: 0.00     Types: Cigarettes     Quit date:      Years since quittin.7    Smokeless tobacco: Former   Substance Use Topics    Alcohol use: Not on file      Current Facility-Administered Medications   Medication Dose Route Frequency Provider Last Rate Last Admin    meropenem (MERREM) 500 mg in sodium chloride 0.9 % 100 mL IVPB (mini-bag)  500 mg IntraVENous Q24H Gui Elizalde MD        Vancomycin- Pharmacy to Dose   Other RX Placeholder Gui Elizalde MD        vancomycin (VANCOCIN) 500 mg in sodium chloride 0.9 % 100 mL IVPB (mini-bag)  500 mg IntraVENous Once Gui Elizalde  mL/hr at 24 1714 500 mg at 24 1714    [START ON 2024] Vancomycin - Please draw level on  @ 0600 prior to HD. Thanks!  1 each Other Once Gui Elizalde MD        midodrine (PROAMATINE) tablet 10 mg  10 mg Oral TID Priscila Sparks MD   10 mg at 24 1641    micafungin (MYCAMINE) 100 mg in sodium chloride 0.9 % 100 mL IVPB (mini-bag)  100 mg IntraVENous Daily Gui Elizalde MD   Stopped at 24 2337    sevelamer (RENVELA) tablet 1,600 mg  1,600 mg Oral TID  Priscila Sparks MD   1,600 mg at 24 1721    insulin lispro (HUMALOG,ADMELOG) injection vial 0-4 Units  0-4 Units SubCUTAneous TID  Janet Daily MD   1 Units at 24 1154    azithromycin (ZITHROMAX) 250 mg in sodium  chloride 0.9 % 250 mL IVPB  250 mg IntraVENous Q24H Gui Elizalde MD   Stopped at 09/18/24 0040    traMADol (ULTRAM) tablet 50 mg  50 mg Oral Q6H PRN Janet Daily MD   50 mg at 09/13/24 1217    0.9 % sodium chloride infusion   IntraVENous PRN Janet Daily MD        0.9 % sodium chloride infusion   IntraVENous PRN Priscila Sparks MD        heparin (porcine) injection 3,600 Units  3,600 Units IntraCATHeter PRN Priscila Sparks MD   3,600 Units at 09/16/24 1847    glucose chewable tablet 16 g  4 tablet Oral PRN Gautam Centeno MD        dextrose bolus 10% 125 mL  125 mL IntraVENous PRN Gautam Centeno MD   Stopped at 09/13/24 0753    Or    dextrose bolus 10% 250 mL  250 mL IntraVENous PRN Gautam Centeno MD        glucagon injection 1 mg  1 mg SubCUTAneous PRN Gautam Centeno MD   1 mg at 09/15/24 0817    dextrose 10 % infusion   IntraVENous Continuous PRN Gautam Centeno MD        epoetin saul-epbx (RETACRIT) injection 10,000 Units  10,000 Units IntraVENous Once per day on Monday Wednesday Friday Priscila Sparks MD   10,000 Units at 09/16/24 1846    acetaminophen (TYLENOL) tablet 650 mg  650 mg Oral Q6H PRN Janet Daily MD   650 mg at 09/17/24 0209    insulin lispro (HUMALOG,ADMELOG) injection vial 0-4 Units  0-4 Units SubCUTAneous Nightly Radha Keating APRN - CNP   0 Units at 09/13/24 2050    aspirin EC tablet 81 mg  81 mg Oral Daily Radha Keating APRN - CNP   81 mg at 09/17/24 0928    cetirizine (ZYRTEC) tablet 10 mg  10 mg Oral Daily Radha Keating APRN - CNP   10 mg at 09/17/24 0928    oxyCODONE (ROXICODONE) immediate release tablet 5 mg  5 mg Oral Q6H PRN Radha Keating APRN - CNP   5 mg at 09/16/24 1904    sodium chloride flush 0.9 % injection 5-40 mL  5-40 mL IntraVENous 2 times per day Radha Keating APRN - CNP   10 mL at 09/17/24 2145    sodium chloride flush 0.9 % injection 5-40 mL  5-40 mL IntraVENous PRN Radha Keating APRN - CNP        0.9 % sodium

## 2024-09-18 NOTE — PROGRESS NOTES
Progress Note  Date:2024       Room:[facility]  Patient Name:Brii Lambert     YOB: 1960     Age:64 y.o.        Subjective    Subjective   Patient followed for presumptive UTI with contaminated urine culture and presumptive Mycoplasma pneumonia. She remains afebrile but WBC remains elevated and ESR and CRP increased.     Objective         Vitals Last 24 Hours:  TEMPERATURE:  Temp  Av.5 °F (36.9 °C)  Min: 97.5 °F (36.4 °C)  Max: 100.8 °F (38.2 °C)  RESPIRATIONS RANGE: Resp  Av.6  Min: 16  Max: 20  PULSE OXIMETRY RANGE: SpO2  Av.5 %  Min: 92 %  Max: 100 %  PULSE RANGE: Pulse  Av.5  Min: 68  Max: 96  BLOOD PRESSURE RANGE: Systolic (24hrs), Av , Min:90 , Max:153   ; Diastolic (24hrs), Av, Min:42, Max:50          Objective:  Vital signs: (most recent): Blood pressure (!) 153/44, pulse 68, temperature 98.8 °F (37.1 °C), temperature source Axillary, resp. rate 18, height 1.676 m (5' 6\"), weight 83.4 kg (183 lb 13.8 oz), SpO2 100%.      Vitals and nursing note reviewed.   Constitutional:       General: She is not in acute distress.     Appearance: She is ill-appearing.   HENT:      Head: Normocephalic and atraumatic.      Right Ear: External ear normal.      Left Ear: External ear normal.      Nose: Nose normal.      Mouth/Throat:      Mouth: Mucous membranes are dry.   Eyes:      Pupils: Pupils are equal, round, and reactive to light.   Cardiovascular:      Rate and Rhythm: Normal rate and regular rhythm.      Heart sounds: No murmur heard.  Pulmonary:      Effort: Pulmonary effort is normal.      Breath sounds: Normal breath sounds.   Chest:           Comments: Right sided Permacath with small area of erythema at the insertion site  Abdominal:      General: Bowel sounds are normal. There is no distension.      Palpations: Abdomen is soft.      Tenderness: There is abdominal tenderness. There is guarding.   Genitourinary:     Comments: No Ortez catheter  Musculoskeletal:       Cervical back: Neck supple.      Right lower leg: No edema.      Left lower leg: No edema.   Skin:     Findings: No rash.   Neurological:      General: No focal deficit present.      Mental Status: She is alert. She is disoriented.   Psychiatric:         Mood and Affect: Mood normal.      Comments: Unable to assess        Labs/Imaging/Diagnostics    Labs:  CBC:  Recent Labs     09/15/24  0634 09/16/24  0559   WBC 16.1* 18.5*   RBC 3.28* 2.62*   HGB 8.9* 7.0*   HCT 29.5* 22.1*   MCV 89.9 84.4   RDW 18.9* 18.2*    222     CHEMISTRIES:  Recent Labs     09/15/24  0950 09/16/24  0559 09/17/24  0658   * 123* 132*   K 5.9* 5.7* 4.4   CL 93* 90* 96*   CO2 23 20* 22   BUN 59* 67* 49*   CREATININE 7.71* 8.90* 5.67*   GLUCOSE 177* 206* 157*   PHOS  --  10.7* 9.0*        LIVER PROFILE:  Recent Labs     09/15/24  0634 09/16/24  0559   * 444*   ALT 94* 103*   BILITOT 0.4 0.4   ALKPHOS 68 94      Latest Reference Range & Units 09/09/24 19:03 09/17/24 17:36   Color, UA -   Yellow/Straw Yellow/Straw   Glucose, Ur Negative mg/dL Negative Negative   Bilirubin, Urine Negative   Negative Negative   Ketones, Urine Negative mg/dL Negative Negative   Specific Gravity, UA 1.003 - 1.030   1.014 1.021   Blood, Urine Negative   Small ! Moderate !   Protein, UA Negative mg/dL 30 ! 100 !   Urobilinogen 0.1 - 1.0 EU/dL 0.1 0.1   Nitrite, Urine Negative   Negative Negative   Leukocyte Esterase, Urine Negative   Large ! Large !   Appearance Clear   Turbid ! Turbid !   pH, Urine 5.0 - 8.0   5.0 5.0   WBC, UA 0 - 4 /hpf >100 (H) >100 (H)   RBC, UA 0 - 5 /hpf 20-50 >100 (H)   Epithelial Cells, UA Few /lpf Few Few   Bacteria, UA Negative /hpf Negative 1+ !   Budding Yeast Negative   Present ! Present !         Procal 81.66 <21.05 <18.72 <6.65 <8.06       CRP 30.50 <28.60 <29.70 <24.80 <18.10           Mycoplasma IgM Reactive    Blood cultures (9/9) No growth 5 days  Blood cultures (9/9) No growth 5 days  Urine culture (9/9) 20,000  col/ml mixed urogenital glenn FINAL  Urine culture (9/17) in process    Imaging Last 24 Hours:  No results found.  CTA ABDOMINAL AORTA W BILAT RUNOFF W WO CONTRAST     Result Date: 9/9/2024  1. Occlusion of the infrarenal abdominal aorta. There is weak reconstitution of the bilateral iliac trunks. 2. Penetrating atherosclerotic ulceration of the descending thoracic aorta. 3. 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 The left renal artery is occluded, with left renal atrophy. 4.Multifocal stenosis of the right SFA, with occlusion of the right posterior tibial artery. 5. Multifocal airspace disease favoring pneumonia. 6. Small left pleural effusion. 7. The bladder is severely distended. Any clinical concern for urinary retention or urinary outlet obstruction? Electronically signed by KAT WHITE      Assessment//Plan           Hospital Problems             Last Modified POA    * (Principal) HCAP (healthcare-associated pneumonia) 9/12/2024 Yes    Leukocytosis 9/9/2024 Yes    Generalized abdominal pain 9/12/2024 Yes    Urinary tract infection with hematuria 9/12/2024 Yes    Somnolence 9/12/2024 Yes    ESRD (end stage renal disease) on dialysis (HCC) 9/12/2024 Yes    Sepsis without acute organ dysfunction (HCC) 9/12/2024 Yes    Pneumonia due to Mycoplasma pneumoniae 9/12/2024 Yes     Persistent leukocytosis, elevated procal and CRP  Persistent Candida UTI with marked pyuria and funguria despite Fluconazole for 7 days  Presumptive Mycoplasma multifocal pneumonia  based on CT scan, Day #5/5 Azithromycin  ESRD on hemodialysis  Constipation     Comment:  WBC, procal and CRP all trending downward after switching to Meropenem.         1.  Discontinue Meropenem and Fluconazole  2. Start IV [id number] mg daily for persistent funguria  3.  Discontinue Vancomycin  4.   In am, repeat CBC, repeat procal and CRP    5. Follow-up repeat urine culture    Electronically signed by Gui Elizalde MD

## 2024-09-18 NOTE — PROGRESS NOTES
Meropenem Extended-Infusion Dosing/Monitoring  Current regimen:  500 mg every 12 hours    Recent Labs     09/16/24  0559 09/17/24  0658 09/18/24  0530   CREATININE 8.90* 5.67* 6.86*   BUN 67* 49* 55*     Estimated CrCl:  HD patient     Plan: Change to meropenem 500 mg IV over 180 minutes every 24 hours per [facility] P&T Committee Protocol with respect to extended-infusion ?-lactam antibiotics. Pharmacy will continue to monitor patient daily and will make dosage adjustments based upon changing renal function.

## 2024-09-18 NOTE — PROGRESS NOTES
Cervical back: Neck supple.      Right lower leg: No edema.      Left lower leg: No edema.   Skin:     Findings: No rash.   Neurological:      General: No focal deficit present.      Mental Status: She is alert. She is disoriented.   Psychiatric:         Mood and Affect: Mood normal.      Comments: Unable to assess        Labs/Imaging/Diagnostics    Labs:  CBC:  Recent Labs     09/16/24  0559 09/18/24  0530   WBC 18.5* 24.0*   RBC 2.62* 2.68*   HGB 7.0* 7.2*   HCT 22.1* 22.4*   MCV 84.4 83.6   RDW 18.2* 18.2*    217     CHEMISTRIES:  Recent Labs     09/16/24  0559 09/17/24  0658 09/18/24  0530   * 132* 131*   K 5.7* 4.4 4.6   CL 90* 96* 96*   CO2 20* 22 21   BUN 67* 49* 55*   CREATININE 8.90* 5.67* 6.86*   GLUCOSE 206* 157* 113*   PHOS 10.7* 9.0*  --         LIVER PROFILE:  Recent Labs     09/16/24  0559   *   *   BILITOT 0.4   ALKPHOS 94      Latest Reference Range & Units 09/09/24 19:03 09/17/24 17:36   Color, UA -   Yellow/Straw Yellow/Straw   Glucose, Ur Negative mg/dL Negative Negative   Bilirubin, Urine Negative   Negative Negative   Ketones, Urine Negative mg/dL Negative Negative   Specific Gravity, UA 1.003 - 1.030   1.014 1.021   Blood, Urine Negative   Small ! Moderate !   Protein, UA Negative mg/dL 30 ! 100 !   Urobilinogen 0.1 - 1.0 EU/dL 0.1 0.1   Nitrite, Urine Negative   Negative Negative   Leukocyte Esterase, Urine Negative   Large ! Large !   Appearance Clear   Turbid ! Turbid !   pH, Urine 5.0 - 8.0   5.0 5.0   WBC, UA 0 - 4 /hpf >100 (H) >100 (H)   RBC, UA 0 - 5 /hpf 20-50 >100 (H)   Epithelial Cells, UA Few /lpf Few Few   Bacteria, UA Negative /hpf Negative 1+ !   Budding Yeast Negative   Present ! Present !         Procal 98.09 <81.66 <21.05 <18.72 <6.65 <8.06        CRP 30.10 <30.50 <28.60 <29.70 <24.80 <18.10            Mycoplasma IgM Reactive    Blood cultures (9/9) No growth FINAL  Blood cultures (9/9) No growth FINAL  Blood culture (9/18) in process  Blood culture  (9/18) in process    Urine culture (9/9) 20,000 col/ml mixed urogenital glenn FINAL  Urine culture (9/17) in process    Imaging Last 24 Hours:  No results found.  CTA ABDOMINAL AORTA W BILAT RUNOFF W WO CONTRAST     Result Date: 9/9/2024  1. Occlusion of the infrarenal abdominal aorta. There is weak reconstitution of the bilateral iliac trunks. 2. Penetrating atherosclerotic ulceration of the descending thoracic aorta. 3. 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 The left renal artery is occluded, with left renal atrophy. 4.Multifocal stenosis of the right SFA, with occlusion of the right posterior tibial artery. 5. Multifocal airspace disease favoring pneumonia. 6. Small left pleural effusion. 7. The bladder is severely distended. Any clinical concern for urinary retention or urinary outlet obstruction? Electronically signed by KAT WHITE      Assessment//Plan           Hospital Problems             Last Modified POA    * (Principal) HCAP (healthcare-associated pneumonia) 9/12/2024 Yes    Leukocytosis 9/9/2024 Yes    Generalized abdominal pain 9/12/2024 Yes    Urinary tract infection with hematuria 9/12/2024 Yes    Somnolence 9/12/2024 Yes    ESRD (end stage renal disease) on dialysis (HCC) 9/12/2024 Yes    Sepsis without acute organ dysfunction (HCC) 9/12/2024 Yes    Pneumonia due to Mycoplasma pneumoniae 9/12/2024 Yes     Persistent leukocytosis, elevated procal and CRP  Persistent Candida UTI with marked pyuria and funguria despite Fluconazole for 7 days  Presumptive Mycoplasma multifocal pneumonia  based on CT scan, status post Azithromycin  ESRD on hemodialysis  Constipation     Comment:  WBC, procal and CRP all trending downward after switching to Meropenem.         1.  Discontinue Meropenem and Fluconazole  2. Continue IV [id number] mg daily for persistent funguria  3.  Restart IV Vancomycin  4.   In am, repeat CBC, repeat procal and CRP    5. Follow-up repeat urine culture    Electronically signed by  Gui Elizalde MD

## 2024-09-18 NOTE — CARE COORDINATION
CM reviewed chart.    DCP is [facility].    Patient is set up with US Renal - MWF     Patient is pending IV abx and cultures.     CM will continue to follow.

## 2024-09-18 NOTE — PROGRESS NOTES
Vancomycin Dosing Consult  Brii Lambert is a 64 y.o. female with sepsis secondary to UTI, mycoplasma PNA. Pharmacy was consulted by Dr. Sparks to dose and monitor vancomycin. Today is day 8.    Antibiotic regimen: Vancomycin + meropenem  + Zithromax + Micafungin    Temp (24hrs), Av.1 °F (37.3 °C), Min:97.7 °F (36.5 °C), Max:101.1 °F (38.4 °C)    Recent Labs     24  0559 24  0658 24  0530   WBC 18.5*  --  24.0*   CREATININE 8.90* 5.67* 6.86*   BUN 67* 49* 55*     Est CrCl: 9 mL/min, HD MWF  Concomitant nephrotoxic drugs: Contrast agents     Cultures:    Blood cultures x 2 - NGTD, final   Urine culture- Mixed urogenital glenn isolated, final   Urine: pending   Blood: pending    MRSA Swab: Not ordered, patient already received first dose of vancomycin    Target range: Trough 21-24 mcg/mL (Intermittent hemodialysis, invasive MRSA infection or sepsis)    Recent level history:  Date/Time Dose & Interval Measured Level (mcg/mL) Associated AUC/AURELIANO    @ 0621 2000 mg x 1 ( 1702) 24.1     @0559 1000mg IV x1 27.9     @ 1348 -- 18.1         Assessment/Plan:   Restarting Vancomycin due to persistent leukocytosis, elevated procal and CRP  HD schedule MWF. Pt not dialyzed today.   Level is slightly sub therapeutic at 18.1. Will order Vancomycin 500 mg x 1 dose.   Pt has been refusing HD sessions. Tentatively scheduling Pre-HD level for  @ 0600  Labs ordered through   Antimicrobial stop date 7 days per consult (last day )

## 2024-09-18 NOTE — PROGRESS NOTES
PROGRESS NOTE      Chief Complaints:  Vascular follow-up.  HPI and  Objective:    Patient awake and alert.  Denies any chest pain shortness of breath.  Review of Systems:  Rest of review of system reviewed personally and they are negative.    EXAM:  BP (!) 131/57   Pulse 77   Temp 98.4 °F (36.9 °C) (Oral)   Resp 16   Ht 1.676 m (5' 6\")   Wt 83.4 kg (183 lb 13.8 oz)   SpO2 94%   BMI 29.68 kg/m²     Patient is awake   Head and neck atraumatic, normocephalic.  ENT: No hoarse voice, gaze appropriate.  Cardiac system regular rate rhythm.  Pulmonary no audible wheeze, no cyanosis.  Chest wall excursion normal with respiration cycle  Abdomen is soft not particularly distended.  Neurologically nonfocal.  Hematology system: No bruising noted.  Musculoskeletal system: No joint deformity noted.  Genitourinary system: No hematuria noted.  Skin is warm and moist.  Psychosocial: Cooperative.  Vascular examination as previously noted no changes.    Current Facility-Administered Medications   Medication Dose Route Frequency Provider Last Rate Last Admin    midodrine (PROAMATINE) tablet 10 mg  10 mg Oral TID Priscila Sparks MD   10 mg at 09/17/24 1641    micafungin (MYCAMINE) 100 mg in sodium chloride 0.9 % 100 mL IVPB (mini-bag)  100 mg IntraVENous Daily Gui Elizalde MD   Stopped at 09/17/24 2337    sevelamer (RENVELA) tablet 1,600 mg  1,600 mg Oral TID  Priscila Sparks MD   1,600 mg at 09/17/24 1211    insulin lispro (HUMALOG,ADMELOG) injection vial 0-4 Units  0-4 Units SubCUTAneous TID  Janet Daily MD   1 Units at 09/16/24 1154    azithromycin (ZITHROMAX) 250 mg in sodium chloride 0.9 % 250 mL IVPB  250 mg IntraVENous Q24H Gui Elizalde MD   Stopped at 09/18/24 0040    traMADol (ULTRAM) tablet 50 mg  50 mg Oral Q6H PRN Janet Daily MD   50 mg at 09/13/24 1217    0.9 % sodium chloride infusion   IntraVENous PRN Janet Daily MD        0.9 % sodium chloride infusion   IntraVENous PRN Bridger,  MD Priscila        heparin (porcine) injection 3,600 Units  3,600 Units IntraCATHeter PRN Priscila Sparks MD   3,600 Units at 09/16/24 1847    glucose chewable tablet 16 g  4 tablet Oral PRN Gautam Centeno MD        dextrose bolus 10% 125 mL  125 mL IntraVENous PRN Gautam Centeno MD   Stopped at 09/13/24 0753    Or    dextrose bolus 10% 250 mL  250 mL IntraVENous PRN Gautam Centeno MD        glucagon injection 1 mg  1 mg SubCUTAneous PRN Gautam Centeno MD   1 mg at 09/15/24 0817    dextrose 10 % infusion   IntraVENous Continuous PRN Gautam Centeno MD        epoetin saul-epbx (RETACRIT) injection 10,000 Units  10,000 Units IntraVENous Once per day on Monday Wednesday Friday Priscila Sparks MD   10,000 Units at 09/16/24 1846    acetaminophen (TYLENOL) tablet 650 mg  650 mg Oral Q6H PRN Janet Daily MD   650 mg at 09/17/24 0209    insulin lispro (HUMALOG,ADMELOG) injection vial 0-4 Units  0-4 Units SubCUTAneous Nightly Radha Keating APRN - CNP   0 Units at 09/13/24 2050    aspirin EC tablet 81 mg  81 mg Oral Daily Radha Keating APRN - CNP   81 mg at 09/17/24 0928    cetirizine (ZYRTEC) tablet 10 mg  10 mg Oral Daily Radha Keating APRN - CNP   10 mg at 09/17/24 0928    oxyCODONE (ROXICODONE) immediate release tablet 5 mg  5 mg Oral Q6H PRN Rahda Keating APRN - CNP   5 mg at 09/16/24 1904    sodium chloride flush 0.9 % injection 5-40 mL  5-40 mL IntraVENous 2 times per day Radha Keating APRN - CNP   10 mL at 09/17/24 2145    sodium chloride flush 0.9 % injection 5-40 mL  5-40 mL IntraVENous PRN Radha Keating APRN - CNP        0.9 % sodium chloride infusion   IntraVENous PRN Radha Keating APRN - CNP 10 mL/hr at 09/16/24 1902 Restarted at 09/16/24 1902    ondansetron (ZOFRAN-ODT) disintegrating tablet 4 mg  4 mg Oral Q8H PRN Radha Keating APRN - CNP        Or    ondansetron (ZOFRAN) injection 4 mg  4 mg IntraVENous Q6H PRN Radha Keating APRN - TRINIDAD   4 mg at    Basic Metabolic Panel    Collection Time: 09/18/24  5:30 AM   Result Value Ref Range    Sodium 131 (L) 136 - 145 mmol/L    Potassium 4.6 3.5 - 5.1 mmol/L    Chloride 96 (L) 97 - 108 mmol/L    CO2 21 21 - 32 mmol/L    Anion Gap 14 (H) 2 - 12 mmol/L    Glucose 113 (H) 65 - 100 mg/dL    BUN 55 (H) 6 - 20 mg/dL    Creatinine 6.86 (H) 0.55 - 1.02 mg/dL    BUN/Creatinine Ratio 8 (L) 12 - 20      Est, Glom Filt Rate 6 (L) >60 ml/min/1.73m2    Calcium 7.2 (L) 8.5 - 10.1 mg/dL   CBC    Collection Time: 09/18/24  5:30 AM   Result Value Ref Range    WBC 24.0 (H) 3.6 - 11.0 K/uL    RBC 2.68 (L) 3.80 - 5.20 M/uL    Hemoglobin 7.2 (L) 11.5 - 16.0 g/dL    Hematocrit 22.4 (L) 35.0 - 47.0 %    MCV 83.6 80.0 - 99.0 FL    MCH 26.9 26.0 - 34.0 PG    MCHC 32.1 30.0 - 36.5 g/dL    RDW 18.2 (H) 11.5 - 14.5 %    Platelets 217 150 - 400 K/uL    MPV 9.0 8.9 - 12.9 FL    Nucleated RBCs 0.1 (H) 0.0  WBC    nRBC 0.02 (H) 0.00 - 0.01 K/uL   C-Reactive Protein    Collection Time: 09/18/24  5:30 AM   Result Value Ref Range    CRP 30.10 (H) 0.00 - 0.30 mg/dL   Procalcitonin    Collection Time: 09/18/24  5:30 AM   Result Value Ref Range    Procalcitonin 98.09 (H) 0 ng/mL       ASSESSMENT:   Patient is 64 y.o. with diagnosis of : Principal Problem:    HCAP (healthcare-associated pneumonia)  Active Problems:    Leukocytosis    Generalized abdominal pain    Urinary tract infection with hematuria    Somnolence    ESRD (end stage renal disease) on dialysis (HCC)    Sepsis without acute organ dysfunction (HCC)    Pneumonia due to Mycoplasma pneumoniae  Resolved Problems:    * No resolved hospital problems. *      PLAN:                 Left foot is still cool to touch no signs of ischemia however.  Doppler signal noted.    Adequate hydration after dialysis and try to avoid  low flow state for peripheral vessels.    Will continue monitor.

## 2024-09-18 NOTE — PROGRESS NOTES
History and Physical    NAME:   Brii Lambert   :  1960   MRN:  [id number]     Date/Time: 2024 12:55 PM    Patient PCP: Janet Daily MD  ______________________________________________________________________       Subjective:     CHIEF COMPLAINT:     Abdominal pain, Pneumonia     HISTORY OF PRESENT ILLNESS:     General Daily Progress Note  Patient is a 64 y.o. year old female past medical history of end-stage renal disease on hemodialysis, hypertension, chronic bilateral cerebellar and pontine strokes, diabetic neuropathy presented to the ER yesterday for evaluation of abdominal, lower extremity pain. Pt was recently discharged from [place] and sent to [place] for rehab.  Patient reportedly missed several days of dialysis due to abdominal pain. Patient states she has not had a bowel movement in 14-15 days. Patient missed Monday dialysis and O2 sat was 70% on 10 L nonrebreather mask with grunting.  Patient seen in the ED, white count 31.1, potassium 5.9, BUN 78, creatinine 7.1 hemoglobin 7.0.    CTA of the abdomen  1. Occlusion of the infrarenal abdominal aorta. There is weak reconstitution of  the bilateral iliac trunks.  2. Penetrating atherosclerotic ulceration of the descending thoracic aorta.  3. 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 The left renal artery is occluded,  with left renal atrophy.  4.Multifocal stenosis of the right SFA, with occlusion of the right posterior  tibial artery.  5. Multifocal airspace disease favoring pneumonia.  6. Small left pleural effusion.  7. The bladder is severely distended. Any clinical concern for urinary retention  or urinary outlet obstruction?    ER doctor spoke with general surgeon determined artery disease chronic.  Patient seen in ED seems confused at times. Pt states she is wheelchair-bound normally. Denies back pain. No LE swelling.   Patient be admitted for further evaluation and treatment.    Consulted by Nephrology - pt came in with

## 2024-09-19 ENCOUNTER — APPOINTMENT (OUTPATIENT)
Facility: HOSPITAL | Age: 64
DRG: 193 | End: 2024-09-19
Attending: INTERNAL MEDICINE
Payer: MEDICARE

## 2024-09-19 LAB
ABO + RH BLD: NORMAL
ALBUMIN SERPL-MCNC: 1.6 G/DL (ref 3.5–5)
ALBUMIN/GLOB SERPL: 0.3 (ref 1.1–2.2)
ALP SERPL-CCNC: 91 U/L (ref 45–117)
ALT SERPL-CCNC: 98 U/L (ref 12–78)
ANION GAP SERPL CALC-SCNC: 15 MMOL/L (ref 2–12)
AST SERPL W P-5'-P-CCNC: 508 U/L (ref 15–37)
BILIRUB SERPL-MCNC: 0.5 MG/DL (ref 0.2–1)
BLD PROD TYP BPU: NORMAL
BLOOD BANK DISPENSE STATUS: NORMAL
BLOOD GROUP ANTIBODIES SERPL: NEGATIVE
BPU ID: NORMAL
BUN SERPL-MCNC: 68 MG/DL (ref 6–20)
BUN/CREAT SERPL: 10 (ref 12–20)
CA-I BLD-MCNC: 7.2 MG/DL (ref 8.5–10.1)
CHLORIDE SERPL-SCNC: 97 MMOL/L (ref 97–108)
CO2 SERPL-SCNC: 21 MMOL/L (ref 21–32)
CREAT SERPL-MCNC: 7.15 MG/DL (ref 0.55–1.02)
CROSSMATCH RESULT: NORMAL
CRP SERPL-MCNC: 35.3 MG/DL (ref 0–0.3)
ERYTHROCYTE [DISTWIDTH] IN BLOOD BY AUTOMATED COUNT: 18.4 % (ref 11.5–14.5)
GLOBULIN SER CALC-MCNC: 4.6 G/DL (ref 2–4)
GLUCOSE BLD STRIP.AUTO-MCNC: 229 MG/DL (ref 65–100)
GLUCOSE BLD STRIP.AUTO-MCNC: 242 MG/DL (ref 65–100)
GLUCOSE BLD STRIP.AUTO-MCNC: 244 MG/DL (ref 65–100)
GLUCOSE SERPL-MCNC: 78 MG/DL (ref 65–100)
HCT VFR BLD AUTO: 21.8 % (ref 35–47)
HGB BLD-MCNC: 6.8 G/DL (ref 11.5–16)
MCH RBC QN AUTO: 26 PG (ref 26–34)
MCHC RBC AUTO-ENTMCNC: 31.2 G/DL (ref 30–36.5)
MCV RBC AUTO: 83.2 FL (ref 80–99)
NRBC # BLD: 0.02 K/UL (ref 0–0.01)
NRBC BLD-RTO: 0.1 PER 100 WBC
PERFORMED BY:: ABNORMAL
PHOSPHATE SERPL-MCNC: 10.2 MG/DL (ref 2.6–4.7)
PLATELET # BLD AUTO: 209 K/UL (ref 150–400)
PMV BLD AUTO: 9.1 FL (ref 8.9–12.9)
POTASSIUM SERPL-SCNC: 4.6 MMOL/L (ref 3.5–5.1)
PROCALCITONIN SERPL-MCNC: 68.93 NG/ML
PROT SERPL-MCNC: 6.2 G/DL (ref 6.4–8.2)
RBC # BLD AUTO: 2.62 M/UL (ref 3.8–5.2)
SODIUM SERPL-SCNC: 133 MMOL/L (ref 136–145)
SPECIMEN EXP DATE BLD: NORMAL
TRANSFUSION STATUS PATIENT QL: NORMAL
UNIT DIVISION: 0
WBC # BLD AUTO: 24.2 K/UL (ref 3.6–11)

## 2024-09-19 PROCEDURE — 90935 HEMODIALYSIS ONE EVALUATION: CPT

## 2024-09-19 PROCEDURE — 02PYX3Z REMOVAL OF INFUSION DEVICE FROM GREAT VESSEL, EXTERNAL APPROACH: ICD-10-PCS | Performed by: PHYSICIAN ASSISTANT

## 2024-09-19 PROCEDURE — 6360000002 HC RX W HCPCS: Performed by: NURSE PRACTITIONER

## 2024-09-19 PROCEDURE — 6360000002 HC RX W HCPCS: Performed by: INTERNAL MEDICINE

## 2024-09-19 PROCEDURE — 86900 BLOOD TYPING SEROLOGIC ABO: CPT

## 2024-09-19 PROCEDURE — 82962 GLUCOSE BLOOD TEST: CPT

## 2024-09-19 PROCEDURE — 85027 COMPLETE CBC AUTOMATED: CPT

## 2024-09-19 PROCEDURE — 6370000000 HC RX 637 (ALT 250 FOR IP): Performed by: FAMILY MEDICINE

## 2024-09-19 PROCEDURE — 99232 SBSQ HOSP IP/OBS MODERATE 35: CPT | Performed by: INTERNAL MEDICINE

## 2024-09-19 PROCEDURE — 77002 NEEDLE LOCALIZATION BY XRAY: CPT

## 2024-09-19 PROCEDURE — 36415 COLL VENOUS BLD VENIPUNCTURE: CPT

## 2024-09-19 PROCEDURE — 86850 RBC ANTIBODY SCREEN: CPT

## 2024-09-19 PROCEDURE — 2580000003 HC RX 258: Performed by: NURSE PRACTITIONER

## 2024-09-19 PROCEDURE — 6370000000 HC RX 637 (ALT 250 FOR IP): Performed by: INTERNAL MEDICINE

## 2024-09-19 PROCEDURE — 6370000000 HC RX 637 (ALT 250 FOR IP): Performed by: NURSE PRACTITIONER

## 2024-09-19 PROCEDURE — 86140 C-REACTIVE PROTEIN: CPT

## 2024-09-19 PROCEDURE — 1100000000 HC RM PRIVATE

## 2024-09-19 PROCEDURE — 02H633Z INSERTION OF INFUSION DEVICE INTO RIGHT ATRIUM, PERCUTANEOUS APPROACH: ICD-10-PCS | Performed by: PHYSICIAN ASSISTANT

## 2024-09-19 PROCEDURE — 86923 COMPATIBILITY TEST ELECTRIC: CPT

## 2024-09-19 PROCEDURE — 86901 BLOOD TYPING SEROLOGIC RH(D): CPT

## 2024-09-19 PROCEDURE — 2580000003 HC RX 258: Performed by: INTERNAL MEDICINE

## 2024-09-19 PROCEDURE — 80053 COMPREHEN METABOLIC PANEL: CPT

## 2024-09-19 PROCEDURE — C1752 CATH,HEMODIALYSIS,SHORT-TERM: HCPCS

## 2024-09-19 PROCEDURE — 84100 ASSAY OF PHOSPHORUS: CPT

## 2024-09-19 PROCEDURE — 84145 PROCALCITONIN (PCT): CPT

## 2024-09-19 PROCEDURE — 2709999900 HC NON-CHARGEABLE SUPPLY

## 2024-09-19 RX ORDER — SEVELAMER CARBONATE 800 MG/1
2400 TABLET, FILM COATED ORAL
Status: DISCONTINUED | OUTPATIENT
Start: 2024-09-19 | End: 2024-09-21 | Stop reason: HOSPADM

## 2024-09-19 RX ORDER — SODIUM CHLORIDE 9 MG/ML
INJECTION, SOLUTION INTRAVENOUS PRN
Status: DISCONTINUED | OUTPATIENT
Start: 2024-09-19 | End: 2024-09-21 | Stop reason: HOSPADM

## 2024-09-19 RX ORDER — CASTOR OIL AND BALSAM, PERU 788; 87 MG/G; MG/G
OINTMENT TOPICAL 2 TIMES DAILY
Status: DISCONTINUED | OUTPATIENT
Start: 2024-09-19 | End: 2024-09-21 | Stop reason: HOSPADM

## 2024-09-19 RX ORDER — HEPARIN SODIUM 1000 [USP'U]/ML
2200 INJECTION, SOLUTION INTRAVENOUS; SUBCUTANEOUS PRN
Status: DISCONTINUED | OUTPATIENT
Start: 2024-09-19 | End: 2024-09-21 | Stop reason: HOSPADM

## 2024-09-19 RX ADMIN — INSULIN LISPRO 1 UNITS: 100 INJECTION, SOLUTION INTRAVENOUS; SUBCUTANEOUS at 17:43

## 2024-09-19 RX ADMIN — SEVELAMER CARBONATE 2400 MG: 800 TABLET, FILM COATED ORAL at 13:28

## 2024-09-19 RX ADMIN — SENNOSIDES AND DOCUSATE SODIUM 1 TABLET: 50; 8.6 TABLET ORAL at 13:28

## 2024-09-19 RX ADMIN — HEPARIN SODIUM 2200 UNITS: 1000 INJECTION INTRAVENOUS; SUBCUTANEOUS at 09:43

## 2024-09-19 RX ADMIN — SODIUM CHLORIDE, PRESERVATIVE FREE 10 ML: 5 INJECTION INTRAVENOUS at 21:54

## 2024-09-19 RX ADMIN — ACETAMINOPHEN 650 MG: 325 TABLET ORAL at 01:36

## 2024-09-19 RX ADMIN — AZITHROMYCIN DIHYDRATE 250 MG: 500 INJECTION, POWDER, LYOPHILIZED, FOR SOLUTION INTRAVENOUS at 21:48

## 2024-09-19 RX ADMIN — ONDANSETRON 4 MG: 2 INJECTION INTRAMUSCULAR; INTRAVENOUS at 08:24

## 2024-09-19 RX ADMIN — MEROPENEM 500 MG: 500 INJECTION, POWDER, FOR SOLUTION INTRAVENOUS at 21:48

## 2024-09-19 RX ADMIN — ASPIRIN 81 MG: 81 TABLET, COATED ORAL at 13:28

## 2024-09-19 RX ADMIN — ACETAMINOPHEN 650 MG: 325 TABLET ORAL at 08:38

## 2024-09-19 RX ADMIN — INSULIN LISPRO 1 UNITS: 100 INJECTION, SOLUTION INTRAVENOUS; SUBCUTANEOUS at 13:28

## 2024-09-19 RX ADMIN — SODIUM CHLORIDE, PRESERVATIVE FREE 5 ML: 5 INJECTION INTRAVENOUS at 13:32

## 2024-09-19 RX ADMIN — SEVELAMER CARBONATE 2400 MG: 800 TABLET, FILM COATED ORAL at 17:44

## 2024-09-19 RX ADMIN — CETIRIZINE HYDROCHLORIDE 10 MG: 10 TABLET, FILM COATED ORAL at 13:28

## 2024-09-19 ASSESSMENT — PAIN SCALES - GENERAL
PAINLEVEL_OUTOF10: 8
PAINLEVEL_OUTOF10: 3
PAINLEVEL_OUTOF10: 0

## 2024-09-19 ASSESSMENT — PAIN DESCRIPTION - DESCRIPTORS: DESCRIPTORS: ACHING

## 2024-09-19 ASSESSMENT — PAIN DESCRIPTION - LOCATION
LOCATION: HEAD
LOCATION: BACK

## 2024-09-19 NOTE — PROGRESS NOTES
Comprehensive Nutrition Assessment    Type and Reason for Visit:  Initial (LOS)    Nutrition Recommendations/Plan:   Will Add Renal ONS BID to better meet pt nutritional needs  Encourage meal intakes >50%  Monitor weight, skin integrity, lytes, and bowel fxn     Malnutrition Assessment:  Malnutrition Status:  At risk for malnutrition (Comment) (poor PO intake) (09/19/24 8070)    Findings of the 6 clinical characteristics of malnutrition:    Nutrition Assessment:    Presented w/ abdmoinal pain and constipation x 14 days and LE pain, missed dialysis; poor oral itnakes this admission - 1-25%; currently on low K and Phos diet. Labs , CRP 35.3, Phos 10.2, Na 133, BUN 68 Meds: azithromycin, zytrec, humalog 0-4 u, merrrem, micafungin, proamantine, senokot, revela, vancomycin    Nutrition Related Findings:    Deferred NFPE + contact precautions; LBM 9/18 Wound Type: Skin Tears       Current Nutrition Intake & Therapies:    Average Meal Intake: 1-25%  Average Supplements Intake: None Ordered  ADULT DIET; Regular; Low Potassium (Less than 3000 mg/day); Low Phosphorus (Less than 1000 mg)    Anthropometric Measures:  Height: 167.6 cm (5' 6\")  Ideal Body Weight (IBW): 130 lbs (59 kg)    Current Body Weight: 84.3 kg (185 lb 13.6 oz),   IBW. Weight Source: Bed Scale  Current BMI (kg/m2): 30  BMI Categories: Obese Class 1 (BMI 30.0-34.9)    Estimated Daily Nutrient Needs:  Energy Requirements Based On: Kcal/kg  Weight Used for Energy Requirements: Current  Energy (kcal/day): 0172-2613 (18-20 kcals/kg)  Weight Used for Protein Requirements: Current  Protein (g/day): 101-118 (1.2-1.4 g/kg)  Method Used for Fluid Requirements: 1 ml/kcal  Fluid (ml/day): 1686    Nutrition Diagnosis:   Inadequate oral intake related to other (comment) (poor appetite) as evidenced by intake 0-25%    Nutrition Interventions:   Food and/or Nutrient Delivery: Continue Current Diet, Start Oral Nutrition Supplement    Goals:     Goals: Meet at least 75%

## 2024-09-19 NOTE — DIALYSIS
Patient wants to shortened treatment, signed waiver done. only runs 1 hour of dialysis, UF removed 250 cc only treatment not tolerated well, access spastic too, Refer to IR, zofran and pain reliever given. Dr. Sparks made aware. Report given to Nurse in charge and telemetry informed of transport.

## 2024-09-19 NOTE — CONSENT
Informed Consent for Blood Component Transfusion Note    I have discussed with the patient the rationale for blood component transfusion; its benefits in treating or preventing fatigue, organ damage, or death; and its risk which includes mild transfusion reactions, rare risk of blood borne infection, or more serious but rare reactions. I have discussed the alternatives to transfusion, including the risk and consequences of not receiving transfusion. The patient had an opportunity to ask questions and had agreed to proceed with transfusion of blood components.    Electronically signed by Janet Daily MD on 9/19/24 at 11:22 AM EDT

## 2024-09-19 NOTE — PROGRESS NOTES
IP WOUND CONSULT    Brii Lambert  MEDICAL RECORD NUMBER:  [id number]  AGE: 64 y.o.   GENDER: female  : 1960  TODAY'S DATE:  2024    GENERAL     [] Follow-up   [x] New Consult    Brii Lambert is a 64 y.o. female referred by:   [x] Physician  [] Nursing  [] Other:         PAST MEDICAL HISTORY    No past medical history on file.     PAST SURGICAL HISTORY    Past Surgical History:   Procedure Laterality Date    IR NONTUNNELED VASCULAR CATHETER  2024    IR NONTUNNELED VASCULAR CATHETER 2024 Vi Kenny PA SSR RAD ANGIO IR       FAMILY HISTORY    No family history on file.      ALLERGIES    No Known Allergies    MEDICATIONS    No current facility-administered medications on file prior to encounter.     No current outpatient medications on file prior to encounter.          BP (!) 120/51   Pulse 77   Temp 98 °F (36.7 °C)   Resp 16   Ht 1.676 m (5' 6\")   Wt 84.3 kg (185 lb 13.6 oz)   SpO2 97%   BMI 30.00 kg/m²     Lab Results   Component Value Date/Time    WBC 24.2 (H) 2024 07:04 AM    LABA1C 5.7 (H) 09/15/2024 06:54 PM    POCGLU 244 (H) 2024 12:08 PM    POCGLU 112 (H) 2024 10:42 PM    HGB 6.8 (L) 2024 07:04 AM    HCT 21.8 (L) 2024 07:04 AM     2024 07:04 AM     ADULT DIET; Regular; Low Potassium (Less than 3000 mg/day); Low Phosphorus (Less than 1000 mg)         ASSESSMENT     Wound Identification & Type: shear skin injury to buttocks with mottling of skin; discoloration to both heels possibly related to venous insufficiency  Dressing change: applied zinc paste to buttocks and sacrum  Verbal consent for picture: Yes    Contributing Factors: edema, diabetes, poor glucose control, decreased mobility, shear force, obesity, arterial insufficiency, decreased tissue oxygenation, and incontinence of stool    Wound 24 Buttocks (Active)   Wound Image     24 1251   Wound Etiology Other 24 1251   Dressing Status New dressing applied

## 2024-09-19 NOTE — PLAN OF CARE
Problem: Discharge Planning  Goal: Discharge to home or other facility with appropriate resources  Outcome: Progressing     Problem: Skin/Tissue Integrity  Goal: Absence of new skin breakdown  Description: 1.  Monitor for areas of redness and/or skin breakdown  2.  Assess vascular access sites hourly  3.  Every 4-6 hours minimum:  Change oxygen saturation probe site  4.  Every 4-6 hours:  If on nasal continuous positive airway pressure, respiratory therapy assess nares and determine need for appliance change or resting period.  Outcome: Progressing     Problem: Safety - Adult  Goal: Free from fall injury  Outcome: Progressing     Problem: Genitourinary - Adult  Goal: Urinary catheter remains patent  Outcome: Progressing     Problem: Pain  Goal: Verbalizes/displays adequate comfort level or baseline comfort level  Outcome: Progressing     Problem: Confusion  Goal: Confusion, delirium, dementia, or psychosis is improved or at baseline  Description: INTERVENTIONS:  1. Assess for possible contributors to thought disturbance, including medications, impaired vision or hearing, underlying metabolic abnormalities, dehydration, psychiatric diagnoses, and notify attending LIP  2. [place] high risk fall precautions, as indicated  3. Provide frequent short contacts to provide reality reorientation, refocusing and direction  4. Decrease environmental stimuli, including noise as appropriate  5. Monitor and intervene to maintain adequate nutrition, hydration, elimination, sleep and activity  6. If unable to ensure safety without constant attention obtain sitter and review sitter guidelines with assigned personnel  7. Initiate Psychosocial CNS and Spiritual Care consult, as indicated  Outcome: Progressing

## 2024-09-19 NOTE — OR NURSING
Exchange of  right temporary dialysis catheter complete under live fluoro and ready for immediate use.

## 2024-09-19 NOTE — PROGRESS NOTES
Late entry 1730 Nurse entered patient room to inform patient about blood transfusion. Patient refused transfusion. Patient request MD to speak with her. Nurse updated Charge nurse.

## 2024-09-19 NOTE — PROGRESS NOTES
injection vial 0-4 Units, 0-4 Units, SubCUTAneous, Nightly, Radha Keating APRN - CNP, 0 Units at 09/13/24 2050    aspirin EC tablet 81 mg, 81 mg, Oral, Daily, Radha Keating APRN - CNP, 81 mg at 09/17/24 0928    cetirizine (ZYRTEC) tablet 10 mg, 10 mg, Oral, Daily, Radha Keating APRN - CNP, 10 mg at 09/17/24 0928    oxyCODONE (ROXICODONE) immediate release tablet 5 mg, 5 mg, Oral, Q6H PRN, Radha Keating APRN - CNP, 5 mg at 09/16/24 1904    sodium chloride flush 0.9 % injection 5-40 mL, 5-40 mL, IntraVENous, 2 times per day, Radha Keating APRN - CNP, 10 mL at 09/18/24 2111    sodium chloride flush 0.9 % injection 5-40 mL, 5-40 mL, IntraVENous, PRN, Radha Keating APRN - CNP    0.9 % sodium chloride infusion, , IntraVENous, PRN, Radha Keating APRN - CNP, Last Rate: 10 mL/hr at 09/16/24 1902, Restarted at 09/16/24 1902    ondansetron (ZOFRAN-ODT) disintegrating tablet 4 mg, 4 mg, Oral, Q8H PRN **OR** ondansetron (ZOFRAN) injection 4 mg, 4 mg, IntraVENous, Q6H PRN, Radha Keating APRN - CNP, 4 mg at 09/19/24 0824    polyethylene glycol (GLYCOLAX) packet 17 g, 17 g, Oral, Daily PRN, Radha Keating APRN - CNP    acetaminophen (TYLENOL) tablet 650 mg, 650 mg, Oral, Q6H PRN, 650 mg at 09/19/24 0838 **OR** acetaminophen (TYLENOL) suppository 650 mg, 650 mg, Rectal, Q6H PRN, Radha Keating APRN - CNP, 650 mg at 09/18/24 0015    sennosides-docusate sodium (SENOKOT-S) 8.6-50 MG tablet 1 tablet, 1 tablet, Oral, BID, Radha Keating APRN - CNP, 1 tablet at 09/18/24 2110    LAB DATA REVIEWED:    Recent Results (from the past 24 hour(s))   POCT Glucose    Collection Time: 09/18/24 11:35 AM   Result Value Ref Range    POC Glucose 149 (H) 65 - 100 mg/dL    Performed by: Nargis Ceballos    Lactate, Sepsis    Collection Time: 09/18/24 12:00 PM   Result Value Ref Range    Lactic Acid, Sepsis 0.9 0.4 - 2.0 mmol/L   Lactic Acid    Collection Time: 09/18/24  1:48 PM   Result Value Ref  weak reconstitution of   the bilateral iliac trunks.   2. Penetrating atherosclerotic ulceration of the descending thoracic aorta.   3. 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 The left renal artery is occluded,   with left renal atrophy.   4.Multifocal stenosis of the right SFA, with occlusion of the right posterior   tibial artery.   5. Multifocal airspace disease favoring pneumonia.   6. Small left pleural effusion.   7. The bladder is severely distended. Any clinical concern for urinary retention   or urinary outlet obstruction?      Electronically signed by KAT WHITE      Vascular duplex lower extremity arteries bilateral   Final Result      XR CHEST PORTABLE   Final Result      Mild cardiomegaly and edema.         Electronically signed by BROOKE TORRES      IR ULTRASOUND GUIDANCE VASCULAR ACCESS    (Results Pending)   IR NONTUNNELED VASCULAR CATHETER > 5 YEARS    (Results Pending)   IR FLUORO GUIDED NEEDLE PLACEMENT    (Results Pending)        Review of Systems:  Constitutional: Negative for chills and fever.   HENT: Negative.    Eyes: Negative.    Respiratory: Negative.    Cardiovascular: Negative.    Gastrointestinal: Negative for abdominal pain and nausea.   Skin: Negative.    Neurological: Negative.    Musculoskeletal: Bilateral leg pain  Objective:   VITALS:    Vitals:    09/19/24 0900   BP: (!) 120/51   Pulse: 77   Resp: 16   Temp:    SpO2:        Physical Exam:   Constitutional: pt is oriented to person,     Head: Normocephalic and atraumatic.   Eyes: Pupils are equal, round, and reactive to light. EOM are normal.   Cardiovascular: Normal rate, regular rhythm and normal heart sounds.   Pulmonary/Chest: Breath sounds normal. No wheezes. No rales.   Exhibits no tenderness.   Abdominal: Soft. Bowel sounds are normal. There is no abdominal tenderness. There is no rebound and no guarding.   Musculoskeletal: Normal range of motion.   Neurological: pt is alert and oriented to person,      ASSESSMENT &

## 2024-09-19 NOTE — CARE COORDINATION
CM reviewed chart.     DCP is [facility].  Patient is set up with US Renal MWF.    Patient is pending ID and Nephro clearance.     CM will continue to follow.

## 2024-09-19 NOTE — PROGRESS NOTES
Nephrology follow-up          Patient: Brii Lambert MRN: [id number]  SSN: [id number]    YOB: 1960  Age: 64 y.o.  Sex: female      Subjective:     I have seen patient at the bedside.  She looks lethargic  No hypoglycemia          Could dialyze for 40 minutes only  Wanted to come off due to nausea  And hd cath malfunctioning too   IR to exchange hd cath  Will dialyze in AM       No past medical history on file.  Past Surgical History:   Procedure Laterality Date    IR NONTUNNELED VASCULAR CATHETER  2024    IR NONTUNNELED VASCULAR CATHETER 2024 Vi Kenny PA [facility] RAD ANGIO IR      No family history on file.  Social History     Tobacco Use    Smoking status: Former     Current packs/day: 0.00     Types: Cigarettes     Quit date:      Years since quittin.7    Smokeless tobacco: Former   Substance Use Topics    Alcohol use: Not on file      Current Facility-Administered Medications   Medication Dose Route Frequency Provider Last Rate Last Admin    heparin (porcine) injection 2,200 Units  2,200 Units IntraCATHeter PRN Priscila Sparks MD   2,200 Units at 24 0943    0.9 % sodium chloride infusion   IntraVENous PRN Janet Daily MD        meropenem (MERREM) 500 mg in sodium chloride 0.9 % 100 mL IVPB (mini-bag)  500 mg IntraVENous Q24H Gui Elizalde MD   Stopped at 24 2138    Vancomycin- Pharmacy to Dose   Other RX Placeholder Gui Elizalde MD        [START ON 2024] Vancomycin - Please draw level on  @ 0600 prior to HD. Thanks!  1 each Other Once Gui Elizalde MD        midodrine (PROAMATINE) tablet 10 mg  10 mg Oral TID Priscila Sparks MD   10 mg at 24 1641    micafungin (MYCAMINE) 100 mg in sodium chloride 0.9 % 100 mL IVPB (mini-bag)  100 mg IntraVENous Daily Gui Elizalde  mL/hr at 24 1830 100 mg at 24 1830    sevelamer (RENVELA) tablet 1,600 mg  1,600 mg Oral TID WC Priscila Sparks MD   1,600 mg at 24  off due to nausea  And hd cath malfunctioning too   IR to exchange hd cath  Will dialyze in AM   Right IJ permacath as a dialysis access.      Hyperkalemia  Recurrent  Potassium 5.7.  Started on on Lokelma 10 g daily.  Will use 2K bath with dialysis.  Potassium is 4.6.    Acute hypoxic respiratory failure  From fluid overload/pulmonary edema/bilateral pneumonia  Leukocytosis  She came with hypoxia and sats 70%  Currently on nasal cannula 2 L.  On IV antibiotics.    Anemia  Hemoglobin 7.0-->5.9  No obvious bleeding  IV erythropoietin with dialysis  Received blood transfusion 2 units with dialysis.  Hb 6.8  Getting one unit prbcs    Abdominal pain  Came with abdominal pain  Leukocytosis  CT of the abdomen with IV contrast occlusion of infrarenal abdominal aorta  Vascular surgery on board.    Hypoglycemia  Recurrent  I had discontinued sitagliptin  D10 at 30 cc/h  Blood sugars consistently above 200  Off  D10.    Hyponatremia  Of moderate severity  Sodium 123->133  Use high Na bath with dialysis.    Hypertension  Noticed to have low blood pressures  discontinued amlodipine/atenolol and hydralazine    Acute encephalopathy  Metabolic/underlying infection/recurrent hypoglycemia  She is very lethargic.  CT head no acute pathology.      Renal osteodystrophy  Ca adjusted ok  Phos 10.2   Increasing sevelamer 2400 mg tid  Sending iPTH   Plan:       Signed By: Priscila Sparks MD     September 19, 2024

## 2024-09-20 ENCOUNTER — APPOINTMENT (OUTPATIENT)
Facility: HOSPITAL | Age: 64
DRG: 193 | End: 2024-09-20
Attending: SURGERY
Payer: MEDICARE

## 2024-09-20 VITALS
TEMPERATURE: 98.8 F | OXYGEN SATURATION: 98 % | HEIGHT: 66 IN | SYSTOLIC BLOOD PRESSURE: 149 MMHG | RESPIRATION RATE: 18 BRPM | BODY MASS INDEX: 29.58 KG/M2 | HEART RATE: 81 BPM | WEIGHT: 184.08 LBS | DIASTOLIC BLOOD PRESSURE: 56 MMHG

## 2024-09-20 LAB
ALBUMIN SERPL-MCNC: 1.5 G/DL (ref 3.5–5)
ANION GAP SERPL CALC-SCNC: 13 MMOL/L (ref 2–12)
BACTERIA SPEC CULT: ABNORMAL
BUN SERPL-MCNC: 71 MG/DL (ref 6–20)
BUN/CREAT SERPL: 10 (ref 12–20)
CA-I BLD-MCNC: 7.2 MG/DL (ref 8.5–10.1)
CHLORIDE SERPL-SCNC: 98 MMOL/L (ref 97–108)
CO2 SERPL-SCNC: 22 MMOL/L (ref 21–32)
COLONY COUNT, CNT: ABNORMAL
CREAT SERPL-MCNC: 6.83 MG/DL (ref 0.55–1.02)
DATE LAST DOSE: NORMAL
DOSE AMOUNT: NORMAL UNITS
GLUCOSE BLD STRIP.AUTO-MCNC: 142 MG/DL (ref 65–100)
GLUCOSE BLD STRIP.AUTO-MCNC: 166 MG/DL (ref 65–100)
GLUCOSE BLD STRIP.AUTO-MCNC: 169 MG/DL (ref 65–100)
GLUCOSE BLD STRIP.AUTO-MCNC: 180 MG/DL (ref 65–100)
GLUCOSE SERPL-MCNC: 145 MG/DL (ref 65–100)
Lab: ABNORMAL
PERFORMED BY:: ABNORMAL
PHOSPHATE SERPL-MCNC: 7.7 MG/DL (ref 2.6–4.7)
POTASSIUM SERPL-SCNC: 4.6 MMOL/L (ref 3.5–5.1)
SODIUM SERPL-SCNC: 133 MMOL/L (ref 136–145)
VANCOMYCIN SERPL-MCNC: 17.9 UG/ML

## 2024-09-20 PROCEDURE — 93925 LOWER EXTREMITY STUDY: CPT

## 2024-09-20 PROCEDURE — 82962 GLUCOSE BLOOD TEST: CPT

## 2024-09-20 PROCEDURE — 90935 HEMODIALYSIS ONE EVALUATION: CPT

## 2024-09-20 PROCEDURE — 6360000002 HC RX W HCPCS: Performed by: INTERNAL MEDICINE

## 2024-09-20 PROCEDURE — 2580000003 HC RX 258: Performed by: FAMILY MEDICINE

## 2024-09-20 PROCEDURE — 2580000003 HC RX 258: Performed by: INTERNAL MEDICINE

## 2024-09-20 PROCEDURE — 6360000002 HC RX W HCPCS: Performed by: FAMILY MEDICINE

## 2024-09-20 PROCEDURE — 80069 RENAL FUNCTION PANEL: CPT

## 2024-09-20 PROCEDURE — 6370000000 HC RX 637 (ALT 250 FOR IP): Performed by: NURSE PRACTITIONER

## 2024-09-20 PROCEDURE — 2709999900 HC NON-CHARGEABLE SUPPLY

## 2024-09-20 PROCEDURE — 80202 ASSAY OF VANCOMYCIN: CPT

## 2024-09-20 PROCEDURE — 6370000000 HC RX 637 (ALT 250 FOR IP): Performed by: FAMILY MEDICINE

## 2024-09-20 PROCEDURE — 2580000003 HC RX 258: Performed by: NURSE PRACTITIONER

## 2024-09-20 PROCEDURE — 99232 SBSQ HOSP IP/OBS MODERATE 35: CPT | Performed by: INTERNAL MEDICINE

## 2024-09-20 PROCEDURE — 1100000000 HC RM PRIVATE

## 2024-09-20 PROCEDURE — 6370000000 HC RX 637 (ALT 250 FOR IP): Performed by: INTERNAL MEDICINE

## 2024-09-20 PROCEDURE — 36415 COLL VENOUS BLD VENIPUNCTURE: CPT

## 2024-09-20 RX ADMIN — SENNOSIDES AND DOCUSATE SODIUM 1 TABLET: 50; 8.6 TABLET ORAL at 20:52

## 2024-09-20 RX ADMIN — MEROPENEM 500 MG: 500 INJECTION, POWDER, FOR SOLUTION INTRAVENOUS at 21:01

## 2024-09-20 RX ADMIN — CASTOR OIL AND BALSAM, PERU: 788; 87 OINTMENT TOPICAL at 13:04

## 2024-09-20 RX ADMIN — AZITHROMYCIN DIHYDRATE 250 MG: 500 INJECTION, POWDER, LYOPHILIZED, FOR SOLUTION INTRAVENOUS at 21:01

## 2024-09-20 RX ADMIN — SEVELAMER CARBONATE 2400 MG: 800 TABLET, FILM COATED ORAL at 13:07

## 2024-09-20 RX ADMIN — EPOETIN ALFA-EPBX 10000 UNITS: 10000 INJECTION, SOLUTION INTRAVENOUS; SUBCUTANEOUS at 09:49

## 2024-09-20 RX ADMIN — INSULIN LISPRO 1 UNITS: 100 INJECTION, SOLUTION INTRAVENOUS; SUBCUTANEOUS at 13:06

## 2024-09-20 RX ADMIN — ACETAMINOPHEN 650 MG: 325 TABLET ORAL at 06:16

## 2024-09-20 RX ADMIN — ASPIRIN 81 MG: 81 TABLET, COATED ORAL at 13:07

## 2024-09-20 RX ADMIN — OXYCODONE HYDROCHLORIDE 5 MG: 5 TABLET ORAL at 09:12

## 2024-09-20 RX ADMIN — VANCOMYCIN HYDROCHLORIDE 750 MG: 750 INJECTION, POWDER, LYOPHILIZED, FOR SOLUTION INTRAVENOUS at 13:22

## 2024-09-20 RX ADMIN — MIDODRINE HYDROCHLORIDE 10 MG: 5 TABLET ORAL at 13:07

## 2024-09-20 RX ADMIN — CASTOR OIL AND BALSAM, PERU: 788; 87 OINTMENT TOPICAL at 21:04

## 2024-09-20 RX ADMIN — HEPARIN SODIUM 2200 UNITS: 1000 INJECTION INTRAVENOUS; SUBCUTANEOUS at 14:06

## 2024-09-20 RX ADMIN — SEVELAMER CARBONATE 2400 MG: 800 TABLET, FILM COATED ORAL at 17:41

## 2024-09-20 RX ADMIN — SODIUM CHLORIDE, PRESERVATIVE FREE 5 ML: 5 INJECTION INTRAVENOUS at 13:08

## 2024-09-20 RX ADMIN — SENNOSIDES AND DOCUSATE SODIUM 1 TABLET: 50; 8.6 TABLET ORAL at 13:07

## 2024-09-20 RX ADMIN — MICAFUNGIN 100 MG: 20 INJECTION, POWDER, LYOPHILIZED, FOR SOLUTION INTRAVENOUS at 17:28

## 2024-09-20 RX ADMIN — CETIRIZINE HYDROCHLORIDE 10 MG: 10 TABLET, FILM COATED ORAL at 13:07

## 2024-09-20 RX ADMIN — SODIUM CHLORIDE, PRESERVATIVE FREE 10 ML: 5 INJECTION INTRAVENOUS at 21:02

## 2024-09-20 ASSESSMENT — PAIN DESCRIPTION - LOCATION: LOCATION: ABDOMEN

## 2024-09-20 ASSESSMENT — PAIN SCALES - GENERAL: PAINLEVEL_OUTOF10: 9

## 2024-09-20 NOTE — DIALYSIS
Patient tolerated treatment poorly. Patient was alert and oriented during treatment. Treatment was stopped per patient's request. MD notified. Report was given to primary nurse Jody.

## 2024-09-20 NOTE — PROGRESS NOTES
Spiritual Health History and Assessment/Progress Note  [facility]    Initial Encounter,  , Life Adjustments, Adjustment to illness,      Name: Brii Lambert MRN: [id number]    Age: 64 y.o.     Sex: female   Language: English   Scientologist: None   HCAP (healthcare-associated pneumonia)     Date: 9/20/2024            Total Time Calculated: 20 min              Spiritual Assessment began in [facility] ONCOLOGY        Referral/Consult From: Rounding   Encounter Overview/Reason: Initial Encounter  Service Provided For: Patient    Janelle, Belief, Meaning:   Patient is connected with a janelle tradition or spiritual practice and has beliefs or practices that help with coping during difficult times  Family/Friends No family/friends present      Importance and Influence:  Patient has spiritual/personal beliefs that influence decisions regarding their health  Family/Friends No family/friends present    Community:  Patient feels well-supported. Support system includes: Children, Janelle Community, and Extended family  Family/Friends No family/friends present    Assessment and Plan of Care:   Patient is alone in her room resting in bed. Patient shared life/review/legacy including family, work and Religious life. Shared medical history and reason for being admitted to facility. States she is tired but maintains her janelle by talking to God and prayer. Listened empathically providing time and space for reflection and sharing of life's sacred events. Provided words of encouragement and comfort. Maintained presence of ministry.  available upon request.   Patient Interventions include: Facilitated expression of thoughts and feelings, Explored spiritual coping/struggle/distress , Engaged in theological reflection, Affirmed coping skills/support systems, and Facilitated life review and/ or legacy  Family/Friends Interventions include: No family/friends present    Patient Plan of Care: Spiritual Care available upon

## 2024-09-20 NOTE — PROGRESS NOTES
Nurse called report to [facility] and was placed on hold. No one answered the phone.

## 2024-09-20 NOTE — PROGRESS NOTES
Nephrology follow-up          Patient: Brii Lambert MRN: [id number]  SSN: [id number]    YOB: 1960  Age: 64 y.o.  Sex: female      Subjective:     I have seen patient at the bedside.  She looks lethargic    She was dialyzed today 1 hour 20 minutes and insisted to come off due to having back pain and legs pain along with nausea.    I have spoken with the patient in detail at the bedside about whether she wanted to continue on dialysis and she said she definitely would like to continue on dialysis.  I will try to give her pain medication before starting on dialysis.    No past medical history on file.  Past Surgical History:   Procedure Laterality Date    IR NONTUNNELED VASCULAR CATHETER  2024    IR NONTUNNELED VASCULAR CATHETER 2024 Vi Kenny PA SSR RAD ANGIO IR    IR NONTUNNELED VASCULAR CATHETER  2024    IR NONTUNNELED VASCULAR CATHETER 2024 Vi Kenny PA SSR RAD ANGIO IR      No family history on file.  Social History     Tobacco Use    Smoking status: Former     Current packs/day: 0.00     Types: Cigarettes     Quit date:      Years since quittin.7    Smokeless tobacco: Former   Substance Use Topics    Alcohol use: Not on file      Current Facility-Administered Medications   Medication Dose Route Frequency Provider Last Rate Last Admin    [START ON 2024] Vancomycin Level: Please draw level on  at 0600 (Pre-HD)  1 each Other Once Janet Daily MD        heparin (porcine) injection 2,200 Units  2,200 Units IntraCATHeter PRN Priscila Sparks MD   2,200 Units at 24 1406    0.9 % sodium chloride infusion   IntraVENous PRN Janet Daily MD        sevelamer (RENVELA) tablet 2,400 mg  2,400 mg Oral TID WC Priscila Sparks MD   2,400 mg at 24 1307    balsum peru-castor oil (VENELEX) ointment   Topical BID Janet Daily MD   Given at 24 1304    meropenem (MERREM) 500 mg in sodium chloride 0.9 % 100 mL IVPB (mini-bag)  500 mg  IntraVENous Q24H Gui Elizalde MD   Stopped at 09/19/24 2218    Vancomycin- Pharmacy to Dose   Other RX Placeholder Gui Elizalde MD        midodrine (PROAMATINE) tablet 10 mg  10 mg Oral TID Priscila Sparks MD   10 mg at 09/20/24 1307    micafungin (MYCAMINE) 100 mg in sodium chloride 0.9 % 100 mL IVPB (mini-bag)  100 mg IntraVENous Daily Gui Elizalde  mL/hr at 09/18/24 1830 100 mg at 09/18/24 1830    insulin lispro (HUMALOG,ADMELOG) injection vial 0-4 Units  0-4 Units SubCUTAneous TID  Janet Daily MD   1 Units at 09/20/24 1306    azithromycin (ZITHROMAX) 250 mg in sodium chloride 0.9 % 250 mL IVPB  250 mg IntraVENous Q24H Gui Elizalde MD   Stopped at 09/19/24 2248    traMADol (ULTRAM) tablet 50 mg  50 mg Oral Q6H PRN Janet Daily MD   50 mg at 09/13/24 1217    0.9 % sodium chloride infusion   IntraVENous PRN Janet Daily MD        0.9 % sodium chloride infusion   IntraVENous PRN Priscila Sparks MD        glucose chewable tablet 16 g  4 tablet Oral PRN Gautam Centeno MD        dextrose bolus 10% 125 mL  125 mL IntraVENous PRN Gautam Centeno MD   Stopped at 09/13/24 0753    Or    dextrose bolus 10% 250 mL  250 mL IntraVENous PRN Gautam Centeno MD        glucagon injection 1 mg  1 mg SubCUTAneous PRN Gautam Centeno MD   1 mg at 09/15/24 0817    dextrose 10 % infusion   IntraVENous Continuous PRN Gautam Centeno MD        epoetin saul-epbx (RETACRIT) injection 10,000 Units  10,000 Units IntraVENous Once per day on Monday Wednesday Friday Priscila Sparks MD   10,000 Units at 09/20/24 0949    acetaminophen (TYLENOL) tablet 650 mg  650 mg Oral Q6H PRN Janet Daily MD   650 mg at 09/17/24 0209    insulin lispro (HUMALOG,ADMELOG) injection vial 0-4 Units  0-4 Units SubCUTAneous Nightly Radha Keating APRN - CNP   0 Units at 09/13/24 2050    aspirin EC tablet 81 mg  81 mg Oral Daily Radha Keating APRN - CNP   81 mg at 09/20/24 1307    cetirizine (ZYRTEC)

## 2024-09-20 NOTE — PROGRESS NOTES
Progress Note  Date:2024       Room:[facility]  Patient Name:Brii Lambert     YOB: 1960     Age:64 y.o.        Subjective    Subjective   Patient followed for presumptive UTI with contaminated urine culture and presumptive Mycoplasma pneumonia. She remains afebrile but WBC remains elevated and ESR and CRP increased.     Objective         Vitals Last 24 Hours:  TEMPERATURE:  Temp  Av.1 °F (36.7 °C)  Min: 97.3 °F (36.3 °C)  Max: 99.1 °F (37.3 °C)  RESPIRATIONS RANGE: Resp  Av.8  Min: 16  Max: 18  PULSE OXIMETRY RANGE: SpO2  Av.8 %  Min: 90 %  Max: 99 %  PULSE RANGE: Pulse  Av.9  Min: 73  Max: 80  BLOOD PRESSURE RANGE: Systolic (24hrs), Av , Min:115 , Max:148   ; Diastolic (24hrs), Av, Min:49, Max:71             Objective:  Vital signs: (most recent): Blood pressure (!) 148/71, pulse 79, temperature 98.3 °F (36.8 °C), temperature source Oral, resp. rate 16, height 1.676 m (5' 6\"), weight 83.5 kg (184 lb 1.4 oz), SpO2 97%.      Vitals and nursing note reviewed.   Constitutional:       General: She is not in acute distress.     Appearance: She is ill-appearing.   HENT:      Head: Normocephalic and atraumatic.      Right Ear: External ear normal.      Left Ear: External ear normal.      Nose: Nose normal.      Mouth/Throat:      Mouth: Mucous membranes are dry.   Eyes:      Pupils: Pupils are equal, round, and reactive to light.   Cardiovascular:      Rate and Rhythm: Normal rate and regular rhythm.      Heart sounds: No murmur heard.  Pulmonary:      Effort: Pulmonary effort is normal.      Breath sounds: Normal breath sounds.   Chest:           Comments: Right sided Permacath with small area of erythema at the insertion site  Abdominal:      General: Bowel sounds are normal. There is no distension.      Palpations: Abdomen is soft.      Tenderness: There is abdominal tenderness. There is guarding.   Genitourinary:     Comments: No Ortez catheter  Musculoskeletal:       Cervical back: Neck supple.      Right lower leg: No edema.      Left lower leg: No edema.   Skin:     Findings: No rash.   Neurological:      General: No focal deficit present.      Mental Status: She is alert. She is disoriented.   Psychiatric:         Mood and Affect: Mood normal.      Comments: Unable to assess        Labs/Imaging/Diagnostics    Labs:  CBC:  Recent Labs     09/18/24  0530 09/19/24  0704   WBC 24.0* 24.2*   RBC 2.68* 2.62*   HGB 7.2* 6.8*   HCT 22.4* 21.8*   MCV 83.6 83.2   RDW 18.2* 18.4*    209     CHEMISTRIES:  Recent Labs     09/18/24  0530 09/19/24  0704 09/20/24  0748   * 133* 133*   K 4.6 4.6 4.6   CL 96* 97 98   CO2 21 21 22   BUN 55* 68* 71*   CREATININE 6.86* 7.15* 6.83*   GLUCOSE 113* 78 145*   PHOS  --  10.2* 7.7*        LIVER PROFILE:  Recent Labs     09/19/24  0704   *   ALT 98*   BILITOT 0.5   ALKPHOS 91      Latest Reference Range & Units 09/09/24 19:03 09/17/24 17:36   Color, UA -   Yellow/Straw Yellow/Straw   Glucose, Ur Negative mg/dL Negative Negative   Bilirubin, Urine Negative   Negative Negative   Ketones, Urine Negative mg/dL Negative Negative   Specific Gravity, UA 1.003 - 1.030   1.014 1.021   Blood, Urine Negative   Small ! Moderate !   Protein, UA Negative mg/dL 30 ! 100 !   Urobilinogen 0.1 - 1.0 EU/dL 0.1 0.1   Nitrite, Urine Negative   Negative Negative   Leukocyte Esterase, Urine Negative   Large ! Large !   Appearance Clear   Turbid ! Turbid !   pH, Urine 5.0 - 8.0   5.0 5.0   WBC, UA 0 - 4 /hpf >100 (H) >100 (H)   RBC, UA 0 - 5 /hpf 20-50 >100 (H)   Epithelial Cells, UA Few /lpf Few Few   Bacteria, UA Negative /hpf Negative 1+ !   Budding Yeast Negative   Present ! Present !         Procal 68.93 <98.09 <81.66 <21.05 <18.72 <6.65 <8.06         CRP 30.10 <30.50 <28.60 <29.70 <24.80 <18.10             Mycoplasma IgM Reactive    Blood cultures (9/9) No growth FINAL  Blood cultures (9/9) No growth FINAL  Blood culture (9/18) in process  Blood  MD

## 2024-09-20 NOTE — PROGRESS NOTES
Vancomycin Dosing Consult  Brii Lambert is a 64 y.o. female with sepsis secondary to UTI, mycoplasma PNA. Pharmacy was consulted by Dr. Sparks to dose and monitor vancomycin. Today is day 10.    Antibiotic regimen: Vancomycin + meropenem  + micafungin    Temp (24hrs), Av.2 °F (36.8 °C), Min:97.3 °F (36.3 °C), Max:99.1 °F (37.3 °C)    Recent Labs     24  0530 24  0704 24  0748   WBC 24.0* 24.2*  --    CREATININE 6.86* 7.15* 6.83*   BUN 55* 68* 71*     Est CrCl: ESRD on HD MWF  Concomitant nephrotoxic drugs: Contrast agents     Cultures:    Blood cultures x 2 - NGTD, final   Urine culture- Mixed urogenital glenn isolated, final   Urine: pending   Blood: pending    MRSA Swab: Not ordered, patient already received first dose of vancomycin    Target range: Trough 21-24 mcg/mL (Intermittent hemodialysis, invasive MRSA infection or sepsis)    Recent level history:  Date/Time Dose & Interval Measured Level (mcg/mL) Associated AUC/AURELIANO    @ 0621 2000 mg x 1 ( 1702) 24.1     @0559 1000mg IV x1 27.9     @ 1348 -- 18.1     @ 0747 500mg x 1 dose () 17.9         Assessment/Plan:   Restarting Vancomycin due to persistent leukocytosis, elevated procal and CRP. ID following. Remains with uncertain source of infection, remains on empiric therapy with vancomycin, meropenem, and micafungin.  ESRD on HD MWF; dialysis yesterday cut short, dialysis again today x 3.5 hrs  Pre-HD level resulted this AM at 17.9; expected post-HD concentration ~ 11-12  Redose vancomycin after HD today with 750mg x 1 dose   Next level scheduled for  @ 0600 (Pre-HD)  Antimicrobial stop date 7 days per consult (last day )

## 2024-09-20 NOTE — CARE COORDINATION
Chart reviewed. DC plan remains to return to [facility] once medically stable. Patient will resume her outpatient dialysis at  [facility].    Updates provided to [facility].

## 2024-09-20 NOTE — PLAN OF CARE
Problem: Discharge Planning  Goal: Discharge to home or other facility with appropriate resources  Outcome: Progressing     Problem: Skin/Tissue Integrity  Goal: Absence of new skin breakdown  Description: 1.  Monitor for areas of redness and/or skin breakdown  2.  Assess vascular access sites hourly  3.  Every 4-6 hours minimum:  Change oxygen saturation probe site  4.  Every 4-6 hours:  If on nasal continuous positive airway pressure, respiratory therapy assess nares and determine need for appliance change or resting period.  Outcome: Progressing     Problem: Safety - Adult  Goal: Free from fall injury  Outcome: Progressing     Problem: Genitourinary - Adult  Goal: Urinary catheter remains patent  Outcome: Progressing     Problem: Pain  Goal: Verbalizes/displays adequate comfort level or baseline comfort level  Outcome: Progressing     Problem: Confusion  Goal: Confusion, delirium, dementia, or psychosis is improved or at baseline  Description: INTERVENTIONS:  1. Assess for possible contributors to thought disturbance, including medications, impaired vision or hearing, underlying metabolic abnormalities, dehydration, psychiatric diagnoses, and notify attending LIP  2. [place] high risk fall precautions, as indicated  3. Provide frequent short contacts to provide reality reorientation, refocusing and direction  4. Decrease environmental stimuli, including noise as appropriate  5. Monitor and intervene to maintain adequate nutrition, hydration, elimination, sleep and activity  6. If unable to ensure safety without constant attention obtain sitter and review sitter guidelines with assigned personnel  7. Initiate Psychosocial CNS and Spiritual Care consult, as indicated  Outcome: Progressing

## 2024-09-20 NOTE — PROGRESS NOTES
Gastroenterology Consult     Referring Physician: Janet Daily MD     Consult Date: 2024     Subjective:     Attempted to talk to patient while she was in the hallway waiting to be wheeled into her room. Patient was very lethargic and speech was difficult to understand.     Hemoglobin and Hematocrit:  6.8 and 21.8, respectively. Overall trending down.     No past medical history on file.  Past Surgical History:   Procedure Laterality Date    IR NONTUNNELED VASCULAR CATHETER  2024    IR NONTUNNELED VASCULAR CATHETER 2024 Vi Kenny PA SSR RAD ANGIO IR    IR NONTUNNELED VASCULAR CATHETER  2024    IR NONTUNNELED VASCULAR CATHETER 2024 Vi Kenny PA SSR RAD ANGIO IR      No family history on file.  Social History     Tobacco Use    Smoking status: Former     Current packs/day: 0.00     Types: Cigarettes     Quit date:      Years since quittin.7    Smokeless tobacco: Former   Substance Use Topics    Alcohol use: Not on file      No Known Allergies  Current Facility-Administered Medications   Medication Dose Route Frequency    vancomycin (VANCOCIN) 750 mg in sodium chloride 0.9 % 250 mL IVPB (Vbiu5Lao)  750 mg IntraVENous Once    [START ON 2024] Vancomycin Level: Please draw level on  at 0600 (Pre-HD)  1 each Other Once    heparin (porcine) injection 2,200 Units  2,200 Units IntraCATHeter PRN    0.9 % sodium chloride infusion   IntraVENous PRN    sevelamer (RENVELA) tablet 2,400 mg  2,400 mg Oral TID     balsum peru-castor oil (VENELEX) ointment   Topical BID    meropenem (MERREM) 500 mg in sodium chloride 0.9 % 100 mL IVPB (mini-bag)  500 mg IntraVENous Q24H    Vancomycin- Pharmacy to Dose   Other RX Placeholder    midodrine (PROAMATINE) tablet 10 mg  10 mg Oral TID    micafungin (MYCAMINE) 100 mg in sodium chloride 0.9 % 100 mL IVPB (mini-bag)  100 mg IntraVENous Daily    insulin lispro (HUMALOG,ADMELOG) injection vial 0-4 Units  0-4 Units SubCUTAneous TID WC      7:48 AM   Result Value Ref Range    Sodium 133 (L) 136 - 145 mmol/L    Potassium 4.6 3.5 - 5.1 mmol/L    Chloride 98 97 - 108 mmol/L    CO2 22 21 - 32 mmol/L    Anion Gap 13 (H) 2 - 12 mmol/L    Glucose 145 (H) 65 - 100 mg/dL    BUN 71 (H) 6 - 20 mg/dL    Creatinine 6.83 (H) 0.55 - 1.02 mg/dL    BUN/Creatinine Ratio 10 (L) 12 - 20      Est, Glom Filt Rate 6 (L) >60 ml/min/1.73m2    Calcium 7.2 (L) 8.5 - 10.1 mg/dL    Phosphorus 7.7 (H) 2.6 - 4.7 mg/dL    Albumin 1.5 (L) 3.5 - 5.0 g/dL   POCT Glucose    Collection Time: 09/20/24 11:17 AM   Result Value Ref Range    POC Glucose 180 (H) 65 - 100 mg/dL    Performed by: Mago Velazquez         IR NONTUNNELED VASCULAR CATHETER > 5 YEARS   Final Result   Technically successful exchange of a non-tunneled hemodialysis   catheter without fluoroscopy. New dialysis catheter is ready for use.                  Electronically signed by Yandel Carlson      XR CHEST PORTABLE   Final Result   1. No pneumothorax      Electronically signed by YOKASTA SHEEHAN      IR NONTUNNELED VASCULAR CATHETER > 5 YEARS   Final Result   Technically successful placement of an ultrasound guided temporary   dialysis catheter via the right internal jugular vein, as described above.               Electronically signed by Kellee Jimenez      IR REMOVE TUNNELED CVAD WO SQ PORT/PUMP   Final Result   Technically successful removal of a tunneled dialysis catheter.            Electronically signed by KAT WHITE      CT HEAD WO CONTRAST   Final Result   Chronic right cerebellar infarct.   No acute hemorrhage.            Electronically signed by TAMI BERRY      XR ABDOMEN (KUB) (SINGLE AP VIEW)   Final Result   Moderate amount of stool throughout the colon.         Electronically signed by Vinay Yanes      CTA ABDOMINAL AORTA W BILAT RUNOFF W WO CONTRAST   Final Result      1. Occlusion of the infrarenal abdominal aorta. There is weak reconstitution of   the bilateral iliac trunks.   2. Penetrating

## 2024-09-20 NOTE — PROGRESS NOTES
Progress Note  Date:2024       Room:[facility]  Patient Name:Brii Lambert     YOB: 1960     Age:64 y.o.        Subjective    Subjective   Patient followed for presumptive UTI with contaminated urine culture and presumptive Mycoplasma pneumonia. She remains afebrile but WBC remains elevated and ESR and CRP increased.     Objective         Vitals Last 24 Hours:  TEMPERATURE:  Temp  Av.3 °F (36.8 °C)  Min: 97.5 °F (36.4 °C)  Max: 99.1 °F (37.3 °C)  RESPIRATIONS RANGE: Resp  Av.9  Min: 15  Max: 18  PULSE OXIMETRY RANGE: SpO2  Av.3 %  Min: 90 %  Max: 99 %  PULSE RANGE: Pulse  Av.3  Min: 71  Max: 80  BLOOD PRESSURE RANGE: Systolic (24hrs), Av , Min:120 , Max:143   ; Diastolic (24hrs), Av, Min:51, Max:60             Objective:  Vital signs: (most recent): Blood pressure (!) 140/56, pulse 79, temperature 98.8 °F (37.1 °C), temperature source Oral, resp. rate 18, height 1.676 m (5' 6\"), weight 83.5 kg (184 lb 1.4 oz), SpO2 90%.      Vitals and nursing note reviewed.   Constitutional:       General: She is not in acute distress.     Appearance: She is ill-appearing.   HENT:      Head: Normocephalic and atraumatic.      Right Ear: External ear normal.      Left Ear: External ear normal.      Nose: Nose normal.      Mouth/Throat:      Mouth: Mucous membranes are dry.   Eyes:      Pupils: Pupils are equal, round, and reactive to light.   Cardiovascular:      Rate and Rhythm: Normal rate and regular rhythm.      Heart sounds: No murmur heard.  Pulmonary:      Effort: Pulmonary effort is normal.      Breath sounds: Normal breath sounds.   Chest:           Comments: Right sided Permacath with small area of erythema at the insertion site  Abdominal:      General: Bowel sounds are normal. There is no distension.      Palpations: Abdomen is soft.      Tenderness: There is abdominal tenderness. There is guarding.   Genitourinary:     Comments: No Ortez catheter  Musculoskeletal:

## 2024-09-20 NOTE — PLAN OF CARE
Problem: Discharge Planning  Goal: Discharge to home or other facility with appropriate resources  Outcome: Progressing     Problem: Skin/Tissue Integrity  Goal: Absence of new skin breakdown  Description: 1.  Monitor for areas of redness and/or skin breakdown  2.  Assess vascular access sites hourly  3.  Every 4-6 hours minimum:  Change oxygen saturation probe site  4.  Every 4-6 hours:  If on nasal continuous positive airway pressure, respiratory therapy assess nares and determine need for appliance change or resting period.  Outcome: Progressing     Problem: Safety - Adult  Goal: Free from fall injury  Outcome: Progressing     Problem: Genitourinary - Adult  Goal: Urinary catheter remains patent  Outcome: Progressing     Problem: Pain  Goal: Verbalizes/displays adequate comfort level or baseline comfort level  Outcome: Progressing     Problem: Confusion  Goal: Confusion, delirium, dementia, or psychosis is improved or at baseline  Description: INTERVENTIONS:  1. Assess for possible contributors to thought disturbance, including medications, impaired vision or hearing, underlying metabolic abnormalities, dehydration, psychiatric diagnoses, and notify attending LIP  2. [place] high risk fall precautions, as indicated  3. Provide frequent short contacts to provide reality reorientation, refocusing and direction  4. Decrease environmental stimuli, including noise as appropriate  5. Monitor and intervene to maintain adequate nutrition, hydration, elimination, sleep and activity  6. If unable to ensure safety without constant attention obtain sitter and review sitter guidelines with assigned personnel  7. Initiate Psychosocial CNS and Spiritual Care consult, as indicated  Outcome: Progressing

## 2024-09-20 NOTE — PROGRESS NOTES
Nurse called to update Ms. Irivng about the patient being transferred to [facility]. Nurse informed Ms.. Irving that the patient will be going to room 2304. All questions were answered.

## 2024-09-20 NOTE — PROGRESS NOTES
New PT orders received and completed, per chart review and previous PT screen pt is LTC resident at [place], bed bound/total care at baseline. If updated mobility status is verified please reorder PT. Thank you.

## 2024-09-20 NOTE — PROGRESS NOTES
64 years old with ESRD, Mycoplasma Pneumonia, Candida UTI and sepsiswith abnormal CTA showing Occlusion of infrarenal abdominal aortapenetrating ulcer of descending , ostial stenosi fo renal artery and multifocal stenosis of the R SFA and occlusion of  r posterioir tibial arterybeen seen by Dr Rodriguez (Vascular surgeon) Dr Albertina ROSARIO and Dr Daily (PMD) on dialysis. Also seen by GI Dr Owens.  Informed by Dr Rodriguez that ultrasound showed DVT and absent signal on ? Left leg but able to move extremities.  Dr Rodriguez is out of town, D/W Dr Daily who accepts to effect transfer to another hospital

## 2024-09-20 NOTE — PROGRESS NOTES
Dr Bourgeois request  me to tranfer pt to [facility]   Due to Ischemia of leg   Dr Rodriguez was following this pt since admission he request to tranfer To [facility]     I called tranfer center waited for for 30 mins ,call Nursing supervisor

## 2024-09-20 NOTE — PROGRESS NOTES
History and Physical    NAME:   Brii Lambert   :  1960   MRN:  [id number]     Date/Time: 2024 10:53 AM    Patient PCP: Janet Daily MD  ______________________________________________________________________       Subjective:     CHIEF COMPLAINT:     Abdominal pain, Pneumonia     HISTORY OF PRESENT ILLNESS:     General Daily Progress Note  Patient is a 64 y.o. year old female past medical history of end-stage renal disease on hemodialysis, hypertension, chronic bilateral cerebellar and pontine strokes, diabetic neuropathy presented to the ER yesterday for evaluation of abdominal, lower extremity pain. Pt was recently discharged from [facility] and sent to [facility] for rehab.  Patient reportedly missed several days of dialysis due to abdominal pain. Patient states she has not had a bowel movement in 14-15 days. Patient missed Monday dialysis and O2 sat was 70% on 10 L nonrebreather mask with grunting.  Patient seen in the ED, white count 31.1, potassium 5.9, BUN 78, creatinine 7.1 hemoglobin 7.0.    CTA of the abdomen  1. Occlusion of the infrarenal abdominal aorta. There is weak reconstitution of  the bilateral iliac trunks.  2. Penetrating atherosclerotic ulceration of the descending thoracic aorta.  3. 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 The left renal artery is occluded,  with left renal atrophy.  4.Multifocal stenosis of the right SFA, with occlusion of the right posterior  tibial artery.  5. Multifocal airspace disease favoring pneumonia.  6. Small left pleural effusion.  7. The bladder is severely distended. Any clinical concern for urinary retention  or urinary outlet obstruction?    ER doctor spoke with general surgeon determined artery disease chronic.  Patient seen in ED seems confused at times. Pt states she is wheelchair-bound normally. Denies back pain. No LE swelling.   Patient be admitted for further evaluation and treatment.    Consulted by Nephrology - pt came in with  volume overload and hyperkalemia from missed dialysis sessions     9/10    K wnl today     Hemoglobin 5.5    UA yesterday is positive with budding yeast - pending urine culture       Vascular surgery consulted today - CTA scan shows multiple atherosclerotic disease process; abdominal pressures, aorta, iliac, and femoral vessels. Distal Aorta occluded. Pt complaining of both feet numbness without any pain. Pt apparently has not been mobile in last 2 months    LE vascular duplex done today -   Impression:  1.  Right leg common femoral artery, superficial femoral artery, and popliteal are patent.  However below-knee level shows significant dampened signal on dorsalis pedis, peroneal, and posterior tibial artery, suspicious presence of hemodynamically significant right below-knee level atherosclerotic occlusive disease process.  2.  Left lower leg common femoral artery, superficial femoral artery and popliteal patent.  From proximal superficial femoral artery to proximal popliteal artery shows changes in peak systolic velocity, suspicious for presence of hemodynamically significant atherosclerotic disease process.  Left below-knee level also shows significantly diminished Doppler signal, suspicious for presence of hemodynamically significant atherosclerotic disease process.    Xray abd (KUB) done yesterday shows moderate amount of stool throughout colon   Chest xray yesterday shows mild cardiomegaly and edema     9/20    Patient getting dialysis today/CBC labs pending  No past medical history on file.   See above  Past Surgical History:   Procedure Laterality Date    IR NONTUNNELED VASCULAR CATHETER  9/18/2024    IR NONTUNNELED VASCULAR CATHETER 9/18/2024 Vi Kenny PA SSR RAD ANGIO IR    IR NONTUNNELED VASCULAR CATHETER  9/19/2024    IR NONTUNNELED VASCULAR CATHETER 9/19/2024 Vi Kenny PA SSR RAD ANGIO IR     Hysterectomy  Permacath placement    9/11    EGD was canceled for today  's seen by vascular surgeon no    ESRD - on hemodialysis   Anemia   Urinary distention   Constipation   Diabetes type 2  Hypertension   Hyperlipidemia   Hyperkalemia  Metabolic acidosis but  Hypoglycemia  Hyperkalemia  Hyponatremia  Elevated LFTs    PVD    Impression:  1.  Right leg common femoral artery, superficial femoral artery, and popliteal are patent.  However below-knee level shows significant dampened signal on dorsalis pedis, peroneal, and posterior tibial artery, suspicious presence of hemodynamically significant right below-knee level atherosclerotic occlusive disease process.  2.  Left lower leg common femoral artery, superficial femoral artery and popliteal patent.  From proximal superficial femoral artery to proximal popliteal artery shows changes in peak systolic velocity, suspicious for presence of hemodynamically significant atherosclerotic disease process.  Left below-knee level also shows significantly diminished Doppler signal, suspicious for presence of hemodynamically significant atherosclerotic disease proces    K wnl today     Saw vascular surgery - CT scan findings appear to be chronic. Pt has well collateralization throughout pelvis. No acute vascular intervention indicated at this time. Pt will be followed by vascular.             Aspirin 81 mg daily   atenolol 50 mg daily  Lipitor 40 mg daily  Zithromax to 50 mg daily  Zyrtec 10 mg daily  Fluconazole 100 mg daily  Gabapentin 100 mg at night  Hydralazine 50 mg every 6 hours  Sliding-scale insulin  Zosyn 3.375 IV every 12 hours  Vancomycin pharmacy protocol  Patient on D10 50 cc/h    Lokelma 10 mg 3 times a day    Follow-up with infectious disease and nephrology regarding discharge planning    Repeat the labs  Pt Follow with Dr Rodriguez  regarding PVD        Follow-up the labs for today  PT OT consult            Signed: Janet Daily MD

## 2024-09-21 ENCOUNTER — HOSPITAL ENCOUNTER (INPATIENT)
Facility: HOSPITAL | Age: 64
LOS: 4 days | Discharge: HOSPICE | DRG: 871 | End: 2024-09-25
Attending: INTERNAL MEDICINE | Admitting: STUDENT IN AN ORGANIZED HEALTH CARE EDUCATION/TRAINING PROGRAM
Payer: MEDICARE

## 2024-09-21 PROBLEM — I99.8 VASCULAR INSUFFICIENCY: Status: ACTIVE | Noted: 2024-09-21

## 2024-09-21 LAB
ALBUMIN SERPL-MCNC: 1.3 G/DL (ref 3.5–5)
ALBUMIN/GLOB SERPL: 0.3 (ref 1.1–2.2)
ALP SERPL-CCNC: 88 U/L (ref 45–117)
ALT SERPL-CCNC: 59 U/L (ref 12–78)
ANION GAP SERPL CALC-SCNC: 13 MMOL/L (ref 2–12)
AST SERPL-CCNC: 294 U/L (ref 15–37)
BASOPHILS # BLD: 0 K/UL (ref 0–0.1)
BASOPHILS NFR BLD: 0 % (ref 0–1)
BILIRUB SERPL-MCNC: 0.5 MG/DL (ref 0.2–1)
BUN SERPL-MCNC: 56 MG/DL (ref 6–20)
BUN/CREAT SERPL: 10 (ref 12–20)
CALCIUM SERPL-MCNC: 7.5 MG/DL (ref 8.5–10.1)
CHLORIDE SERPL-SCNC: 100 MMOL/L (ref 97–108)
CO2 SERPL-SCNC: 21 MMOL/L (ref 21–32)
COMMENT:: NORMAL
CREAT SERPL-MCNC: 5.57 MG/DL (ref 0.55–1.02)
CRP SERPL-MCNC: 31.7 MG/DL (ref 0–0.3)
DIFFERENTIAL METHOD BLD: ABNORMAL
ECHO BSA: 1.89 M2
EOSINOPHIL # BLD: 0 K/UL (ref 0–0.4)
EOSINOPHIL NFR BLD: 0 % (ref 0–7)
ERYTHROCYTE [DISTWIDTH] IN BLOOD BY AUTOMATED COUNT: 18.6 % (ref 11.5–14.5)
ERYTHROCYTE [SEDIMENTATION RATE] IN BLOOD: 127 MM/HR (ref 0–30)
GLOBULIN SER CALC-MCNC: 4.7 G/DL (ref 2–4)
GLUCOSE SERPL-MCNC: 113 MG/DL (ref 65–100)
HCT VFR BLD AUTO: 20.3 % (ref 35–47)
HCT VFR BLD AUTO: 24.2 % (ref 35–47)
HGB BLD-MCNC: 6.2 G/DL (ref 11.5–16)
HGB BLD-MCNC: 7.8 G/DL (ref 11.5–16)
HISTORY CHECK: NORMAL
IMM GRANULOCYTES # BLD AUTO: 0.5 K/UL (ref 0–0.04)
IMM GRANULOCYTES NFR BLD AUTO: 2 % (ref 0–0.5)
LYMPHOCYTES # BLD: 2.1 K/UL (ref 0.8–3.5)
LYMPHOCYTES NFR BLD: 8 % (ref 12–49)
MCH RBC QN AUTO: 26.2 PG (ref 26–34)
MCHC RBC AUTO-ENTMCNC: 30.5 G/DL (ref 30–36.5)
MCV RBC AUTO: 85.7 FL (ref 80–99)
MONOCYTES # BLD: 1 K/UL (ref 0–1)
MONOCYTES NFR BLD: 4 % (ref 5–13)
NEUTS SEG # BLD: 22.4 K/UL (ref 1.8–8)
NEUTS SEG NFR BLD: 86 % (ref 32–75)
NRBC # BLD: 0 K/UL (ref 0–0.01)
NRBC BLD-RTO: 0 PER 100 WBC
PLATELET # BLD AUTO: 211 K/UL (ref 150–400)
PMV BLD AUTO: 9.5 FL (ref 8.9–12.9)
POTASSIUM SERPL-SCNC: 4.3 MMOL/L (ref 3.5–5.1)
PROCALCITONIN SERPL-MCNC: 39.85 NG/ML
PROT SERPL-MCNC: 6 G/DL (ref 6.4–8.2)
RBC # BLD AUTO: 2.37 M/UL (ref 3.8–5.2)
RBC MORPH BLD: ABNORMAL
SODIUM SERPL-SCNC: 134 MMOL/L (ref 136–145)
SPECIMEN HOLD: NORMAL
VAS LEFT POP A DIST PSV: 10.3 CM/S
VAS RIGHT ATA DIST PSV: 6.3 CM/S
VAS RIGHT PTA DIST PSV: 17.2 CM/S
WBC # BLD AUTO: 26 K/UL (ref 3.6–11)

## 2024-09-21 PROCEDURE — 86923 COMPATIBILITY TEST ELECTRIC: CPT

## 2024-09-21 PROCEDURE — 6360000002 HC RX W HCPCS: Performed by: STUDENT IN AN ORGANIZED HEALTH CARE EDUCATION/TRAINING PROGRAM

## 2024-09-21 PROCEDURE — 36430 TRANSFUSION BLD/BLD COMPNT: CPT

## 2024-09-21 PROCEDURE — 85018 HEMOGLOBIN: CPT

## 2024-09-21 PROCEDURE — 86140 C-REACTIVE PROTEIN: CPT

## 2024-09-21 PROCEDURE — 36415 COLL VENOUS BLD VENIPUNCTURE: CPT

## 2024-09-21 PROCEDURE — 86901 BLOOD TYPING SEROLOGIC RH(D): CPT

## 2024-09-21 PROCEDURE — 86900 BLOOD TYPING SEROLOGIC ABO: CPT

## 2024-09-21 PROCEDURE — 6370000000 HC RX 637 (ALT 250 FOR IP): Performed by: STUDENT IN AN ORGANIZED HEALTH CARE EDUCATION/TRAINING PROGRAM

## 2024-09-21 PROCEDURE — 2580000003 HC RX 258: Performed by: STUDENT IN AN ORGANIZED HEALTH CARE EDUCATION/TRAINING PROGRAM

## 2024-09-21 PROCEDURE — 2060000000 HC ICU INTERMEDIATE R&B

## 2024-09-21 PROCEDURE — P9016 RBC LEUKOCYTES REDUCED: HCPCS

## 2024-09-21 PROCEDURE — 80053 COMPREHEN METABOLIC PANEL: CPT

## 2024-09-21 PROCEDURE — 85014 HEMATOCRIT: CPT

## 2024-09-21 PROCEDURE — 30233N1 TRANSFUSION OF NONAUTOLOGOUS RED BLOOD CELLS INTO PERIPHERAL VEIN, PERCUTANEOUS APPROACH: ICD-10-PCS | Performed by: STUDENT IN AN ORGANIZED HEALTH CARE EDUCATION/TRAINING PROGRAM

## 2024-09-21 PROCEDURE — 87040 BLOOD CULTURE FOR BACTERIA: CPT

## 2024-09-21 PROCEDURE — 85025 COMPLETE CBC W/AUTO DIFF WBC: CPT

## 2024-09-21 PROCEDURE — 93925 LOWER EXTREMITY STUDY: CPT | Performed by: SURGERY

## 2024-09-21 PROCEDURE — 86850 RBC ANTIBODY SCREEN: CPT

## 2024-09-21 PROCEDURE — 84145 PROCALCITONIN (PCT): CPT

## 2024-09-21 PROCEDURE — 85652 RBC SED RATE AUTOMATED: CPT

## 2024-09-21 RX ORDER — SEVELAMER CARBONATE FOR ORAL SUSPENSION 2400 MG/1
2.4 POWDER, FOR SUSPENSION ORAL
Status: DISCONTINUED | OUTPATIENT
Start: 2024-09-21 | End: 2024-09-25 | Stop reason: HOSPADM

## 2024-09-21 RX ORDER — SODIUM CHLORIDE 0.9 % (FLUSH) 0.9 %
5-40 SYRINGE (ML) INJECTION EVERY 12 HOURS SCHEDULED
Status: DISCONTINUED | OUTPATIENT
Start: 2024-09-21 | End: 2024-09-25 | Stop reason: HOSPADM

## 2024-09-21 RX ORDER — ONDANSETRON 2 MG/ML
4 INJECTION INTRAMUSCULAR; INTRAVENOUS EVERY 6 HOURS PRN
Status: DISCONTINUED | OUTPATIENT
Start: 2024-09-21 | End: 2024-09-25 | Stop reason: HOSPADM

## 2024-09-21 RX ORDER — SODIUM CHLORIDE 0.9 % (FLUSH) 0.9 %
5-40 SYRINGE (ML) INJECTION PRN
Status: DISCONTINUED | OUTPATIENT
Start: 2024-09-21 | End: 2024-09-25 | Stop reason: HOSPADM

## 2024-09-21 RX ORDER — SODIUM CHLORIDE 9 MG/ML
INJECTION, SOLUTION INTRAVENOUS PRN
Status: DISCONTINUED | OUTPATIENT
Start: 2024-09-21 | End: 2024-09-25 | Stop reason: HOSPADM

## 2024-09-21 RX ORDER — ACETAMINOPHEN 650 MG/1
650 SUPPOSITORY RECTAL EVERY 6 HOURS PRN
Status: DISCONTINUED | OUTPATIENT
Start: 2024-09-21 | End: 2024-09-25 | Stop reason: HOSPADM

## 2024-09-21 RX ORDER — ACETAMINOPHEN 325 MG/1
650 TABLET ORAL EVERY 6 HOURS PRN
Status: DISCONTINUED | OUTPATIENT
Start: 2024-09-21 | End: 2024-09-25 | Stop reason: HOSPADM

## 2024-09-21 RX ADMIN — SODIUM ZIRCONIUM CYCLOSILICATE 10 G: 5 POWDER, FOR SUSPENSION ORAL at 09:46

## 2024-09-21 RX ADMIN — SODIUM CHLORIDE, PRESERVATIVE FREE 10 ML: 5 INJECTION INTRAVENOUS at 17:27

## 2024-09-21 RX ADMIN — SODIUM ZIRCONIUM CYCLOSILICATE 10 G: 5 POWDER, FOR SUSPENSION ORAL at 20:55

## 2024-09-21 RX ADMIN — SODIUM CHLORIDE: 9 INJECTION, SOLUTION INTRAVENOUS at 21:08

## 2024-09-21 RX ADMIN — MEROPENEM 500 MG: 500 INJECTION, POWDER, FOR SOLUTION INTRAVENOUS at 21:11

## 2024-09-21 RX ADMIN — PANTOPRAZOLE SODIUM 40 MG: 40 INJECTION, POWDER, FOR SOLUTION INTRAVENOUS at 20:54

## 2024-09-21 RX ADMIN — SEVELAMER CARBONATE FOR ORAL SUSPENSION 2.4 G: 2400 POWDER, FOR SUSPENSION ORAL at 09:47

## 2024-09-21 RX ADMIN — SODIUM CHLORIDE, PRESERVATIVE FREE 10 ML: 5 INJECTION INTRAVENOUS at 20:59

## 2024-09-21 RX ADMIN — PANTOPRAZOLE SODIUM 40 MG: 40 INJECTION, POWDER, FOR SOLUTION INTRAVENOUS at 09:49

## 2024-09-21 RX ADMIN — MICAFUNGIN SODIUM 100 MG: 100 INJECTION, POWDER, LYOPHILIZED, FOR SOLUTION INTRAVENOUS at 17:26

## 2024-09-21 ASSESSMENT — PAIN SCALES - GENERAL
PAINLEVEL_OUTOF10: 0
PAINLEVEL_OUTOF10: 0
PAINLEVEL_OUTOF10: 5
PAINLEVEL_OUTOF10: 0

## 2024-09-21 ASSESSMENT — PAIN DESCRIPTION - LOCATION: LOCATION: HEAD

## 2024-09-21 ASSESSMENT — PAIN DESCRIPTION - PAIN TYPE: TYPE: ACUTE PAIN

## 2024-09-21 ASSESSMENT — PAIN SCALES - WONG BAKER
WONGBAKER_NUMERICALRESPONSE: NO HURT

## 2024-09-21 ASSESSMENT — PAIN DESCRIPTION - ORIENTATION: ORIENTATION: ANTERIOR

## 2024-09-21 ASSESSMENT — PAIN DESCRIPTION - DESCRIPTORS: DESCRIPTORS: ACHING

## 2024-09-21 ASSESSMENT — PAIN - FUNCTIONAL ASSESSMENT: PAIN_FUNCTIONAL_ASSESSMENT: ACTIVITIES ARE NOT PREVENTED

## 2024-09-21 NOTE — PROGRESS NOTES
Pt transferred to [facility]. Transferred @ 10:28. Pt  showed no signs or symptoms of distress. EMTALA signed

## 2024-09-22 LAB
ABO + RH BLD: NORMAL
ALBUMIN SERPL-MCNC: 1.4 G/DL (ref 3.5–5)
ALBUMIN/GLOB SERPL: 0.3 (ref 1.1–2.2)
ALP SERPL-CCNC: 86 U/L (ref 45–117)
ALT SERPL-CCNC: 48 U/L (ref 12–78)
ANION GAP SERPL CALC-SCNC: 15 MMOL/L (ref 2–12)
AST SERPL-CCNC: 213 U/L (ref 15–37)
BACTERIA SPEC CULT: NORMAL
BACTERIA SPEC CULT: NORMAL
BASOPHILS # BLD: 0.1 K/UL (ref 0–0.1)
BASOPHILS NFR BLD: 0 % (ref 0–1)
BILIRUB SERPL-MCNC: 0.5 MG/DL (ref 0.2–1)
BLD PROD TYP BPU: NORMAL
BLOOD BANK BLOOD PRODUCT EXPIRATION DATE: NORMAL
BLOOD BANK DISPENSE STATUS: NORMAL
BLOOD BANK ISBT PRODUCT BLOOD TYPE: 5100
BLOOD BANK PRODUCT CODE: NORMAL
BLOOD BANK UNIT TYPE AND RH: NORMAL
BLOOD GROUP ANTIBODIES SERPL: NORMAL
BPU ID: NORMAL
BUN SERPL-MCNC: 69 MG/DL (ref 6–20)
BUN/CREAT SERPL: 11 (ref 12–20)
CALCIUM SERPL-MCNC: 7.5 MG/DL (ref 8.5–10.1)
CHLORIDE SERPL-SCNC: 99 MMOL/L (ref 97–108)
CO2 SERPL-SCNC: 22 MMOL/L (ref 21–32)
CREAT SERPL-MCNC: 6.33 MG/DL (ref 0.55–1.02)
CROSSMATCH RESULT: NORMAL
DIFFERENTIAL METHOD BLD: ABNORMAL
EOSINOPHIL # BLD: 0.2 K/UL (ref 0–0.4)
EOSINOPHIL NFR BLD: 1 % (ref 0–7)
ERYTHROCYTE [DISTWIDTH] IN BLOOD BY AUTOMATED COUNT: 17.2 % (ref 11.5–14.5)
GLOBULIN SER CALC-MCNC: 4.8 G/DL (ref 2–4)
GLUCOSE SERPL-MCNC: 140 MG/DL (ref 65–100)
HCT VFR BLD AUTO: 25.3 % (ref 35–47)
HGB BLD-MCNC: 8 G/DL (ref 11.5–16)
IMM GRANULOCYTES # BLD AUTO: 0.4 K/UL (ref 0–0.04)
IMM GRANULOCYTES NFR BLD AUTO: 2 % (ref 0–0.5)
LYMPHOCYTES # BLD: 2 K/UL (ref 0.8–3.5)
LYMPHOCYTES NFR BLD: 9 % (ref 12–49)
Lab: NORMAL
Lab: NORMAL
MCH RBC QN AUTO: 27.3 PG (ref 26–34)
MCHC RBC AUTO-ENTMCNC: 31.6 G/DL (ref 30–36.5)
MCV RBC AUTO: 86.3 FL (ref 80–99)
MONOCYTES # BLD: 1 K/UL (ref 0–1)
MONOCYTES NFR BLD: 5 % (ref 5–13)
NEUTS SEG # BLD: 18.9 K/UL (ref 1.8–8)
NEUTS SEG NFR BLD: 83 % (ref 32–75)
NRBC # BLD: 0 K/UL (ref 0–0.01)
NRBC BLD-RTO: 0 PER 100 WBC
PLATELET # BLD AUTO: 216 K/UL (ref 150–400)
PMV BLD AUTO: 9.2 FL (ref 8.9–12.9)
POTASSIUM SERPL-SCNC: 4.3 MMOL/L (ref 3.5–5.1)
PROT SERPL-MCNC: 6.2 G/DL (ref 6.4–8.2)
RBC # BLD AUTO: 2.93 M/UL (ref 3.8–5.2)
SODIUM SERPL-SCNC: 136 MMOL/L (ref 136–145)
SPECIMEN EXP DATE BLD: NORMAL
UNIT DIVISION: 0
UNIT ISSUE DATE/TIME: NORMAL
WBC # BLD AUTO: 22.5 K/UL (ref 3.6–11)

## 2024-09-22 PROCEDURE — 6360000002 HC RX W HCPCS: Performed by: STUDENT IN AN ORGANIZED HEALTH CARE EDUCATION/TRAINING PROGRAM

## 2024-09-22 PROCEDURE — 85025 COMPLETE CBC W/AUTO DIFF WBC: CPT

## 2024-09-22 PROCEDURE — 2060000000 HC ICU INTERMEDIATE R&B

## 2024-09-22 PROCEDURE — 36415 COLL VENOUS BLD VENIPUNCTURE: CPT

## 2024-09-22 PROCEDURE — 2580000003 HC RX 258: Performed by: STUDENT IN AN ORGANIZED HEALTH CARE EDUCATION/TRAINING PROGRAM

## 2024-09-22 PROCEDURE — 6370000000 HC RX 637 (ALT 250 FOR IP): Performed by: STUDENT IN AN ORGANIZED HEALTH CARE EDUCATION/TRAINING PROGRAM

## 2024-09-22 PROCEDURE — 80053 COMPREHEN METABOLIC PANEL: CPT

## 2024-09-22 RX ADMIN — ACETAMINOPHEN 650 MG: 325 TABLET ORAL at 20:14

## 2024-09-22 RX ADMIN — SEVELAMER CARBONATE FOR ORAL SUSPENSION 2.4 G: 2400 POWDER, FOR SUSPENSION ORAL at 12:04

## 2024-09-22 RX ADMIN — PANTOPRAZOLE SODIUM 40 MG: 40 INJECTION, POWDER, FOR SOLUTION INTRAVENOUS at 20:14

## 2024-09-22 RX ADMIN — MEROPENEM 500 MG: 500 INJECTION, POWDER, FOR SOLUTION INTRAVENOUS at 20:24

## 2024-09-22 RX ADMIN — SODIUM CHLORIDE, PRESERVATIVE FREE 10 ML: 5 INJECTION INTRAVENOUS at 12:09

## 2024-09-22 RX ADMIN — PANTOPRAZOLE SODIUM 40 MG: 40 INJECTION, POWDER, FOR SOLUTION INTRAVENOUS at 12:07

## 2024-09-22 RX ADMIN — SODIUM CHLORIDE, PRESERVATIVE FREE 10 ML: 5 INJECTION INTRAVENOUS at 20:15

## 2024-09-22 RX ADMIN — SODIUM ZIRCONIUM CYCLOSILICATE 10 G: 5 POWDER, FOR SUSPENSION ORAL at 20:14

## 2024-09-22 RX ADMIN — MICAFUNGIN SODIUM 100 MG: 100 INJECTION, POWDER, LYOPHILIZED, FOR SOLUTION INTRAVENOUS at 17:43

## 2024-09-22 ASSESSMENT — PAIN DESCRIPTION - DESCRIPTORS
DESCRIPTORS: ACHING

## 2024-09-22 ASSESSMENT — PAIN DESCRIPTION - ORIENTATION
ORIENTATION: ANTERIOR

## 2024-09-22 ASSESSMENT — PAIN DESCRIPTION - LOCATION
LOCATION: ABDOMEN

## 2024-09-22 ASSESSMENT — PAIN SCALES - GENERAL
PAINLEVEL_OUTOF10: 1
PAINLEVEL_OUTOF10: 0
PAINLEVEL_OUTOF10: 5
PAINLEVEL_OUTOF10: 3
PAINLEVEL_OUTOF10: 7
PAINLEVEL_OUTOF10: 2

## 2024-09-22 ASSESSMENT — PAIN - FUNCTIONAL ASSESSMENT
PAIN_FUNCTIONAL_ASSESSMENT: ACTIVITIES ARE NOT PREVENTED

## 2024-09-22 ASSESSMENT — PAIN DESCRIPTION - PAIN TYPE
TYPE: ACUTE PAIN
TYPE: ACUTE PAIN

## 2024-09-22 ASSESSMENT — PAIN SCALES - WONG BAKER: WONGBAKER_NUMERICALRESPONSE: NO HURT

## 2024-09-23 LAB
ABO + RH BLD: NORMAL
BLD PROD TYP BPU: NORMAL
BLOOD BANK DISPENSE STATUS: NORMAL
BLOOD GROUP ANTIBODIES SERPL: NEGATIVE
BPU ID: NORMAL
CROSSMATCH RESULT: NORMAL
SPECIMEN EXP DATE BLD: NORMAL
TRANSFUSION STATUS PATIENT QL: NORMAL
UNIT DIVISION: 0
VANCOMYCIN SERPL-MCNC: 18.9 UG/ML

## 2024-09-23 PROCEDURE — 6360000002 HC RX W HCPCS: Performed by: STUDENT IN AN ORGANIZED HEALTH CARE EDUCATION/TRAINING PROGRAM

## 2024-09-23 PROCEDURE — 2580000003 HC RX 258: Performed by: STUDENT IN AN ORGANIZED HEALTH CARE EDUCATION/TRAINING PROGRAM

## 2024-09-23 PROCEDURE — 36415 COLL VENOUS BLD VENIPUNCTURE: CPT

## 2024-09-23 PROCEDURE — 2060000000 HC ICU INTERMEDIATE R&B

## 2024-09-23 PROCEDURE — 36556 INSERT NON-TUNNEL CV CATH: CPT

## 2024-09-23 PROCEDURE — 2580000003 HC RX 258: Performed by: INTERNAL MEDICINE

## 2024-09-23 PROCEDURE — 80202 ASSAY OF VANCOMYCIN: CPT

## 2024-09-23 PROCEDURE — 6360000002 HC RX W HCPCS: Performed by: INTERNAL MEDICINE

## 2024-09-23 PROCEDURE — 6370000000 HC RX 637 (ALT 250 FOR IP): Performed by: NURSE PRACTITIONER

## 2024-09-23 PROCEDURE — 6370000000 HC RX 637 (ALT 250 FOR IP): Performed by: STUDENT IN AN ORGANIZED HEALTH CARE EDUCATION/TRAINING PROGRAM

## 2024-09-23 PROCEDURE — 99223 1ST HOSP IP/OBS HIGH 75: CPT | Performed by: INTERNAL MEDICINE

## 2024-09-23 RX ORDER — POLYETHYLENE GLYCOL 3350 17 G/17G
17 POWDER, FOR SOLUTION ORAL 2 TIMES DAILY
Status: DISCONTINUED | OUTPATIENT
Start: 2024-09-23 | End: 2024-09-25 | Stop reason: HOSPADM

## 2024-09-23 RX ORDER — BISACODYL 10 MG
10 SUPPOSITORY, RECTAL RECTAL ONCE
Status: COMPLETED | OUTPATIENT
Start: 2024-09-23 | End: 2024-09-23

## 2024-09-23 RX ORDER — METRONIDAZOLE 500 MG/100ML
500 INJECTION, SOLUTION INTRAVENOUS EVERY 12 HOURS
Status: DISCONTINUED | OUTPATIENT
Start: 2024-09-23 | End: 2024-09-24

## 2024-09-23 RX ADMIN — ACETAMINOPHEN 650 MG: 325 TABLET ORAL at 10:42

## 2024-09-23 RX ADMIN — METRONIDAZOLE 500 MG: 500 INJECTION, SOLUTION INTRAVENOUS at 15:02

## 2024-09-23 RX ADMIN — BISACODYL 10 MG: 10 SUPPOSITORY RECTAL at 13:53

## 2024-09-23 RX ADMIN — EPOETIN ALFA-EPBX 8000 UNITS: 4000 INJECTION, SOLUTION INTRAVENOUS; SUBCUTANEOUS at 18:43

## 2024-09-23 RX ADMIN — SEVELAMER CARBONATE FOR ORAL SUSPENSION 2.4 G: 2400 POWDER, FOR SUSPENSION ORAL at 13:53

## 2024-09-23 RX ADMIN — POLYETHYLENE GLYCOL 3350 17 G: 17 POWDER, FOR SOLUTION ORAL at 14:12

## 2024-09-23 RX ADMIN — PANTOPRAZOLE SODIUM 40 MG: 40 INJECTION, POWDER, FOR SOLUTION INTRAVENOUS at 08:24

## 2024-09-23 RX ADMIN — POLYETHYLENE GLYCOL 3350 17 G: 17 POWDER, FOR SOLUTION ORAL at 20:27

## 2024-09-23 RX ADMIN — SEVELAMER CARBONATE FOR ORAL SUSPENSION 2.4 G: 2400 POWDER, FOR SUSPENSION ORAL at 08:23

## 2024-09-23 RX ADMIN — PANTOPRAZOLE SODIUM 40 MG: 40 INJECTION, POWDER, FOR SOLUTION INTRAVENOUS at 20:27

## 2024-09-23 RX ADMIN — CEFEPIME 1000 MG: 1 INJECTION, POWDER, FOR SOLUTION INTRAMUSCULAR; INTRAVENOUS at 14:17

## 2024-09-23 RX ADMIN — SODIUM CHLORIDE, PRESERVATIVE FREE 10 ML: 5 INJECTION INTRAVENOUS at 08:24

## 2024-09-23 RX ADMIN — SODIUM CHLORIDE, PRESERVATIVE FREE 10 ML: 5 INJECTION INTRAVENOUS at 20:27

## 2024-09-23 ASSESSMENT — PAIN DESCRIPTION - DESCRIPTORS: DESCRIPTORS: ACHING

## 2024-09-23 ASSESSMENT — PAIN SCALES - GENERAL
PAINLEVEL_OUTOF10: 0
PAINLEVEL_OUTOF10: 0
PAINLEVEL_OUTOF10: 1
PAINLEVEL_OUTOF10: 3
PAINLEVEL_OUTOF10: 1
PAINLEVEL_OUTOF10: 2
PAINLEVEL_OUTOF10: 1

## 2024-09-23 ASSESSMENT — PAIN DESCRIPTION - PAIN TYPE: TYPE: ACUTE PAIN

## 2024-09-23 ASSESSMENT — PAIN - FUNCTIONAL ASSESSMENT: PAIN_FUNCTIONAL_ASSESSMENT: ACTIVITIES ARE NOT PREVENTED

## 2024-09-23 ASSESSMENT — PAIN DESCRIPTION - ORIENTATION: ORIENTATION: ANTERIOR

## 2024-09-23 ASSESSMENT — PAIN DESCRIPTION - LOCATION: LOCATION: HEAD

## 2024-09-24 ENCOUNTER — APPOINTMENT (OUTPATIENT)
Facility: HOSPITAL | Age: 64
DRG: 871 | End: 2024-09-24
Attending: INTERNAL MEDICINE
Payer: MEDICARE

## 2024-09-24 PROBLEM — N18.4 CONTROLLED TYPE 2 DIABETES MELLITUS WITH STAGE 4 CHRONIC KIDNEY DISEASE (HCC): Status: ACTIVE | Noted: 2024-09-24

## 2024-09-24 PROBLEM — N28.0: Status: ACTIVE | Noted: 2024-09-24

## 2024-09-24 PROBLEM — I70.0 OCCLUSION OF AORTA (HCC): Status: ACTIVE | Noted: 2024-09-24

## 2024-09-24 PROBLEM — A49.3 MYCOPLASMA INFECTION: Status: ACTIVE | Noted: 2024-09-24

## 2024-09-24 PROBLEM — E11.22 CONTROLLED TYPE 2 DIABETES MELLITUS WITH STAGE 4 CHRONIC KIDNEY DISEASE (HCC): Status: ACTIVE | Noted: 2024-09-24

## 2024-09-24 PROBLEM — B37.49 CANDIDAL UTI (URINARY TRACT INFECTION): Status: ACTIVE | Noted: 2024-09-12

## 2024-09-24 LAB
BACTERIA SPEC CULT: NORMAL
Lab: NORMAL
Lab: NORMAL
SERVICE CMNT-IMP: NORMAL

## 2024-09-24 PROCEDURE — 2580000003 HC RX 258: Performed by: STUDENT IN AN ORGANIZED HEALTH CARE EDUCATION/TRAINING PROGRAM

## 2024-09-24 PROCEDURE — 99233 SBSQ HOSP IP/OBS HIGH 50: CPT | Performed by: INTERNAL MEDICINE

## 2024-09-24 PROCEDURE — 6360000002 HC RX W HCPCS: Performed by: INTERNAL MEDICINE

## 2024-09-24 PROCEDURE — 2060000000 HC ICU INTERMEDIATE R&B

## 2024-09-24 PROCEDURE — 6360000002 HC RX W HCPCS: Performed by: STUDENT IN AN ORGANIZED HEALTH CARE EDUCATION/TRAINING PROGRAM

## 2024-09-24 PROCEDURE — 74018 RADEX ABDOMEN 1 VIEW: CPT

## 2024-09-24 PROCEDURE — 2580000003 HC RX 258: Performed by: INTERNAL MEDICINE

## 2024-09-24 PROCEDURE — 6370000000 HC RX 637 (ALT 250 FOR IP): Performed by: STUDENT IN AN ORGANIZED HEALTH CARE EDUCATION/TRAINING PROGRAM

## 2024-09-24 PROCEDURE — 6370000000 HC RX 637 (ALT 250 FOR IP): Performed by: NURSE PRACTITIONER

## 2024-09-24 RX ORDER — METRONIDAZOLE 500 MG/100ML
500 INJECTION, SOLUTION INTRAVENOUS EVERY 8 HOURS
Status: DISCONTINUED | OUTPATIENT
Start: 2024-09-24 | End: 2024-09-25 | Stop reason: HOSPADM

## 2024-09-24 RX ORDER — OXYCODONE HYDROCHLORIDE 5 MG/1
2.5 TABLET ORAL ONCE
Status: COMPLETED | OUTPATIENT
Start: 2024-09-24 | End: 2024-09-24

## 2024-09-24 RX ADMIN — VANCOMYCIN HYDROCHLORIDE 750 MG: 750 INJECTION, POWDER, LYOPHILIZED, FOR SOLUTION INTRAVENOUS at 15:29

## 2024-09-24 RX ADMIN — SODIUM CHLORIDE 20 ML/HR: 9 INJECTION, SOLUTION INTRAVENOUS at 14:51

## 2024-09-24 RX ADMIN — CEFEPIME 1000 MG: 1 INJECTION, POWDER, FOR SOLUTION INTRAMUSCULAR; INTRAVENOUS at 14:57

## 2024-09-24 RX ADMIN — SODIUM CHLORIDE, PRESERVATIVE FREE 10 ML: 5 INJECTION INTRAVENOUS at 09:34

## 2024-09-24 RX ADMIN — METRONIDAZOLE 500 MG: 500 INJECTION, SOLUTION INTRAVENOUS at 20:38

## 2024-09-24 RX ADMIN — SODIUM CHLORIDE, PRESERVATIVE FREE 10 ML: 5 INJECTION INTRAVENOUS at 20:40

## 2024-09-24 RX ADMIN — PANTOPRAZOLE SODIUM 40 MG: 40 INJECTION, POWDER, FOR SOLUTION INTRAVENOUS at 09:34

## 2024-09-24 RX ADMIN — ACETAMINOPHEN 650 MG: 325 TABLET ORAL at 16:36

## 2024-09-24 RX ADMIN — SEVELAMER CARBONATE FOR ORAL SUSPENSION 2.4 G: 2400 POWDER, FOR SUSPENSION ORAL at 16:38

## 2024-09-24 RX ADMIN — OXYCODONE HYDROCHLORIDE 2.5 MG: 5 TABLET ORAL at 21:59

## 2024-09-24 RX ADMIN — PANTOPRAZOLE SODIUM 40 MG: 40 INJECTION, POWDER, FOR SOLUTION INTRAVENOUS at 20:37

## 2024-09-24 RX ADMIN — METRONIDAZOLE 500 MG: 500 INJECTION, SOLUTION INTRAVENOUS at 02:02

## 2024-09-24 ASSESSMENT — PAIN DESCRIPTION - PAIN TYPE: TYPE: CHRONIC PAIN

## 2024-09-24 ASSESSMENT — PAIN DESCRIPTION - DESCRIPTORS
DESCRIPTORS: ACHING;DISCOMFORT;NAGGING
DESCRIPTORS: ITCHING;SHARP
DESCRIPTORS: ACHING

## 2024-09-24 ASSESSMENT — PAIN DESCRIPTION - LOCATION
LOCATION: BACK
LOCATION: BACK
LOCATION: GENERALIZED

## 2024-09-24 ASSESSMENT — PAIN SCALES - GENERAL
PAINLEVEL_OUTOF10: 0
PAINLEVEL_OUTOF10: 0
PAINLEVEL_OUTOF10: 9
PAINLEVEL_OUTOF10: 0
PAINLEVEL_OUTOF10: 10
PAINLEVEL_OUTOF10: 6
PAINLEVEL_OUTOF10: 0

## 2024-09-24 ASSESSMENT — PAIN DESCRIPTION - ORIENTATION
ORIENTATION: POSTERIOR
ORIENTATION: POSTERIOR;ANTERIOR

## 2024-09-24 NOTE — DISCHARGE SUMMARY
History and Physical    NAME:   Brii Lambert   :  1960   MRN:  [id number]     Date/Time: 2024 8:04 PM    Patient PCP: Janet Daily MD  ______________________________________________________________________       Subjective:     CHIEF COMPLAINT:     Abdominal pain, Pneumonia     HISTORY OF PRESENT ILLNESS:     General Daily Progress Note  Patient is a 64 y.o. year old female past medical history of end-stage renal disease on hemodialysis, hypertension, chronic bilateral cerebellar and pontine strokes, diabetic neuropathy presented to the ER yesterday for evaluation of abdominal, lower extremity pain. Pt was recently discharged from [place] and sent to [place] for rehab.  Patient reportedly missed several days of dialysis due to abdominal pain. Patient states she has not had a bowel movement in 14-15 days. Patient missed Monday dialysis and O2 sat was 70% on 10 L nonrebreather mask with grunting.  Patient seen in the ED, white count 31.1, potassium 5.9, BUN 78, creatinine 7.1 hemoglobin 7.0.    CTA of the abdomen  1. Occlusion of the infrarenal abdominal aorta. There is weak reconstitution of  the bilateral iliac trunks.  2. Penetrating atherosclerotic ulceration of the descending thoracic aorta.  3. 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 The left renal artery is occluded,  with left renal atrophy.  4.Multifocal stenosis of the right SFA, with occlusion of the right posterior  tibial artery.  5. Multifocal airspace disease favoring pneumonia.  6. Small left pleural effusion.  7. The bladder is severely distended. Any clinical concern for urinary retention  or urinary outlet obstruction?    ER doctor spoke with general surgeon determined artery disease chronic.  Patient seen in ED seems confused at times. Pt states she is wheelchair-bound normally. Denies back pain. No LE swelling.   Patient be admitted for further evaluation and treatment.    Consulted by Nephrology - pt came in with

## 2024-09-25 VITALS
RESPIRATION RATE: 19 BRPM | HEART RATE: 86 BPM | OXYGEN SATURATION: 96 % | DIASTOLIC BLOOD PRESSURE: 60 MMHG | SYSTOLIC BLOOD PRESSURE: 154 MMHG | TEMPERATURE: 97.8 F

## 2024-09-25 PROCEDURE — 2580000003 HC RX 258: Performed by: INTERNAL MEDICINE

## 2024-09-25 PROCEDURE — 6370000000 HC RX 637 (ALT 250 FOR IP): Performed by: STUDENT IN AN ORGANIZED HEALTH CARE EDUCATION/TRAINING PROGRAM

## 2024-09-25 PROCEDURE — 2580000003 HC RX 258: Performed by: STUDENT IN AN ORGANIZED HEALTH CARE EDUCATION/TRAINING PROGRAM

## 2024-09-25 PROCEDURE — 6360000002 HC RX W HCPCS: Performed by: INTERNAL MEDICINE

## 2024-09-25 PROCEDURE — 6360000002 HC RX W HCPCS: Performed by: STUDENT IN AN ORGANIZED HEALTH CARE EDUCATION/TRAINING PROGRAM

## 2024-09-25 RX ORDER — SEVELAMER CARBONATE FOR ORAL SUSPENSION 2400 MG/1
2.4 POWDER, FOR SUSPENSION ORAL
Qty: 90 EACH | Refills: 0 | Status: SHIPPED | OUTPATIENT
Start: 2024-09-25

## 2024-09-25 RX ORDER — PANTOPRAZOLE SODIUM 40 MG/1
40 TABLET, DELAYED RELEASE ORAL 2 TIMES DAILY
Qty: 90 TABLET | Refills: 0 | Status: SHIPPED | OUTPATIENT
Start: 2024-09-25

## 2024-09-25 RX ADMIN — METRONIDAZOLE 500 MG: 500 INJECTION, SOLUTION INTRAVENOUS at 15:28

## 2024-09-25 RX ADMIN — SEVELAMER CARBONATE FOR ORAL SUSPENSION 2.4 G: 2400 POWDER, FOR SUSPENSION ORAL at 12:15

## 2024-09-25 RX ADMIN — METRONIDAZOLE 500 MG: 500 INJECTION, SOLUTION INTRAVENOUS at 04:45

## 2024-09-25 RX ADMIN — CEFEPIME 1000 MG: 1 INJECTION, POWDER, FOR SOLUTION INTRAMUSCULAR; INTRAVENOUS at 14:14

## 2024-09-25 RX ADMIN — SODIUM CHLORIDE, PRESERVATIVE FREE 10 ML: 5 INJECTION INTRAVENOUS at 08:43

## 2024-09-25 RX ADMIN — PANTOPRAZOLE SODIUM 40 MG: 40 INJECTION, POWDER, FOR SOLUTION INTRAVENOUS at 08:43

## 2024-09-25 RX ADMIN — ACETAMINOPHEN 650 MG: 325 TABLET ORAL at 04:09

## 2024-09-25 RX ADMIN — SEVELAMER CARBONATE FOR ORAL SUSPENSION 2.4 G: 2400 POWDER, FOR SUSPENSION ORAL at 08:42

## 2024-09-25 ASSESSMENT — PAIN SCALES - GENERAL: PAINLEVEL_OUTOF10: 7

## 2024-09-25 ASSESSMENT — PAIN DESCRIPTION - ORIENTATION: ORIENTATION: ANTERIOR;POSTERIOR

## 2024-09-25 ASSESSMENT — PAIN DESCRIPTION - DESCRIPTORS: DESCRIPTORS: DISCOMFORT;ACHING;NAGGING

## 2024-09-25 ASSESSMENT — PAIN DESCRIPTION - LOCATION: LOCATION: GENERALIZED

## 2024-09-26 LAB
BACTERIA SPEC CULT: NORMAL
BACTERIA SPEC CULT: NORMAL
SERVICE CMNT-IMP: NORMAL
SERVICE CMNT-IMP: NORMAL

## 2024-10-05 PROBLEM — I73.9 PVD (PERIPHERAL VASCULAR DISEASE) (HCC): Status: ACTIVE | Noted: 2024-10-05

## 2024-10-05 PROBLEM — E87.70 HYPERVOLEMIA: Status: ACTIVE | Noted: 2024-10-05

## 2024-11-21 NOTE — PROGRESS NOTES
Physician Progress Note      PATIENT:               YOSEF CHAU  [facility] #:                  [id number]  :                       1960  ADMIT DATE:       2024 10:31 AM  DISCH DATE:        2024 11:00 PM  RESPONDING  PROVIDER #:        Janet Daily MD          QUERY TEXT:    Pt admitted with pneumonia.  Pt noted to be treated for 5 days with Zosyn. If   possible, please document in the progress notes and discharge summary if you   are evaluating and/or treating any of the following:    The medical record reflects the following:  Risk Factors: 64 y.o. year old female past medical history of end-stage renal   disease on hemodialysis, hypertension, chronic bilateral cerebellar and   pontine strokes, diabetic neuropathy presented to the ER yesterday for   evaluation of abdominal, lower extremity pain.  Clinical Indicators:  CT - Multifocal airspace disease favoring pneumonia  Per H&P - Pneumonia   ID: Presumptive Mycoplasma multifocal pneumonia  based on CT scan, status   post Azithromycin   Mycoplasma pneumo IgM Reactive Abnormal  Treatment: Zosyn 3375 mg 9/10 -     Thank you,  Romelia Delvalle RN, CDI  [email or web address]  >  Options provided:  -- Probable gram negative pneumonia  -- Other - I will add my own diagnosis  -- Disagree - Not applicable / Not valid  -- Disagree - Clinically unable to determine / Unknown  -- Refer to Clinical Documentation Reviewer    PROVIDER RESPONSE TEXT:    This patient has probable gram negative pneumonia.    Query created by: Romelia Delvalle on 11/15/2024 3:47 PM      Electronically signed by:  Janet Daily MD 2024 1:12 AM